# Patient Record
Sex: FEMALE | Race: OTHER | HISPANIC OR LATINO
[De-identification: names, ages, dates, MRNs, and addresses within clinical notes are randomized per-mention and may not be internally consistent; named-entity substitution may affect disease eponyms.]

---

## 2017-01-03 ENCOUNTER — APPOINTMENT (OUTPATIENT)
Dept: SURGERY | Facility: CLINIC | Age: 42
End: 2017-01-03

## 2017-01-03 VITALS
WEIGHT: 159.13 LBS | TEMPERATURE: 98.2 F | HEIGHT: 61 IN | DIASTOLIC BLOOD PRESSURE: 80 MMHG | HEART RATE: 73 BPM | OXYGEN SATURATION: 98 % | BODY MASS INDEX: 30.04 KG/M2 | SYSTOLIC BLOOD PRESSURE: 118 MMHG

## 2017-01-03 RX ORDER — ERGOCALCIFEROL 1.25 MG/1
1.25 MG CAPSULE, LIQUID FILLED ORAL
Qty: 4 | Refills: 0 | Status: ACTIVE | COMMUNITY
Start: 2016-12-22

## 2017-01-05 ENCOUNTER — OUTPATIENT (OUTPATIENT)
Dept: OUTPATIENT SERVICES | Facility: HOSPITAL | Age: 42
LOS: 1 days | End: 2017-01-05
Payer: COMMERCIAL

## 2017-01-05 DIAGNOSIS — Z90.49 ACQUIRED ABSENCE OF OTHER SPECIFIED PARTS OF DIGESTIVE TRACT: Chronic | ICD-10-CM

## 2017-01-05 DIAGNOSIS — Z90.710 ACQUIRED ABSENCE OF BOTH CERVIX AND UTERUS: Chronic | ICD-10-CM

## 2017-01-05 PROCEDURE — 74245: CPT

## 2017-01-05 PROCEDURE — 74245: CPT | Mod: 26

## 2017-01-17 ENCOUNTER — OUTPATIENT (OUTPATIENT)
Dept: OUTPATIENT SERVICES | Facility: HOSPITAL | Age: 42
LOS: 1 days | End: 2017-01-17
Payer: COMMERCIAL

## 2017-01-17 ENCOUNTER — APPOINTMENT (OUTPATIENT)
Dept: SURGERY | Facility: CLINIC | Age: 42
End: 2017-01-17

## 2017-01-17 ENCOUNTER — LABORATORY RESULT (OUTPATIENT)
Age: 42
End: 2017-01-17

## 2017-01-17 VITALS
TEMPERATURE: 97.8 F | BODY MASS INDEX: 30.8 KG/M2 | OXYGEN SATURATION: 98 % | HEART RATE: 58 BPM | HEIGHT: 61 IN | WEIGHT: 163.13 LBS | SYSTOLIC BLOOD PRESSURE: 132 MMHG | DIASTOLIC BLOOD PRESSURE: 82 MMHG

## 2017-01-17 DIAGNOSIS — Z90.710 ACQUIRED ABSENCE OF BOTH CERVIX AND UTERUS: Chronic | ICD-10-CM

## 2017-01-17 DIAGNOSIS — Z90.49 ACQUIRED ABSENCE OF OTHER SPECIFIED PARTS OF DIGESTIVE TRACT: Chronic | ICD-10-CM

## 2017-01-17 DIAGNOSIS — Z01.818 ENCOUNTER FOR OTHER PREPROCEDURAL EXAMINATION: ICD-10-CM

## 2017-01-17 DIAGNOSIS — E56.9 VITAMIN DEFICIENCY, UNSPECIFIED: ICD-10-CM

## 2017-01-17 PROCEDURE — 93005 ELECTROCARDIOGRAM TRACING: CPT

## 2017-01-17 PROCEDURE — 71020: CPT | Mod: 26

## 2017-01-17 PROCEDURE — 71046 X-RAY EXAM CHEST 2 VIEWS: CPT

## 2017-01-17 PROCEDURE — 93010 ELECTROCARDIOGRAM REPORT: CPT

## 2017-01-26 ENCOUNTER — OTHER (OUTPATIENT)
Age: 42
End: 2017-01-26

## 2017-01-26 LAB
25(OH)D3 SERPL-MCNC: 17.9 NG/ML
A-TOCOPHEROL VIT E SERPL-MCNC: 10.8 MG/L
ALBUMIN SERPL ELPH-MCNC: 4.3 G/DL
ALP BLD-CCNC: 95 U/L
ALT SERPL-CCNC: 19 U/L
ANION GAP SERPL CALC-SCNC: 15 MMOL/L
APPEARANCE: ABNORMAL
APTT BLD: 34.1 SEC
AST SERPL-CCNC: 17 U/L
BASOPHILS # BLD AUTO: 0.02 K/UL
BASOPHILS NFR BLD AUTO: 0.5 %
BETA+GAMMA TOCOPHEROL SERPL-MCNC: <1 MG/L
BILIRUB SERPL-MCNC: 0.4 MG/DL
BILIRUBIN URINE: NEGATIVE
BLOOD URINE: NEGATIVE
BUN SERPL-MCNC: 10 MG/DL
CA-I SERPL-SCNC: 1.21 MMOL/L
CALCIUM SERPL-MCNC: 9.6 MG/DL
CALCIUM SERPL-MCNC: 9.6 MG/DL
CHLORIDE SERPL-SCNC: 103 MMOL/L
CHOLEST SERPL-MCNC: 218 MG/DL
CHOLEST/HDLC SERPL: 3 RATIO
CO2 SERPL-SCNC: 23 MMOL/L
COLOR: YELLOW
CREAT SERPL-MCNC: 0.58 MG/DL
EOSINOPHIL # BLD AUTO: 0.07 K/UL
EOSINOPHIL NFR BLD AUTO: 1.6 %
FOLATE SERPL-MCNC: 8.6 NG/ML
GLUCOSE QUALITATIVE U: NORMAL
GLUCOSE SERPL-MCNC: 96 MG/DL
HBA1C MFR BLD HPLC: 5.5 %
HCT VFR BLD CALC: 39.6 %
HDLC SERPL-MCNC: 72 MG/DL
HGB BLD-MCNC: 13.1 G/DL
IMM GRANULOCYTES NFR BLD AUTO: 0 %
INR PPP: 1.03 RATIO
IRON SATN MFR SERPL: 36 %
IRON SERPL-MCNC: 126 UG/DL
KETONES URINE: NEGATIVE
LDLC SERPL CALC-MCNC: 121 MG/DL
LEUKOCYTE ESTERASE URINE: NEGATIVE
LYMPHOCYTES # BLD AUTO: 2.09 K/UL
LYMPHOCYTES NFR BLD AUTO: 47.2 %
MAN DIFF?: NORMAL
MCHC RBC-ENTMCNC: 28.1 PG
MCHC RBC-ENTMCNC: 33.1 GM/DL
MCV RBC AUTO: 84.8 FL
MONOCYTES # BLD AUTO: 0.31 K/UL
MONOCYTES NFR BLD AUTO: 7 %
NEUTROPHILS # BLD AUTO: 1.94 K/UL
NEUTROPHILS NFR BLD AUTO: 43.7 %
NITRITE URINE: NEGATIVE
PARATHYROID HORMONE INTACT: 57 PG/ML
PH URINE: 6
PLATELET # BLD AUTO: 268 K/UL
POTASSIUM SERPL-SCNC: 4.2 MMOL/L
PREALB SERPL NEPH-MCNC: 25 MG/DL
PROT SERPL-MCNC: 6.9 G/DL
PROTEIN URINE: NEGATIVE
PT BLD: 11.6 SEC
RBC # BLD: 4.67 M/UL
RBC # FLD: 19.8 %
SODIUM SERPL-SCNC: 141 MMOL/L
SPECIFIC GRAVITY URINE: 1.02
TIBC SERPL-MCNC: 352 UG/DL
TRIGL SERPL-MCNC: 123 MG/DL
TSH SERPL-ACNC: 0.71 UIU/ML
UIBC SERPL-MCNC: 226 UG/DL
UROBILINOGEN URINE: NORMAL
VIT A SERPL-MCNC: 45 UG/DL
VIT B1 SERPL-MCNC: 144.6 NMOL/L
VIT B12 SERPL-MCNC: 783 PG/ML
WBC # FLD AUTO: 4.43 K/UL
ZINC SERPL-MCNC: 94 UG/DL

## 2017-01-26 RX ORDER — ERGOCALCIFEROL 1.25 MG/1
1.25 MG CAPSULE, LIQUID FILLED ORAL
Qty: 4 | Refills: 2 | Status: ACTIVE | OUTPATIENT
Start: 2017-01-26

## 2017-01-27 ENCOUNTER — OTHER (OUTPATIENT)
Age: 42
End: 2017-01-27

## 2017-02-01 ENCOUNTER — RESULT REVIEW (OUTPATIENT)
Age: 42
End: 2017-02-01

## 2017-02-02 ENCOUNTER — OUTPATIENT (OUTPATIENT)
Dept: OUTPATIENT SERVICES | Facility: HOSPITAL | Age: 42
LOS: 1 days | Discharge: HOME | End: 2017-02-02
Payer: COMMERCIAL

## 2017-02-02 VITALS
HEART RATE: 65 BPM | TEMPERATURE: 98 F | WEIGHT: 156.53 LBS | DIASTOLIC BLOOD PRESSURE: 63 MMHG | RESPIRATION RATE: 15 BRPM | SYSTOLIC BLOOD PRESSURE: 118 MMHG | HEIGHT: 61 IN

## 2017-02-02 DIAGNOSIS — Z90.710 ACQUIRED ABSENCE OF BOTH CERVIX AND UTERUS: Chronic | ICD-10-CM

## 2017-02-02 DIAGNOSIS — R10.9 UNSPECIFIED ABDOMINAL PAIN: ICD-10-CM

## 2017-02-02 DIAGNOSIS — Z90.49 ACQUIRED ABSENCE OF OTHER SPECIFIED PARTS OF DIGESTIVE TRACT: Chronic | ICD-10-CM

## 2017-02-02 PROCEDURE — 49320 DIAG LAPARO SEPARATE PROC: CPT | Mod: 59,GC

## 2017-02-02 PROCEDURE — 43848 REVJ OPEN GSTR RSTCV PX: CPT | Mod: GC

## 2017-02-02 RX ORDER — OXYBUTYNIN CHLORIDE 5 MG
5 TABLET ORAL DAILY
Qty: 0 | Refills: 0 | Status: DISCONTINUED | OUTPATIENT
Start: 2017-02-02 | End: 2017-02-04

## 2017-02-02 RX ORDER — HYDROMORPHONE HYDROCHLORIDE 2 MG/ML
0.5 INJECTION INTRAMUSCULAR; INTRAVENOUS; SUBCUTANEOUS EVERY 4 HOURS
Qty: 0 | Refills: 0 | Status: DISCONTINUED | OUTPATIENT
Start: 2017-02-02 | End: 2017-02-04

## 2017-02-02 RX ORDER — SODIUM CHLORIDE 9 MG/ML
1000 INJECTION, SOLUTION INTRAVENOUS
Qty: 0 | Refills: 0 | Status: DISCONTINUED | OUTPATIENT
Start: 2017-02-02 | End: 2017-02-04

## 2017-02-02 RX ORDER — HEPARIN SODIUM 5000 [USP'U]/ML
5000 INJECTION INTRAVENOUS; SUBCUTANEOUS EVERY 8 HOURS
Qty: 0 | Refills: 0 | Status: DISCONTINUED | OUTPATIENT
Start: 2017-02-02 | End: 2017-02-04

## 2017-02-02 RX ORDER — HYDROMORPHONE HYDROCHLORIDE 2 MG/ML
0.5 INJECTION INTRAMUSCULAR; INTRAVENOUS; SUBCUTANEOUS
Qty: 0 | Refills: 0 | Status: DISCONTINUED | OUTPATIENT
Start: 2017-02-02 | End: 2017-02-02

## 2017-02-02 RX ORDER — ONDANSETRON 8 MG/1
4 TABLET, FILM COATED ORAL EVERY 6 HOURS
Qty: 0 | Refills: 0 | Status: DISCONTINUED | OUTPATIENT
Start: 2017-02-02 | End: 2017-02-04

## 2017-02-02 RX ADMIN — HYDROMORPHONE HYDROCHLORIDE 0.5 MILLIGRAM(S): 2 INJECTION INTRAMUSCULAR; INTRAVENOUS; SUBCUTANEOUS at 13:46

## 2017-02-02 RX ADMIN — HYDROMORPHONE HYDROCHLORIDE 0.5 MILLIGRAM(S): 2 INJECTION INTRAMUSCULAR; INTRAVENOUS; SUBCUTANEOUS at 13:19

## 2017-02-02 RX ADMIN — HYDROMORPHONE HYDROCHLORIDE 0.5 MILLIGRAM(S): 2 INJECTION INTRAMUSCULAR; INTRAVENOUS; SUBCUTANEOUS at 12:40

## 2017-02-02 RX ADMIN — SODIUM CHLORIDE 100 MILLILITER(S): 9 INJECTION, SOLUTION INTRAVENOUS at 22:33

## 2017-02-02 RX ADMIN — HEPARIN SODIUM 5000 UNIT(S): 5000 INJECTION INTRAVENOUS; SUBCUTANEOUS at 22:31

## 2017-02-02 RX ADMIN — HYDROMORPHONE HYDROCHLORIDE 0.5 MILLIGRAM(S): 2 INJECTION INTRAMUSCULAR; INTRAVENOUS; SUBCUTANEOUS at 13:49

## 2017-02-02 RX ADMIN — HYDROMORPHONE HYDROCHLORIDE 0.5 MILLIGRAM(S): 2 INJECTION INTRAMUSCULAR; INTRAVENOUS; SUBCUTANEOUS at 17:30

## 2017-02-02 RX ADMIN — HYDROMORPHONE HYDROCHLORIDE 0.5 MILLIGRAM(S): 2 INJECTION INTRAMUSCULAR; INTRAVENOUS; SUBCUTANEOUS at 17:15

## 2017-02-02 NOTE — H&P ADULT. - ASSESSMENT
41F with history of gastric bypass complicated by intussusception and abdominal pain, presents for possible gastric bypass revision.

## 2017-02-02 NOTE — H&P ADULT. - PROBLEM SELECTOR PLAN 1
- Admit to surgery, regional bed  - OR for diagnostic laparoscopy and possible gastric bypass revision

## 2017-02-02 NOTE — ASU PREOP CHECKLIST - SELECT TESTS ORDERED
PT/PTT/CXR/CBC/UGI series, CT of Abd/pelvic/CMP/Urinalysis/EKG CBC/CXR/EKG/Urinalysis/UGI series, CT of Abd/pelvic; ICON-NEGATIVE/PT/PTT/CMP

## 2017-02-02 NOTE — ASU PATIENT PROFILE, ADULT - PSH
H/O abdominal hysterectomy    History of cholecystectomy    Status post bariatric surgery  Gastric bypass status for obesity

## 2017-02-02 NOTE — H&P ADULT. - HISTORY OF PRESENT ILLNESS
Patient is a 41 year old female with remote history of gastric bypass, who presented several months ago to the ER with abdominal pain, found to be caused by intussusception involving the jejunojejunostomy. She now presents for elective diagnostic laparoscopy and possible revision of her gastric bypass.

## 2017-02-02 NOTE — BRIEF OPERATIVE NOTE - OPERATION/FINDINGS
Diagnostic laparoscopy, Resection JJ anastomosis. Small bowel anastomosis x 2.    dilated JJ anastomosis.

## 2017-02-03 LAB
ANION GAP SERPL CALC-SCNC: 8 MMOL/L — LOW (ref 9–16)
BUN SERPL-MCNC: 11 MG/DL — SIGNIFICANT CHANGE UP (ref 7–23)
CALCIUM SERPL-MCNC: 8.1 MG/DL — LOW (ref 8.5–10.5)
CHLORIDE SERPL-SCNC: 104 MMOL/L — SIGNIFICANT CHANGE UP (ref 96–108)
CO2 SERPL-SCNC: 25 MMOL/L — SIGNIFICANT CHANGE UP (ref 22–31)
CREAT SERPL-MCNC: 0.38 MG/DL — LOW (ref 0.5–1.3)
GLUCOSE SERPL-MCNC: 121 MG/DL — HIGH (ref 70–99)
HCT VFR BLD CALC: 30.7 % — LOW (ref 34.5–45)
HGB BLD-MCNC: 10.2 G/DL — LOW (ref 11.5–15.5)
MAGNESIUM SERPL-MCNC: 1.9 MG/DL — SIGNIFICANT CHANGE UP (ref 1.6–2.4)
MCHC RBC-ENTMCNC: 28.3 PG — SIGNIFICANT CHANGE UP (ref 27–34)
MCHC RBC-ENTMCNC: 33.2 G/DL — SIGNIFICANT CHANGE UP (ref 32–36)
MCV RBC AUTO: 85.3 FL — SIGNIFICANT CHANGE UP (ref 80–100)
PHOSPHATE SERPL-MCNC: 3.2 MG/DL — SIGNIFICANT CHANGE UP (ref 2.5–4.5)
PLATELET # BLD AUTO: 249 K/UL — SIGNIFICANT CHANGE UP (ref 150–400)
POTASSIUM SERPL-MCNC: 3.4 MMOL/L — LOW (ref 3.5–5.3)
POTASSIUM SERPL-SCNC: 3.4 MMOL/L — LOW (ref 3.5–5.3)
RBC # BLD: 3.6 M/UL — LOW (ref 3.8–5.2)
RBC # FLD: 16.3 % — SIGNIFICANT CHANGE UP (ref 10.3–16.9)
SODIUM SERPL-SCNC: 137 MMOL/L — SIGNIFICANT CHANGE UP (ref 135–145)
WBC # BLD: 10.3 K/UL — SIGNIFICANT CHANGE UP (ref 3.8–10.5)
WBC # FLD AUTO: 10.3 K/UL — SIGNIFICANT CHANGE UP (ref 3.8–10.5)

## 2017-02-03 RX ORDER — POTASSIUM CHLORIDE 20 MEQ
10 PACKET (EA) ORAL
Qty: 0 | Refills: 0 | Status: COMPLETED | OUTPATIENT
Start: 2017-02-03 | End: 2017-02-03

## 2017-02-03 RX ORDER — HYDROMORPHONE HYDROCHLORIDE 2 MG/ML
0.5 INJECTION INTRAMUSCULAR; INTRAVENOUS; SUBCUTANEOUS ONCE
Qty: 0 | Refills: 0 | Status: DISCONTINUED | OUTPATIENT
Start: 2017-02-03 | End: 2017-02-03

## 2017-02-03 RX ORDER — SIMETHICONE 80 MG/1
80 TABLET, CHEWABLE ORAL ONCE
Qty: 0 | Refills: 0 | Status: COMPLETED | OUTPATIENT
Start: 2017-02-03 | End: 2017-02-03

## 2017-02-03 RX ADMIN — HYDROMORPHONE HYDROCHLORIDE 0.5 MILLIGRAM(S): 2 INJECTION INTRAMUSCULAR; INTRAVENOUS; SUBCUTANEOUS at 09:41

## 2017-02-03 RX ADMIN — Medication 100 MILLIEQUIVALENT(S): at 16:02

## 2017-02-03 RX ADMIN — SIMETHICONE 80 MILLIGRAM(S): 80 TABLET, CHEWABLE ORAL at 09:26

## 2017-02-03 RX ADMIN — SODIUM CHLORIDE 100 MILLILITER(S): 9 INJECTION, SOLUTION INTRAVENOUS at 22:05

## 2017-02-03 RX ADMIN — ONDANSETRON 4 MILLIGRAM(S): 8 TABLET, FILM COATED ORAL at 17:40

## 2017-02-03 RX ADMIN — HYDROMORPHONE HYDROCHLORIDE 0.5 MILLIGRAM(S): 2 INJECTION INTRAMUSCULAR; INTRAVENOUS; SUBCUTANEOUS at 03:40

## 2017-02-03 RX ADMIN — HEPARIN SODIUM 5000 UNIT(S): 5000 INJECTION INTRAVENOUS; SUBCUTANEOUS at 22:03

## 2017-02-03 RX ADMIN — HYDROMORPHONE HYDROCHLORIDE 0.5 MILLIGRAM(S): 2 INJECTION INTRAMUSCULAR; INTRAVENOUS; SUBCUTANEOUS at 06:21

## 2017-02-03 RX ADMIN — HEPARIN SODIUM 5000 UNIT(S): 5000 INJECTION INTRAVENOUS; SUBCUTANEOUS at 13:14

## 2017-02-03 RX ADMIN — HYDROMORPHONE HYDROCHLORIDE 0.5 MILLIGRAM(S): 2 INJECTION INTRAMUSCULAR; INTRAVENOUS; SUBCUTANEOUS at 04:00

## 2017-02-03 RX ADMIN — ONDANSETRON 4 MILLIGRAM(S): 8 TABLET, FILM COATED ORAL at 09:38

## 2017-02-03 RX ADMIN — HEPARIN SODIUM 5000 UNIT(S): 5000 INJECTION INTRAVENOUS; SUBCUTANEOUS at 06:21

## 2017-02-03 RX ADMIN — Medication 100 MILLIEQUIVALENT(S): at 17:40

## 2017-02-03 RX ADMIN — HYDROMORPHONE HYDROCHLORIDE 0.5 MILLIGRAM(S): 2 INJECTION INTRAMUSCULAR; INTRAVENOUS; SUBCUTANEOUS at 16:15

## 2017-02-03 RX ADMIN — HYDROMORPHONE HYDROCHLORIDE 0.5 MILLIGRAM(S): 2 INJECTION INTRAMUSCULAR; INTRAVENOUS; SUBCUTANEOUS at 06:56

## 2017-02-03 RX ADMIN — HYDROMORPHONE HYDROCHLORIDE 0.5 MILLIGRAM(S): 2 INJECTION INTRAMUSCULAR; INTRAVENOUS; SUBCUTANEOUS at 09:26

## 2017-02-03 RX ADMIN — Medication 5 MILLIGRAM(S): at 11:08

## 2017-02-03 RX ADMIN — Medication 100 MILLIEQUIVALENT(S): at 13:14

## 2017-02-03 RX ADMIN — HYDROMORPHONE HYDROCHLORIDE 0.5 MILLIGRAM(S): 2 INJECTION INTRAMUSCULAR; INTRAVENOUS; SUBCUTANEOUS at 16:00

## 2017-02-03 NOTE — PROGRESS NOTE ADULT - SUBJECTIVE AND OBJECTIVE BOX
O/N: Afebrile, VSS. LANA  2/2: S/P Diagnostic laparoscopy, Resection JJ anastomosis. Small bowel anastomosis x 2  POC WNL, VSS     STATUS POST: Diagnostic laparoscopy, Resection JJ anastomosis. Small bowel anastomosis x 2    POST OP DAY #: 1 O/N: Afebrile, VSS. LANA  2/2: S/P Diagnostic laparoscopy, Resection JJ anastomosis. Small bowel anastomosis x 2  POC WNL, VSS     STATUS POST: Diagnostic laparoscopy, Resection JJ anastomosis. Small bowel anastomosis x 2    POST OP DAY #: 1    SUBJECTIVE: Pt seen and examined at bedside. No overnight events.  Having pain. Some nausea    Vital Signs Last 24 Hrs  T(C): 36.9, Max: 37.4 (02-02 @ 17:15)  T(F): 98.5, Max: 99.4 (02-02 @ 17:15)  HR: 68 (62 - 83)  BP: 105/67 (93/57 - 138/69)  BP(mean): --  RR: 16 (15 - 17)  SpO2: 98% (95% - 98%)    PHYSICAL EXAM    Gen: NAD  Abd: Soft, nondistended, tender, incisions CDI      I&O's Detail    I & Os for current day (as of 03 Feb 2017 07:22)  =============================================  IN:    lactated ringers.: 1900 ml    Oral Fluid: 400 ml    Total IN: 2300 ml  ---------------------------------------------  OUT:    Voided: 950 ml    Total OUT: 950 ml  ---------------------------------------------  Total NET: 1350 ml      LABS:                MEDICATIONS  (STANDING):  heparin  Injectable 5000Unit(s) SubCutaneous every 8 hours  lactated ringers. 1000milliLiter(s) IV Continuous <Continuous>  oxybutynin 5milliGRAM(s) Oral daily    MEDICATIONS  (PRN):  oxyCODONE  5 mG/acetaminophen 325 mG 1Tablet(s) Oral every 4 hours PRN Moderate Pain  oxyCODONE  5 mG/acetaminophen 325 mG 2Tablet(s) Oral every 4 hours PRN Severe Pain  HYDROmorphone  Injectable 0.5milliGRAM(s) IV Push every 4 hours PRN Breakthrough pain only  ondansetron Injectable 4milliGRAM(s) IV Push every 6 hours PRN Nausea      RADIOLOGY & ADDITIONAL STUDIES:    ASSESSMENT AND PLAN

## 2017-02-04 ENCOUNTER — TRANSCRIPTION ENCOUNTER (OUTPATIENT)
Age: 42
End: 2017-02-04

## 2017-02-04 VITALS
OXYGEN SATURATION: 99 % | DIASTOLIC BLOOD PRESSURE: 63 MMHG | RESPIRATION RATE: 16 BRPM | HEART RATE: 61 BPM | SYSTOLIC BLOOD PRESSURE: 102 MMHG | TEMPERATURE: 99 F

## 2017-02-04 LAB
ANION GAP SERPL CALC-SCNC: 8 MMOL/L — LOW (ref 9–16)
BUN SERPL-MCNC: 6 MG/DL — LOW (ref 7–23)
CALCIUM SERPL-MCNC: 8.5 MG/DL — SIGNIFICANT CHANGE UP (ref 8.5–10.5)
CHLORIDE SERPL-SCNC: 104 MMOL/L — SIGNIFICANT CHANGE UP (ref 96–108)
CO2 SERPL-SCNC: 28 MMOL/L — SIGNIFICANT CHANGE UP (ref 22–31)
CREAT SERPL-MCNC: 0.5 MG/DL — SIGNIFICANT CHANGE UP (ref 0.5–1.3)
GLUCOSE SERPL-MCNC: 88 MG/DL — SIGNIFICANT CHANGE UP (ref 70–99)
HCT VFR BLD CALC: 29.7 % — LOW (ref 34.5–45)
HGB BLD-MCNC: 9.7 G/DL — LOW (ref 11.5–15.5)
MAGNESIUM SERPL-MCNC: 1.7 MG/DL — SIGNIFICANT CHANGE UP (ref 1.6–2.4)
MCHC RBC-ENTMCNC: 28.1 PG — SIGNIFICANT CHANGE UP (ref 27–34)
MCHC RBC-ENTMCNC: 32.7 G/DL — SIGNIFICANT CHANGE UP (ref 32–36)
MCV RBC AUTO: 86.1 FL — SIGNIFICANT CHANGE UP (ref 80–100)
PHOSPHATE SERPL-MCNC: 2.7 MG/DL — SIGNIFICANT CHANGE UP (ref 2.5–4.5)
PLATELET # BLD AUTO: 247 K/UL — SIGNIFICANT CHANGE UP (ref 150–400)
POTASSIUM SERPL-MCNC: 3.6 MMOL/L — SIGNIFICANT CHANGE UP (ref 3.5–5.3)
POTASSIUM SERPL-SCNC: 3.6 MMOL/L — SIGNIFICANT CHANGE UP (ref 3.5–5.3)
RBC # BLD: 3.45 M/UL — LOW (ref 3.8–5.2)
RBC # FLD: 16.2 % — SIGNIFICANT CHANGE UP (ref 10.3–16.9)
SODIUM SERPL-SCNC: 140 MMOL/L — SIGNIFICANT CHANGE UP (ref 135–145)
WBC # BLD: 5.8 K/UL — SIGNIFICANT CHANGE UP (ref 3.8–10.5)
WBC # FLD AUTO: 5.8 K/UL — SIGNIFICANT CHANGE UP (ref 3.8–10.5)

## 2017-02-04 PROCEDURE — 86900 BLOOD TYPING SEROLOGIC ABO: CPT

## 2017-02-04 PROCEDURE — 36415 COLL VENOUS BLD VENIPUNCTURE: CPT

## 2017-02-04 PROCEDURE — 84100 ASSAY OF PHOSPHORUS: CPT

## 2017-02-04 PROCEDURE — 43659 UNLISTED LAPS PX STOMACH: CPT

## 2017-02-04 PROCEDURE — 86901 BLOOD TYPING SEROLOGIC RH(D): CPT

## 2017-02-04 PROCEDURE — 83735 ASSAY OF MAGNESIUM: CPT

## 2017-02-04 PROCEDURE — 86850 RBC ANTIBODY SCREEN: CPT

## 2017-02-04 PROCEDURE — 85027 COMPLETE CBC AUTOMATED: CPT

## 2017-02-04 PROCEDURE — 88307 TISSUE EXAM BY PATHOLOGIST: CPT

## 2017-02-04 PROCEDURE — 80048 BASIC METABOLIC PNL TOTAL CA: CPT

## 2017-02-04 RX ORDER — ERGOCALCIFEROL 1.25 MG/1
1 CAPSULE ORAL
Qty: 0 | Refills: 0 | COMMUNITY

## 2017-02-04 RX ORDER — OXYBUTYNIN CHLORIDE 5 MG
1 TABLET ORAL
Qty: 0 | Refills: 0 | COMMUNITY

## 2017-02-04 RX ORDER — MAGNESIUM SULFATE 500 MG/ML
1 VIAL (ML) INJECTION ONCE
Qty: 0 | Refills: 0 | Status: COMPLETED | OUTPATIENT
Start: 2017-02-04 | End: 2017-02-04

## 2017-02-04 RX ORDER — PANTOPRAZOLE SODIUM 20 MG/1
1 TABLET, DELAYED RELEASE ORAL
Qty: 30 | Refills: 0
Start: 2017-02-04 | End: 2017-03-06

## 2017-02-04 RX ORDER — POTASSIUM CHLORIDE 20 MEQ
40 PACKET (EA) ORAL ONCE
Qty: 0 | Refills: 0 | Status: COMPLETED | OUTPATIENT
Start: 2017-02-04 | End: 2017-02-04

## 2017-02-04 RX ADMIN — Medication 100 GRAM(S): at 13:49

## 2017-02-04 RX ADMIN — Medication 5 MILLIGRAM(S): at 13:50

## 2017-02-04 RX ADMIN — HEPARIN SODIUM 5000 UNIT(S): 5000 INJECTION INTRAVENOUS; SUBCUTANEOUS at 05:45

## 2017-02-04 RX ADMIN — ONDANSETRON 4 MILLIGRAM(S): 8 TABLET, FILM COATED ORAL at 00:56

## 2017-02-04 RX ADMIN — Medication 40 MILLIEQUIVALENT(S): at 13:50

## 2017-02-04 RX ADMIN — HEPARIN SODIUM 5000 UNIT(S): 5000 INJECTION INTRAVENOUS; SUBCUTANEOUS at 13:50

## 2017-02-04 NOTE — DISCHARGE NOTE ADULT - CARE PROVIDER_API CALL
Lane Jack (MD), Cardiovascular Disease; Internal Medicine  [address] [place] [address]  Phone: [phone]  Fax: [phone]

## 2017-02-04 NOTE — DISCHARGE NOTE ADULT - MEDICATION SUMMARY - MEDICATIONS TO TAKE
I will START or STAY ON the medications listed below when I get home from the hospital:    Percocet 5/325 325 mg-5 mg oral tablet  -- 1 tab(s) by mouth every 4 to 6 hours, As Needed -for severe pain MDD:6  -- Caution federal law prohibits the transfer of this drug to any person other  than the person for whom it was prescribed.  May cause drowsiness.  Alcohol may intensify this effect.  Use care when operating dangerous machinery.  This prescription cannot be refilled.  This product contains acetaminophen.  Do not use  with any other product containing acetaminophen to prevent possible liver damage.  Using more of this medication than prescribed may cause serious breathing problems.    -- Indication: For Post operative pain control    ferrous sulfate 325 mg (65 mg elemental iron) oral tablet  -- 1 tab(s) by mouth 3 times a day  -- Indication: For Home medication     tiZANidine 2 mg oral capsule  -- 1 cap(s) by mouth once (at bedtime)  -- Indication: For Home medication     Protonix 40 mg oral delayed release tablet  -- 1 tab(s) by mouth once a day MDD:1  -- It is very important that you take or use this exactly as directed.  Do not skip doses or discontinue unless directed by your doctor.  Obtain medical advice before taking any non-prescription drugs as some may affect the action of this medication.  Swallow whole.  Do not crush.    -- Indication: For Acid reflux     cyanocobalamin 2500 mcg sublingual tablet  -- 1 tab(s) under tongue once a day  -- Indication: For Home medication

## 2017-02-04 NOTE — DISCHARGE NOTE ADULT - PATIENT PORTAL LINK FT
“You can access the [employer] Patient Portal, offered by [facility], by registering with the following website: [email or web address]”

## 2017-02-04 NOTE — DISCHARGE NOTE ADULT - PLAN OF CARE
recovery Follow up with  in 1 week. Call the office at  to schedule your appointment. You may shower; soap and water over incision sites. Do not scrub. Pat dry when done. No tub bathing or swimming until cleared. Keep incision sites out of the sun as scars will darken. No heavy lifting (>10lbs) or strenuous exercise. Diet: please continue clear liquid diet and advance as tolerated Call the office if you experience increasing abdominal pain, nausea, vomiting, or temperature >100.4F.  NO ASPIRIN OR NSAIDs until approved by Dr. Campos. Avoid alcoholic beverages until cleared by Dr. Campos.

## 2017-02-04 NOTE — PROGRESS NOTE ADULT - SUBJECTIVE AND OBJECTIVE BOX
O/N: Tolerating CLD. VSS. No flatus  2/3: tolerating clears, OOB, c/o pain    STATUS POST: Diagnostic laparoscopy, Resection JJ anastomosis. Small bowel anastomosis x 2    POST OP DAY #: 2 O/N: Tolerating CLD. VSS. No flatus  2/3: tolerating clears, OOB, c/o pain    STATUS POST: Diagnostic laparoscopy, Resection JJ anastomosis. Small bowel anastomosis x 2    POST OP DAY #: 2      SUBJECTIVE:  Patient complains of incisional pain  Flatus: [] YES [ X} NO             Bowel Movement: [ ] YES [ X] NO  Pain (0-10):            Pain Control Adequate: [X ] YES [ ] NO  Nausea: [ ] YES [X ] NO            Vomiting: [ ] YES [X ] NO  Diarrhea: [ ] YES [X ] NO         Constipation: [ ] YES [X ] NO     Chest Pain: [ ] YES [X ] NO    SOB:  [ ] YES [X ] NO    heparin  Injectable 5000Unit(s) SubCutaneous every 8 hours      Vital Signs Last 24 Hrs  T(C): 37.1, Max: 37.1 (02-04 @ 05:38)  T(F): 98.7, Max: 98.7 (02-04 @ 05:38)  HR: 73 (64 - 85)  BP: 104/67 (104/67 - 111/72)  BP(mean): --  RR: 17 (14 - 17)  SpO2: 91% (91% - 98%)  I&O's Detail    I & Os for current day (as of 04 Feb 2017 11:19)  =============================================  IN:    lactated ringers.: 2400 ml    Oral Fluid: 820 ml    Total IN: 3220 ml  ---------------------------------------------  OUT:    Voided: 3780 ml    Total OUT: 3780 ml  ---------------------------------------------  Total NET: -560 ml      General: NAD, resting comfortably in bed  C/V: NSR  Pulm: Nonlabored breathing, no respiratory distress  Abd: soft, non distended , TTP  around incision site , incision clean dry and intact   Extrem: WWP, no edema, SCDs in place        LABS:                        9.7    5.8   )-----------( 247      ( 04 Feb 2017 07:05 )             29.7     04 Feb 2017 07:03    140    |  104    |  6      ----------------------------<  88     3.6     |  28     |  0.50     Ca    8.5        04 Feb 2017 07:03  Phos  2.7       04 Feb 2017 07:03  Mg     1.7       04 Feb 2017 07:03            RADIOLOGY & ADDITIONAL STUDIES:

## 2017-02-04 NOTE — DISCHARGE NOTE ADULT - CARE PLAN
Principal Discharge DX:	Abdominal pain  Goal:	recovery  Instructions for follow-up, activity and diet:	Follow up with  in 1 week. Call the office at  to schedule your appointment. You may shower; soap and water over incision sites. Do not scrub. Pat dry when done. No tub bathing or swimming until cleared. Keep incision sites out of the sun as scars will darken. No heavy lifting (>10lbs) or strenuous exercise. Diet: please continue clear liquid diet and advance as tolerated Call the office if you experience increasing abdominal pain, nausea, vomiting, or temperature >100.4F.  NO ASPIRIN OR NSAIDs until approved by Dr. Campos. Avoid alcoholic beverages until cleared by Dr. Campos.

## 2017-02-04 NOTE — DISCHARGE NOTE ADULT - HOSPITAL COURSE
41 year old female with remote history of gastric bypass, who presented several months ago to the ER with abdominal pain, found to be caused by intussusception involving the jejunojejunostomy. She now presents for elective diagnostic laparoscopy and possible revision of her gastric bypass.     2/2:Diagnostic laparoscopy, Resection JJ anastomosis. Small bowel anastomosis x 2  Pt tolerated the procedure well. At time of discharge, pt was tolerating a clear liquid diet, and pt's pain was controlled. Patient discharged on clear liquid diet and  advance as tolerated. Plan is to follow up with Dr. Campos in the office.

## 2017-02-08 DIAGNOSIS — D64.9 ANEMIA, UNSPECIFIED: ICD-10-CM

## 2017-02-08 DIAGNOSIS — K56.1 INTUSSUSCEPTION: ICD-10-CM

## 2017-02-08 DIAGNOSIS — Z98.84 BARIATRIC SURGERY STATUS: ICD-10-CM

## 2017-02-08 LAB — SURGICAL PATHOLOGY STUDY: SIGNIFICANT CHANGE UP

## 2017-02-10 ENCOUNTER — APPOINTMENT (OUTPATIENT)
Dept: SURGERY | Facility: CLINIC | Age: 42
End: 2017-02-10

## 2017-02-10 VITALS
HEIGHT: 61 IN | BODY MASS INDEX: 28.7 KG/M2 | TEMPERATURE: 98.3 F | OXYGEN SATURATION: 99 % | WEIGHT: 152 LBS | SYSTOLIC BLOOD PRESSURE: 115 MMHG | DIASTOLIC BLOOD PRESSURE: 74 MMHG | HEART RATE: 69 BPM

## 2017-02-10 RX ORDER — PANTOPRAZOLE 40 MG/1
40 TABLET, DELAYED RELEASE ORAL DAILY
Qty: 30 | Refills: 2 | Status: ACTIVE | OUTPATIENT
Start: 2017-02-10

## 2017-02-13 ENCOUNTER — APPOINTMENT (OUTPATIENT)
Dept: BREAST CENTER | Facility: CLINIC | Age: 42
End: 2017-02-13

## 2017-02-13 DIAGNOSIS — N64.4 MASTODYNIA: ICD-10-CM

## 2017-02-13 DIAGNOSIS — N60.19 DIFFUSE CYSTIC MASTOPATHY OF UNSPECIFIED BREAST: ICD-10-CM

## 2017-03-03 ENCOUNTER — APPOINTMENT (OUTPATIENT)
Dept: SURGERY | Facility: CLINIC | Age: 42
End: 2017-03-03

## 2017-03-03 VITALS
SYSTOLIC BLOOD PRESSURE: 123 MMHG | OXYGEN SATURATION: 98 % | HEART RATE: 64 BPM | HEIGHT: 61 IN | BODY MASS INDEX: 28.89 KG/M2 | WEIGHT: 153 LBS | DIASTOLIC BLOOD PRESSURE: 81 MMHG

## 2017-03-06 ENCOUNTER — APPOINTMENT (OUTPATIENT)
Dept: UROLOGY | Facility: CLINIC | Age: 42
End: 2017-03-06

## 2017-03-06 VITALS
BODY MASS INDEX: 28.89 KG/M2 | TEMPERATURE: 97.9 F | DIASTOLIC BLOOD PRESSURE: 75 MMHG | SYSTOLIC BLOOD PRESSURE: 117 MMHG | WEIGHT: 153 LBS | HEIGHT: 61 IN | HEART RATE: 69 BPM

## 2017-04-22 ENCOUNTER — EMERGENCY (EMERGENCY)
Facility: HOSPITAL | Age: 42
LOS: 1 days | Discharge: HOME | End: 2017-04-22
Attending: EMERGENCY MEDICINE | Admitting: EMERGENCY MEDICINE
Payer: COMMERCIAL

## 2017-04-22 VITALS
HEART RATE: 71 BPM | TEMPERATURE: 99 F | RESPIRATION RATE: 18 BRPM | WEIGHT: 151.9 LBS | DIASTOLIC BLOOD PRESSURE: 71 MMHG | SYSTOLIC BLOOD PRESSURE: 120 MMHG | HEIGHT: 61 IN | OXYGEN SATURATION: 98 %

## 2017-04-22 DIAGNOSIS — Z90.49 ACQUIRED ABSENCE OF OTHER SPECIFIED PARTS OF DIGESTIVE TRACT: Chronic | ICD-10-CM

## 2017-04-22 DIAGNOSIS — Z90.710 ACQUIRED ABSENCE OF BOTH CERVIX AND UTERUS: Chronic | ICD-10-CM

## 2017-04-22 DIAGNOSIS — K29.70 GASTRITIS, UNSPECIFIED, WITHOUT BLEEDING: ICD-10-CM

## 2017-04-22 DIAGNOSIS — Z79.899 OTHER LONG TERM (CURRENT) DRUG THERAPY: ICD-10-CM

## 2017-04-22 DIAGNOSIS — R11.2 NAUSEA WITH VOMITING, UNSPECIFIED: ICD-10-CM

## 2017-04-22 LAB
ALBUMIN SERPL ELPH-MCNC: 3.8 G/DL — SIGNIFICANT CHANGE UP (ref 3.4–5)
ALP SERPL-CCNC: 101 U/L — SIGNIFICANT CHANGE UP (ref 40–120)
ALT FLD-CCNC: 53 U/L — HIGH (ref 12–42)
ANION GAP SERPL CALC-SCNC: 9 MMOL/L — SIGNIFICANT CHANGE UP (ref 9–16)
AST SERPL-CCNC: 41 U/L — HIGH (ref 15–37)
BASOPHILS NFR BLD AUTO: 0.2 % — SIGNIFICANT CHANGE UP (ref 0–2)
BILIRUB SERPL-MCNC: 0.7 MG/DL — SIGNIFICANT CHANGE UP (ref 0.2–1.2)
BUN SERPL-MCNC: 12 MG/DL — SIGNIFICANT CHANGE UP (ref 7–23)
CALCIUM SERPL-MCNC: 8.7 MG/DL — SIGNIFICANT CHANGE UP (ref 8.5–10.5)
CHLORIDE SERPL-SCNC: 101 MMOL/L — SIGNIFICANT CHANGE UP (ref 96–108)
CO2 SERPL-SCNC: 26 MMOL/L — SIGNIFICANT CHANGE UP (ref 22–31)
CREAT SERPL-MCNC: 0.43 MG/DL — LOW (ref 0.5–1.3)
EOSINOPHIL NFR BLD AUTO: 0.5 % — SIGNIFICANT CHANGE UP (ref 0–6)
GLUCOSE SERPL-MCNC: 109 MG/DL — HIGH (ref 70–99)
HCT VFR BLD CALC: 40.4 % — SIGNIFICANT CHANGE UP (ref 34.5–45)
HGB BLD-MCNC: 14 G/DL — SIGNIFICANT CHANGE UP (ref 11.5–15.5)
LACTATE SERPL-SCNC: 1.1 MMOL/L — SIGNIFICANT CHANGE UP (ref 0.5–2)
LIDOCAIN IGE QN: 307 U/L — SIGNIFICANT CHANGE UP (ref 73–393)
LYMPHOCYTES # BLD AUTO: 10.9 % — LOW (ref 13–44)
MCHC RBC-ENTMCNC: 31 PG — SIGNIFICANT CHANGE UP (ref 27–34)
MCHC RBC-ENTMCNC: 34.7 G/DL — SIGNIFICANT CHANGE UP (ref 32–36)
MCV RBC AUTO: 89.6 FL — SIGNIFICANT CHANGE UP (ref 80–100)
MONOCYTES NFR BLD AUTO: 5.9 % — SIGNIFICANT CHANGE UP (ref 2–14)
NEUTROPHILS NFR BLD AUTO: 82.5 % — HIGH (ref 43–77)
PLATELET # BLD AUTO: 316 K/UL — SIGNIFICANT CHANGE UP (ref 150–400)
POTASSIUM SERPL-MCNC: 4.1 MMOL/L — SIGNIFICANT CHANGE UP (ref 3.5–5.3)
POTASSIUM SERPL-SCNC: 4.1 MMOL/L — SIGNIFICANT CHANGE UP (ref 3.5–5.3)
PROT SERPL-MCNC: 7.2 G/DL — SIGNIFICANT CHANGE UP (ref 6.4–8.2)
RBC # BLD: 4.51 M/UL — SIGNIFICANT CHANGE UP (ref 3.8–5.2)
RBC # FLD: 12.5 % — SIGNIFICANT CHANGE UP (ref 10.3–16.9)
SODIUM SERPL-SCNC: 136 MMOL/L — SIGNIFICANT CHANGE UP (ref 135–145)
WBC # BLD: 9.4 K/UL — SIGNIFICANT CHANGE UP (ref 3.8–10.5)
WBC # FLD AUTO: 9.4 K/UL — SIGNIFICANT CHANGE UP (ref 3.8–10.5)

## 2017-04-22 PROCEDURE — 74022 RADEX COMPL AQT ABD SERIES: CPT | Mod: 26

## 2017-04-22 PROCEDURE — 99285 EMERGENCY DEPT VISIT HI MDM: CPT | Mod: 25

## 2017-04-22 PROCEDURE — 93010 ELECTROCARDIOGRAM REPORT: CPT

## 2017-04-22 RX ORDER — MORPHINE SULFATE 50 MG/1
4 CAPSULE, EXTENDED RELEASE ORAL ONCE
Qty: 0 | Refills: 0 | Status: DISCONTINUED | OUTPATIENT
Start: 2017-04-22 | End: 2017-04-22

## 2017-04-22 RX ORDER — ONDANSETRON 8 MG/1
4 TABLET, FILM COATED ORAL ONCE
Qty: 0 | Refills: 0 | Status: COMPLETED | OUTPATIENT
Start: 2017-04-22 | End: 2017-04-22

## 2017-04-22 RX ORDER — IOHEXOL 300 MG/ML
50 INJECTION, SOLUTION INTRAVENOUS ONCE
Qty: 0 | Refills: 0 | Status: COMPLETED | OUTPATIENT
Start: 2017-04-22 | End: 2017-04-22

## 2017-04-22 RX ORDER — MORPHINE SULFATE 50 MG/1
2 CAPSULE, EXTENDED RELEASE ORAL ONCE
Qty: 0 | Refills: 0 | Status: DISCONTINUED | OUTPATIENT
Start: 2017-04-22 | End: 2017-04-22

## 2017-04-22 RX ADMIN — MORPHINE SULFATE 4 MILLIGRAM(S): 50 CAPSULE, EXTENDED RELEASE ORAL at 23:09

## 2017-04-22 RX ADMIN — ONDANSETRON 4 MILLIGRAM(S): 8 TABLET, FILM COATED ORAL at 22:18

## 2017-04-22 RX ADMIN — MORPHINE SULFATE 2 MILLIGRAM(S): 50 CAPSULE, EXTENDED RELEASE ORAL at 23:45

## 2017-04-22 RX ADMIN — IOHEXOL 50 MILLILITER(S): 300 INJECTION, SOLUTION INTRAVENOUS at 23:07

## 2017-04-22 RX ADMIN — MORPHINE SULFATE 4 MILLIGRAM(S): 50 CAPSULE, EXTENDED RELEASE ORAL at 22:18

## 2017-04-22 NOTE — ED ADULT NURSE NOTE - OBJECTIVE STATEMENT
Patient to the ED for worsening upper epigastric pain radiating to back beginning around 5pm today associated with nausea, vomiting diaphoresis and chills.  Patient took 1gm tylenol around 5 pm with no relief.  Patient denies any chest pain, fever, diarrhea, burning or pain on urination.  Reported having abdominal surgery last year for intestinal obstruction.

## 2017-04-22 NOTE — ED ADULT TRIAGE NOTE - ARRIVAL INFO ADDITIONAL COMMENTS
Pt to the ER c/o cramping abd pain today with SOB, N/V, and diaphoresis. Pt states she had intestinal surgery in this hospital 2 months ago.  Pt denies CP, fevers, sick contacts, or travel out of the country in the last 3 weeks.  EKG done at triage per protocol.

## 2017-04-23 VITALS
SYSTOLIC BLOOD PRESSURE: 116 MMHG | HEART RATE: 68 BPM | RESPIRATION RATE: 18 BRPM | DIASTOLIC BLOOD PRESSURE: 72 MMHG | OXYGEN SATURATION: 98 % | TEMPERATURE: 98 F

## 2017-04-23 LAB
APPEARANCE UR: CLEAR — SIGNIFICANT CHANGE UP
BILIRUB UR-MCNC: NEGATIVE — SIGNIFICANT CHANGE UP
COLOR SPEC: YELLOW — SIGNIFICANT CHANGE UP
DIFF PNL FLD: NEGATIVE — SIGNIFICANT CHANGE UP
GLUCOSE UR QL: NEGATIVE — SIGNIFICANT CHANGE UP
KETONES UR-MCNC: NEGATIVE — SIGNIFICANT CHANGE UP
LEUKOCYTE ESTERASE UR-ACNC: NEGATIVE — SIGNIFICANT CHANGE UP
NITRITE UR-MCNC: NEGATIVE — SIGNIFICANT CHANGE UP
PH UR: 6 — SIGNIFICANT CHANGE UP (ref 5–8)
PROT UR-MCNC: NEGATIVE MG/DL — SIGNIFICANT CHANGE UP
SP GR SPEC: 1.01 — SIGNIFICANT CHANGE UP (ref 1–1.03)
UROBILINOGEN FLD QL: 0.2 E.U./DL — SIGNIFICANT CHANGE UP

## 2017-04-23 PROCEDURE — 85025 COMPLETE CBC W/AUTO DIFF WBC: CPT

## 2017-04-23 PROCEDURE — 96375 TX/PRO/DX INJ NEW DRUG ADDON: CPT | Mod: XU

## 2017-04-23 PROCEDURE — 83690 ASSAY OF LIPASE: CPT

## 2017-04-23 PROCEDURE — 99284 EMERGENCY DEPT VISIT MOD MDM: CPT | Mod: 25

## 2017-04-23 PROCEDURE — 81003 URINALYSIS AUTO W/O SCOPE: CPT

## 2017-04-23 PROCEDURE — 87086 URINE CULTURE/COLONY COUNT: CPT

## 2017-04-23 PROCEDURE — 80053 COMPREHEN METABOLIC PANEL: CPT

## 2017-04-23 PROCEDURE — 74177 CT ABD & PELVIS W/CONTRAST: CPT | Mod: 26

## 2017-04-23 PROCEDURE — 74022 RADEX COMPL AQT ABD SERIES: CPT

## 2017-04-23 PROCEDURE — 74177 CT ABD & PELVIS W/CONTRAST: CPT

## 2017-04-23 PROCEDURE — 93005 ELECTROCARDIOGRAM TRACING: CPT

## 2017-04-23 PROCEDURE — 96374 THER/PROPH/DIAG INJ IV PUSH: CPT | Mod: XU

## 2017-04-23 PROCEDURE — 83605 ASSAY OF LACTIC ACID: CPT

## 2017-04-23 PROCEDURE — 96376 TX/PRO/DX INJ SAME DRUG ADON: CPT

## 2017-04-23 RX ORDER — LIDOCAINE 4 G/100G
5 CREAM TOPICAL ONCE
Qty: 0 | Refills: 0 | Status: COMPLETED | OUTPATIENT
Start: 2017-04-23 | End: 2017-04-23

## 2017-04-23 RX ORDER — MORPHINE SULFATE 50 MG/1
2 CAPSULE, EXTENDED RELEASE ORAL ONCE
Qty: 0 | Refills: 0 | Status: DISCONTINUED | OUTPATIENT
Start: 2017-04-23 | End: 2017-04-23

## 2017-04-23 RX ORDER — ONDANSETRON 8 MG/1
1 TABLET, FILM COATED ORAL
Qty: 21 | Refills: 0 | OUTPATIENT
Start: 2017-04-23 | End: 2017-04-30

## 2017-04-23 RX ORDER — OMEPRAZOLE 10 MG/1
1 CAPSULE, DELAYED RELEASE ORAL
Qty: 30 | Refills: 0
Start: 2017-04-23 | End: 2017-05-23

## 2017-04-23 RX ORDER — FAMOTIDINE 10 MG/ML
20 INJECTION INTRAVENOUS ONCE
Qty: 0 | Refills: 0 | Status: COMPLETED | OUTPATIENT
Start: 2017-04-23 | End: 2017-04-23

## 2017-04-23 RX ADMIN — LIDOCAINE 5 MILLILITER(S): 4 CREAM TOPICAL at 03:00

## 2017-04-23 RX ADMIN — MORPHINE SULFATE 2 MILLIGRAM(S): 50 CAPSULE, EXTENDED RELEASE ORAL at 03:56

## 2017-04-23 RX ADMIN — MORPHINE SULFATE 2 MILLIGRAM(S): 50 CAPSULE, EXTENDED RELEASE ORAL at 00:12

## 2017-04-23 RX ADMIN — MORPHINE SULFATE 2 MILLIGRAM(S): 50 CAPSULE, EXTENDED RELEASE ORAL at 03:42

## 2017-04-23 RX ADMIN — Medication 30 MILLILITER(S): at 03:00

## 2017-04-23 RX ADMIN — FAMOTIDINE 20 MILLIGRAM(S): 10 INJECTION INTRAVENOUS at 02:08

## 2017-04-23 NOTE — ED PROVIDER NOTE - OBJECTIVE STATEMENT
40 y/o f with multiple abdominal surgery h/o gastric bypass 9 yrs ago, multiple SBO, intussusception 12/16 and most recent sbo two months ago. Presents to ED c/o nausea and vomiting about 8 episodes since 5 pm today. 42 y/o f with multiple abdominal surgery h/o gastric bypass 9 yrs ago, multiple SBO, intussusception 12/16 and most recent sbo two months ago. Presents to ED c/o nausea and vomiting about 8 episodes since 5 pm today. Denies fever, URI, sob, chest pain, diarrhea, bloody or black stools. No urinary symptoms.

## 2017-04-23 NOTE — ED PROVIDER NOTE - MEDICAL DECISION MAKING DETAILS
Patient with nausea /vomiting feeling better, tolerating PO. Neg labs and ctscan.  Recommend GI f/u with PPI

## 2017-04-23 NOTE — ED PROVIDER NOTE - CHPI ED SYMPTOMS NEG
no diarrhea/no chills/no abdominal distension/no blood in stool/no hematuria/no dysuria/no burning urination/no fever no hematuria/no blood in stool/no diarrhea/no fever/no chills/no dysuria/no abdominal distension no blood in stool/no abdominal distension/no hematuria/no dysuria/no fever/no chills

## 2017-04-23 NOTE — ED PROVIDER NOTE - ATTENDING CONTRIBUTION TO CARE
abd pain, n/v for past day.  Multiple SBO in past. Appears uncomfortable with diffuse tenderness on exam.  CT obtained without acute pathology.  Surgery consulted given history and cleared for discharge after improvement with meds/GI cocktail

## 2017-04-24 LAB
CULTURE RESULTS: NO GROWTH — SIGNIFICANT CHANGE UP
SPECIMEN SOURCE: SIGNIFICANT CHANGE UP

## 2017-05-02 ENCOUNTER — APPOINTMENT (OUTPATIENT)
Dept: SURGERY | Facility: CLINIC | Age: 42
End: 2017-05-02

## 2017-05-02 VITALS
DIASTOLIC BLOOD PRESSURE: 65 MMHG | WEIGHT: 152 LBS | BODY MASS INDEX: 28.7 KG/M2 | HEIGHT: 61 IN | SYSTOLIC BLOOD PRESSURE: 103 MMHG | HEART RATE: 64 BPM

## 2017-07-10 ENCOUNTER — EMERGENCY (EMERGENCY)
Facility: HOSPITAL | Age: 42
LOS: 1 days | Discharge: HOME | End: 2017-07-10
Admitting: EMERGENCY MEDICINE
Payer: COMMERCIAL

## 2017-07-10 VITALS
WEIGHT: 158.29 LBS | TEMPERATURE: 98 F | SYSTOLIC BLOOD PRESSURE: 108 MMHG | HEART RATE: 64 BPM | RESPIRATION RATE: 18 BRPM | OXYGEN SATURATION: 98 % | DIASTOLIC BLOOD PRESSURE: 58 MMHG

## 2017-07-10 DIAGNOSIS — M25.532 PAIN IN LEFT WRIST: ICD-10-CM

## 2017-07-10 DIAGNOSIS — Z90.710 ACQUIRED ABSENCE OF BOTH CERVIX AND UTERUS: Chronic | ICD-10-CM

## 2017-07-10 DIAGNOSIS — Z90.49 ACQUIRED ABSENCE OF OTHER SPECIFIED PARTS OF DIGESTIVE TRACT: Chronic | ICD-10-CM

## 2017-07-10 DIAGNOSIS — Z79.1 LONG TERM (CURRENT) USE OF NON-STEROIDAL ANTI-INFLAMMATORIES (NSAID): ICD-10-CM

## 2017-07-10 DIAGNOSIS — Z79.899 OTHER LONG TERM (CURRENT) DRUG THERAPY: ICD-10-CM

## 2017-07-10 PROCEDURE — 99283 EMERGENCY DEPT VISIT LOW MDM: CPT | Mod: 25

## 2017-07-10 PROCEDURE — 99283 EMERGENCY DEPT VISIT LOW MDM: CPT

## 2017-07-10 NOTE — ED PROVIDER NOTE - OBJECTIVE STATEMENT
42 y/o f presents c/o pain to left wrist, gradually worsening over the past 2 weeks. Pt stating pain at base of thumb, worse with movement 42 y/o f presents c/o pain to left wrist, gradually worsening over the past 2 weeks. Pt stating pain at base of thumb, worse with movement.  Denies trauma, numbness/tingling, weakness, all other ROS negative.

## 2017-07-10 NOTE — ED PROVIDER NOTE - MEDICAL DECISION MAKING DETAILS
40 y/o f left wrist pain; exam consistent with tendonitis, placed in pre-ember splint, will rest, elevate, nsaids, f/u ortho

## 2017-07-10 NOTE — ED PROVIDER NOTE - MUSCULOSKELETAL, MLM
tenderness over radial aspect of left wrist, exacerbated with abduction of 1st digit, sensation intact distally, cap refill < 2 sec

## 2017-07-13 ENCOUNTER — APPOINTMENT (OUTPATIENT)
Dept: ORTHOPEDIC SURGERY | Facility: CLINIC | Age: 42
End: 2017-07-13

## 2017-08-08 ENCOUNTER — APPOINTMENT (OUTPATIENT)
Dept: SURGERY | Facility: CLINIC | Age: 42
End: 2017-08-08
Payer: MEDICAID

## 2017-08-08 VITALS
OXYGEN SATURATION: 98 % | SYSTOLIC BLOOD PRESSURE: 127 MMHG | HEIGHT: 61 IN | BODY MASS INDEX: 29.86 KG/M2 | WEIGHT: 158.13 LBS | DIASTOLIC BLOOD PRESSURE: 83 MMHG

## 2017-08-08 DIAGNOSIS — Z90.49 ACQUIRED ABSENCE OF OTHER SPECIFIED PARTS OF DIGESTIVE TRACT: ICD-10-CM

## 2017-08-08 PROCEDURE — 99213 OFFICE O/P EST LOW 20 MIN: CPT

## 2017-09-13 ENCOUNTER — APPOINTMENT (OUTPATIENT)
Dept: UROLOGY | Facility: CLINIC | Age: 42
End: 2017-09-13
Payer: MEDICAID

## 2017-09-13 VITALS
TEMPERATURE: 98.5 F | WEIGHT: 158 LBS | HEIGHT: 61 IN | DIASTOLIC BLOOD PRESSURE: 76 MMHG | BODY MASS INDEX: 29.83 KG/M2 | SYSTOLIC BLOOD PRESSURE: 116 MMHG | HEART RATE: 67 BPM

## 2017-09-13 PROCEDURE — 99213 OFFICE O/P EST LOW 20 MIN: CPT

## 2017-11-03 ENCOUNTER — APPOINTMENT (OUTPATIENT)
Dept: SURGERY | Facility: CLINIC | Age: 42
End: 2017-11-03

## 2018-03-14 ENCOUNTER — APPOINTMENT (OUTPATIENT)
Dept: UROLOGY | Facility: CLINIC | Age: 43
End: 2018-03-14

## 2018-04-06 ENCOUNTER — APPOINTMENT (OUTPATIENT)
Dept: UROLOGY | Facility: CLINIC | Age: 43
End: 2018-04-06

## 2018-06-11 VITALS
HEART RATE: 78 BPM | SYSTOLIC BLOOD PRESSURE: 120 MMHG | OXYGEN SATURATION: 97 % | WEIGHT: 166.45 LBS | DIASTOLIC BLOOD PRESSURE: 75 MMHG | RESPIRATION RATE: 18 BRPM | TEMPERATURE: 98 F

## 2018-06-11 LAB
ALBUMIN SERPL ELPH-MCNC: 3.9 G/DL — SIGNIFICANT CHANGE UP (ref 3.3–5)
ALP SERPL-CCNC: 133 U/L — HIGH (ref 40–120)
ALT FLD-CCNC: 211 U/L — HIGH (ref 10–45)
ANION GAP SERPL CALC-SCNC: 10 MMOL/L — SIGNIFICANT CHANGE UP (ref 5–17)
APPEARANCE UR: CLEAR — SIGNIFICANT CHANGE UP
AST SERPL-CCNC: 170 U/L — HIGH (ref 10–40)
BASOPHILS NFR BLD AUTO: 0.1 % — SIGNIFICANT CHANGE UP (ref 0–2)
BILIRUB SERPL-MCNC: 0.3 MG/DL — SIGNIFICANT CHANGE UP (ref 0.2–1.2)
BILIRUB UR-MCNC: NEGATIVE — SIGNIFICANT CHANGE UP
BUN SERPL-MCNC: 10 MG/DL — SIGNIFICANT CHANGE UP (ref 7–23)
CALCIUM SERPL-MCNC: 8.8 MG/DL — SIGNIFICANT CHANGE UP (ref 8.4–10.5)
CHLORIDE SERPL-SCNC: 101 MMOL/L — SIGNIFICANT CHANGE UP (ref 96–108)
CO2 SERPL-SCNC: 27 MMOL/L — SIGNIFICANT CHANGE UP (ref 22–31)
COLOR SPEC: YELLOW — SIGNIFICANT CHANGE UP
CREAT SERPL-MCNC: 0.51 MG/DL — SIGNIFICANT CHANGE UP (ref 0.5–1.3)
DIFF PNL FLD: NEGATIVE — SIGNIFICANT CHANGE UP
EOSINOPHIL NFR BLD AUTO: 1.2 % — SIGNIFICANT CHANGE UP (ref 0–6)
EXTRA BLUE TOP TUBE: SIGNIFICANT CHANGE UP
EXTRA SST TUBE: SIGNIFICANT CHANGE UP
GLUCOSE SERPL-MCNC: 76 MG/DL — SIGNIFICANT CHANGE UP (ref 70–99)
GLUCOSE UR QL: NEGATIVE — SIGNIFICANT CHANGE UP
HCT VFR BLD CALC: 36.2 % — SIGNIFICANT CHANGE UP (ref 34.5–45)
HGB BLD-MCNC: 12.3 G/DL — SIGNIFICANT CHANGE UP (ref 11.5–15.5)
KETONES UR-MCNC: NEGATIVE — SIGNIFICANT CHANGE UP
LEUKOCYTE ESTERASE UR-ACNC: NEGATIVE — SIGNIFICANT CHANGE UP
LYMPHOCYTES # BLD AUTO: 19 % — SIGNIFICANT CHANGE UP (ref 13–44)
MCHC RBC-ENTMCNC: 29.1 PG — SIGNIFICANT CHANGE UP (ref 27–34)
MCHC RBC-ENTMCNC: 34 G/DL — SIGNIFICANT CHANGE UP (ref 32–36)
MCV RBC AUTO: 85.6 FL — SIGNIFICANT CHANGE UP (ref 80–100)
MONOCYTES NFR BLD AUTO: 8.1 % — SIGNIFICANT CHANGE UP (ref 2–14)
NEUTROPHILS NFR BLD AUTO: 71.6 % — SIGNIFICANT CHANGE UP (ref 43–77)
NITRITE UR-MCNC: NEGATIVE — SIGNIFICANT CHANGE UP
PH UR: 7 — SIGNIFICANT CHANGE UP (ref 5–8)
PLATELET # BLD AUTO: 320 K/UL — SIGNIFICANT CHANGE UP (ref 150–400)
POTASSIUM SERPL-MCNC: 4.3 MMOL/L — SIGNIFICANT CHANGE UP (ref 3.5–5.3)
POTASSIUM SERPL-SCNC: 4.3 MMOL/L — SIGNIFICANT CHANGE UP (ref 3.5–5.3)
PROT SERPL-MCNC: 6.6 G/DL — SIGNIFICANT CHANGE UP (ref 6–8.3)
PROT UR-MCNC: NEGATIVE MG/DL — SIGNIFICANT CHANGE UP
RBC # BLD: 4.23 M/UL — SIGNIFICANT CHANGE UP (ref 3.8–5.2)
RBC # FLD: 12.9 % — SIGNIFICANT CHANGE UP (ref 10.3–16.9)
SODIUM SERPL-SCNC: 138 MMOL/L — SIGNIFICANT CHANGE UP (ref 135–145)
SP GR SPEC: 1.01 — SIGNIFICANT CHANGE UP (ref 1–1.03)
UROBILINOGEN FLD QL: 0.2 E.U./DL — SIGNIFICANT CHANGE UP
WBC # BLD: 7 K/UL — SIGNIFICANT CHANGE UP (ref 3.8–10.5)
WBC # FLD AUTO: 7 K/UL — SIGNIFICANT CHANGE UP (ref 3.8–10.5)

## 2018-06-11 PROCEDURE — 74177 CT ABD & PELVIS W/CONTRAST: CPT | Mod: 26

## 2018-06-11 PROCEDURE — 99285 EMERGENCY DEPT VISIT HI MDM: CPT | Mod: 25

## 2018-06-11 PROCEDURE — 93010 ELECTROCARDIOGRAM REPORT: CPT

## 2018-06-11 RX ORDER — ONDANSETRON 8 MG/1
4 TABLET, FILM COATED ORAL ONCE
Qty: 0 | Refills: 0 | Status: COMPLETED | OUTPATIENT
Start: 2018-06-11 | End: 2018-06-11

## 2018-06-11 RX ORDER — MORPHINE SULFATE 50 MG/1
4 CAPSULE, EXTENDED RELEASE ORAL ONCE
Qty: 0 | Refills: 0 | Status: DISCONTINUED | OUTPATIENT
Start: 2018-06-11 | End: 2018-06-11

## 2018-06-11 RX ORDER — IOHEXOL 300 MG/ML
50 INJECTION, SOLUTION INTRAVENOUS ONCE
Qty: 0 | Refills: 0 | Status: COMPLETED | OUTPATIENT
Start: 2018-06-11 | End: 2018-06-11

## 2018-06-11 RX ORDER — SODIUM CHLORIDE 9 MG/ML
1000 INJECTION, SOLUTION INTRAVENOUS
Qty: 0 | Refills: 0 | Status: DISCONTINUED | OUTPATIENT
Start: 2018-06-11 | End: 2018-06-14

## 2018-06-11 RX ORDER — SODIUM CHLORIDE 9 MG/ML
1000 INJECTION INTRAMUSCULAR; INTRAVENOUS; SUBCUTANEOUS ONCE
Qty: 0 | Refills: 0 | Status: COMPLETED | OUTPATIENT
Start: 2018-06-11 | End: 2018-06-11

## 2018-06-11 RX ORDER — MORPHINE SULFATE 50 MG/1
6 CAPSULE, EXTENDED RELEASE ORAL ONCE
Qty: 0 | Refills: 0 | Status: DISCONTINUED | OUTPATIENT
Start: 2018-06-11 | End: 2018-06-11

## 2018-06-11 RX ADMIN — SODIUM CHLORIDE 250 MILLILITER(S): 9 INJECTION, SOLUTION INTRAVENOUS at 20:59

## 2018-06-11 RX ADMIN — IOHEXOL 50 MILLILITER(S): 300 INJECTION, SOLUTION INTRAVENOUS at 20:16

## 2018-06-11 RX ADMIN — MORPHINE SULFATE 6 MILLIGRAM(S): 50 CAPSULE, EXTENDED RELEASE ORAL at 22:32

## 2018-06-11 RX ADMIN — SODIUM CHLORIDE 1000 MILLILITER(S): 9 INJECTION INTRAMUSCULAR; INTRAVENOUS; SUBCUTANEOUS at 19:41

## 2018-06-11 RX ADMIN — MORPHINE SULFATE 4 MILLIGRAM(S): 50 CAPSULE, EXTENDED RELEASE ORAL at 20:58

## 2018-06-11 RX ADMIN — MORPHINE SULFATE 4 MILLIGRAM(S): 50 CAPSULE, EXTENDED RELEASE ORAL at 20:16

## 2018-06-11 RX ADMIN — ONDANSETRON 4 MILLIGRAM(S): 8 TABLET, FILM COATED ORAL at 20:16

## 2018-06-11 RX ADMIN — SODIUM CHLORIDE 1000 MILLILITER(S): 9 INJECTION INTRAMUSCULAR; INTRAVENOUS; SUBCUTANEOUS at 20:59

## 2018-06-11 NOTE — ED PROVIDER NOTE - OBJECTIVE STATEMENT
Pt w/ PMHx obesity s/p gastric bypass 10-11 years ago s/p intussusception and SBO, hysterectomy, cholecystectomy, p/w abd pain x 4 days. The pain is located in the diffuse abdomen, more so in the upper abdomen toward the left. The pain is described as hardness, and sometimes sharp, associated swelling. The pain is similar to prior intussusception and sbo. The pain is constant and non radiating. + nausea. no vomiting. Pt reports 4 episodes of watery, non bloody diarrhea today. No f/c. Pt took Tylenol for pain yesterday w/o relief. Pain on exam is 8/10.

## 2018-06-11 NOTE — ED ADULT NURSE NOTE - OBJECTIVE STATEMENT
41 y/o female pmhx hysterectomy and bowel obstruction c/o lower abdominal pain since Friday with loose stools/diarrhea and nausea for one week. pt also reports decreased eating/drinking and chest pain that "feels like something is sitting on my chest." EKG in progress. NAD, VSS, pt speaking in full sentences, denies any fever/chills, vomiting, urinary symptoms, vaginal bleeding or discharge. pt had surgery in february for bowel obstruction and also states "it has happened twice, I forget when the first one was though."

## 2018-06-11 NOTE — ED PROVIDER NOTE - PROGRESS NOTE DETAILS
Surgery made aware pt in the ED and w/u in progress CT neg for acute findings, surgery will admit for observation, serial exams

## 2018-06-11 NOTE — ED PROVIDER NOTE - GASTROINTESTINAL, MLM
Abd soft, ND, hypoactive BS. + ttp in epigastric region and LUQ. No guarding, rebound, or rigidity. No pulsatile abdominal masses. No organomegaly appreciated. No CVAT

## 2018-06-11 NOTE — ED PROVIDER NOTE - MEDICAL DECISION MAKING DETAILS
Pt p/w abd pain, nausea, diarrhea s/p gastric bypass, prior hx intussusception and SBO, now reporting similar pain to prior episodes. DDx  include pancreatitis, gastritis, SBO, partial SBO, complication of gastric bypass, other intra-abdominal pathology. Check labs, EKG, UA, CT a/p, IVF, NPO, analgesia. Dispo pending w/u and clinical status

## 2018-06-12 ENCOUNTER — INPATIENT (INPATIENT)
Facility: HOSPITAL | Age: 43
LOS: 5 days | Discharge: HOME | DRG: 745 | End: 2018-06-18
Attending: SURGERY | Admitting: SURGERY
Payer: COMMERCIAL

## 2018-06-12 DIAGNOSIS — Z90.710 ACQUIRED ABSENCE OF BOTH CERVIX AND UTERUS: Chronic | ICD-10-CM

## 2018-06-12 DIAGNOSIS — Z90.49 ACQUIRED ABSENCE OF OTHER SPECIFIED PARTS OF DIGESTIVE TRACT: Chronic | ICD-10-CM

## 2018-06-12 LAB
ALBUMIN SERPL ELPH-MCNC: 3.6 G/DL — SIGNIFICANT CHANGE UP (ref 3.3–5)
ALP SERPL-CCNC: 108 U/L — SIGNIFICANT CHANGE UP (ref 40–120)
ALT FLD-CCNC: 137 U/L — HIGH (ref 10–45)
ANION GAP SERPL CALC-SCNC: 9 MMOL/L — SIGNIFICANT CHANGE UP (ref 5–17)
AST SERPL-CCNC: 90 U/L — HIGH (ref 10–40)
BILIRUB SERPL-MCNC: 0.3 MG/DL — SIGNIFICANT CHANGE UP (ref 0.2–1.2)
BLD GP AB SCN SERPL QL: NEGATIVE — SIGNIFICANT CHANGE UP
BUN SERPL-MCNC: 6 MG/DL — LOW (ref 7–23)
CALCIUM SERPL-MCNC: 9 MG/DL — SIGNIFICANT CHANGE UP (ref 8.4–10.5)
CHLORIDE SERPL-SCNC: 101 MMOL/L — SIGNIFICANT CHANGE UP (ref 96–108)
CO2 SERPL-SCNC: 25 MMOL/L — SIGNIFICANT CHANGE UP (ref 22–31)
CREAT SERPL-MCNC: 0.46 MG/DL — LOW (ref 0.5–1.3)
GLUCOSE SERPL-MCNC: 97 MG/DL — SIGNIFICANT CHANGE UP (ref 70–99)
MAGNESIUM SERPL-MCNC: 2.2 MG/DL — SIGNIFICANT CHANGE UP (ref 1.6–2.6)
PHOSPHATE SERPL-MCNC: 3.5 MG/DL — SIGNIFICANT CHANGE UP (ref 2.5–4.5)
POTASSIUM SERPL-MCNC: 4 MMOL/L — SIGNIFICANT CHANGE UP (ref 3.5–5.3)
POTASSIUM SERPL-SCNC: 4 MMOL/L — SIGNIFICANT CHANGE UP (ref 3.5–5.3)
PROT SERPL-MCNC: 5.7 G/DL — LOW (ref 6–8.3)
RH IG SCN BLD-IMP: POSITIVE — SIGNIFICANT CHANGE UP
SODIUM SERPL-SCNC: 135 MMOL/L — SIGNIFICANT CHANGE UP (ref 135–145)

## 2018-06-12 PROCEDURE — 74245: CPT | Mod: 26

## 2018-06-12 RX ORDER — MORPHINE SULFATE 50 MG/1
4 CAPSULE, EXTENDED RELEASE ORAL EVERY 4 HOURS
Qty: 0 | Refills: 0 | Status: DISCONTINUED | OUTPATIENT
Start: 2018-06-12 | End: 2018-06-17

## 2018-06-12 RX ORDER — HYDROMORPHONE HYDROCHLORIDE 2 MG/ML
0.5 INJECTION INTRAMUSCULAR; INTRAVENOUS; SUBCUTANEOUS ONCE
Qty: 0 | Refills: 0 | Status: DISCONTINUED | OUTPATIENT
Start: 2018-06-12 | End: 2018-06-12

## 2018-06-12 RX ORDER — ACETAMINOPHEN 500 MG
1000 TABLET ORAL ONCE
Qty: 0 | Refills: 0 | Status: COMPLETED | OUTPATIENT
Start: 2018-06-12 | End: 2018-06-12

## 2018-06-12 RX ORDER — HEPARIN SODIUM 5000 [USP'U]/ML
5000 INJECTION INTRAVENOUS; SUBCUTANEOUS EVERY 8 HOURS
Qty: 0 | Refills: 0 | Status: DISCONTINUED | OUTPATIENT
Start: 2018-06-12 | End: 2018-06-18

## 2018-06-12 RX ORDER — DOCUSATE SODIUM 100 MG
100 CAPSULE ORAL
Qty: 0 | Refills: 0 | Status: DISCONTINUED | OUTPATIENT
Start: 2018-06-12 | End: 2018-06-18

## 2018-06-12 RX ORDER — ONDANSETRON 8 MG/1
8 TABLET, FILM COATED ORAL EVERY 6 HOURS
Qty: 0 | Refills: 0 | Status: DISCONTINUED | OUTPATIENT
Start: 2018-06-12 | End: 2018-06-18

## 2018-06-12 RX ORDER — HYDROMORPHONE HYDROCHLORIDE 2 MG/ML
0.5 INJECTION INTRAMUSCULAR; INTRAVENOUS; SUBCUTANEOUS EVERY 4 HOURS
Qty: 0 | Refills: 0 | Status: DISCONTINUED | OUTPATIENT
Start: 2018-06-12 | End: 2018-06-12

## 2018-06-12 RX ORDER — SENNA PLUS 8.6 MG/1
1 TABLET ORAL ONCE
Qty: 0 | Refills: 0 | Status: COMPLETED | OUTPATIENT
Start: 2018-06-12 | End: 2018-06-12

## 2018-06-12 RX ORDER — MINERAL OIL
30 OIL (ML) MISCELLANEOUS DAILY
Qty: 0 | Refills: 0 | Status: DISCONTINUED | OUTPATIENT
Start: 2018-06-12 | End: 2018-06-18

## 2018-06-12 RX ORDER — SENNA PLUS 8.6 MG/1
2 TABLET ORAL AT BEDTIME
Qty: 0 | Refills: 0 | Status: DISCONTINUED | OUTPATIENT
Start: 2018-06-12 | End: 2018-06-18

## 2018-06-12 RX ORDER — SCOPALAMINE 1 MG/3D
1 PATCH, EXTENDED RELEASE TRANSDERMAL
Qty: 0 | Refills: 0 | Status: DISCONTINUED | OUTPATIENT
Start: 2018-06-12 | End: 2018-06-18

## 2018-06-12 RX ORDER — PANTOPRAZOLE SODIUM 20 MG/1
40 TABLET, DELAYED RELEASE ORAL DAILY
Qty: 0 | Refills: 0 | Status: DISCONTINUED | OUTPATIENT
Start: 2018-06-12 | End: 2018-06-18

## 2018-06-12 RX ORDER — SODIUM CHLORIDE 9 MG/ML
1000 INJECTION INTRAMUSCULAR; INTRAVENOUS; SUBCUTANEOUS
Qty: 0 | Refills: 0 | Status: DISCONTINUED | OUTPATIENT
Start: 2018-06-12 | End: 2018-06-14

## 2018-06-12 RX ORDER — MORPHINE SULFATE 50 MG/1
2 CAPSULE, EXTENDED RELEASE ORAL EVERY 4 HOURS
Qty: 0 | Refills: 0 | Status: DISCONTINUED | OUTPATIENT
Start: 2018-06-12 | End: 2018-06-17

## 2018-06-12 RX ADMIN — HEPARIN SODIUM 5000 UNIT(S): 5000 INJECTION INTRAVENOUS; SUBCUTANEOUS at 22:00

## 2018-06-12 RX ADMIN — HYDROMORPHONE HYDROCHLORIDE 0.5 MILLIGRAM(S): 2 INJECTION INTRAMUSCULAR; INTRAVENOUS; SUBCUTANEOUS at 19:30

## 2018-06-12 RX ADMIN — Medication 100 MILLIGRAM(S): at 18:17

## 2018-06-12 RX ADMIN — Medication 1000 MILLIGRAM(S): at 06:12

## 2018-06-12 RX ADMIN — ONDANSETRON 8 MILLIGRAM(S): 8 TABLET, FILM COATED ORAL at 19:15

## 2018-06-12 RX ADMIN — Medication 30 MILLILITER(S): at 22:07

## 2018-06-12 RX ADMIN — Medication 400 MILLIGRAM(S): at 05:42

## 2018-06-12 RX ADMIN — HEPARIN SODIUM 5000 UNIT(S): 5000 INJECTION INTRAVENOUS; SUBCUTANEOUS at 14:27

## 2018-06-12 RX ADMIN — Medication 100 MILLIGRAM(S): at 05:42

## 2018-06-12 RX ADMIN — ONDANSETRON 8 MILLIGRAM(S): 8 TABLET, FILM COATED ORAL at 02:25

## 2018-06-12 RX ADMIN — HYDROMORPHONE HYDROCHLORIDE 0.5 MILLIGRAM(S): 2 INJECTION INTRAMUSCULAR; INTRAVENOUS; SUBCUTANEOUS at 19:15

## 2018-06-12 RX ADMIN — SENNA PLUS 2 TABLET(S): 8.6 TABLET ORAL at 21:28

## 2018-06-12 RX ADMIN — Medication 400 MILLIGRAM(S): at 22:07

## 2018-06-12 RX ADMIN — SODIUM CHLORIDE 75 MILLILITER(S): 9 INJECTION INTRAMUSCULAR; INTRAVENOUS; SUBCUTANEOUS at 21:28

## 2018-06-12 RX ADMIN — HYDROMORPHONE HYDROCHLORIDE 0.5 MILLIGRAM(S): 2 INJECTION INTRAMUSCULAR; INTRAVENOUS; SUBCUTANEOUS at 01:27

## 2018-06-12 RX ADMIN — SCOPALAMINE 1 PATCH: 1 PATCH, EXTENDED RELEASE TRANSDERMAL at 14:27

## 2018-06-12 RX ADMIN — MORPHINE SULFATE 6 MILLIGRAM(S): 50 CAPSULE, EXTENDED RELEASE ORAL at 00:59

## 2018-06-12 RX ADMIN — Medication 1000 MILLIGRAM(S): at 22:30

## 2018-06-12 RX ADMIN — HYDROMORPHONE HYDROCHLORIDE 0.5 MILLIGRAM(S): 2 INJECTION INTRAMUSCULAR; INTRAVENOUS; SUBCUTANEOUS at 14:48

## 2018-06-12 RX ADMIN — SENNA PLUS 1 TABLET(S): 8.6 TABLET ORAL at 05:42

## 2018-06-12 RX ADMIN — PANTOPRAZOLE SODIUM 40 MILLIGRAM(S): 20 TABLET, DELAYED RELEASE ORAL at 14:27

## 2018-06-12 RX ADMIN — ONDANSETRON 8 MILLIGRAM(S): 8 TABLET, FILM COATED ORAL at 14:27

## 2018-06-12 RX ADMIN — HEPARIN SODIUM 5000 UNIT(S): 5000 INJECTION INTRAVENOUS; SUBCUTANEOUS at 05:42

## 2018-06-12 RX ADMIN — HYDROMORPHONE HYDROCHLORIDE 0.5 MILLIGRAM(S): 2 INJECTION INTRAMUSCULAR; INTRAVENOUS; SUBCUTANEOUS at 14:33

## 2018-06-12 NOTE — H&P ADULT - ASSESSMENT
42yoF with PMH RYGB (2009), jejunojejunal anastomotic intussusception (2015), and subsequent resection of jejunojejunal anastomosis (2017), presents with 3-day history of nausea and 1-day history of abdominal pain and PO intolerance    Admit to General Surgery, regional, Dr Campos  NPO  IVF, continue banana bag  SQH/SCD/OOBA/IS  Serial abdominal exams  AM labs

## 2018-06-12 NOTE — H&P ADULT - HISTORY OF PRESENT ILLNESS
42yoF with PMH anemia, herniated cervical disc, cholecystectomy, RYGB (2009), intussusception of jejunojejunal anastomosis (2015), and subsequent resection of dilated jejunojejunal anastomosis (Feb 2017) with small bowel anastomosis, presents to the ED with 3-day history of nausea, and 1-day history of abdominal pain. She states that she began feeling mild nausea on Saturday but became concerned when she experienced abdominal pain last night, prompting her visit to the ED. She has been unable to tolerate PO as it worsens the abdominal pain. Denies fevers, chills. Emesis x1 this AM--clear liquid, NBNB. Last BM this morning--small volume, loose BM. Prior to this AM, had been constipated. No recent weight changes. Denies PUD, gastritis, GERD.

## 2018-06-12 NOTE — PROGRESS NOTE ADULT - ASSESSMENT
42yoF with PMH RYGB (2009), jejunojejunal anastomotic intussusception (2015), and subsequent resection of jejunojejunal anastomosis (2017), presents with 3-day history of nausea and 1-day history of abdominal pain and PO intolerance    NPO  IVF, continue banana bag  SQH/SCD/OOBA/IS  Serial abdominal exams  AM labs

## 2018-06-12 NOTE — H&P ADULT - NSHPPHYSICALEXAM_GEN_ALL_CORE
GEN: NAD, resting comfortably in bed. Appears stated age  CV: RRR, no MRG  PULM: CTAB, nonlabored breathing on RA  ABD: soft, mildly distended, tender to palpation in epigastric region with voluntary guarding. No rebound.  EXTREM: WWP, no edema. Palpable radial and PT bilaterally

## 2018-06-12 NOTE — H&P ADULT - NSHPLABSRESULTS_GEN_ALL_CORE
12.3   7.0   )-----------( 320      ( 11 Jun 2018 19:39 )             36.2   06-11    138  |  101  |  10  ----------------------------<  76  4.3   |  27  |  0.51    Ca    8.8      11 Jun 2018 19:39  Mg     2.6     06-11    TPro  6.6  /  Alb  3.9  /  TBili  0.3  /  DBili  x   /  AST  170<H>  /  ALT  211<H>  /  AlkPhos  133<H>  06-11  < from: CT Abdomen and Pelvis w/ Oral Cont and w/ IV Cont (06.11.18 @ 22:42) >    Lower chest: Linear atelectasis is noted within the left lower lobe. No   pleural effusions. No pericardial effusion.    Liver: Smooth in contour. No focal mass. Portal and hepatic veins are   patent.    Biliary system: The patient is again noted to be status post   cholecystectomy. No biliary ductal dilatation.    Pancreas: Unremarkable.    Spleen: Unremarkable.    Adrenal glands: Unremarkable.    Kidneys: Symmetric parenchymal enhancement. No renal mass. No   hydronephrosis. No renal or ureteral stone.     Urinary Bladder: Unremarkable.    Reproductive organs: The right ovary is enlarged as compared to the left   with a 3.2 cm cyst. The left ovary is normal in appearance. The patient   is again noted to be status post hysterectomy.     Bowel/Peritoneum: The patient is again noted to be status post Renetta-en-Y   gastric bypass. Ingested contrast is seen reaching the cecum. There is   reflux of contrast into the secretory limb and opacifying the duodenum   and distal gastric remnant. There is a dilated loop of proximal jejunum   measuring up to4.1 cm in diameter, similar but slightly improved when   compared to 4/23/2017. There are a couple of short segments of perceived   small bowel wall thickening, the first is proximal to this area of small   bowel dilatation, and the second is distal.These may be secondary to   peristalsis. No evidence of small bowel obstruction. No evidence of   intussusception on this examination. Normal appendix. Redemonstration of   trace pelvic ascites. No extraluminal gas.     Lymph nodes: No lymphadenopathy.    Aorta/IVC: Normal caliber.    Abdominal wall: Tiny fat-containing umbilical hernia.    Bones/Soft tissues: No acute abnormality.  Mild degenerative changes. The   patient is again noted to be status post gluteal injections.    IMPRESSION:   1.  The patient is again noted to be status post Renetta-en-Y gastric bypass   with redemonstration of distended loops of proximal jejunum measuring up   to 4.1 cm, slightly improved as compared to 4/23/2017. No evidence of   small bowel obstruction. No evidence of intussusception.  2.  Ancillary findings as above.        < end of copied text >

## 2018-06-12 NOTE — PROGRESS NOTE ADULT - SUBJECTIVE AND OBJECTIVE BOX
OVERNIGHT EVENTS: admitted for 3 day history of nausea and 1 day history of abdominal pain and PO intolerance, pt states she has not had a bm in several days, john brown    SUBJECTIVE: Patient examined bedside with chief resident complains of  diffuse abdominal pain   Flatus: [] YES [X] NO             Bowel Movement: [ ] YES [X ] NO  Pain (0-10):            Pain Control Adequate: [X ] YES [ ] NO  Nausea: [ ] YES [X ] NO            Vomiting: [ ] YES [X ] NO  Diarrhea: [ ] YES [X ] NO         Constipation: [ ] YES [X ] NO     Chest Pain: [ ] YES [X ] NO    SOB:  [ ] YES [X ] NO    heparin  Injectable 5000 Unit(s) SubCutaneous every 8 hours      Vital Signs Last 24 Hrs  T(C): 36.6 (2018 05:34), Max: 37.1 (2018 01:52)  T(F): 97.9 (2018 05:34), Max: 98.7 (2018 01:52)  HR: 60 (2018 05:34) (56 - 78)  BP: 95/59 (2018 05:34) (93/52 - 147/90)  BP(mean): --  RR: 17 (2018 05:34) (16 - 18)  SpO2: 94% (2018 05:34) (94% - 99%)  I&O's Detail    2018 07:01  -  2018 07:00  --------------------------------------------------------  IN:    multivitamin/thiamine/folic acid in sodium chloride 0.9%: 250 mL    sodium chloride 0.9%.: 150 mL  Total IN: 400 mL    OUT:  Total OUT: 0 mL    Total NET: 400 mL          General: NAD, resting comfortably in bed  C/V: NSR  Pulm: Nonlabored breathing, no respiratory distress  Abd: soft, ND, diffuse abdominal tenderness to  palpation.  Extrem: WWP, no edema, SCDs in place      LABS:                        12.3   7.0   )-----------( 320      ( 2018 19:39 )             36.2     06-11    138  |  101  |  10  ----------------------------<  76  4.3   |  27  |  0.51    Ca    8.8      2018 19:39  Mg     2.6         TPro  6.6  /  Alb  3.9  /  TBili  0.3  /  DBili  x   /  AST  170<H>  /  ALT  211<H>  /  AlkPhos  133<H>        Urinalysis Basic - ( 2018 19:39 )    Color: Yellow / Appearance: Clear / S.010 / pH: x  Gluc: x / Ketone: NEGATIVE  / Bili: Negative / Urobili: 0.2 E.U./dL   Blood: x / Protein: NEGATIVE mg/dL / Nitrite: NEGATIVE   Leuk Esterase: NEGATIVE / RBC: x / WBC x   Sq Epi: x / Non Sq Epi: x / Bacteria: x

## 2018-06-13 LAB
ANION GAP SERPL CALC-SCNC: 13 MMOL/L — SIGNIFICANT CHANGE UP (ref 5–17)
APTT BLD: 29.7 SEC — SIGNIFICANT CHANGE UP (ref 27.5–37.4)
BUN SERPL-MCNC: 7 MG/DL — SIGNIFICANT CHANGE UP (ref 7–23)
CALCIUM SERPL-MCNC: 8.9 MG/DL — SIGNIFICANT CHANGE UP (ref 8.4–10.5)
CHLORIDE SERPL-SCNC: 100 MMOL/L — SIGNIFICANT CHANGE UP (ref 96–108)
CO2 SERPL-SCNC: 23 MMOL/L — SIGNIFICANT CHANGE UP (ref 22–31)
CREAT SERPL-MCNC: 0.54 MG/DL — SIGNIFICANT CHANGE UP (ref 0.5–1.3)
GLUCOSE SERPL-MCNC: 84 MG/DL — SIGNIFICANT CHANGE UP (ref 70–99)
HCG UR QL: NEGATIVE — SIGNIFICANT CHANGE UP
HCT VFR BLD CALC: 33.8 % — LOW (ref 34.5–45)
HGB BLD-MCNC: 11.1 G/DL — LOW (ref 11.5–15.5)
INR BLD: 0.99 — SIGNIFICANT CHANGE UP (ref 0.88–1.16)
MAGNESIUM SERPL-MCNC: 2 MG/DL — SIGNIFICANT CHANGE UP (ref 1.6–2.6)
MCHC RBC-ENTMCNC: 28.2 PG — SIGNIFICANT CHANGE UP (ref 27–34)
MCHC RBC-ENTMCNC: 32.8 G/DL — SIGNIFICANT CHANGE UP (ref 32–36)
MCV RBC AUTO: 86 FL — SIGNIFICANT CHANGE UP (ref 80–100)
PHOSPHATE SERPL-MCNC: 3.6 MG/DL — SIGNIFICANT CHANGE UP (ref 2.5–4.5)
PLATELET # BLD AUTO: 299 K/UL — SIGNIFICANT CHANGE UP (ref 150–400)
POTASSIUM SERPL-MCNC: 3.6 MMOL/L — SIGNIFICANT CHANGE UP (ref 3.5–5.3)
POTASSIUM SERPL-SCNC: 3.6 MMOL/L — SIGNIFICANT CHANGE UP (ref 3.5–5.3)
PROTHROM AB SERPL-ACNC: 11 SEC — SIGNIFICANT CHANGE UP (ref 9.8–12.7)
RBC # BLD: 3.93 M/UL — SIGNIFICANT CHANGE UP (ref 3.8–5.2)
RBC # FLD: 13 % — SIGNIFICANT CHANGE UP (ref 10.3–16.9)
SODIUM SERPL-SCNC: 136 MMOL/L — SIGNIFICANT CHANGE UP (ref 135–145)
WBC # BLD: 5.1 K/UL — SIGNIFICANT CHANGE UP (ref 3.8–10.5)
WBC # FLD AUTO: 5.1 K/UL — SIGNIFICANT CHANGE UP (ref 3.8–10.5)

## 2018-06-13 RX ORDER — POTASSIUM CHLORIDE 20 MEQ
10 PACKET (EA) ORAL
Qty: 0 | Refills: 0 | Status: COMPLETED | OUTPATIENT
Start: 2018-06-13 | End: 2018-06-13

## 2018-06-13 RX ADMIN — SENNA PLUS 2 TABLET(S): 8.6 TABLET ORAL at 21:10

## 2018-06-13 RX ADMIN — ONDANSETRON 8 MILLIGRAM(S): 8 TABLET, FILM COATED ORAL at 13:59

## 2018-06-13 RX ADMIN — ONDANSETRON 8 MILLIGRAM(S): 8 TABLET, FILM COATED ORAL at 19:12

## 2018-06-13 RX ADMIN — MORPHINE SULFATE 4 MILLIGRAM(S): 50 CAPSULE, EXTENDED RELEASE ORAL at 21:00

## 2018-06-13 RX ADMIN — Medication 100 MILLIEQUIVALENT(S): at 15:10

## 2018-06-13 RX ADMIN — MORPHINE SULFATE 4 MILLIGRAM(S): 50 CAPSULE, EXTENDED RELEASE ORAL at 11:44

## 2018-06-13 RX ADMIN — PANTOPRAZOLE SODIUM 40 MILLIGRAM(S): 20 TABLET, DELAYED RELEASE ORAL at 13:59

## 2018-06-13 RX ADMIN — MORPHINE SULFATE 4 MILLIGRAM(S): 50 CAPSULE, EXTENDED RELEASE ORAL at 16:26

## 2018-06-13 RX ADMIN — Medication 100 MILLIGRAM(S): at 05:15

## 2018-06-13 RX ADMIN — MORPHINE SULFATE 4 MILLIGRAM(S): 50 CAPSULE, EXTENDED RELEASE ORAL at 20:51

## 2018-06-13 RX ADMIN — Medication 100 MILLIEQUIVALENT(S): at 16:40

## 2018-06-13 RX ADMIN — MORPHINE SULFATE 4 MILLIGRAM(S): 50 CAPSULE, EXTENDED RELEASE ORAL at 15:56

## 2018-06-13 RX ADMIN — HEPARIN SODIUM 5000 UNIT(S): 5000 INJECTION INTRAVENOUS; SUBCUTANEOUS at 06:00

## 2018-06-13 RX ADMIN — ONDANSETRON 8 MILLIGRAM(S): 8 TABLET, FILM COATED ORAL at 01:10

## 2018-06-13 RX ADMIN — Medication 100 MILLIEQUIVALENT(S): at 14:00

## 2018-06-13 RX ADMIN — MORPHINE SULFATE 4 MILLIGRAM(S): 50 CAPSULE, EXTENDED RELEASE ORAL at 11:29

## 2018-06-13 RX ADMIN — Medication 30 MILLILITER(S): at 21:10

## 2018-06-13 RX ADMIN — Medication 100 MILLIGRAM(S): at 18:01

## 2018-06-13 RX ADMIN — ONDANSETRON 8 MILLIGRAM(S): 8 TABLET, FILM COATED ORAL at 07:10

## 2018-06-13 RX ADMIN — HEPARIN SODIUM 5000 UNIT(S): 5000 INJECTION INTRAVENOUS; SUBCUTANEOUS at 21:23

## 2018-06-13 NOTE — PROGRESS NOTE ADULT - ASSESSMENT
42yoF with PMH RYGB (2009), jejunojejunal anastomotic intussusception (2015), and subsequent resection of jejunojejunal anastomosis (2017), presents with 3-day history of nausea and 1-day history of abdominal pain and PO intolerance    Admit to General Surgery, regional, Dr Campos  NPO  IVF, continue banana bag  SQH/SCD/OOBA/IS  Serial abdominal exams  AM labs 42yoF with PMH RYGB (2009), jejunojejunal anastomotic intussusception (2015), and subsequent resection of jejunojejunal anastomosis (2017), presents with 3-day history of nausea and 1-day history of abdominal pain and PO intolerance    NPO  IVF  Banana bag  Pain/nausea control  OOB/AMB SCD'S  F/U am labs   possible OR   HSQ DVT PPX

## 2018-06-13 NOTE — PROGRESS NOTE ADULT - SUBJECTIVE AND OBJECTIVE BOX
O/N: pt states she is in discomfort all the time, - BM, - Flatus, encouraged OOBA, mineral oil and senna for BM, preopped for OR tomorrow (except for chest x-ray)  6/12: Pt c/o diffuse abdominal pain, UGI:Mildly dilated loop of small bowel in the left lower quadrant with delayed passage of contrast through this region. Contrast reaches the cecum in approximately 3.5 hours. Findings are suggestive of incomplete obstruction. OVERNIGHT EVENTS: pt states she is in discomfort all the time, - BM, - Flatus, encouraged OOBA, mineral oil and senna for BM, preopped for OR tomorrow (except for chest x-ray)  : Pt c/o diffuse abdominal pain, UGI:Mildly dilated loop of small bowel in the left lower quadrant with delayed passage of contrast through this region. Contrast reaches the cecum in approximately 3.5 hours. Findings are suggestive of incomplete obstruction.    SUBJECTIVE: Patient examined bedside complains of diffuse abdominal pain.  Flatus: [] YES [X] NO             Bowel Movement: [ ] YES [ X] NO  Pain (0-10):            Pain Control Adequate: [X ] YES [ ] NO  Nausea: [ ] YES [X ] NO            Vomiting: [ ] YES [X ] NO  Diarrhea: [ ] YES [ X] NO         Constipation: [ ] YES [X ] NO     Chest Pain: [ ] YES [ X] NO    SOB:  [ ] YES [ X] NO    heparin  Injectable 5000 Unit(s) SubCutaneous every 8 hours      Vital Signs Last 24 Hrs  T(C): 36.2 (2018 05:32), Max: 37.2 (2018 16:59)  T(F): 97.2 (2018 05:32), Max: 99 (2018 16:59)  HR: 51 (2018 05:32) (51 - 61)  BP: 93/56 (2018 05:32) (92/57 - 112/74)  BP(mean): --  RR: 16 (2018 05:32) (16 - 17)  SpO2: 96% (2018 05:32) (95% - 98%)  I&O's Detail    2018 07:01  -  2018 07:00  --------------------------------------------------------  IN:    sodium chloride 0.9%.: 1425 mL  Total IN: 1425 mL    OUT:    Voided: 120 mL  Total OUT: 120 mL    Total NET: 1305 mL      2018 07:01  -  2018 08:01  --------------------------------------------------------  IN:    sodium chloride 0.9%.: 75 mL  Total IN: 75 mL    OUT:  Total OUT: 0 mL    Total NET: 75 mL          General: NAD, resting comfortably in bed  C/V: NSR  Pulm: Nonlabored breathing, no respiratory distress  Abd: Soft, non- distended, TTP around incision site, incision clean dry and intact   Extrem: WWP, no edema, SCDs in place      LABS:                        11.1   5.1   )-----------( 299      ( 2018 06:50 )             33.8     06-13    136  |  100  |  7   ----------------------------<  84  3.6   |  23  |  0.54    Ca    8.9      2018 06:50  Phos  3.6     06-13  Mg     2.0     06-13    TPro  5.7<L>  /  Alb  3.6  /  TBili  0.3  /  DBili  x   /  AST  90<H>  /  ALT  137<H>  /  AlkPhos  108  06-12    PT/INR - ( 2018 06:50 )   PT: 11.0 sec;   INR: 0.99          PTT - ( 2018 06:50 )  PTT:29.7 sec  Urinalysis Basic - ( 2018 19:39 )    Color: Yellow / Appearance: Clear / S.010 / pH: x  Gluc: x / Ketone: NEGATIVE  / Bili: Negative / Urobili: 0.2 E.U./dL   Blood: x / Protein: NEGATIVE mg/dL / Nitrite: NEGATIVE   Leuk Esterase: NEGATIVE / RBC: x / WBC x   Sq Epi: x / Non Sq Epi: x / Bacteria: x

## 2018-06-14 LAB
ANION GAP SERPL CALC-SCNC: 11 MMOL/L — SIGNIFICANT CHANGE UP (ref 5–17)
BUN SERPL-MCNC: 6 MG/DL — LOW (ref 7–23)
CALCIUM SERPL-MCNC: 8.5 MG/DL — SIGNIFICANT CHANGE UP (ref 8.4–10.5)
CHLORIDE SERPL-SCNC: 100 MMOL/L — SIGNIFICANT CHANGE UP (ref 96–108)
CO2 SERPL-SCNC: 24 MMOL/L — SIGNIFICANT CHANGE UP (ref 22–31)
CREAT SERPL-MCNC: 0.63 MG/DL — SIGNIFICANT CHANGE UP (ref 0.5–1.3)
GLUCOSE SERPL-MCNC: 85 MG/DL — SIGNIFICANT CHANGE UP (ref 70–99)
HCT VFR BLD CALC: 33.5 % — LOW (ref 34.5–45)
HGB BLD-MCNC: 10.7 G/DL — LOW (ref 11.5–15.5)
MAGNESIUM SERPL-MCNC: 1.8 MG/DL — SIGNIFICANT CHANGE UP (ref 1.6–2.6)
MCHC RBC-ENTMCNC: 27.9 PG — SIGNIFICANT CHANGE UP (ref 27–34)
MCHC RBC-ENTMCNC: 31.9 G/DL — LOW (ref 32–36)
MCV RBC AUTO: 87.5 FL — SIGNIFICANT CHANGE UP (ref 80–100)
PHOSPHATE SERPL-MCNC: 3.7 MG/DL — SIGNIFICANT CHANGE UP (ref 2.5–4.5)
PLATELET # BLD AUTO: 300 K/UL — SIGNIFICANT CHANGE UP (ref 150–400)
POTASSIUM SERPL-MCNC: 3.9 MMOL/L — SIGNIFICANT CHANGE UP (ref 3.5–5.3)
POTASSIUM SERPL-SCNC: 3.9 MMOL/L — SIGNIFICANT CHANGE UP (ref 3.5–5.3)
RBC # BLD: 3.83 M/UL — SIGNIFICANT CHANGE UP (ref 3.8–5.2)
RBC # FLD: 12.6 % — SIGNIFICANT CHANGE UP (ref 10.3–16.9)
SODIUM SERPL-SCNC: 135 MMOL/L — SIGNIFICANT CHANGE UP (ref 135–145)
WBC # BLD: 4.9 K/UL — SIGNIFICANT CHANGE UP (ref 3.8–10.5)
WBC # FLD AUTO: 4.9 K/UL — SIGNIFICANT CHANGE UP (ref 3.8–10.5)

## 2018-06-14 RX ORDER — MAGNESIUM SULFATE 500 MG/ML
1 VIAL (ML) INJECTION ONCE
Qty: 0 | Refills: 0 | Status: COMPLETED | OUTPATIENT
Start: 2018-06-14 | End: 2018-06-14

## 2018-06-14 RX ORDER — SODIUM CHLORIDE 9 MG/ML
1000 INJECTION, SOLUTION INTRAVENOUS
Qty: 0 | Refills: 0 | Status: DISCONTINUED | OUTPATIENT
Start: 2018-06-14 | End: 2018-06-18

## 2018-06-14 RX ADMIN — ONDANSETRON 8 MILLIGRAM(S): 8 TABLET, FILM COATED ORAL at 14:09

## 2018-06-14 RX ADMIN — MORPHINE SULFATE 2 MILLIGRAM(S): 50 CAPSULE, EXTENDED RELEASE ORAL at 21:04

## 2018-06-14 RX ADMIN — MORPHINE SULFATE 4 MILLIGRAM(S): 50 CAPSULE, EXTENDED RELEASE ORAL at 05:59

## 2018-06-14 RX ADMIN — Medication 100 GRAM(S): at 10:57

## 2018-06-14 RX ADMIN — HEPARIN SODIUM 5000 UNIT(S): 5000 INJECTION INTRAVENOUS; SUBCUTANEOUS at 21:04

## 2018-06-14 RX ADMIN — MORPHINE SULFATE 2 MILLIGRAM(S): 50 CAPSULE, EXTENDED RELEASE ORAL at 22:00

## 2018-06-14 RX ADMIN — MORPHINE SULFATE 2 MILLIGRAM(S): 50 CAPSULE, EXTENDED RELEASE ORAL at 15:38

## 2018-06-14 RX ADMIN — MORPHINE SULFATE 2 MILLIGRAM(S): 50 CAPSULE, EXTENDED RELEASE ORAL at 16:30

## 2018-06-14 RX ADMIN — MORPHINE SULFATE 4 MILLIGRAM(S): 50 CAPSULE, EXTENDED RELEASE ORAL at 00:50

## 2018-06-14 RX ADMIN — SENNA PLUS 2 TABLET(S): 8.6 TABLET ORAL at 21:04

## 2018-06-14 RX ADMIN — MORPHINE SULFATE 2 MILLIGRAM(S): 50 CAPSULE, EXTENDED RELEASE ORAL at 10:59

## 2018-06-14 RX ADMIN — MORPHINE SULFATE 2 MILLIGRAM(S): 50 CAPSULE, EXTENDED RELEASE ORAL at 10:48

## 2018-06-14 RX ADMIN — Medication 100 MILLIGRAM(S): at 17:57

## 2018-06-14 RX ADMIN — ONDANSETRON 8 MILLIGRAM(S): 8 TABLET, FILM COATED ORAL at 19:01

## 2018-06-14 RX ADMIN — PANTOPRAZOLE SODIUM 40 MILLIGRAM(S): 20 TABLET, DELAYED RELEASE ORAL at 14:09

## 2018-06-14 RX ADMIN — ONDANSETRON 8 MILLIGRAM(S): 8 TABLET, FILM COATED ORAL at 08:41

## 2018-06-14 RX ADMIN — HEPARIN SODIUM 5000 UNIT(S): 5000 INJECTION INTRAVENOUS; SUBCUTANEOUS at 05:59

## 2018-06-14 RX ADMIN — Medication 30 MILLILITER(S): at 21:04

## 2018-06-14 RX ADMIN — MORPHINE SULFATE 4 MILLIGRAM(S): 50 CAPSULE, EXTENDED RELEASE ORAL at 00:36

## 2018-06-14 RX ADMIN — ONDANSETRON 8 MILLIGRAM(S): 8 TABLET, FILM COATED ORAL at 01:10

## 2018-06-14 RX ADMIN — MORPHINE SULFATE 4 MILLIGRAM(S): 50 CAPSULE, EXTENDED RELEASE ORAL at 06:10

## 2018-06-14 RX ADMIN — HEPARIN SODIUM 5000 UNIT(S): 5000 INJECTION INTRAVENOUS; SUBCUTANEOUS at 14:09

## 2018-06-14 NOTE — PROGRESS NOTE ADULT - SUBJECTIVE AND OBJECTIVE BOX
PREOP NOTE    preop diagnosis: abdominal pain  procedure: exploratory laparoscopy  labs: CBC, BMP, coags, type and screen, ua  Blood: type and screen x 2 done  Orders: NPO  consent to be obtained, patient able to consent PREOP NOTE    preop diagnosis: abdominal pain  procedure: exploratory laparoscopy  labs: CBC, BMP, coags, type and screen, ua - refer to results in computer chart  Blood: type and screen x 2 done - refer to results in computer chart  Orders: NPO  consent to be obtained, patient able to consent

## 2018-06-14 NOTE — PROGRESS NOTE ADULT - ASSESSMENT
42yoF with PMH RYGB (2009), jejunojejunal anastomotic intussusception (2015), and subsequent resection of jejunojejunal anastomosis (2017), presents with 3-day history of nausea and 1-day history of abdominal pain and PO intolerance    Admit to General Surgery, regional, Dr Campos  NPO  IVF, continue banana bag  SQH/SCD/OOBA/IS  Serial abdominal exams  AM labs 42yoF with PMH RYGB (2009), jejunojejunal anastomotic intussusception (2015), and subsequent resection of jejunojejunal anastomosis (2017), presents with 3-day history of nausea and 1-day history of abdominal pain and PO intolerance      NPO  IVF, continue banana bag  SQH/SCD/OOBA/IS  Serial abdominal exams  AM labs

## 2018-06-14 NOTE — PROGRESS NOTE ADULT - SUBJECTIVE AND OBJECTIVE BOX
O/N: preop note done, c/o abdominal pain/nausea  6/13: +flatus/bm x1, reports abdominal pain improving , OR tomorrow OVERNIGHT EVENTS: preop note done, c/o abdominal pain/nausea  6/13: +flatus/bm x1, reports abdominal pain improving , OR tomorrow       SUBJECTIVE: Patient examined bedside complains of diffuse abdominal  pain.  Flatus: [] YES [X] NO             Bowel Movement: [ ] YES [ X] NO  Pain (0-10):            Pain Control Adequate: [ X] YES [ ] NO  Nausea: [ ] YES [X ] NO            Vomiting: [ ] YES [X ] NO  Diarrhea: [ ] YES [X ] NO         Constipation: [ ] YES [ X] NO     Chest Pain: [ ] YES [X ] NO    SOB:  [ ] YES [ X] NO    heparin  Injectable 5000 Unit(s) SubCutaneous every 8 hours      Vital Signs Last 24 Hrs  T(C): 36.5 (14 Jun 2018 05:30), Max: 37.7 (13 Jun 2018 20:55)  T(F): 97.7 (14 Jun 2018 05:30), Max: 99.8 (13 Jun 2018 20:55)  HR: 52 (14 Jun 2018 05:30) (52 - 91)  BP: 93/56 (14 Jun 2018 05:30) (90/54 - 100/59)  BP(mean): --  RR: 16 (14 Jun 2018 05:30) (16 - 17)  SpO2: 96% (14 Jun 2018 05:30) (95% - 98%)  I&O's Detail    13 Jun 2018 07:01  -  14 Jun 2018 07:00  --------------------------------------------------------  IN:    IV PiggyBack: 100 mL    Oral Fluid: 370 mL    sodium chloride 0.9%.: 1725 mL  Total IN: 2195 mL    OUT:    Voided: 150 mL  Total OUT: 150 mL    Total NET: 2045 mL          General: NAD, resting comfortably in bed  C/V: NSR  Pulm: Nonlabored breathing, no respiratory distress  Abd: soft, non distended , diffuse abdominal tenderness to palpation .  Extrem: WWP, no edema, SCDs in place        LABS:                        11.1   5.1   )-----------( 299      ( 13 Jun 2018 06:50 )             33.8     06-13    136  |  100  |  7   ----------------------------<  84  3.6   |  23  |  0.54    Ca    8.9      13 Jun 2018 06:50  Phos  3.6     06-13  Mg     2.0     06-13      PT/INR - ( 13 Jun 2018 06:50 )   PT: 11.0 sec;   INR: 0.99          PTT - ( 13 Jun 2018 06:50 )  PTT:29.7 sec

## 2018-06-14 NOTE — DIETITIAN INITIAL EVALUATION ADULT. - OTHER INFO
43yo F w/ PMH of RYGB (2009), jejunojejunal anastomotic intussusception (2015), and subsequent resection of jejunojejunal anastomosis (2017). P/w 3 adys of nausea and 1 day of abdominal pain and PO intoleranc.e Pt seen resting in bed. Currently NPO w/ plan for ex lap. Reports minimal intake since Friday 6/8- indicating pt has been meeting <25% estimated needs x6 days. +Flatus/+BM. Nausea controlled. States pain is currently a 7/10 (better than yesterday). Takes calcium, vitamin D, iron at home. Discussed advancement process once deemed medically feasible. No known wt changes, UBW is 166lbs. NKFA or dietary restrictions. Skin WDL; GI: rounded and tender per flowsheet.

## 2018-06-14 NOTE — DIETITIAN INITIAL EVALUATION ADULT. - ENERGY NEEDS
Height: 5'2" Weight: 166lbs, IBW 110lbs+/-10%, %%, BMI 31.5  IBW used for calculations as pt >120% of IBW   Nutrient needs based on [facility] standards of care for maintenance in adults.   Needs adjusted for poor PO intake x6 days and pre-op

## 2018-06-15 LAB
ANION GAP SERPL CALC-SCNC: 12 MMOL/L — SIGNIFICANT CHANGE UP (ref 5–17)
BUN SERPL-MCNC: 4 MG/DL — LOW (ref 7–23)
CALCIUM SERPL-MCNC: 8.8 MG/DL — SIGNIFICANT CHANGE UP (ref 8.4–10.5)
CHLORIDE SERPL-SCNC: 100 MMOL/L — SIGNIFICANT CHANGE UP (ref 96–108)
CO2 SERPL-SCNC: 25 MMOL/L — SIGNIFICANT CHANGE UP (ref 22–31)
CREAT SERPL-MCNC: 0.68 MG/DL — SIGNIFICANT CHANGE UP (ref 0.5–1.3)
GLUCOSE SERPL-MCNC: 105 MG/DL — HIGH (ref 70–99)
HCT VFR BLD CALC: 33.5 % — LOW (ref 34.5–45)
HGB BLD-MCNC: 11.1 G/DL — LOW (ref 11.5–15.5)
MAGNESIUM SERPL-MCNC: 2 MG/DL — SIGNIFICANT CHANGE UP (ref 1.6–2.6)
MCHC RBC-ENTMCNC: 28.8 PG — SIGNIFICANT CHANGE UP (ref 27–34)
MCHC RBC-ENTMCNC: 33.1 G/DL — SIGNIFICANT CHANGE UP (ref 32–36)
MCV RBC AUTO: 87 FL — SIGNIFICANT CHANGE UP (ref 80–100)
PHOSPHATE SERPL-MCNC: 3.9 MG/DL — SIGNIFICANT CHANGE UP (ref 2.5–4.5)
PLATELET # BLD AUTO: 300 K/UL — SIGNIFICANT CHANGE UP (ref 150–400)
POTASSIUM SERPL-MCNC: 3.6 MMOL/L — SIGNIFICANT CHANGE UP (ref 3.5–5.3)
POTASSIUM SERPL-SCNC: 3.6 MMOL/L — SIGNIFICANT CHANGE UP (ref 3.5–5.3)
RBC # BLD: 3.85 M/UL — SIGNIFICANT CHANGE UP (ref 3.8–5.2)
RBC # FLD: 12.7 % — SIGNIFICANT CHANGE UP (ref 10.3–16.9)
SODIUM SERPL-SCNC: 137 MMOL/L — SIGNIFICANT CHANGE UP (ref 135–145)
WBC # BLD: 4.6 K/UL — SIGNIFICANT CHANGE UP (ref 3.8–10.5)
WBC # FLD AUTO: 4.6 K/UL — SIGNIFICANT CHANGE UP (ref 3.8–10.5)

## 2018-06-15 RX ORDER — SUCRALFATE 1 G
1 TABLET ORAL
Qty: 0 | Refills: 0 | Status: DISCONTINUED | OUTPATIENT
Start: 2018-06-15 | End: 2018-06-18

## 2018-06-15 RX ADMIN — Medication 100 MILLIGRAM(S): at 17:17

## 2018-06-15 RX ADMIN — HEPARIN SODIUM 5000 UNIT(S): 5000 INJECTION INTRAVENOUS; SUBCUTANEOUS at 13:16

## 2018-06-15 RX ADMIN — SCOPALAMINE 1 PATCH: 1 PATCH, EXTENDED RELEASE TRANSDERMAL at 13:16

## 2018-06-15 RX ADMIN — HEPARIN SODIUM 5000 UNIT(S): 5000 INJECTION INTRAVENOUS; SUBCUTANEOUS at 21:16

## 2018-06-15 RX ADMIN — PANTOPRAZOLE SODIUM 40 MILLIGRAM(S): 20 TABLET, DELAYED RELEASE ORAL at 13:16

## 2018-06-15 RX ADMIN — SENNA PLUS 2 TABLET(S): 8.6 TABLET ORAL at 21:16

## 2018-06-15 RX ADMIN — MORPHINE SULFATE 2 MILLIGRAM(S): 50 CAPSULE, EXTENDED RELEASE ORAL at 22:54

## 2018-06-15 RX ADMIN — HEPARIN SODIUM 5000 UNIT(S): 5000 INJECTION INTRAVENOUS; SUBCUTANEOUS at 05:28

## 2018-06-15 RX ADMIN — MORPHINE SULFATE 2 MILLIGRAM(S): 50 CAPSULE, EXTENDED RELEASE ORAL at 06:30

## 2018-06-15 RX ADMIN — MORPHINE SULFATE 2 MILLIGRAM(S): 50 CAPSULE, EXTENDED RELEASE ORAL at 06:00

## 2018-06-15 RX ADMIN — MORPHINE SULFATE 2 MILLIGRAM(S): 50 CAPSULE, EXTENDED RELEASE ORAL at 14:00

## 2018-06-15 RX ADMIN — MORPHINE SULFATE 4 MILLIGRAM(S): 50 CAPSULE, EXTENDED RELEASE ORAL at 19:00

## 2018-06-15 RX ADMIN — Medication 100 MILLIGRAM(S): at 05:28

## 2018-06-15 RX ADMIN — ONDANSETRON 8 MILLIGRAM(S): 8 TABLET, FILM COATED ORAL at 06:00

## 2018-06-15 RX ADMIN — Medication 30 MILLILITER(S): at 21:16

## 2018-06-15 RX ADMIN — MORPHINE SULFATE 2 MILLIGRAM(S): 50 CAPSULE, EXTENDED RELEASE ORAL at 13:37

## 2018-06-15 RX ADMIN — MORPHINE SULFATE 4 MILLIGRAM(S): 50 CAPSULE, EXTENDED RELEASE ORAL at 18:35

## 2018-06-15 RX ADMIN — Medication 1 GRAM(S): at 17:17

## 2018-06-15 RX ADMIN — ONDANSETRON 8 MILLIGRAM(S): 8 TABLET, FILM COATED ORAL at 13:16

## 2018-06-15 RX ADMIN — ONDANSETRON 8 MILLIGRAM(S): 8 TABLET, FILM COATED ORAL at 21:16

## 2018-06-15 RX ADMIN — SODIUM CHLORIDE 100 MILLILITER(S): 9 INJECTION, SOLUTION INTRAVENOUS at 22:54

## 2018-06-15 NOTE — PROGRESS NOTE ADULT - ASSESSMENT
42yoF with PMH RYGB (2009), jejunojejunal anastomotic intussusception (2015), and subsequent resection of jejunojejunal anastomosis (2017), presents with 3-day history of nausea and 1-day history of abdominal pain and PO intolerance    Admit to General Surgery, regional, Dr Campos  NPO  IVF, continue banana bag  SQH/SCD/OOBA/IS  Serial abdominal exams  AM labs 42yoF with PMH RYGB (2009), jejunojejunal anastomotic intussusception (2015), and subsequent resection of jejunojejunal anastomosis (2017), presents with 3-day history of nausea and 1-day history of abdominal pain and PO intolerance    NPO  IVF  SQH/SCD/OOBA/IS  Serial abdominal exams  AM labs  Possible OR procedure today

## 2018-06-15 NOTE — PROGRESS NOTE ADULT - SUBJECTIVE AND OBJECTIVE BOX
O/N: LANA, NPO after midnight  6/14: Patient started on full liquid diet, O/N: LANA, NPO after midnight  6/14: Patient started on full liquid diet,     SUBJECTIVE: LANA. Mild nausea after PO intake yesterday.      Vital Signs Last 24 Hrs  T(C): 36.8 (15 Vicente 2018 05:46), Max: 37.1 (14 Jun 2018 08:20)  T(F): 98.3 (15 Vicente 2018 05:46), Max: 98.8 (14 Jun 2018 17:01)  HR: 63 (15 Vicente 2018 05:46) (51 - 63)  BP: 94/59 (15 Vicente 2018 05:46) (90/50 - 106/65)  BP(mean): --  RR: 18 (15 Vicente 2018 05:46) (16 - 18)  SpO2: 95% (15 Vicente 2018 05:46) (95% - 100%)    General: NAD, resting comfortably in bed  Pulm: Nonlabored breathing, no respiratory distress  Abd: soft, ND, mildly TTP left side    LABS:                        10.7   4.9   )-----------( 300      ( 14 Jun 2018 07:09 )             33.5     06-14    135  |  100  |  6<L>  ----------------------------<  85  3.9   |  24  |  0.63    Ca    8.5      14 Jun 2018 07:09  Phos  3.7     06-14  Mg     1.8     06-14

## 2018-06-16 LAB
ANION GAP SERPL CALC-SCNC: 9 MMOL/L — SIGNIFICANT CHANGE UP (ref 5–17)
APTT BLD: 30.2 SEC — SIGNIFICANT CHANGE UP (ref 27.5–37.4)
BUN SERPL-MCNC: 5 MG/DL — LOW (ref 7–23)
CALCIUM SERPL-MCNC: 8.5 MG/DL — SIGNIFICANT CHANGE UP (ref 8.4–10.5)
CHLORIDE SERPL-SCNC: 104 MMOL/L — SIGNIFICANT CHANGE UP (ref 96–108)
CO2 SERPL-SCNC: 26 MMOL/L — SIGNIFICANT CHANGE UP (ref 22–31)
CREAT SERPL-MCNC: 0.72 MG/DL — SIGNIFICANT CHANGE UP (ref 0.5–1.3)
GLUCOSE SERPL-MCNC: 110 MG/DL — HIGH (ref 70–99)
GRAM STN FLD: SIGNIFICANT CHANGE UP
HCT VFR BLD CALC: 33.6 % — LOW (ref 34.5–45)
HGB BLD-MCNC: 11 G/DL — LOW (ref 11.5–15.5)
INR BLD: 1.04 — SIGNIFICANT CHANGE UP (ref 0.88–1.16)
MAGNESIUM SERPL-MCNC: 1.8 MG/DL — SIGNIFICANT CHANGE UP (ref 1.6–2.6)
MCHC RBC-ENTMCNC: 28.5 PG — SIGNIFICANT CHANGE UP (ref 27–34)
MCHC RBC-ENTMCNC: 32.7 G/DL — SIGNIFICANT CHANGE UP (ref 32–36)
MCV RBC AUTO: 87 FL — SIGNIFICANT CHANGE UP (ref 80–100)
PHOSPHATE SERPL-MCNC: 4 MG/DL — SIGNIFICANT CHANGE UP (ref 2.5–4.5)
PLATELET # BLD AUTO: 304 K/UL — SIGNIFICANT CHANGE UP (ref 150–400)
POTASSIUM SERPL-MCNC: 3.7 MMOL/L — SIGNIFICANT CHANGE UP (ref 3.5–5.3)
POTASSIUM SERPL-SCNC: 3.7 MMOL/L — SIGNIFICANT CHANGE UP (ref 3.5–5.3)
PROTHROM AB SERPL-ACNC: 11.6 SEC — SIGNIFICANT CHANGE UP (ref 9.8–12.7)
RBC # BLD: 3.86 M/UL — SIGNIFICANT CHANGE UP (ref 3.8–5.2)
RBC # FLD: 12.7 % — SIGNIFICANT CHANGE UP (ref 10.3–16.9)
SODIUM SERPL-SCNC: 139 MMOL/L — SIGNIFICANT CHANGE UP (ref 135–145)
SPECIMEN SOURCE: SIGNIFICANT CHANGE UP
WBC # BLD: 4.6 K/UL — SIGNIFICANT CHANGE UP (ref 3.8–10.5)
WBC # FLD AUTO: 4.6 K/UL — SIGNIFICANT CHANGE UP (ref 3.8–10.5)

## 2018-06-16 PROCEDURE — 76830 TRANSVAGINAL US NON-OB: CPT | Mod: 26

## 2018-06-16 PROCEDURE — 76856 US EXAM PELVIC COMPLETE: CPT | Mod: 26

## 2018-06-16 PROCEDURE — 49084 PERITONEAL LAVAGE: CPT | Mod: 59,GC

## 2018-06-16 PROCEDURE — 49320 DIAG LAPARO SEPARATE PROC: CPT | Mod: GC

## 2018-06-16 RX ORDER — POTASSIUM CHLORIDE 20 MEQ
10 PACKET (EA) ORAL
Qty: 0 | Refills: 0 | Status: COMPLETED | OUTPATIENT
Start: 2018-06-16 | End: 2018-06-16

## 2018-06-16 RX ORDER — CEFOTETAN DISODIUM 1 G
2 VIAL (EA) INJECTION EVERY 12 HOURS
Qty: 0 | Refills: 0 | Status: DISCONTINUED | OUTPATIENT
Start: 2018-06-16 | End: 2018-06-18

## 2018-06-16 RX ORDER — CEFOTETAN DISODIUM 1 G
2 VIAL (EA) INJECTION ONCE
Qty: 0 | Refills: 0 | Status: COMPLETED | OUTPATIENT
Start: 2018-06-16 | End: 2018-06-16

## 2018-06-16 RX ORDER — MORPHINE SULFATE 50 MG/1
1 CAPSULE, EXTENDED RELEASE ORAL ONCE
Qty: 0 | Refills: 0 | Status: DISCONTINUED | OUTPATIENT
Start: 2018-06-16 | End: 2018-06-17

## 2018-06-16 RX ORDER — CEFOTETAN DISODIUM 1 G
VIAL (EA) INJECTION
Qty: 0 | Refills: 0 | Status: DISCONTINUED | OUTPATIENT
Start: 2018-06-16 | End: 2018-06-18

## 2018-06-16 RX ORDER — ACETAMINOPHEN 500 MG
1000 TABLET ORAL ONCE
Qty: 0 | Refills: 0 | Status: COMPLETED | OUTPATIENT
Start: 2018-06-16 | End: 2018-06-16

## 2018-06-16 RX ADMIN — Medication 100 MILLIEQUIVALENT(S): at 15:29

## 2018-06-16 RX ADMIN — Medication 100 GRAM(S): at 13:23

## 2018-06-16 RX ADMIN — MORPHINE SULFATE 2 MILLIGRAM(S): 50 CAPSULE, EXTENDED RELEASE ORAL at 15:29

## 2018-06-16 RX ADMIN — MORPHINE SULFATE 4 MILLIGRAM(S): 50 CAPSULE, EXTENDED RELEASE ORAL at 13:09

## 2018-06-16 RX ADMIN — ONDANSETRON 8 MILLIGRAM(S): 8 TABLET, FILM COATED ORAL at 03:24

## 2018-06-16 RX ADMIN — MORPHINE SULFATE 4 MILLIGRAM(S): 50 CAPSULE, EXTENDED RELEASE ORAL at 13:24

## 2018-06-16 RX ADMIN — Medication 100 MILLIGRAM(S): at 17:41

## 2018-06-16 RX ADMIN — Medication 30 MILLILITER(S): at 21:06

## 2018-06-16 RX ADMIN — PANTOPRAZOLE SODIUM 40 MILLIGRAM(S): 20 TABLET, DELAYED RELEASE ORAL at 13:09

## 2018-06-16 RX ADMIN — HEPARIN SODIUM 5000 UNIT(S): 5000 INJECTION INTRAVENOUS; SUBCUTANEOUS at 21:06

## 2018-06-16 RX ADMIN — SENNA PLUS 2 TABLET(S): 8.6 TABLET ORAL at 21:06

## 2018-06-16 RX ADMIN — ONDANSETRON 8 MILLIGRAM(S): 8 TABLET, FILM COATED ORAL at 08:14

## 2018-06-16 RX ADMIN — ONDANSETRON 8 MILLIGRAM(S): 8 TABLET, FILM COATED ORAL at 21:06

## 2018-06-16 RX ADMIN — Medication 1000 MILLIGRAM(S): at 22:51

## 2018-06-16 RX ADMIN — MORPHINE SULFATE 2 MILLIGRAM(S): 50 CAPSULE, EXTENDED RELEASE ORAL at 20:03

## 2018-06-16 RX ADMIN — Medication 1 GRAM(S): at 17:41

## 2018-06-16 RX ADMIN — HEPARIN SODIUM 5000 UNIT(S): 5000 INJECTION INTRAVENOUS; SUBCUTANEOUS at 07:14

## 2018-06-16 RX ADMIN — Medication 100 MILLIEQUIVALENT(S): at 08:11

## 2018-06-16 RX ADMIN — MORPHINE SULFATE 2 MILLIGRAM(S): 50 CAPSULE, EXTENDED RELEASE ORAL at 19:53

## 2018-06-16 RX ADMIN — Medication 400 MILLIGRAM(S): at 22:41

## 2018-06-16 RX ADMIN — MORPHINE SULFATE 2 MILLIGRAM(S): 50 CAPSULE, EXTENDED RELEASE ORAL at 15:55

## 2018-06-16 RX ADMIN — ONDANSETRON 8 MILLIGRAM(S): 8 TABLET, FILM COATED ORAL at 15:35

## 2018-06-16 NOTE — PROGRESS NOTE ADULT - SUBJECTIVE AND OBJECTIVE BOX
Post op check       s/p diagnostic lap and CRICKET   patient seen and examined   no complaints post op   evidence of PID intra op   started on abx / gyn consulted / TVUS ordered       Vital Signs Last 24 Hrs  T(C): 36.4 (16 Jun 2018 10:50), Max: 37.1 (15 Vicente 2018 20:30)  T(F): 97.6 (16 Jun 2018 10:50), Max: 98.7 (15 Vicente 2018 20:30)  HR: 53 (16 Jun 2018 11:10) (53 - 67)  BP: 100/56 (16 Jun 2018 11:10) (87/52 - 131/63)  BP(mean): 80 (16 Jun 2018 11:10) (80 - 93)  RR: 16 (16 Jun 2018 11:10) (16 - 18)  SpO2: 96% (16 Jun 2018 11:10) (95% - 98%)      Gen: NAD  Abd: soft , nt / nd   incisions c/d/i       42yoF with PMH RYGB (2009), jejunojejunal anastomotic intussusception (2015), and subsequent resection of jejunojejunal anastomosis (2017), presents with 3-day history of nausea and 1-day history of abdominal pain and PO intolerance  now s/p diagnostic lap / cricket / cultures sent from fluid in pelvis suspicion for PID   pain control  dvt ppx  cld  abx for pid   gyn c/s  tvus ordered   likely d/c tmw

## 2018-06-16 NOTE — BRIEF OPERATIVE NOTE - OPERATION/FINDINGS
Diagnostic lap, bowel ran. Small amount of adhesions by J-J lysed, no major kink or any issue in this area. Pelvis with adhesions, especially to left ovary. Some fluid in pelvis, sent for culture. Suspicious for pelvic inflamatory disease.

## 2018-06-16 NOTE — PROGRESS NOTE ADULT - ASSESSMENT
42yoF with PMH RYGB (2009), jejunojejunal anastomotic intussusception (2015), and subsequent resection of jejunojejunal anastomosis (2017), presents with 3-day history of nausea and 1-day history of abdominal pain and PO intolerance    Admit to General Surgery, regional, Dr Campos  NPO  IVF, continue banana bag  SQH/SCD/OOBA/IS  Serial abdominal exams  AM labs 42yoF with PMH RYGB (2009), jejunojejunal anastomotic intussusception (2015), and subsequent resection of jejunojejunal anastomosis (2017), presents with 3-day history of nausea and 1-day history of abdominal pain and PO intolerance    NPO  IVF, continue banana bag  SQH/SCD/OOBA/IS  Serial abdominal exams  AM labs  possible OR today for diagnostic laparoscopy

## 2018-06-16 NOTE — PROGRESS NOTE ADULT - SUBJECTIVE AND OBJECTIVE BOX
O/N: c/o abdominal pain, LANA  6/15: FLD, NPO MN, carafate added O/N: c/o abdominal pain, LANA  6/15: FLD, NPO MN, carafate added        Vital Signs Last 24 Hrs  T(C): 36.6 (16 Jun 2018 06:02), Max: 37.1 (15 Vicente 2018 20:30)  T(F): 97.9 (16 Jun 2018 06:02), Max: 98.7 (15 Vicente 2018 20:30)  HR: 59 (16 Jun 2018 06:02) (51 - 63)  BP: 87/54 (16 Jun 2018 06:02) (87/54 - 103/67)  BP(mean): --  RR: 16 (16 Jun 2018 06:02) (16 - 18)  SpO2: 97% (16 Jun 2018 06:02) (95% - 98%)      Gen:NAd, poor po intake   abd: soft, ttp, nd

## 2018-06-17 ENCOUNTER — TRANSCRIPTION ENCOUNTER (OUTPATIENT)
Age: 43
End: 2018-06-17

## 2018-06-17 LAB
ANION GAP SERPL CALC-SCNC: 11 MMOL/L — SIGNIFICANT CHANGE UP (ref 5–17)
BUN SERPL-MCNC: 4 MG/DL — LOW (ref 7–23)
CALCIUM SERPL-MCNC: 9 MG/DL — SIGNIFICANT CHANGE UP (ref 8.4–10.5)
CHLORIDE SERPL-SCNC: 102 MMOL/L — SIGNIFICANT CHANGE UP (ref 96–108)
CO2 SERPL-SCNC: 24 MMOL/L — SIGNIFICANT CHANGE UP (ref 22–31)
CREAT SERPL-MCNC: 0.6 MG/DL — SIGNIFICANT CHANGE UP (ref 0.5–1.3)
GLUCOSE SERPL-MCNC: 115 MG/DL — HIGH (ref 70–99)
HCT VFR BLD CALC: 32 % — LOW (ref 34.5–45)
HGB BLD-MCNC: 10.5 G/DL — LOW (ref 11.5–15.5)
MAGNESIUM SERPL-MCNC: 1.7 MG/DL — SIGNIFICANT CHANGE UP (ref 1.6–2.6)
MCHC RBC-ENTMCNC: 28.6 PG — SIGNIFICANT CHANGE UP (ref 27–34)
MCHC RBC-ENTMCNC: 32.8 G/DL — SIGNIFICANT CHANGE UP (ref 32–36)
MCV RBC AUTO: 87.2 FL — SIGNIFICANT CHANGE UP (ref 80–100)
PHOSPHATE SERPL-MCNC: 3.9 MG/DL — SIGNIFICANT CHANGE UP (ref 2.5–4.5)
PLATELET # BLD AUTO: 301 K/UL — SIGNIFICANT CHANGE UP (ref 150–400)
POTASSIUM SERPL-MCNC: 3.2 MMOL/L — LOW (ref 3.5–5.3)
POTASSIUM SERPL-SCNC: 3.2 MMOL/L — LOW (ref 3.5–5.3)
RBC # BLD: 3.67 M/UL — LOW (ref 3.8–5.2)
RBC # FLD: 12.9 % — SIGNIFICANT CHANGE UP (ref 10.3–16.9)
SODIUM SERPL-SCNC: 137 MMOL/L — SIGNIFICANT CHANGE UP (ref 135–145)
WBC # BLD: 8.8 K/UL — SIGNIFICANT CHANGE UP (ref 3.8–10.5)
WBC # FLD AUTO: 8.8 K/UL — SIGNIFICANT CHANGE UP (ref 3.8–10.5)

## 2018-06-17 PROCEDURE — 49320 DIAG LAPARO SEPARATE PROC: CPT | Mod: GC

## 2018-06-17 PROCEDURE — 49084 PERITONEAL LAVAGE: CPT | Mod: 59,GC

## 2018-06-17 RX ORDER — POTASSIUM CHLORIDE 20 MEQ
40 PACKET (EA) ORAL
Qty: 0 | Refills: 0 | Status: COMPLETED | OUTPATIENT
Start: 2018-06-17 | End: 2018-06-17

## 2018-06-17 RX ORDER — SENNA PLUS 8.6 MG/1
2 TABLET ORAL
Qty: 0 | Refills: 0 | COMMUNITY
Start: 2018-06-17

## 2018-06-17 RX ORDER — OXYCODONE AND ACETAMINOPHEN 5; 325 MG/1; MG/1
2 TABLET ORAL EVERY 4 HOURS
Qty: 0 | Refills: 0 | Status: DISCONTINUED | OUTPATIENT
Start: 2018-06-17 | End: 2018-06-18

## 2018-06-17 RX ORDER — DOCUSATE SODIUM 100 MG
1 CAPSULE ORAL
Qty: 0 | Refills: 0 | COMMUNITY
Start: 2018-06-17

## 2018-06-17 RX ORDER — MAGNESIUM SULFATE 500 MG/ML
2 VIAL (ML) INJECTION ONCE
Qty: 0 | Refills: 0 | Status: COMPLETED | OUTPATIENT
Start: 2018-06-17 | End: 2018-06-17

## 2018-06-17 RX ORDER — OXYCODONE AND ACETAMINOPHEN 5; 325 MG/1; MG/1
1 TABLET ORAL EVERY 4 HOURS
Qty: 0 | Refills: 0 | Status: DISCONTINUED | OUTPATIENT
Start: 2018-06-17 | End: 2018-06-18

## 2018-06-17 RX ADMIN — OXYCODONE AND ACETAMINOPHEN 2 TABLET(S): 5; 325 TABLET ORAL at 16:18

## 2018-06-17 RX ADMIN — Medication 40 MILLIEQUIVALENT(S): at 07:42

## 2018-06-17 RX ADMIN — Medication 100 MILLIGRAM(S): at 17:06

## 2018-06-17 RX ADMIN — Medication 40 MILLIEQUIVALENT(S): at 11:33

## 2018-06-17 RX ADMIN — HEPARIN SODIUM 5000 UNIT(S): 5000 INJECTION INTRAVENOUS; SUBCUTANEOUS at 21:21

## 2018-06-17 RX ADMIN — OXYCODONE AND ACETAMINOPHEN 2 TABLET(S): 5; 325 TABLET ORAL at 22:21

## 2018-06-17 RX ADMIN — ONDANSETRON 8 MILLIGRAM(S): 8 TABLET, FILM COATED ORAL at 19:30

## 2018-06-17 RX ADMIN — HEPARIN SODIUM 5000 UNIT(S): 5000 INJECTION INTRAVENOUS; SUBCUTANEOUS at 06:24

## 2018-06-17 RX ADMIN — HEPARIN SODIUM 5000 UNIT(S): 5000 INJECTION INTRAVENOUS; SUBCUTANEOUS at 14:47

## 2018-06-17 RX ADMIN — Medication 100 GRAM(S): at 01:17

## 2018-06-17 RX ADMIN — Medication 50 GRAM(S): at 07:41

## 2018-06-17 RX ADMIN — PANTOPRAZOLE SODIUM 40 MILLIGRAM(S): 20 TABLET, DELAYED RELEASE ORAL at 14:47

## 2018-06-17 RX ADMIN — Medication 100 GRAM(S): at 14:46

## 2018-06-17 RX ADMIN — Medication 1 GRAM(S): at 17:06

## 2018-06-17 RX ADMIN — MORPHINE SULFATE 2 MILLIGRAM(S): 50 CAPSULE, EXTENDED RELEASE ORAL at 05:06

## 2018-06-17 RX ADMIN — OXYCODONE AND ACETAMINOPHEN 2 TABLET(S): 5; 325 TABLET ORAL at 21:21

## 2018-06-17 RX ADMIN — ONDANSETRON 8 MILLIGRAM(S): 8 TABLET, FILM COATED ORAL at 06:24

## 2018-06-17 RX ADMIN — OXYCODONE AND ACETAMINOPHEN 2 TABLET(S): 5; 325 TABLET ORAL at 11:36

## 2018-06-17 RX ADMIN — ONDANSETRON 8 MILLIGRAM(S): 8 TABLET, FILM COATED ORAL at 11:32

## 2018-06-17 RX ADMIN — Medication 100 MILLIGRAM(S): at 06:24

## 2018-06-17 RX ADMIN — Medication 1 GRAM(S): at 06:24

## 2018-06-17 RX ADMIN — Medication 30 MILLILITER(S): at 21:21

## 2018-06-17 RX ADMIN — SENNA PLUS 2 TABLET(S): 8.6 TABLET ORAL at 21:21

## 2018-06-17 RX ADMIN — MORPHINE SULFATE 2 MILLIGRAM(S): 50 CAPSULE, EXTENDED RELEASE ORAL at 04:51

## 2018-06-17 RX ADMIN — OXYCODONE AND ACETAMINOPHEN 2 TABLET(S): 5; 325 TABLET ORAL at 16:48

## 2018-06-17 RX ADMIN — OXYCODONE AND ACETAMINOPHEN 2 TABLET(S): 5; 325 TABLET ORAL at 12:15

## 2018-06-17 NOTE — DISCHARGE NOTE ADULT - CARE PROVIDER_API CALL
Gavin Campos), Surgery  [address] [place] [address]  Phone: [phone]  Fax: [phone] Gavin Campos), Surgery  [address] [address]  Phone: [phone]  Fax: [phone]    Esvin Valles), Gynecologic Oncology  [address] [place] [address]  Phone: [phone]  Fax: [phone]

## 2018-06-17 NOTE — CONSULT NOTE ADULT - SUBJECTIVE AND OBJECTIVE BOX
42y  s/p total hysterectomy 2y ago with PMH anemia, herniated cervical disc, cholecystectomy, RYGB (), intussusception of jejunojejunal anastomosis (), and subsequent resection of dilated jejunojejunal anastomosis (2017) with small bowel anastomosis, admitted to surgery for diagnostic l/s for 1-day history of abdominal pain. In OR pt was found to have pelvic fluid concerning for PID. Pt denies any abnormal discharge or hx of STDs. She does endorse one small ovarian cyst on right ovary but other wise GYNHx only significant for total hysterectomy for fibroids. Pelvic pain was significant only for one day. Denies fevers. chills.      OBHx:  x2, 17y and 21y ago  GYNHx: total abdominal hysterectomy for fibroid uterus 2y ago. Denies STDs or abnormal pap smears     PAST MEDICAL & SURGICAL HISTORY:  UTI (urinary tract infection)  Herniated cervical disc  Anemia  History of cholecystectomy  H/O total abdominal hysterectomy  Status post bariatric surgery: Gastric bypass status for obesity    FAMILY HISTORY:  No pertinent family history in first degree relatives    No Known Allergies          PHYSICAL EXAM:   Vital Signs Last 24 Hrs  T(C): 36.7 (2018 09:20), Max: 37.2 (2018 15:28)  T(F): 98 (2018 09:20), Max: 99 (2018 15:28)  HR: 52 (2018 09:20) (46 - 72)  BP: 91/59 (2018 09:20) (91/57 - 119/75)  BP(mean): 85 (2018 14:00) (73 - 87)  RR: 17 (2018 09:20) (16 - 18)  SpO2: 97% (2018 09:20) (95% - 98%)    **************************  Constitutional: Alert & Oriented x3, No acute distress, cooperative   Respiratory: Clear to ausculation bilaterally; no wheezing, rhonchi, or crackles  Cardiovascular: S1 &S2 physiologic, no murmurs, or gallops  Gastrointestinal: soft, non tender, positive bowel sounds, no rebound or guarding   Speculum exam:  normal vaginal mucosa, some vaginal discharge, NL  Pelvic exam: no CMT, some pelvic pain at incision sites  Extremities: no calf tenderness or swelling      LABS:                        10.5   8.8   )-----------( 301      ( 2018 05:41 )             32.0     -    137  |  102  |  4<L>  ----------------------------<  115<H>  3.2<L>   |  24  |  0.60    Ca    9.0      2018 05:41  Phos  3.9       Mg     1.7           PT/INR - ( 2018 06:34 )   PT: 11.6 sec;   INR: 1.04          PTT - ( 2018 06:34 )  PTT:30.2 sec      RADIOLOGY & ADDITIONAL STUDIES:  < from: US Transvaginal (18 @ 14:55) >  ******PRELIMINARY REPORT******    ******PRELIMINARY REPORT******            EXAM:  US PELVIC COMPLETE                          EXAM:  US TRANSVAGINAL                          PROCEDURE DATE:  2018    ******PRELIMINARY REPORT******    ******PRELIMINARY REPORT******              INTERPRETATION:  RESIDENT PRELIMINARY REPORT    PELVIC ULTRASOUND dated 2018 2:55 PM    INDICATION: Pelvic inflammatory disease. Pelvic pain. LMP: Not reported    TECHNIQUE: Transabdominal views of the pelvis were obtained followed by   transvaginal views for better visualization of the ovaries.      PRIOR STUDIES: CT abdomen and pelvis 2018    FINDINGS:   Patient is noted to be status post hysterectomy. The cervix is normal in   appearance.    The right ovary is enlarged, measuring 4.0 x 3.5 x 3.1 cm. There is a 3.3   x 2.0 x 3.0 cm simple cyst within the right ovary. The left ovary is   normal in size, measuring 2.5 x 1.9 x 1.4 cm. No left ovarian masses are   seen. Doppler evaluation demonstrates flow to both ovaries with no   evidence of torsion.    A physiologic amount of free fluid is seen in the cul-de-sac.    IMPRESSION:   Patient is noted to be status post hysterectomy. Right ovarian cyst   without sonographic evidence of ovarian torsion.            "Thank you for the opportunity to participate in the care of this   patient."  ******PRELIMINARY REPORT******    ******PRELIMINARY REPORT******          NIURKA ANGEL M.D., RADIOLOGY RESIDENT    < end of copied text >  < from: US Transvaginal (18 @ 14:55) >  ******PRELIMINARY REPORT******    ******PRELIMINARY REPORT******            EXAM:  US PELVIC COMPLETE                          EXAM:  US TRANSVAGINAL                          PROCEDURE DATE:  2018    ******PRELIMINARY REPORT******    ******PRELIMINARY REPORT******              INTERPRETATION:  RESIDENT PRELIMINARY REPORT    PELVIC ULTRASOUND dated 2018 2:55 PM    INDICATION: Pelvic inflammatory disease. Pelvic pain. LMP: Not reported    TECHNIQUE: Transabdominal views of the pelvis were obtained followed by   transvaginal views for better visualization of the ovaries.      PRIOR STUDIES: CT abdomen and pelvis 2018    FINDINGS:   Patient is noted to be status post hysterectomy. The cervix is normal in   appearance.    The right ovary is enlarged, measuring 4.0 x 3.5 x 3.1 cm. There is a 3.3   x 2.0 x 3.0 cm simple cyst within the right ovary. The left ovary is   normal in size, measuring 2.5 x 1.9 x 1.4 cm. No left ovarian masses are   seen. Doppler evaluation demonstrates flow to both ovaries with no   evidence of torsion.    A physiologic amount of free fluid is seen in the cul-de-sac.    IMPRESSION:   Patient is noted to be status post hysterectomy. Right ovarian cyst   without sonographic evidence of ovarian torsion.            "Thank you for the opportunity to participate in the care of this   patient."  ******PRELIMINARY REPORT******    ******PRELIMINARY REPORT******          NIURKA ANGEL M.D., RADIOLOGY RESIDENT    < end of copied text >

## 2018-06-17 NOTE — CONSULT NOTE ADULT - ASSESSMENT
42y  s/p total hysterectomy presenting to ED with pelvic pain and found to have pelvic fluid concerning for PID in OR  - Pt w/o cervix and uterus therefore PID unlikely.  - Possible infxn of tube/ovary. agree with ABx in house  - No need for outpt abx  - Pt to follow up with own GYN or [facility] on [address]    dtori Valles

## 2018-06-17 NOTE — PROGRESS NOTE ADULT - SUBJECTIVE AND OBJECTIVE BOX
6/17: went to OR for diagnostic laparoscopy, CRICKET performed, fluid in pelvis noted and cultures suspicion for PID, TVUS performed, GYN called, started on abx, POC WNL         Gen: NAD  Abd: soft , nt / nd   incisions c/d/i       42yoF with PMH RYGB (2009), jejunojejunal anastomotic intussusception (2015), and subsequent resection of jejunojejunal anastomosis (2017), presents with 3-day history of nausea and 1-day history of abdominal pain and PO intolerance  now s/p diagnostic lap / cricket / cultures sent from fluid in pelvis suspicion for PID   pain control  dvt ppx  cld  abx for pid   gyn c/s  tvus ordered   likely d/c tmw 6/17: went to OR for diagnostic laparoscopy, CRICKET performed, fluid in pelvis noted and cultures suspicion for PID, TVUS performed, GYN called, started on abx, POC WNL         INTERVAL HPI/OVERNIGHT EVENTS:    STATUS POST:  Diagnostic Lap CRICKET    POST OPERATIVE DAY #: 1    SUBJECTIVE: Pt seen and examined at bedside. No acute complaints. Pain improved.  Flatus: [ x] YES [ ] NO             Bowel Movement: [ ] YES [x ] NO  Pain (0-10):            Pain Control Adequate: [x ] YES [ ] NO  Nausea: [ ] YES [x ] NO            Vomiting: [ ] YES [x ] NO  Diarrhea: [ ] YES [x ] NO         Constipation: [ ] YES [x ] NO     Chest Pain: [ ] YES [x ] NO    SOB:  [ ] YES [x ] NO    MEDICATIONS  (STANDING):  cefoTEtan  IVPB 2 Gram(s) IV Intermittent every 12 hours  cefoTEtan  IVPB      dextrose 5% + sodium chloride 0.45%. 1000 milliLiter(s) (100 mL/Hr) IV Continuous <Continuous>  docusate sodium 100 milliGRAM(s) Oral two times a day  doxycycline hyclate Capsule 100 milliGRAM(s) Oral every 12 hours  heparin  Injectable 5000 Unit(s) SubCutaneous every 8 hours  mineral oil 30 milliLiter(s) Oral daily  morphine  - Injectable 1 milliGRAM(s) IV Push once  ondansetron Injectable 8 milliGRAM(s) IV Push every 6 hours  pantoprazole  Injectable 40 milliGRAM(s) IV Push daily  potassium chloride   Powder 40 milliEquivalent(s) Oral every 2 hours  scopolamine   Patch 1 Patch Transdermal every 3 days  senna 2 Tablet(s) Oral at bedtime  sucralfate 1 Gram(s) Oral two times a day    MEDICATIONS  (PRN):  morphine  - Injectable 2 milliGRAM(s) IV Push every 4 hours PRN Moderate Pain (4 - 6)  morphine  - Injectable 4 milliGRAM(s) IV Push every 4 hours PRN Severe Pain (7 - 10)      Vital Signs Last 24 Hrs  T(C): 36.3 (17 Jun 2018 05:48), Max: 37.2 (16 Jun 2018 15:28)  T(F): 97.4 (17 Jun 2018 05:48), Max: 99 (16 Jun 2018 15:28)  HR: 46 (17 Jun 2018 05:48) (46 - 72)  BP: 91/57 (17 Jun 2018 05:48) (91/53 - 131/63)  BP(mean): 85 (16 Jun 2018 14:00) (73 - 93)  RR: 16 (17 Jun 2018 05:48) (16 - 18)  SpO2: 96% (17 Jun 2018 05:48) (95% - 98%)    PHYSICAL EXAM:      Constitutional: A&Ox3    Respiratory: non labored breathing, no respiratory distress    Cardiovascular:  RRR    Gastrointestinal: soft nt nd                 Incision: cdi    Genitourinary: voiding    Extremities: (-) edema                  I&O's Detail    16 Jun 2018 07:01  -  17 Jun 2018 07:00  --------------------------------------------------------  IN:    dextrose 5% + sodium chloride 0.45%.: 1400 mL    Oral Fluid: 850 mL    Other: 900 mL  Total IN: 3150 mL    OUT:    Voided: 2200 mL  Total OUT: 2200 mL    Total NET: 950 mL          LABS:                        10.5   8.8   )-----------( 301      ( 17 Jun 2018 05:41 )             32.0     06-17    137  |  102  |  4<L>  ----------------------------<  115<H>  3.2<L>   |  24  |  0.60    Ca    9.0      17 Jun 2018 05:41  Phos  3.9     06-17  Mg     1.7     06-17      PT/INR - ( 16 Jun 2018 06:34 )   PT: 11.6 sec;   INR: 1.04          PTT - ( 16 Jun 2018 06:34 )  PTT:30.2 sec      RADIOLOGY & ADDITIONAL STUDIES:      42yoF with PMH RYGB (2009), jejunojejunal anastomotic intussusception (2015), and subsequent resection of jejunojejunal anastomosis (2017), presents with 3-day history of nausea and 1-day history of abdominal pain and PO intolerance  now s/p diagnostic lap / cricket / cultures sent from fluid in pelvis suspicion for PID   pain control  dvt ppx  Regular diet  abx for pid   gyn c/s  tvus ordered   likely d/c in pm

## 2018-06-17 NOTE — DISCHARGE NOTE ADULT - PLAN OF CARE
recovery Follow up with  in 1 week. Call the office at  to schedule your appointment. You should follow up with own GYN or [facility] on [address]. You may shower; soap and water over incision sites. Do not scrub. Pat dry when done. No tub bathing or swimming until cleared. Keep incision sites out of the sun as scars will darken. No heavy lifting (>10lbs) or strenuous exercise.  Continue diet as prior if tolerated. You should be urinating at least 3-4x per day.   For pain control, you may take Percocet for severe pain not helped by Tylenol or Advil. Take Percocet 1-2 tabs, every 4-6 hours, as needed. Do not take Tylenol and Percocet together. While taking Percocet, you may want to take Colace (a stool softener), as the narcotics may cause constipation. Call the office if you experience increasing abdominal pain, nausea, vomiting, or temperature >100.4F. Gyn Please follow up with your primary OBGYN doctor. If you do not have one or would prefer someone else, please follow up with Dr. Valles in 2-4 weeks. Please call the number below to schedule an appointment.

## 2018-06-17 NOTE — DISCHARGE NOTE ADULT - FINDINGS/TREATMENT
Diagnostic lap, bowel ran. Small amount of adhesions by J-J lysed, no major kink or any issue in this area. Pelvis with adhesions, especially to left ovary. Some fluid in pelvis, sent for culture. Suspicious for pelvic inflammatory disease.

## 2018-06-17 NOTE — DISCHARGE NOTE ADULT - NS AS ACTIVITY OBS
Walking-Indoors allowed/Stairs allowed/Walking-Outdoors allowed/Showering allowed/No Heavy lifting/straining

## 2018-06-17 NOTE — DISCHARGE NOTE ADULT - MEDICATION SUMMARY - MEDICATIONS TO TAKE
I will START or STAY ON the medications listed below when I get home from the hospital:    Anaprox-DS sodium 550 mg oral tablet  -- 1 tab(s) by mouth 2 times a day  -- Check with your doctor before becoming pregnant.  It is very important that you take or use this exactly as directed.  Do not skip doses or discontinue unless directed by your doctor.  May cause drowsiness or dizziness.  Obtain medical advice before taking any non-prescription drugs as some may affect the action of this medication.  Take with food or milk.    -- Indication: For Home Medication    oxyCODONE-acetaminophen 5 mg-325 mg oral tablet  -- 1 tab(s) by mouth every 4-6 hours, As Needed -Severe Pain (7 - 10) MDD:6  -- Indication: For Severe Pain    Zofran ODT 4 mg oral tablet, disintegrating  -- 1 tab(s) by mouth 3 times a day  -- Indication: For nausea    ferrous sulfate 325 mg (65 mg elemental iron) oral tablet  -- 1 tab(s) by mouth 3 times a day  -- Indication: For iron    docusate sodium 100 mg oral capsule  -- 1 cap(s) by mouth 2 times a day  -- Indication: For PRN constipation    senna oral tablet  -- 2 tab(s) by mouth once a day (at bedtime)  -- Indication: For PRN constipation    tiZANidine 2 mg oral capsule  -- 1 cap(s) by mouth once (at bedtime)  -- Indication: For muscle relaxant    Protonix 40 mg oral delayed release tablet  -- 1 tab(s) by mouth once a day MDD:1  -- It is very important that you take or use this exactly as directed.  Do not skip doses or discontinue unless directed by your doctor.  Obtain medical advice before taking any non-prescription drugs as some may affect the action of this medication.  Swallow whole.  Do not crush.    -- Indication: For home medication    omeprazole 20 mg oral delayed release capsule  -- 1 cap(s) by mouth once a day  -- It is very important that you take or use this exactly as directed.  Do not skip doses or discontinue unless directed by your doctor.  Obtain medical advice before taking any non-prescription drugs as some may affect the action of this medication.  Swallow whole.  Do not crush.    -- Indication: For home medication    cyanocobalamin 2500 mcg sublingual tablet  -- 1 tab(s) under tongue once a day  -- Indication: For Hx gastric surgery

## 2018-06-17 NOTE — DISCHARGE NOTE ADULT - CARE PLAN
Principal Discharge DX:	Abdominal pain  Goal:	recovery  Assessment and plan of treatment:	Follow up with  in 1 week. Call the office at  to schedule your appointment. You should follow up with own GYN or [facility] on [address]. You may shower; soap and water over incision sites. Do not scrub. Pat dry when done. No tub bathing or swimming until cleared. Keep incision sites out of the sun as scars will darken. No heavy lifting (>10lbs) or strenuous exercise.  Continue diet as prior if tolerated. You should be urinating at least 3-4x per day.   For pain control, you may take Percocet for severe pain not helped by Tylenol or Advil. Take Percocet 1-2 tabs, every 4-6 hours, as needed. Do not take Tylenol and Percocet together. While taking Percocet, you may want to take Colace (a stool softener), as the narcotics may cause constipation. Call the office if you experience increasing abdominal pain, nausea, vomiting, or temperature >100.4F. Principal Discharge DX:	Abdominal pain  Goal:	recovery  Assessment and plan of treatment:	Follow up with  in 1 week. Call the office at  to schedule your appointment. You should follow up with own GYN or [facility] on [address]. You may shower; soap and water over incision sites. Do not scrub. Pat dry when done. No tub bathing or swimming until cleared. Keep incision sites out of the sun as scars will darken. No heavy lifting (>10lbs) or strenuous exercise.  Continue diet as prior if tolerated. You should be urinating at least 3-4x per day.   For pain control, you may take Percocet for severe pain not helped by Tylenol or Advil. Take Percocet 1-2 tabs, every 4-6 hours, as needed. Do not take Tylenol and Percocet together. While taking Percocet, you may want to take Colace (a stool softener), as the narcotics may cause constipation. Call the office if you experience increasing abdominal pain, nausea, vomiting, or temperature >100.4F.  Goal:	Gyn  Assessment and plan of treatment:	Please follow up with your primary OBGYN doctor. If you do not have one or would prefer someone else, please follow up with Dr. Valles in 2-4 weeks. Please call the number below to schedule an appointment.

## 2018-06-17 NOTE — DISCHARGE NOTE ADULT - CONDITIONS AT DISCHARGE
VSS. Patient denies pain. VSS. Patient denies pain and nausea. OOB independent ambulatory. VSS. Patient denies pain and nausea. OOB independent ambulatory. Surgical incisions clean, dry and intact.

## 2018-06-17 NOTE — DISCHARGE NOTE ADULT - PATIENT PORTAL LINK FT
You can access the [facility] Patient Portal, offered by [facility], by registering with the following website: [email or web address]

## 2018-06-17 NOTE — DISCHARGE NOTE ADULT - CARE PROVIDERS DIRECT ADDRESSES
[email or web address]

## 2018-06-17 NOTE — DISCHARGE NOTE ADULT - HOSPITAL COURSE
HPI:  42yoF with PMH anemia, herniated cervical disc, cholecystectomy, RYGB (2009), intussusception of jejunojejunal anastomosis (2015), and subsequent resection of dilated jejunojejunal anastomosis (Feb 2017) with small bowel anastomosis, presents to the ED with 3-day history of nausea, and 1-day history of abdominal pain. She states that she began feeling mild nausea on Saturday but became concerned when she experienced abdominal pain last night, prompting her visit to the ED. She has been unable to tolerate PO as it worsens the abdominal pain. Denies fevers, chills. Emesis x1 this AM--clear liquid, NBNB. Last BM this morning--small volume, loose BM. Prior to this AM, had been constipated. No recent weight changes. Denies PUD, gastritis, GERD.   On 6/12 she underwent Upper GI that showed contrast reaching cecum in 3.5 hrs and dilated loop in LLQ consistent with partial obstruction. On 6/17 she underwent laparoscopic GENI and pelvic fluid was found and sent for culture. GYN was consulted who suggested no OP abx. At this time she is afebrile, hemodynamically  stable. Tolerating a regular diet. having bowel function and with pain well controlled on oral medication. She is currently ready for discharge.

## 2018-06-18 VITALS
HEART RATE: 73 BPM | OXYGEN SATURATION: 95 % | RESPIRATION RATE: 16 BRPM | TEMPERATURE: 98 F | SYSTOLIC BLOOD PRESSURE: 106 MMHG | DIASTOLIC BLOOD PRESSURE: 69 MMHG

## 2018-06-18 LAB
ANION GAP SERPL CALC-SCNC: 9 MMOL/L — SIGNIFICANT CHANGE UP (ref 5–17)
BUN SERPL-MCNC: 5 MG/DL — LOW (ref 7–23)
C TRACH RRNA SPEC QL NAA+PROBE: SIGNIFICANT CHANGE UP
CALCIUM SERPL-MCNC: 8.7 MG/DL — SIGNIFICANT CHANGE UP (ref 8.4–10.5)
CHLORIDE SERPL-SCNC: 103 MMOL/L — SIGNIFICANT CHANGE UP (ref 96–108)
CO2 SERPL-SCNC: 24 MMOL/L — SIGNIFICANT CHANGE UP (ref 22–31)
CREAT SERPL-MCNC: 0.65 MG/DL — SIGNIFICANT CHANGE UP (ref 0.5–1.3)
CULTURE RESULTS: NO GROWTH — SIGNIFICANT CHANGE UP
GLUCOSE SERPL-MCNC: 98 MG/DL — SIGNIFICANT CHANGE UP (ref 70–99)
HCT VFR BLD CALC: 30.4 % — LOW (ref 34.5–45)
HGB BLD-MCNC: 10.3 G/DL — LOW (ref 11.5–15.5)
MAGNESIUM SERPL-MCNC: 1.8 MG/DL — SIGNIFICANT CHANGE UP (ref 1.6–2.6)
MCHC RBC-ENTMCNC: 29.3 PG — SIGNIFICANT CHANGE UP (ref 27–34)
MCHC RBC-ENTMCNC: 33.9 G/DL — SIGNIFICANT CHANGE UP (ref 32–36)
MCV RBC AUTO: 86.6 FL — SIGNIFICANT CHANGE UP (ref 80–100)
N GONORRHOEA RRNA SPEC QL NAA+PROBE: SIGNIFICANT CHANGE UP
PHOSPHATE SERPL-MCNC: 4.3 MG/DL — SIGNIFICANT CHANGE UP (ref 2.5–4.5)
PLATELET # BLD AUTO: 267 K/UL — SIGNIFICANT CHANGE UP (ref 150–400)
POTASSIUM SERPL-MCNC: 3.8 MMOL/L — SIGNIFICANT CHANGE UP (ref 3.5–5.3)
POTASSIUM SERPL-SCNC: 3.8 MMOL/L — SIGNIFICANT CHANGE UP (ref 3.5–5.3)
RBC # BLD: 3.51 M/UL — LOW (ref 3.8–5.2)
RBC # FLD: 13.7 % — SIGNIFICANT CHANGE UP (ref 10.3–16.9)
SODIUM SERPL-SCNC: 136 MMOL/L — SIGNIFICANT CHANGE UP (ref 135–145)
SPECIMEN SOURCE: SIGNIFICANT CHANGE UP
SPECIMEN SOURCE: SIGNIFICANT CHANGE UP
WBC # BLD: 5.4 K/UL — SIGNIFICANT CHANGE UP (ref 3.8–10.5)
WBC # FLD AUTO: 5.4 K/UL — SIGNIFICANT CHANGE UP (ref 3.8–10.5)

## 2018-06-18 RX ORDER — DOCUSATE SODIUM 100 MG
1 CAPSULE ORAL
Qty: 28 | Refills: 0 | OUTPATIENT
Start: 2018-06-18 | End: 2018-07-01

## 2018-06-18 RX ORDER — SENNA PLUS 8.6 MG/1
2 TABLET ORAL
Qty: 28 | Refills: 0 | OUTPATIENT
Start: 2018-06-18 | End: 2018-07-01

## 2018-06-18 RX ORDER — MAGNESIUM SULFATE 500 MG/ML
2 VIAL (ML) INJECTION ONCE
Qty: 0 | Refills: 0 | Status: COMPLETED | OUTPATIENT
Start: 2018-06-18 | End: 2018-06-18

## 2018-06-18 RX ORDER — POTASSIUM CHLORIDE 20 MEQ
20 PACKET (EA) ORAL ONCE
Qty: 0 | Refills: 0 | Status: COMPLETED | OUTPATIENT
Start: 2018-06-18 | End: 2018-06-18

## 2018-06-18 RX ORDER — ONDANSETRON 8 MG/1
4 TABLET, FILM COATED ORAL EVERY 4 HOURS
Qty: 0 | Refills: 0 | Status: DISCONTINUED | OUTPATIENT
Start: 2018-06-18 | End: 2018-06-18

## 2018-06-18 RX ADMIN — OXYCODONE AND ACETAMINOPHEN 2 TABLET(S): 5; 325 TABLET ORAL at 12:00

## 2018-06-18 RX ADMIN — ONDANSETRON 8 MILLIGRAM(S): 8 TABLET, FILM COATED ORAL at 06:21

## 2018-06-18 RX ADMIN — OXYCODONE AND ACETAMINOPHEN 2 TABLET(S): 5; 325 TABLET ORAL at 05:52

## 2018-06-18 RX ADMIN — OXYCODONE AND ACETAMINOPHEN 2 TABLET(S): 5; 325 TABLET ORAL at 02:49

## 2018-06-18 RX ADMIN — Medication 100 GRAM(S): at 01:25

## 2018-06-18 RX ADMIN — Medication 50 GRAM(S): at 10:08

## 2018-06-18 RX ADMIN — OXYCODONE AND ACETAMINOPHEN 2 TABLET(S): 5; 325 TABLET ORAL at 06:47

## 2018-06-18 RX ADMIN — OXYCODONE AND ACETAMINOPHEN 2 TABLET(S): 5; 325 TABLET ORAL at 12:55

## 2018-06-18 RX ADMIN — Medication 1 GRAM(S): at 05:48

## 2018-06-18 RX ADMIN — Medication 100 MILLIGRAM(S): at 05:48

## 2018-06-18 RX ADMIN — OXYCODONE AND ACETAMINOPHEN 2 TABLET(S): 5; 325 TABLET ORAL at 01:49

## 2018-06-18 RX ADMIN — HEPARIN SODIUM 5000 UNIT(S): 5000 INJECTION INTRAVENOUS; SUBCUTANEOUS at 05:48

## 2018-06-18 RX ADMIN — ONDANSETRON 8 MILLIGRAM(S): 8 TABLET, FILM COATED ORAL at 00:38

## 2018-06-18 RX ADMIN — ONDANSETRON 4 MILLIGRAM(S): 8 TABLET, FILM COATED ORAL at 10:09

## 2018-06-18 RX ADMIN — Medication 20 MILLIEQUIVALENT(S): at 10:07

## 2018-06-18 RX ADMIN — OXYCODONE AND ACETAMINOPHEN 2 TABLET(S): 5; 325 TABLET ORAL at 16:06

## 2018-06-18 NOTE — PROGRESS NOTE ADULT - ASSESSMENT
42yoF with PMH RYGB (2009), jejunojejunal anastomotic intussusception (2015), and subsequent resection of jejunojejunal anastomosis (2017), presents with 3-day history of nausea and 1-day history of abdominal pain and PO intolerance s/p GENI     Admit to General Surgery, [facility], Dr Campos  Regular diet  SQH/SCD/OOBA/IS  Serial abdominal exams  AM labs 42yoF with PMH RYGB (2009), jejunojejunal anastomotic intussusception (2015), and subsequent resection of jejunojejunal anastomosis (2017), presents with 3-day history of nausea and 1-day history of abdominal pain and PO intolerance s/p GENI. s/p GENI near JJ anastomosis 6/16    Discharge home today  Regular diet  SQH/SCD/OOBA/IS  Serial abdominal exams  AM labs

## 2018-06-18 NOTE — PROGRESS NOTE ADULT - SUBJECTIVE AND OBJECTIVE BOX
O/N: pt wants to stay tonight because she has nausea  6/17: went to OR for diagnostic laparoscopy, GENI performed, fluid in pelvis noted and cultures suspicion for PID, TVUS performed, GYN called, started on abx, POC WNL Advanced to regular diet. Tolerating O/N: pt wants to stay tonight because she has nausea  6/17: went to OR for diagnostic laparoscopy, GENI performed, fluid in pelvis noted and cultures suspicion for PID, TVUS performed, GYN called, started on abx, POC WNL Advanced to regular diet. Tolerating      SUBJECTIVE: Patient seen and examined bedside by surgery team. Nausea improved. Throat discomfort, consistent with ET tube from OR. Reassured. Pain well-controlled. Denies nausea, vomiting, chest pain, shortness of breath, fevers, or chills.      cefoTEtan  IVPB 2 Gram(s) IV Intermittent every 12 hours  cefoTEtan  IVPB      doxycycline hyclate Capsule 100 milliGRAM(s) Oral every 12 hours  heparin  Injectable 5000 Unit(s) SubCutaneous every 8 hours      Vital Signs Last 24 Hrs  T(C): 36.7 (18 Jun 2018 05:54), Max: 37.1 (17 Jun 2018 17:25)  T(F): 98 (18 Jun 2018 05:54), Max: 98.7 (17 Jun 2018 17:25)  HR: 55 (18 Jun 2018 05:54) (52 - 70)  BP: 92/61 (18 Jun 2018 05:54) (91/59 - 132/74)  BP(mean): --  RR: 17 (18 Jun 2018 05:54) (16 - 17)  SpO2: 96% (18 Jun 2018 05:54) (95% - 97%)  I&O's Detail    17 Jun 2018 07:01  -  18 Jun 2018 07:00  --------------------------------------------------------  IN:    dextrose 5% + sodium chloride 0.45%.: 1200 mL    Oral Fluid: 240 mL  Total IN: 1440 mL    OUT:  Total OUT: 0 mL    Total NET: 1440 mL            Physical Exam  Gen:  NAD, resting comfortably  Pulm:  no respiratory distress, nonlabored breathing  C/V: normal sinus rhythm  Abd: soft, NT/ND. Lap incision sites CDI  Extrem: WWP, non-edematous, SCDs in place        LABS:                        10.3   5.4   )-----------( 267      ( 18 Jun 2018 07:20 )             30.4     06-18    136  |  103  |  5<L>  ----------------------------<  98  3.8   |  24  |  0.65    Ca    8.7      18 Jun 2018 07:20  Phos  4.3     06-18  Mg     1.8     06-18            RADIOLOGY & ADDITIONAL STUDIES:

## 2018-06-19 LAB
CULTURE RESULTS: SIGNIFICANT CHANGE UP
SPECIMEN SOURCE: SIGNIFICANT CHANGE UP

## 2018-06-19 PROCEDURE — 83690 ASSAY OF LIPASE: CPT

## 2018-06-19 PROCEDURE — 85025 COMPLETE CBC W/AUTO DIFF WBC: CPT

## 2018-06-19 PROCEDURE — 87591 N.GONORRHOEAE DNA AMP PROB: CPT

## 2018-06-19 PROCEDURE — 96376 TX/PRO/DX INJ SAME DRUG ADON: CPT

## 2018-06-19 PROCEDURE — 87075 CULTR BACTERIA EXCEPT BLOOD: CPT

## 2018-06-19 PROCEDURE — 80048 BASIC METABOLIC PNL TOTAL CA: CPT

## 2018-06-19 PROCEDURE — 86900 BLOOD TYPING SEROLOGIC ABO: CPT

## 2018-06-19 PROCEDURE — 93005 ELECTROCARDIOGRAM TRACING: CPT

## 2018-06-19 PROCEDURE — 85730 THROMBOPLASTIN TIME PARTIAL: CPT

## 2018-06-19 PROCEDURE — 76830 TRANSVAGINAL US NON-OB: CPT

## 2018-06-19 PROCEDURE — 87491 CHLMYD TRACH DNA AMP PROBE: CPT

## 2018-06-19 PROCEDURE — 86901 BLOOD TYPING SEROLOGIC RH(D): CPT

## 2018-06-19 PROCEDURE — 85610 PROTHROMBIN TIME: CPT

## 2018-06-19 PROCEDURE — 81003 URINALYSIS AUTO W/O SCOPE: CPT

## 2018-06-19 PROCEDURE — 96374 THER/PROPH/DIAG INJ IV PUSH: CPT | Mod: XU

## 2018-06-19 PROCEDURE — 87205 SMEAR GRAM STAIN: CPT

## 2018-06-19 PROCEDURE — 36415 COLL VENOUS BLD VENIPUNCTURE: CPT

## 2018-06-19 PROCEDURE — 80053 COMPREHEN METABOLIC PANEL: CPT

## 2018-06-19 PROCEDURE — 83735 ASSAY OF MAGNESIUM: CPT

## 2018-06-19 PROCEDURE — 85027 COMPLETE CBC AUTOMATED: CPT

## 2018-06-19 PROCEDURE — 74245: CPT

## 2018-06-19 PROCEDURE — 81025 URINE PREGNANCY TEST: CPT

## 2018-06-19 PROCEDURE — 99285 EMERGENCY DEPT VISIT HI MDM: CPT | Mod: 25

## 2018-06-19 PROCEDURE — 74177 CT ABD & PELVIS W/CONTRAST: CPT

## 2018-06-19 PROCEDURE — 96375 TX/PRO/DX INJ NEW DRUG ADDON: CPT

## 2018-06-19 PROCEDURE — 87070 CULTURE OTHR SPECIMN AEROBIC: CPT

## 2018-06-19 PROCEDURE — 86850 RBC ANTIBODY SCREEN: CPT

## 2018-06-19 PROCEDURE — 84100 ASSAY OF PHOSPHORUS: CPT

## 2018-06-19 PROCEDURE — 76856 US EXAM PELVIC COMPLETE: CPT

## 2018-06-23 NOTE — ED PROVIDER NOTE - BOWEL SOUNDS
"Encounter Date: 6/23/2018    SCRIBE #1 NOTE: I, Mohamud Borrero, am scribing for, and in the presence of, Dr. Ghotra.       History     Chief Complaint   Patient presents with    Nausea     "I feel hung over." "I may have essential oil poisoning"     06/23/2018 1:07 PM     Chief complaint: Nausea     Mary Myles is a 23 y.o. female who has a past medical history of Anemia; GERD; interstitial cystitis; and PCOS. The patient presents to the ED with an acute onset of nausea with associated abdominal pain and a burning sensation after urination. She reports that she was performing a ritual for her [employer] yesterday and she spilled some essential oils on her hands. She expounds that the oils entered cat scratch wounds on her hands. She adds that she woke up feeling her current symptoms. She states that she feels "hung over" despite not having any alcohol for a significant amount of time and that she "may have essential oil poisoning." She adds that she has had darker urine and a burning sensation after urinating since before her current issues and that she has an unspecified gallbladder issue for which she has been unable to get a referral to [facility]. She presents with no other acute complaints.         The history is provided by the patient.     Review of patient's allergies indicates:  No Known Allergies  Past Medical History:   Diagnosis Date    ADD (attention deficit disorder)     Anemia     Borderline personality disorder     Chronic headache     Depression     Fibromyalgia     GERD (gastroesophageal reflux disease)     Migraine headache     MALCOLM (obstructive sleep apnea)     PCOS (polycystic ovarian syndrome)     Pseudotumor cerebri syndrome     Schizoaffective disorder     Tourette's syndrome      Past Surgical History:   Procedure Laterality Date    TONSILLECTOMY      UPPER GASTROINTESTINAL ENDOSCOPY  in her childhood    "born with underdeveloped esophagus" & GERD per " patient report    WISDOM TOOTH EXTRACTION       Family History   Problem Relation Age of Onset    Seizures Mother     Migraines Mother     Migraines Brother     Colon cancer Neg Hx     Colon polyps Neg Hx     Crohn's disease Neg Hx     Ulcerative colitis Neg Hx     Stomach cancer Neg Hx     Esophageal cancer Neg Hx      Social History   Substance Use Topics    Smoking status: Current Every Day Smoker     Packs/day: 1.00     Types: Cigarettes    Smokeless tobacco: Never Used    Alcohol use No     Review of Systems   Constitutional: Negative for fever.   HENT: Negative for congestion.    Eyes: Negative for visual disturbance.   Respiratory: Negative for wheezing.    Cardiovascular: Negative for chest pain.   Gastrointestinal: Positive for abdominal pain and nausea.   Genitourinary: Positive for dysuria.        She has a burning sensation after urinating and she notes darker urine as of late.   Musculoskeletal: Negative for joint swelling.   Skin: Negative for rash.   Neurological: Negative for syncope.   Hematological: Does not bruise/bleed easily.   Psychiatric/Behavioral: Negative for confusion.       Physical Exam     Vitals:    06/23/18 1110   BP: 128/76   Pulse: 86   Resp: 16   Temp: 98.4 °F (36.9 °C)      Physical Exam    Nursing note and vitals reviewed.  Constitutional: She appears well-developed and well-nourished.  Non-toxic appearance. No distress.   HENT:   Head: Normocephalic and atraumatic.   Eyes: EOM are normal. Pupils are equal, round, and reactive to light.   Neck: Normal range of motion. Neck supple. No neck rigidity. No JVD present.   Cardiovascular: Normal rate, regular rhythm, normal heart sounds and intact distal pulses. Exam reveals no gallop and no friction rub.    No murmur heard.  Pulmonary/Chest: Breath sounds normal. She has no wheezes. She has no rhonchi. She has no rales.   Abdominal: Soft. Bowel sounds are normal. She exhibits no distension. There is tenderness. There is no  rigidity, no rebound and no guarding.   She has tenderness to palpation of the right upper quadrant and the suprapubic region.   She has a positive Miller's sign.   Musculoskeletal: Normal range of motion.   Neurological: She is alert and oriented to person, place, and time. She has normal strength and normal reflexes. No cranial nerve deficit or sensory deficit. She exhibits normal muscle tone. Coordination normal. GCS eye subscore is 4. GCS verbal subscore is 5. GCS motor subscore is 6.   Skin: Skin is warm and dry.   Psychiatric: She has a normal mood and affect. Her speech is normal and behavior is normal. She is not actively hallucinating.         ED Course   Procedures  Labs Reviewed   CBC W/ AUTO DIFFERENTIAL - Abnormal; Notable for the following:        Result Value    RDW 14.9 (*)     Platelets 351 (*)     MPV 7.8 (*)     All other components within normal limits   COMPREHENSIVE METABOLIC PANEL - Abnormal; Notable for the following:     Total Bilirubin 1.7 (*)     All other components within normal limits   URINALYSIS - Abnormal; Notable for the following:     Protein, UA Trace (*)     All other components within normal limits   LIPASE   POCT URINE PREGNANCY          Imaging Results          US Abdomen Limited (Final result)  Result time 06/23/18 14:52:54    Final result by Rhett Littlejohn Jr., MD (06/23/18 14:52:54)                 Impression:      Negative right upper quadrant ultrasound.      Electronically signed by: Rhett Littlejohn MD  Date:    06/23/2018  Time:    14:52             Narrative:    EXAMINATION:  US ABDOMEN LIMITED    CLINICAL HISTORY:  ruq pain, elevated bilirubin, hx gallstones;    TECHNIQUE:  Limited ultrasound of the right upper quadrant of the abdomen (including pancreas, liver, gallbladder, common bile duct, and right kidney) was performed.    COMPARISON:  Abdomen ultrasound of May 23, 2018.    FINDINGS:  The liver is of normal size contour and echogenicity without a focal liver  mass seen.  The gallbladder is of normal size without internal echogenic shifting shadowing structures consistent with gallstones.  A small amount of debris is noted.  The common bile duct is not dilated at 2.3 mm.    The right kidney measures 12.1 cm without mass cyst or hydronephrosis.  The abdominal aorta and pancreas are obscured by bowel gas.                                 Medical Decision Making:   History:   Old Medical Records: I decided to obtain old medical records.  Initial Assessment:    Patient 23-year-old girl who presents for evaluation of nausea, dysuria and concern she may have overdosed on essential oils while performing a [employer] ritual.  States she woke up today feeling as though she had a hangover but did not drink any alcohol.  Patient had tenderness in the right upper quadrant of her abdomen.  States she has a history of biliary sludge in his awaiting for evaluation for cholecystectomy at [facility].  Laboratory evaluation today reveals chronic mild elevation of her total bilirubin with a negative right upper quadrant ultrasound.  No fever.  No evidence of UTI.  No significant laboratory derangements.  Patient is advised to wear gloves while handling the essential oils, make sure she has well ventilated space and get fresh air.  I do not detect any acutely life-threatening or emergent etiologies to her symptoms.  She is discharged in no acute distress.  Clinical Tests:   Lab Tests: Ordered and Reviewed            Scribe Attestation:   Scribe #1: I performed the above scribed service and the documentation accurately describes the services I performed. I attest to the accuracy of the note.    I, Brandin Hardy, personally performed the services described in this documentation. All medical record entries made by the scribe were at my direction and in my presence.  I have reviewed the chart and agree that the record reflects my personal performance and is accurate and complete.  Dereck Ghotra MD.  11:19 PM 06/23/2018             Clinical Impression:     1. Nausea            Disposition:   Disposition: Discharged  Condition: Stable                        Dereck Ghotra MD  06/23/18 1230     HYPOACTIVE

## 2018-06-26 ENCOUNTER — APPOINTMENT (OUTPATIENT)
Dept: SURGERY | Facility: CLINIC | Age: 43
End: 2018-06-26
Payer: MEDICAID

## 2018-06-26 VITALS
OXYGEN SATURATION: 100 % | DIASTOLIC BLOOD PRESSURE: 78 MMHG | HEIGHT: 61 IN | WEIGHT: 161.38 LBS | BODY MASS INDEX: 30.47 KG/M2 | SYSTOLIC BLOOD PRESSURE: 116 MMHG | TEMPERATURE: 97.9 F | HEART RATE: 67 BPM

## 2018-06-26 DIAGNOSIS — Z87.19 PERSONAL HISTORY OF OTHER DISEASES OF THE DIGESTIVE SYSTEM: ICD-10-CM

## 2018-06-26 DIAGNOSIS — K56.1 INTUSSUSCEPTION: ICD-10-CM

## 2018-06-26 PROCEDURE — 99024 POSTOP FOLLOW-UP VISIT: CPT

## 2018-06-29 DIAGNOSIS — R10.9 UNSPECIFIED ABDOMINAL PAIN: ICD-10-CM

## 2018-06-29 DIAGNOSIS — E66.9 OBESITY, UNSPECIFIED: ICD-10-CM

## 2018-06-29 DIAGNOSIS — Z90.49 ACQUIRED ABSENCE OF OTHER SPECIFIED PARTS OF DIGESTIVE TRACT: ICD-10-CM

## 2018-06-29 DIAGNOSIS — N73.9 FEMALE PELVIC INFLAMMATORY DISEASE, UNSPECIFIED: ICD-10-CM

## 2018-06-29 DIAGNOSIS — M50.20 OTHER CERVICAL DISC DISPLACEMENT, UNSPECIFIED CERVICAL REGION: ICD-10-CM

## 2018-06-29 DIAGNOSIS — N73.6 FEMALE PELVIC PERITONEAL ADHESIONS (POSTINFECTIVE): ICD-10-CM

## 2018-06-29 DIAGNOSIS — N83.202 UNSPECIFIED OVARIAN CYST, LEFT SIDE: ICD-10-CM

## 2018-06-29 DIAGNOSIS — Z90.710 ACQUIRED ABSENCE OF BOTH CERVIX AND UTERUS: ICD-10-CM

## 2018-06-29 DIAGNOSIS — Z98.84 BARIATRIC SURGERY STATUS: ICD-10-CM

## 2018-06-29 DIAGNOSIS — N83.201 UNSPECIFIED OVARIAN CYST, RIGHT SIDE: ICD-10-CM

## 2018-07-08 VITALS
RESPIRATION RATE: 16 BRPM | HEART RATE: 68 BPM | DIASTOLIC BLOOD PRESSURE: 70 MMHG | WEIGHT: 160.06 LBS | TEMPERATURE: 98 F | SYSTOLIC BLOOD PRESSURE: 111 MMHG | OXYGEN SATURATION: 98 %

## 2018-07-08 NOTE — ED ADULT TRIAGE NOTE - ARRIVAL INFO ADDITIONAL COMMENTS
Pt c/o mid low back pain onset today while walking. Pt states the pain radiates down both legs when ambulating.

## 2018-07-08 NOTE — ED ADULT NURSE NOTE - CHPI ED SYMPTOMS NEG
no bladder dysfunction/no constipation/no neck tenderness/no motor function loss/no anorexia/no bowel dysfunction/no fatigue

## 2018-07-08 NOTE — ED ADULT NURSE NOTE - OBJECTIVE STATEMENT
Patient states was walking uphill at 6pm tonight when had sudden lower mid-back pain 6/10, w/ numbness /tingling of bilateral lower legs, no urinary difficulties or weakness.  Denies fall or recent injury, w/ difficulty ambulating and bearing weight.

## 2018-07-09 ENCOUNTER — INPATIENT (INPATIENT)
Facility: HOSPITAL | Age: 43
LOS: 5 days | Discharge: HOME | DRG: 552 | End: 2018-07-15
Attending: INTERNAL MEDICINE | Admitting: INTERNAL MEDICINE
Payer: COMMERCIAL

## 2018-07-09 DIAGNOSIS — Z29.9 ENCOUNTER FOR PROPHYLACTIC MEASURES, UNSPECIFIED: ICD-10-CM

## 2018-07-09 DIAGNOSIS — Z90.710 ACQUIRED ABSENCE OF BOTH CERVIX AND UTERUS: Chronic | ICD-10-CM

## 2018-07-09 DIAGNOSIS — R63.8 OTHER SYMPTOMS AND SIGNS CONCERNING FOOD AND FLUID INTAKE: ICD-10-CM

## 2018-07-09 DIAGNOSIS — M50.20 OTHER CERVICAL DISC DISPLACEMENT, UNSPECIFIED CERVICAL REGION: ICD-10-CM

## 2018-07-09 DIAGNOSIS — M54.9 DORSALGIA, UNSPECIFIED: ICD-10-CM

## 2018-07-09 DIAGNOSIS — Z90.49 ACQUIRED ABSENCE OF OTHER SPECIFIED PARTS OF DIGESTIVE TRACT: Chronic | ICD-10-CM

## 2018-07-09 DIAGNOSIS — K59.00 CONSTIPATION, UNSPECIFIED: ICD-10-CM

## 2018-07-09 DIAGNOSIS — Z91.89 OTHER SPECIFIED PERSONAL RISK FACTORS, NOT ELSEWHERE CLASSIFIED: ICD-10-CM

## 2018-07-09 LAB
ALBUMIN SERPL ELPH-MCNC: 4.1 G/DL — SIGNIFICANT CHANGE UP (ref 3.3–5)
ALP SERPL-CCNC: 95 U/L — SIGNIFICANT CHANGE UP (ref 40–120)
ALT FLD-CCNC: 18 U/L — SIGNIFICANT CHANGE UP (ref 10–45)
ANION GAP SERPL CALC-SCNC: 12 MMOL/L — SIGNIFICANT CHANGE UP (ref 5–17)
APPEARANCE UR: CLEAR — SIGNIFICANT CHANGE UP
APTT BLD: 28.9 SEC — SIGNIFICANT CHANGE UP (ref 27.5–37.4)
AST SERPL-CCNC: 20 U/L — SIGNIFICANT CHANGE UP (ref 10–40)
BILIRUB SERPL-MCNC: 0.2 MG/DL — SIGNIFICANT CHANGE UP (ref 0.2–1.2)
BILIRUB UR-MCNC: NEGATIVE — SIGNIFICANT CHANGE UP
BUN SERPL-MCNC: 14 MG/DL — SIGNIFICANT CHANGE UP (ref 7–23)
CALCIUM SERPL-MCNC: 9.4 MG/DL — SIGNIFICANT CHANGE UP (ref 8.4–10.5)
CHLORIDE SERPL-SCNC: 102 MMOL/L — SIGNIFICANT CHANGE UP (ref 96–108)
CO2 SERPL-SCNC: 24 MMOL/L — SIGNIFICANT CHANGE UP (ref 22–31)
COLOR SPEC: YELLOW — SIGNIFICANT CHANGE UP
CREAT SERPL-MCNC: 0.61 MG/DL — SIGNIFICANT CHANGE UP (ref 0.5–1.3)
DIFF PNL FLD: NEGATIVE — SIGNIFICANT CHANGE UP
GLUCOSE SERPL-MCNC: 101 MG/DL — HIGH (ref 70–99)
GLUCOSE UR QL: NEGATIVE — SIGNIFICANT CHANGE UP
HCG SERPL-ACNC: <.1 MIU/ML — SIGNIFICANT CHANGE UP
HCT VFR BLD CALC: 34.8 % — SIGNIFICANT CHANGE UP (ref 34.5–45)
HGB BLD-MCNC: 11.5 G/DL — SIGNIFICANT CHANGE UP (ref 11.5–15.5)
INR BLD: 1 — SIGNIFICANT CHANGE UP (ref 0.88–1.16)
KETONES UR-MCNC: NEGATIVE — SIGNIFICANT CHANGE UP
LEUKOCYTE ESTERASE UR-ACNC: NEGATIVE — SIGNIFICANT CHANGE UP
MCHC RBC-ENTMCNC: 28.8 PG — SIGNIFICANT CHANGE UP (ref 27–34)
MCHC RBC-ENTMCNC: 33 G/DL — SIGNIFICANT CHANGE UP (ref 32–36)
MCV RBC AUTO: 87.2 FL — SIGNIFICANT CHANGE UP (ref 80–100)
NITRITE UR-MCNC: NEGATIVE — SIGNIFICANT CHANGE UP
PH UR: 6.5 — SIGNIFICANT CHANGE UP (ref 5–8)
PLATELET # BLD AUTO: 307 K/UL — SIGNIFICANT CHANGE UP (ref 150–400)
POTASSIUM SERPL-MCNC: 4.3 MMOL/L — SIGNIFICANT CHANGE UP (ref 3.5–5.3)
POTASSIUM SERPL-SCNC: 4.3 MMOL/L — SIGNIFICANT CHANGE UP (ref 3.5–5.3)
PROT SERPL-MCNC: 6.3 G/DL — SIGNIFICANT CHANGE UP (ref 6–8.3)
PROT UR-MCNC: NEGATIVE MG/DL — SIGNIFICANT CHANGE UP
PROTHROM AB SERPL-ACNC: 11.1 SEC — SIGNIFICANT CHANGE UP (ref 9.8–12.7)
RBC # BLD: 3.99 M/UL — SIGNIFICANT CHANGE UP (ref 3.8–5.2)
RBC # FLD: 13.3 % — SIGNIFICANT CHANGE UP (ref 10.3–16.9)
SODIUM SERPL-SCNC: 138 MMOL/L — SIGNIFICANT CHANGE UP (ref 135–145)
SP GR SPEC: 1.01 — SIGNIFICANT CHANGE UP (ref 1–1.03)
UROBILINOGEN FLD QL: 0.2 E.U./DL — SIGNIFICANT CHANGE UP
WBC # BLD: 7.1 K/UL — SIGNIFICANT CHANGE UP (ref 3.8–10.5)
WBC # FLD AUTO: 7.1 K/UL — SIGNIFICANT CHANGE UP (ref 3.8–10.5)

## 2018-07-09 PROCEDURE — 99285 EMERGENCY DEPT VISIT HI MDM: CPT

## 2018-07-09 PROCEDURE — 72148 MRI LUMBAR SPINE W/O DYE: CPT | Mod: 26

## 2018-07-09 PROCEDURE — 99223 1ST HOSP IP/OBS HIGH 75: CPT | Mod: GC

## 2018-07-09 PROCEDURE — 74176 CT ABD & PELVIS W/O CONTRAST: CPT | Mod: 26

## 2018-07-09 RX ORDER — ONDANSETRON 8 MG/1
4 TABLET, FILM COATED ORAL ONCE
Qty: 0 | Refills: 0 | Status: COMPLETED | OUTPATIENT
Start: 2018-07-09 | End: 2018-07-09

## 2018-07-09 RX ORDER — HYDROMORPHONE HYDROCHLORIDE 2 MG/ML
1 INJECTION INTRAMUSCULAR; INTRAVENOUS; SUBCUTANEOUS ONCE
Qty: 0 | Refills: 0 | Status: DISCONTINUED | OUTPATIENT
Start: 2018-07-09 | End: 2018-07-09

## 2018-07-09 RX ORDER — OXYCODONE AND ACETAMINOPHEN 5; 325 MG/1; MG/1
1 TABLET ORAL EVERY 4 HOURS
Qty: 0 | Refills: 0 | Status: DISCONTINUED | OUTPATIENT
Start: 2018-07-09 | End: 2018-07-09

## 2018-07-09 RX ORDER — CYCLOBENZAPRINE HYDROCHLORIDE 10 MG/1
5 TABLET, FILM COATED ORAL THREE TIMES A DAY
Qty: 0 | Refills: 0 | Status: DISCONTINUED | OUTPATIENT
Start: 2018-07-09 | End: 2018-07-09

## 2018-07-09 RX ORDER — HYDROMORPHONE HYDROCHLORIDE 2 MG/ML
0.5 INJECTION INTRAMUSCULAR; INTRAVENOUS; SUBCUTANEOUS
Qty: 0 | Refills: 0 | Status: DISCONTINUED | OUTPATIENT
Start: 2018-07-09 | End: 2018-07-11

## 2018-07-09 RX ORDER — ONDANSETRON 8 MG/1
4 TABLET, FILM COATED ORAL EVERY 6 HOURS
Qty: 0 | Refills: 0 | Status: DISCONTINUED | OUTPATIENT
Start: 2018-07-09 | End: 2018-07-15

## 2018-07-09 RX ORDER — LIDOCAINE 4 G/100G
1 CREAM TOPICAL ONCE
Qty: 0 | Refills: 0 | Status: COMPLETED | OUTPATIENT
Start: 2018-07-09 | End: 2018-07-09

## 2018-07-09 RX ORDER — FERROUS SULFATE 325(65) MG
325 TABLET ORAL DAILY
Qty: 0 | Refills: 0 | Status: DISCONTINUED | OUTPATIENT
Start: 2018-07-09 | End: 2018-07-15

## 2018-07-09 RX ORDER — HYDROMORPHONE HYDROCHLORIDE 2 MG/ML
0.5 INJECTION INTRAMUSCULAR; INTRAVENOUS; SUBCUTANEOUS
Qty: 0 | Refills: 0 | Status: DISCONTINUED | OUTPATIENT
Start: 2018-07-09 | End: 2018-07-09

## 2018-07-09 RX ORDER — FERROUS SULFATE 325(65) MG
325 TABLET ORAL DAILY
Qty: 0 | Refills: 0 | Status: DISCONTINUED | OUTPATIENT
Start: 2018-07-09 | End: 2018-07-09

## 2018-07-09 RX ORDER — MORPHINE SULFATE 50 MG/1
4 CAPSULE, EXTENDED RELEASE ORAL ONCE
Qty: 0 | Refills: 0 | Status: DISCONTINUED | OUTPATIENT
Start: 2018-07-09 | End: 2018-07-09

## 2018-07-09 RX ORDER — POLYETHYLENE GLYCOL 3350 17 G/17G
17 POWDER, FOR SOLUTION ORAL DAILY
Qty: 0 | Refills: 0 | Status: DISCONTINUED | OUTPATIENT
Start: 2018-07-09 | End: 2018-07-12

## 2018-07-09 RX ORDER — GABAPENTIN 400 MG/1
100 CAPSULE ORAL THREE TIMES A DAY
Qty: 0 | Refills: 0 | Status: DISCONTINUED | OUTPATIENT
Start: 2018-07-09 | End: 2018-07-10

## 2018-07-09 RX ORDER — KETOROLAC TROMETHAMINE 30 MG/ML
15 SYRINGE (ML) INJECTION ONCE
Qty: 0 | Refills: 0 | Status: DISCONTINUED | OUTPATIENT
Start: 2018-07-09 | End: 2018-07-09

## 2018-07-09 RX ORDER — OXYCODONE AND ACETAMINOPHEN 5; 325 MG/1; MG/1
1 TABLET ORAL EVERY 6 HOURS
Qty: 0 | Refills: 0 | Status: DISCONTINUED | OUTPATIENT
Start: 2018-07-09 | End: 2018-07-10

## 2018-07-09 RX ORDER — LIDOCAINE 4 G/100G
1 CREAM TOPICAL DAILY
Qty: 0 | Refills: 0 | Status: DISCONTINUED | OUTPATIENT
Start: 2018-07-09 | End: 2018-07-15

## 2018-07-09 RX ORDER — ONDANSETRON 8 MG/1
8 TABLET, FILM COATED ORAL ONCE
Qty: 0 | Refills: 0 | Status: COMPLETED | OUTPATIENT
Start: 2018-07-09 | End: 2018-07-09

## 2018-07-09 RX ORDER — PREGABALIN 225 MG/1
1000 CAPSULE ORAL DAILY
Qty: 0 | Refills: 0 | Status: DISCONTINUED | OUTPATIENT
Start: 2018-07-09 | End: 2018-07-09

## 2018-07-09 RX ORDER — SENNA PLUS 8.6 MG/1
1 TABLET ORAL DAILY
Qty: 0 | Refills: 0 | Status: DISCONTINUED | OUTPATIENT
Start: 2018-07-09 | End: 2018-07-12

## 2018-07-09 RX ADMIN — HYDROMORPHONE HYDROCHLORIDE 1 MILLIGRAM(S): 2 INJECTION INTRAMUSCULAR; INTRAVENOUS; SUBCUTANEOUS at 02:12

## 2018-07-09 RX ADMIN — MORPHINE SULFATE 4 MILLIGRAM(S): 50 CAPSULE, EXTENDED RELEASE ORAL at 00:27

## 2018-07-09 RX ADMIN — GABAPENTIN 100 MILLIGRAM(S): 400 CAPSULE ORAL at 08:43

## 2018-07-09 RX ADMIN — MORPHINE SULFATE 4 MILLIGRAM(S): 50 CAPSULE, EXTENDED RELEASE ORAL at 01:00

## 2018-07-09 RX ADMIN — Medication 0.5 MILLIGRAM(S): at 03:48

## 2018-07-09 RX ADMIN — HYDROMORPHONE HYDROCHLORIDE 0.5 MILLIGRAM(S): 2 INJECTION INTRAMUSCULAR; INTRAVENOUS; SUBCUTANEOUS at 19:10

## 2018-07-09 RX ADMIN — OXYCODONE AND ACETAMINOPHEN 1 TABLET(S): 5; 325 TABLET ORAL at 13:30

## 2018-07-09 RX ADMIN — ONDANSETRON 4 MILLIGRAM(S): 8 TABLET, FILM COATED ORAL at 18:55

## 2018-07-09 RX ADMIN — OXYCODONE AND ACETAMINOPHEN 1 TABLET(S): 5; 325 TABLET ORAL at 22:34

## 2018-07-09 RX ADMIN — GABAPENTIN 100 MILLIGRAM(S): 400 CAPSULE ORAL at 14:06

## 2018-07-09 RX ADMIN — CYCLOBENZAPRINE HYDROCHLORIDE 5 MILLIGRAM(S): 10 TABLET, FILM COATED ORAL at 14:06

## 2018-07-09 RX ADMIN — ONDANSETRON 4 MILLIGRAM(S): 8 TABLET, FILM COATED ORAL at 02:29

## 2018-07-09 RX ADMIN — SENNA PLUS 1 TABLET(S): 8.6 TABLET ORAL at 11:42

## 2018-07-09 RX ADMIN — POLYETHYLENE GLYCOL 3350 17 GRAM(S): 17 POWDER, FOR SOLUTION ORAL at 11:42

## 2018-07-09 RX ADMIN — OXYCODONE AND ACETAMINOPHEN 1 TABLET(S): 5; 325 TABLET ORAL at 14:30

## 2018-07-09 RX ADMIN — ONDANSETRON 8 MILLIGRAM(S): 8 TABLET, FILM COATED ORAL at 05:31

## 2018-07-09 RX ADMIN — ONDANSETRON 4 MILLIGRAM(S): 8 TABLET, FILM COATED ORAL at 12:48

## 2018-07-09 RX ADMIN — HYDROMORPHONE HYDROCHLORIDE 1 MILLIGRAM(S): 2 INJECTION INTRAMUSCULAR; INTRAVENOUS; SUBCUTANEOUS at 03:11

## 2018-07-09 RX ADMIN — HYDROMORPHONE HYDROCHLORIDE 0.5 MILLIGRAM(S): 2 INJECTION INTRAMUSCULAR; INTRAVENOUS; SUBCUTANEOUS at 18:09

## 2018-07-09 RX ADMIN — HYDROMORPHONE HYDROCHLORIDE 1 MILLIGRAM(S): 2 INJECTION INTRAMUSCULAR; INTRAVENOUS; SUBCUTANEOUS at 03:30

## 2018-07-09 RX ADMIN — HYDROMORPHONE HYDROCHLORIDE 1 MILLIGRAM(S): 2 INJECTION INTRAMUSCULAR; INTRAVENOUS; SUBCUTANEOUS at 01:03

## 2018-07-09 RX ADMIN — LIDOCAINE 1 PATCH: 4 CREAM TOPICAL at 08:43

## 2018-07-09 RX ADMIN — GABAPENTIN 100 MILLIGRAM(S): 400 CAPSULE ORAL at 21:29

## 2018-07-09 RX ADMIN — LIDOCAINE 1 PATCH: 4 CREAM TOPICAL at 21:31

## 2018-07-09 RX ADMIN — OXYCODONE AND ACETAMINOPHEN 1 TABLET(S): 5; 325 TABLET ORAL at 21:34

## 2018-07-09 RX ADMIN — Medication 325 MILLIGRAM(S): at 14:06

## 2018-07-09 NOTE — ED PROVIDER NOTE - PROGRESS NOTE DETAILS
pt given morphine, dilaudid, still unable to walk, asking for bed pan sleeping comfortably sleeping comfortably, discussed case with JATIN ROGER, MRI tech to come to perform MRI to r/o cauda equina somewhat decreased rectal tone, will give zofran for nausea. pt has had multiple doses of opioid pain meds and still unable to ambulate more than one step.  will admit to hospitalist service for pain mgmt (her PCP at [facility]).

## 2018-07-09 NOTE — ED PROVIDER NOTE - MEDICAL DECISION MAKING DETAILS
Pt with acute onset low back pain R sided weakness numbness, unclear etiology, hx of herniated disc in cervical spine, no trauma, no lifting, no sea risk factors, no fever, no ivdu, no diabetes. Pt with hx of abd surgeries, multiple including gastric bypass however no abdominal pain, no n/v at this time. UA shows no e/o infection or heme, s/p hysterectomy, CT abdomen/pelvis shows no etiology of kidney pathology. Plan for MRI to eval for cauda equina.

## 2018-07-09 NOTE — H&P ADULT - NSHPLABSRESULTS_GEN_ALL_CORE
.  LABS:                         11.5   7.1   )-----------( 307      ( 2018 00:36 )             34.8         138  |  102  |  14  ----------------------------<  101<H>  4.3   |  24  |  0.61    Ca    9.4      2018 00:36    TPro  6.3  /  Alb  4.1  /  TBili  0.2  /  DBili  x   /  AST  20  /  ALT  18  /  AlkPhos  95  07-09    PT/INR - ( 2018 00:36 )   PT: 11.1 sec;   INR: 1.00          PTT - ( 2018 00:36 )  PTT:28.9 sec  Urinalysis Basic - ( 2018 02:04 )    Color: Yellow / Appearance: Clear / S.010 / pH: x  Gluc: x / Ketone: NEGATIVE  / Bili: Negative / Urobili: 0.2 E.U./dL   Blood: x / Protein: NEGATIVE mg/dL / Nitrite: NEGATIVE   Leuk Esterase: NEGATIVE / RBC: x / WBC x   Sq Epi: x / Non Sq Epi: x / Bacteria: x                RADIOLOGY, EKG & ADDITIONAL TESTS: Reviewed.   < from: MR Lumbar Spine No Cont (18 @ 04:38) >      IMPRESSION: Small central herniated disc at L4/L5 causing slight   indentation on the thecal sac.      < end of copied text > .  LABS:                         11.5   7.1   )-----------( 307      ( 2018 00:36 )             34.8         138  |  102  |  14  ----------------------------<  101<H>  4.3   |  24  |  0.61    Ca    9.4      2018 00:36    TPro  6.3  /  Alb  4.1  /  TBili  0.2  /  DBili  x   /  AST  20  /  ALT  18  /  AlkPhos  95      PT/INR - ( 2018 00:36 )   PT: 11.1 sec;   INR: 1.00          PTT - ( 2018 00:36 )  PTT:28.9 sec  Urinalysis Basic - ( 2018 02:04 )    Color: Yellow / Appearance: Clear / S.010 / pH: x  Gluc: x / Ketone: NEGATIVE  / Bili: Negative / Urobili: 0.2 E.U./dL   Blood: x / Protein: NEGATIVE mg/dL / Nitrite: NEGATIVE   Leuk Esterase: NEGATIVE / RBC: x / WBC x   Sq Epi: x / Non Sq Epi: x / Bacteria: x                RADIOLOGY, EKG & ADDITIONAL TESTS: Reviewed.   CT Abd and Pelvis  IMPRESSION:  1.  No renal or ureteral calculus.  2.  Again status post Renetta-en-Y gastric bypass; no evidence of   obstruction. No acute intra-abdominal pathology.    < from: MR Lumbar Spine No Cont (18 @ 04:38) >      IMPRESSION: Small central herniated disc at L4/L5 causing slight   indentation on the thecal sac.      < end of copied text >

## 2018-07-09 NOTE — ED PROVIDER NOTE - OBJECTIVE STATEMENT
42F Bur[place]ian speaking prefers to use son for translation, hx of gastric bypass with complications of intussception, most recent surgery in June pt with sudden onset back pain, gradual worsening to the point where she couldn't walk. Occurred after [employer], no lifting, no trauma, pt reports leg weakness R > L, reports leg numbness r > L, no bowel/bladder incontinence, has hx of overactive bladder at baseline, no saddle anesthesia. Pain is worse than movement, pt unable to walk.

## 2018-07-09 NOTE — PHYSICAL THERAPY INITIAL EVALUATION ADULT - ADDITIONAL COMMENTS
pt lives at home with family in private home 5 ANA ad few STEs inside, no elevator. PTA indep with functional mobility, no Ad.

## 2018-07-09 NOTE — H&P ADULT - ATTENDING COMMENTS
Patient seen and examined at 1 PM.     Subjective: Spoke with son Lane over phone to go over HPI. Patient feels minimal relief with pain meds. Last used percocet 6/20 after her surgery. No bowel or urinary incontinence. Has sensation in both legs.     Exam: afebrile, VSS, appears comfortable with minimal movement  HEENT: MMM  CV: S1+S2,   Lungs: CTA b/l  Back: exquisite paraspinal muscle tenderness on R lumbar area, no spinal tenderness  Neuro: +R SLR, unable to lift legs due to pain but does lift minimally when asked to, able to move toes, sensation intact    A/P: 42 year old F w/ PMH of R cervical herniated disc, recent lysis of adhesions p/w back pain.     1. Acute back pain: likely lumbosacral sprain vs herniated disc at L4/L5, no neurosurgical intervention, r/o cauda equina w/ MRI overnight, c/w pain control with percocet, muscle relaxants, f/u pain management recs, PT    I was physically present for the key portions of the evaluation and management (E/M) service provided.  I agree with the above history, physical, and plan which I have reviewed and edited where appropriate.     35 minutes spent on total encounter; more than 50% of the visit was spent counseling and/or coordinating care by the attending physician.     Plan discussed with Primary Team.

## 2018-07-09 NOTE — H&P ADULT - NSHPPHYSICALEXAM_GEN_ALL_CORE
.  VITAL SIGNS:  T(C): 36.8 (07-09-18 @ 05:29), Max: 36.9 (07-09-18 @ 03:00)  T(F): 98.3 (07-09-18 @ 05:29), Max: 98.4 (07-09-18 @ 03:00)  HR: 82 (07-09-18 @ 06:45) (68 - 91)  BP: 121/72 (07-09-18 @ 06:45) (111/70 - 121/72)  BP(mean): --  RR: 16 (07-09-18 @ 06:45) (16 - 18)  SpO2: 99% (07-09-18 @ 06:45) (97% - 100%)  Wt(kg): --    PHYSICAL EXAM:    Constitutional: laying in gurney NAD when not moving.,   Head: NC/AT  Eyes: PERRL, EOMI, clear conjunctiva  ENT: no nasal discharge; uvula midline, no oropharyngeal erythema or exudates; MMM  Neck: supple; no JVD or thyromegaly  Respiratory: CTA B/L; no W/R/R, no retractions  Cardiac: +S1/S2; RRR; no M/R/G; PMI non-displaced  Gastrointestinal: soft, NT/ND; no rebound or guarding; +BSx4  **Back: spine midline, no bony tenderness or step-offs; no CVAT B/L  Extremities: WWP, no clubbing or cyanosis; no peripheral edema  Musculoskeletal: NROM x4; no joint swelling, tenderness or erythema; trace edema LE, dar  Vascular: 2+ radial, femoral, DP/PT pulses B/L  Dermatologic: skin warm, dry and intact; no rashes, wounds, or scars  Lymphatic: no submandibular or cervical LAD  Neurologic: AAOx3; CNII-XII grossly intact; no focal deficits  LE sensation in tact to LT, dar.   Reflexes: 2+ patella; Babinski toes down going  Straight leg test positive, dar  Pt did not stand to walk.   Psychiatric: affect and characteristics of appearance, verbalizations, behaviors are appropriate .  VITAL SIGNS:  T(C): 36.8 (07-09-18 @ 05:29), Max: 36.9 (07-09-18 @ 03:00)  T(F): 98.3 (07-09-18 @ 05:29), Max: 98.4 (07-09-18 @ 03:00)  HR: 82 (07-09-18 @ 06:45) (68 - 91)  BP: 121/72 (07-09-18 @ 06:45) (111/70 - 121/72)  BP(mean): --  RR: 16 (07-09-18 @ 06:45) (16 - 18)  SpO2: 99% (07-09-18 @ 06:45) (97% - 100%)  Wt(kg): --    PHYSICAL EXAM:    Constitutional: laying in gurney NAD when not moving.,   Head: NC/AT  Eyes: PERRL, EOMI, clear conjunctiva  ENT: no nasal discharge; uvula midline, no oropharyngeal erythema or exudates; MMM  Neck: supple; no JVD or thyromegaly  Respiratory: CTA B/L; no W/R/R, no retractions  Cardiac: +S1/S2; RRR; no M/R/G; PMI non-displaced  Gastrointestinal: soft, NT/ND; no rebound or guarding; +BSx4  Back: Pt with limited ROM in all directions sec pain. Examined back in left lateral decubitus. Lordosis of lower spine but midline, no bony tenderness or step-offs; no CVAT B/L  Extremities: WWP, no clubbing or cyanosis; no peripheral edema  Musculoskeletal: NROM x4; no joint swelling, tenderness or erythema; trace edema LE, dar  Vascular: 2+ radial, femoral, DP/PT pulses B/L  Dermatologic: skin warm, dry and intact; no rashes, wounds, or scars  Lymphatic: no submandibular or cervical LAD  Neurologic: AAOx3; CNII-XII grossly intact; no focal deficits  LE sensation in tact to LT, dar.   Reflexes: 2+ patella; Babinski toes down going  Straight leg test positive, dar  Did not test gait as pt refused sec to pain limitations.  Psychiatric: affect and characteristics of appearance, verbalizations, behaviors are appropriate .  VITAL SIGNS:  T(C): 36.8 (07-09-18 @ 05:29), Max: 36.9 (07-09-18 @ 03:00)  T(F): 98.3 (07-09-18 @ 05:29), Max: 98.4 (07-09-18 @ 03:00)  HR: 82 (07-09-18 @ 06:45) (68 - 91)  BP: 121/72 (07-09-18 @ 06:45) (111/70 - 121/72)  BP(mean): --  RR: 16 (07-09-18 @ 06:45) (16 - 18)  SpO2: 99% (07-09-18 @ 06:45) (97% - 100%)  Wt(kg): --    PHYSICAL EXAM:    Constitutional: Laying in gurney, sleeping and easily aroused to name. NAD when not moving.,   Head: NC/AT  Eyes: PERRL, EOMI, clear conjunctiva  ENT: no nasal discharge; uvula midline, no oropharyngeal erythema or exudates; MMM  Neck: supple; no JVD or thyromegaly  Respiratory: CTA B/L; no W/R/R, no retractions  Cardiac: +S1/S2; RRR; no M/R/G; PMI non-displaced  Gastrointestinal: soft, NT/ND; no rebound or guarding; +BSx4  Back: Pt with limited ROM in all directions sec pain. Examined back in left lateral decubitus. Lordosis of lower spine but midline, no bony tenderness or step-offs; no CVAT B/L  Extremities: WWP, no clubbing or cyanosis; no peripheral edema  Musculoskeletal: NROM x4; no joint swelling, tenderness or erythema; trace edema LE, dar  Vascular: 2+ radial, femoral, DP/PT pulses B/L  Dermatologic: skin warm, dry and intact; no rashes, wounds, or scars  Lymphatic: no submandibular or cervical LAD  Neurologic: AAOx3; CNII-XII grossly intact; no focal deficits  LE sensation in tact to LT, dar.   Reflexes: 2+ patella; Babinski toes down going  Straight leg test positive, dar  Did not test gait as pt refused sec to pain limitations.  Psychiatric: affect and characteristics of appearance, verbalizations, behaviors are appropriate

## 2018-07-09 NOTE — PHYSICAL THERAPY INITIAL EVALUATION ADULT - PERTINENT HX OF CURRENT PROBLEM, REHAB EVAL
41 yo F presents with sudden onset of lower back pain. Pt and her son reported yesterday after [facility] the pt had sudden onset of mid, lower back pain, sharp in quality, that radiated down legs with "pulsating" sensation R>L. Pt went home and took 2 Tylenol but this did not relieve pain. Pain escalated to 10/10 and she was not able to walk due to the pain, which prompted her son to bring her to the ED.

## 2018-07-09 NOTE — H&P ADULT - PROBLEM/PLAN-1
Josep Zuniga  Cardiology fellow  [id number] DISPLAY PLAN FREE TEXT

## 2018-07-09 NOTE — ED PROVIDER NOTE - SHIFT CHANGE DETAILS
Pt with acute low back pain radiating RLE with assoc RLE weakness and decreased sensation. Neuro exam significant for slight decreased rectal tone.  Going for MRI to r/o cauda equina syndrome.  Neurosurgery aware.

## 2018-07-09 NOTE — ED PROVIDER NOTE - PHYSICAL EXAMINATION
General: NAD, alert, conversant, comfortable-appearing  Head: ncat  Eyes: clear, pupils round  Thoat: MMM, oropharynx clear  Neck: supple, no large masses, no meningismus  CV: RRR no murmurs  Resp: CTAB no w/c/r  Abd: nontender, no rebound/guarding  Back: lumbar midline ttp, no flank ttp, strength exam limited due to pain, r worse than L ehl strength, reports decreased sensation on R side of lower ext affecting tib/fib region 2+ dp/pt pulse

## 2018-07-09 NOTE — H&P ADULT - PROBLEM SELECTOR PLAN 1
Acute back pain ddx broad inclussive of spinal cord impingement/abscess, or fracture, vs. gyn/pelvic inflammation/infxn, vs. kidney stone vs. bowel inflammation/constipation. VS, labs and urine reassuring that there is no infectious process. Imaging identifies a herniated disk at L4/5 which is likely resulting in radiculopathy to LE's, which is consistent with exam. Per neurosx, no surgical intervention warranted at this time. Pain management and mobility likely to improve symptoms. Pain relief not achieved with opiates. She would benefit from trial of local intervention in conjunction with systemic neuro. meds. Of note, pt constipated, which can be exacerbating symptoms.   -lidocaine patch to lower back  -gabapentin 100 mg TID   -PT eval Acute back pain ddx broad inclusive of MSK strain vs spinal cord impingement/abscess or fracture, vs. gyn/pelvic inflammation/infxn, vs. kidney stone vs. bowel inflammation/constipation. Vital signs, labs and urine reassuring that there is no infectious process. Imaging identifies a herniated disk at L4/5 which is likely resulting in radiculopathy to LE's, consistent with exam. Per neurosx, no surgical intervention warranted at this time. Pain management and mobility likely to improve symptoms. Pain relief not achieved with opiates, thus far. However, pt may have higher tolerance given recent use of same. Will try PO percocet, as less nauseous with same. Will consult pain service to help manage pain long term. Of note, pt constipated but no abd distention or impaction noted on imaging, which can be exacerbating symptoms.   -lidocaine patch to lower back  -gabapentin 100 mg TID   -Percocet 5mg/325mg q4hr PRN for sev pain  -Pain consult  -PT eval

## 2018-07-09 NOTE — ED ADULT NURSE REASSESSMENT NOTE - NS ED NURSE REASSESS COMMENT FT1
Patient c/o of severe lower back pain, radiates to bilateral lower legs, no urinary or neuro deficits, w/ pain on bearing weight and ambulating.  Vital signs stable.  Repositioned for comfort.  MD evaluation pending for pain meds.
pt complaining of nausea with one episode of vomiting Md Briseno made aware pt medicated with 4mg Zofran IVP
pt to MRI
pt. medicated as noted, mayi. well, assessment on-going
pt. medicated as noted, t/p here to take pt. to MRI, pt. and spouse utd, with understanding verbalized, states nausea better since zofran
pt. reminded that we need urine sample prior to CT, has cup, understanding verbalized
pt. was placed on bedpan for void per HARLAN Goodman
initial admit order received, awaiting sign out, family remains at bedside
interventions as noted, medicated for pain, mayi. well, family at bedside, utd on poc with understanding verbalized

## 2018-07-09 NOTE — PHYSICAL THERAPY INITIAL EVALUATION ADULT - GENERAL OBSERVATIONS, REHAB EVAL
Rec'd pt supine in bed, non-acute distress, +heplock, cleared for PT and OOB actvitiy by HARLAN Dove and Dr. Sandhu intern Rec'd pt supine in bed, non-acute distress, +heplock, cleared for PT and OOB activity by HARLAN Dove and Dr. Sandhu intern

## 2018-07-09 NOTE — H&P ADULT - PROBLEM SELECTOR PLAN 2
Pt with h/o RYGB with subsequent complications, now constipating in setting of acute pain and opiates. Will provide bowel regimen.   -senna  -miralax

## 2018-07-09 NOTE — CONSULT NOTE ADULT - SUBJECTIVE AND OBJECTIVE BOX
Pain Management Consult Note - [facility] [phone]    Chief Complaint: Lower Back Pain    HPI: Patient seen at 1:30pm. Patient laying down in bed, in no apparent distress. Patient complains of lower back pain that radiates down to her Right and Left Leg that is unrelieved with Tylenol PO .      Pain is _x__ sharp _x___dull ___burning _x__achy ___ Intensity: ____ mild _x__mod _x__severe     Location ____surgical site ____cervical __x___lumbar ____abd ____upper ext____lower ext    Worse with __x__activity __x__movement _____physical therapy___ Rest    Improved with _x___medication _x___rest ____physical therapy    ROS: Const:  _-__febrile   Eyes:___ENT:___CV: __-_chest pain  Resp: _-___sob  GI:_-__nausea _x__vomiting _-__abd pain ___npo ___clears _x_full diet __bm  :___ Musk: _x__pain _x__spasm  Skin:___ Neuro:  __-_nsmeocbn__-_aaeasbxuv__-_ numbness _x__weakness _x__paresth  Psych:__anxiety  Endo:___ Heme:___Allergy:_________, _x__all others reviewed and negative    PAST MEDICAL & SURGICAL HISTORY:  UTI (urinary tract infection)  Herniated cervical disc  Anemia  History of cholecystectomy  H/O abdominal hysterectomy  Status post bariatric surgery: Gastric bypass status for obesity  ACUTE MIDLINE BACK PAIN  No pertinent family history in first degree relatives  Handoff  MEWS Score  UTI (urinary tract infection)  Herniated cervical disc  Anemia  UTI (urinary tract infection)  Acute midline back pain, unspecified back location  Transition of care performed with sharing of clinical summary  Need for prophylactic measure  Nutrition, metabolism, and development symptoms  Herniated cervical disc  Constipation, unspecified constipation type  Acute midline back pain, unspecified back location  History of cholecystectomy  H/O abdominal hysterectomy  Status post bariatric surgery  LOWER BACK PAIN    SH: _-__Tobacco   _-__Alcohol                          FH:FAMILY HISTORY:  No pertinent family history in first degree relatives      cyclobenzaprine 5 milliGRAM(s) Oral three times a day PRN  ferrous    sulfate 325 milliGRAM(s) Oral daily  gabapentin 100 milliGRAM(s) Oral three times a day  oxyCODONE    5 mG/acetaminophen 325 mG 1 Tablet(s) Oral every 4 hours PRN  polyethylene glycol 3350 17 Gram(s) Oral daily  senna 1 Tablet(s) Oral daily      T(C): 37 (07-09-18 @ 13:30), Max: 37 (07-09-18 @ 13:30)  HR: 80 (07-09-18 @ 13:30) (68 - 91)  BP: 120/70 (07-09-18 @ 13:30) (111/70 - 122/72)  RR: 18 (07-09-18 @ 13:30) (16 - 18)  SpO2: 98% (07-09-18 @ 13:30) (97% - 100%)  Wt(kg): --    T(C): 37 (07-09-18 @ 13:30), Max: 37 (07-09-18 @ 13:30)  HR: 80 (07-09-18 @ 13:30) (68 - 91)  BP: 120/70 (07-09-18 @ 13:30) (111/70 - 122/72)  RR: 18 (07-09-18 @ 13:30) (16 - 18)  SpO2: 98% (07-09-18 @ 13:30) (97% - 100%)  Wt(kg): --    T(C): 37 (07-09-18 @ 13:30), Max: 37 (07-09-18 @ 13:30)  HR: 80 (07-09-18 @ 13:30) (68 - 91)  BP: 120/70 (07-09-18 @ 13:30) (111/70 - 122/72)  RR: 18 (07-09-18 @ 13:30) (16 - 18)  SpO2: 98% (07-09-18 @ 13:30) (97% - 100%)  Wt(kg): --    PHYSICAL EXAM:  Gen Appearance: __x_no acute distress _x__appropriate        Neuro: ___SILT feet____ EOM Intact Psych: AAOX_3_, _x__mood/affect appropriate        Eyes: _x__conjunctiva WNL  __x___ Pupils equal and round        ENT: _x__ears and nose atraumatic__x_ Hearing grossly intact        Neck: _x__trachea midline, no visible masses ___thyroid without palpable mass    Resp: _x__Nml WOB____No tactile fremitus ___clear to auscultation    Cardio: ___extremities free from edema __x__pedal pulses palpable    GI/Abdomen: _x__soft ___x__ Nontender___x___Nondistended_____HSM    Lymphatic: ___no palpable nodes in neck  _x__no palpable nodes calves and feet    Skin/Wound: ___Incision, ___Dressing c/d/i,   ____surrounding tissues soft,  ___drain/chest tube present____    Muscular: EHL _5__/5  Gastrocnemius_5__/5    ___absent clubbing/cyanosis      ASSESSMENT: This is a 42y old Female with a history of herniated cervical disc presenting with acute midline back pain that radiates to his R and L leg. Pain not well controlled under current pain medication regimen,.         Recommended Treatment PLAN:  1. Oxycodone 5-10mg Po Q4h prn for moderate to severe pain  2. Dilaudid 0.5mg Q2H IVP prn breakthrough pain  3. Lidocaine patch to affected area 12 hours on, 12 hours off   4. Gabapentin 100mg Po TID  Plan discussed with José Miguel Garg Pain Management Consult Note - [facility] [phone]    Chief Complaint: Lower Back Pain    HPI: Patient seen at 1:30pm. Patient laying down in bed, in no apparent distress. Patient complains of lower back pain that radiates down to her Right and Left Leg that is unrelieved with Tylenol PO .      Pain is _x__ sharp _x___dull ___burning _x__achy ___ Intensity: ____ mild _x__mod _x__severe     Location ____surgical site ____cervical __x___lumbar ____abd ____upper ext____lower ext    Worse with __x__activity __x__movement _____physical therapy___ Rest    Improved with _x___medication _x___rest ____physical therapy    ROS: Const:  _-__febrile   Eyes:___ENT:___CV: __-_chest pain  Resp: _-___sob  GI:_-__nausea _x__vomiting _-__abd pain ___npo ___clears _x_full diet __bm  :___ Musk: _x__pain _x__spasm  Skin:___ Neuro:  __-_xlmigvky__-_xaehorvxg__-_ numbness _x__weakness _x__paresth  Psych:__anxiety  Endo:___ Heme:___Allergy:_________, _x__all others reviewed and negative    PAST MEDICAL & SURGICAL HISTORY:  UTI (urinary tract infection)  Herniated cervical disc  Anemia  History of cholecystectomy  H/O abdominal hysterectomy  Status post bariatric surgery: Gastric bypass status for obesity  ACUTE MIDLINE BACK PAIN  No pertinent family history in first degree relatives  Handoff  MEWS Score  UTI (urinary tract infection)  Herniated cervical disc  Anemia  UTI (urinary tract infection)  Acute midline back pain, unspecified back location  Transition of care performed with sharing of clinical summary  Need for prophylactic measure  Nutrition, metabolism, and development symptoms  Herniated cervical disc  Constipation, unspecified constipation type  Acute midline back pain, unspecified back location  History of cholecystectomy  H/O abdominal hysterectomy  Status post bariatric surgery  LOWER BACK PAIN    SH: _-__Tobacco   _-__Alcohol                          FH:FAMILY HISTORY:  No pertinent family history in first degree relatives      cyclobenzaprine 5 milliGRAM(s) Oral three times a day PRN  ferrous    sulfate 325 milliGRAM(s) Oral daily  gabapentin 100 milliGRAM(s) Oral three times a day  oxyCODONE    5 mG/acetaminophen 325 mG 1 Tablet(s) Oral every 4 hours PRN  polyethylene glycol 3350 17 Gram(s) Oral daily  senna 1 Tablet(s) Oral daily      T(C): 37 (07-09-18 @ 13:30), Max: 37 (07-09-18 @ 13:30)  HR: 80 (07-09-18 @ 13:30) (68 - 91)  BP: 120/70 (07-09-18 @ 13:30) (111/70 - 122/72)  RR: 18 (07-09-18 @ 13:30) (16 - 18)  SpO2: 98% (07-09-18 @ 13:30) (97% - 100%)  Wt(kg): --    T(C): 37 (07-09-18 @ 13:30), Max: 37 (07-09-18 @ 13:30)  HR: 80 (07-09-18 @ 13:30) (68 - 91)  BP: 120/70 (07-09-18 @ 13:30) (111/70 - 122/72)  RR: 18 (07-09-18 @ 13:30) (16 - 18)  SpO2: 98% (07-09-18 @ 13:30) (97% - 100%)  Wt(kg): --    T(C): 37 (07-09-18 @ 13:30), Max: 37 (07-09-18 @ 13:30)  HR: 80 (07-09-18 @ 13:30) (68 - 91)  BP: 120/70 (07-09-18 @ 13:30) (111/70 - 122/72)  RR: 18 (07-09-18 @ 13:30) (16 - 18)  SpO2: 98% (07-09-18 @ 13:30) (97% - 100%)  Wt(kg): --    PHYSICAL EXAM:  Gen Appearance: __x_no acute distress _x__appropriate        Neuro: ___SILT feet____ EOM Intact Psych: AAOX_3_, _x__mood/affect appropriate        Eyes: _x__conjunctiva WNL  __x___ Pupils equal and round        ENT: _x__ears and nose atraumatic__x_ Hearing grossly intact        Neck: _x__trachea midline, no visible masses ___thyroid without palpable mass    Resp: _x__Nml WOB____No tactile fremitus ___clear to auscultation    Cardio: ___extremities free from edema __x__pedal pulses palpable    GI/Abdomen: _x__soft ___x__ Nontender___x___Nondistended_____HSM    Lymphatic: ___no palpable nodes in neck  _x__no palpable nodes calves and feet    Skin/Wound: ___Incision, ___Dressing c/d/i,   ____surrounding tissues soft,  ___drain/chest tube present____    Muscular: EHL _5__/5  Gastrocnemius_5__/5    ___absent clubbing/cyanosis      ASSESSMENT: This is a 42y old Female with a history of herniated cervical disc presenting with acute midline back pain that radiates to his R and L leg. Pain not well controlled under current pain medication regimen.    Recommended Treatment PLAN:  1. Oxycodone 5-10mg Po Q4h prn for moderate to severe pain  2. Dilaudid 0.5mg Q2H IVP prn breakthrough pain  3. Lidocaine patch to affected area 12 hours on, 12 hours off   4. Gabapentin 100mg Po TID  Plan discussed with José Miguel Garg

## 2018-07-09 NOTE — H&P ADULT - NSHPSOCIALHISTORY_GEN_ALL_CORE
pt lives at home with  and her 2 grown children. No tob/EtOH/rec drug. Works in office, computer job.

## 2018-07-09 NOTE — H&P ADULT - HISTORY OF PRESENT ILLNESS
42yoF with PMH anemia, herniated cervical disc, cholecystectomy, RYGB (2009), intussusception of jejunojejunal anastomosis (2015), and subsequent resection of dilated jejunojejunal anastomosis (Feb 2017) with small bowel anastomosis, recently admitted in June for pelvis laproscopy with lysis of adhesions, who presents with sudden onset of lower back pain. Pt and her son reported yesterday after Taoism the pt had sudden onset of mid, lower back pain, sharp in quality, that radiated down legs with "pulsating" sensation R>L. Pt went home and took 2 Tylenol but this did not relieve pain. Pain escalated to 10/10 and she was not able to walk, which prompted her son to bring her to the ED.     Per pt, she never had back pain before. The pain is 10/10 at rest in supine and with any movement. She reported urinary incontinence over the past 2 weeks associated with filled bladder and movement; no bowel incontinence. Denied saddle anesthesia. Denied back trauma or any new exercise; she works at a desk job. Received local steroid injection to cervical spine in May 2018 without relief; feels this pain is different. BM's occur 2x/day, last BM Friday which was formed, brown, non-bloody.  Denied fever/chills, headache, dizziness, nausea/vomitting, diarrhea. Other ROS neg.     In ED, she walked in with her son; afebrile VSS. She was given Dilaudid 1mg (2x), Morphine 4mg, lorazepam 0.5mg, and Zofran (4mg, 8mg). 42yoF with PMH anemia, herniated cervical disc, cholecystectomy, RYGB (2009), intussusception of jejunojejunal anastomosis (2015), and subsequent resection of dilated jejunojejunal anastomosis (Feb 2017) with small bowel anastomosis, recently admitted in June for pelvis laproscopy with lysis of adhesions, who presents with sudden onset of lower back pain. Pt and her son reported yesterday after [employer] the pt had sudden onset of mid, lower back pain, sharp in quality, that radiated down legs with "pulsating" sensation R>L. Pt went home and took 2 Tylenol but this did not relieve pain. Pain escalated to 10/10 and she was not able to walk due to the pain, which prompted her son to bring her to the ED.     Per pt, she never had back pain before. The pain is 10/10 at rest in supine and with any movement. She reported urinary incontinence over the past 2 weeks associated with filled bladder and movement; no bowel incontinence. Denied saddle anesthesia. Denied back trauma or any new exercise; she works at a desk job. Received local steroid injection to cervical spine in May 2018 without relief but no new symptoms currently; feels this back pain is different. BM's occur 2x/day, last BM Friday which was formed, brown, non-bloody.  Denied fever/chills, headache, dizziness, nausea/vomitting, diarrhea. Other ROS neg.     In ED, she walked in with her son; afebrile VSS. She was given Dilaudid 1mg (2x), Morphine 4mg, lorazepam 0.5mg, and Zofran (4mg, 8mg).

## 2018-07-10 PROCEDURE — 99221 1ST HOSP IP/OBS SF/LOW 40: CPT

## 2018-07-10 PROCEDURE — 93010 ELECTROCARDIOGRAM REPORT: CPT

## 2018-07-10 PROCEDURE — 99233 SBSQ HOSP IP/OBS HIGH 50: CPT | Mod: GC

## 2018-07-10 RX ORDER — GABAPENTIN 400 MG/1
200 CAPSULE ORAL THREE TIMES A DAY
Qty: 0 | Refills: 0 | Status: DISCONTINUED | OUTPATIENT
Start: 2018-07-10 | End: 2018-07-12

## 2018-07-10 RX ORDER — DOCUSATE SODIUM 100 MG
100 CAPSULE ORAL THREE TIMES A DAY
Qty: 0 | Refills: 0 | Status: DISCONTINUED | OUTPATIENT
Start: 2018-07-10 | End: 2018-07-15

## 2018-07-10 RX ORDER — CYCLOBENZAPRINE HYDROCHLORIDE 10 MG/1
5 TABLET, FILM COATED ORAL
Qty: 0 | Refills: 0 | Status: DISCONTINUED | OUTPATIENT
Start: 2018-07-10 | End: 2018-07-11

## 2018-07-10 RX ORDER — OXYCODONE AND ACETAMINOPHEN 5; 325 MG/1; MG/1
2 TABLET ORAL EVERY 6 HOURS
Qty: 0 | Refills: 0 | Status: DISCONTINUED | OUTPATIENT
Start: 2018-07-10 | End: 2018-07-12

## 2018-07-10 RX ORDER — CYCLOBENZAPRINE HYDROCHLORIDE 10 MG/1
10 TABLET, FILM COATED ORAL THREE TIMES A DAY
Qty: 0 | Refills: 0 | Status: DISCONTINUED | OUTPATIENT
Start: 2018-07-10 | End: 2018-07-10

## 2018-07-10 RX ORDER — OXYCODONE AND ACETAMINOPHEN 5; 325 MG/1; MG/1
1 TABLET ORAL EVERY 6 HOURS
Qty: 0 | Refills: 0 | Status: DISCONTINUED | OUTPATIENT
Start: 2018-07-10 | End: 2018-07-10

## 2018-07-10 RX ADMIN — POLYETHYLENE GLYCOL 3350 17 GRAM(S): 17 POWDER, FOR SOLUTION ORAL at 11:25

## 2018-07-10 RX ADMIN — GABAPENTIN 100 MILLIGRAM(S): 400 CAPSULE ORAL at 13:20

## 2018-07-10 RX ADMIN — Medication 100 MILLIGRAM(S): at 14:16

## 2018-07-10 RX ADMIN — LIDOCAINE 1 PATCH: 4 CREAM TOPICAL at 11:25

## 2018-07-10 RX ADMIN — OXYCODONE AND ACETAMINOPHEN 1 TABLET(S): 5; 325 TABLET ORAL at 13:49

## 2018-07-10 RX ADMIN — HYDROMORPHONE HYDROCHLORIDE 0.5 MILLIGRAM(S): 2 INJECTION INTRAMUSCULAR; INTRAVENOUS; SUBCUTANEOUS at 05:26

## 2018-07-10 RX ADMIN — Medication 100 MILLIGRAM(S): at 21:18

## 2018-07-10 RX ADMIN — GABAPENTIN 100 MILLIGRAM(S): 400 CAPSULE ORAL at 05:07

## 2018-07-10 RX ADMIN — Medication 50 MILLIGRAM(S): at 15:54

## 2018-07-10 RX ADMIN — Medication 325 MILLIGRAM(S): at 11:25

## 2018-07-10 RX ADMIN — OXYCODONE AND ACETAMINOPHEN 1 TABLET(S): 5; 325 TABLET ORAL at 06:46

## 2018-07-10 RX ADMIN — OXYCODONE AND ACETAMINOPHEN 1 TABLET(S): 5; 325 TABLET ORAL at 07:46

## 2018-07-10 RX ADMIN — HYDROMORPHONE HYDROCHLORIDE 0.5 MILLIGRAM(S): 2 INJECTION INTRAMUSCULAR; INTRAVENOUS; SUBCUTANEOUS at 22:21

## 2018-07-10 RX ADMIN — HYDROMORPHONE HYDROCHLORIDE 0.5 MILLIGRAM(S): 2 INJECTION INTRAMUSCULAR; INTRAVENOUS; SUBCUTANEOUS at 21:51

## 2018-07-10 RX ADMIN — HYDROMORPHONE HYDROCHLORIDE 0.5 MILLIGRAM(S): 2 INJECTION INTRAMUSCULAR; INTRAVENOUS; SUBCUTANEOUS at 05:11

## 2018-07-10 RX ADMIN — HYDROMORPHONE HYDROCHLORIDE 0.5 MILLIGRAM(S): 2 INJECTION INTRAMUSCULAR; INTRAVENOUS; SUBCUTANEOUS at 09:20

## 2018-07-10 RX ADMIN — OXYCODONE AND ACETAMINOPHEN 1 TABLET(S): 5; 325 TABLET ORAL at 12:49

## 2018-07-10 RX ADMIN — OXYCODONE AND ACETAMINOPHEN 1 TABLET(S): 5; 325 TABLET ORAL at 19:01

## 2018-07-10 RX ADMIN — CYCLOBENZAPRINE HYDROCHLORIDE 5 MILLIGRAM(S): 10 TABLET, FILM COATED ORAL at 21:43

## 2018-07-10 RX ADMIN — HYDROMORPHONE HYDROCHLORIDE 0.5 MILLIGRAM(S): 2 INJECTION INTRAMUSCULAR; INTRAVENOUS; SUBCUTANEOUS at 10:25

## 2018-07-10 RX ADMIN — GABAPENTIN 200 MILLIGRAM(S): 400 CAPSULE ORAL at 21:18

## 2018-07-10 RX ADMIN — HYDROMORPHONE HYDROCHLORIDE 0.5 MILLIGRAM(S): 2 INJECTION INTRAMUSCULAR; INTRAVENOUS; SUBCUTANEOUS at 17:03

## 2018-07-10 RX ADMIN — HYDROMORPHONE HYDROCHLORIDE 0.5 MILLIGRAM(S): 2 INJECTION INTRAMUSCULAR; INTRAVENOUS; SUBCUTANEOUS at 16:01

## 2018-07-10 RX ADMIN — SENNA PLUS 1 TABLET(S): 8.6 TABLET ORAL at 11:25

## 2018-07-10 NOTE — CONSULT NOTE ADULT - SUBJECTIVE AND OBJECTIVE BOX
HPI:   42yoF with PMH anemia, herniated cervical disc, cholecystectomy, RYGB (), intussusception of jejunojejunal anastomosis (), and subsequent resection of dilated jejunojejunal anastomosis (2017) with small bowel anastomosis, recently admitted in  for pelvis laproscopy with lysis of adhesions, who presents with sudden onset of lower back pain. Pt and her son reported yesterday after Alev[employer] the pt had sudden onset of mid, lower back pain, sharp in quality, that radiated down legs with "pulsating" sensation R>L. Pt went home and took 2 Tylenol but this did not relieve pain. Pain escalated to 10/10 and she was not able to walk due to the pain, which prompted her son to bring her to the ED.     Per pt, she never had back pain before. The pain is 10/10 at rest in supine and with any movement. She reported urinary incontinence over the past 2 weeks associated with filled bladder and movement; no bowel incontinence. Denied saddle anesthesia. Denied back trauma or any new exercise; she works at a desk job. Received local steroid injection to cervical spine in May 2018 without relief but no new symptoms currently; feels this back pain is different. BM's occur 2x/day, last BM Friday which was formed, brown, non-bloody.  Denied fever/chills, headache, dizziness, nausea/vomitting, diarrhea. Other ROS neg.     In ED, she walked in with her son; afebrile VSS. She was given Dilaudid 1mg (2x), Morphine 4mg, lorazepam 0.5mg, and Zofran (4mg, 8mg).     INTERVAL HPI/OVERNIGHT EVENTS:    SUBJECTIVE: Patient seen and examined at bedside.    OBJECTIVE:    VITAL SIGNS:  ICU Vital Signs Last 24 Hrs  T(C): 37 (10 Jul 2018 08:27), Max: 37 (2018 13:30)  T(F): 98.6 (10 Jul 2018 08:27), Max: 98.6 (2018 13:30)  HR: 61 (10 Jul 2018 08:27) (61 - 80)  BP: 109/71 (10 Jul 2018 08:27) (105/66 - 120/70)  BP(mean): --  ABP: --  ABP(mean): --  RR: 16 (10 Jul 2018 08:27) (16 - 18)  SpO2: 95% (10 Jul 2018 08:27) (94% - 98%)        CAPILLARY BLOOD GLUCOSE          PHYSICAL EXAM:    General: NAD  HEENT: NC/AT; PERRL, clear conjunctiva  Neck: supple  Respiratory: CTA b/l  Cardiovascular: +S1/S2; RRR  Abdomen: soft, NT/ND; +BS x4  Extremities: WWP, 2+ peripheral pulses b/l; no LE edema  Skin: normal color and turgor; no rash  Neurological:     MEDICATIONS:  MEDICATIONS  (STANDING):  ferrous    sulfate 325 milliGRAM(s) Oral daily  gabapentin 100 milliGRAM(s) Oral three times a day  lidocaine   Patch 1 Patch Transdermal daily  polyethylene glycol 3350 17 Gram(s) Oral daily  senna 1 Tablet(s) Oral daily    MEDICATIONS  (PRN):  HYDROmorphone  Injectable 0.5 milliGRAM(s) IV Push every 3 hours PRN Severe Pain (7 - 10)  ondansetron Injectable 4 milliGRAM(s) IV Push every 6 hours PRN Nausea and/or Vomiting  oxyCODONE    5 mG/acetaminophen 325 mG 1 Tablet(s) Oral every 6 hours PRN Severe Pain (7 - 10)      ALLERGIES:  Allergies    No Known Allergies    Intolerances        LABS:                        11.5   7.1   )-----------( 307      ( 2018 00:36 )             34.8     07-09    138  |  102  |  14  ----------------------------<  101<H>  4.3   |  24  |  0.61    Ca    9.4      2018 00:36    TPro  6.3  /  Alb  4.1  /  TBili  0.2  /  DBili  x   /  AST  20  /  ALT  18  /  AlkPhos  95  07-09    PT/INR - ( 2018 00:36 )   PT: 11.1 sec;   INR: 1.00          PTT - ( 2018 00:36 )  PTT:28.9 sec  Urinalysis Basic - ( 2018 02:04 )    Color: Yellow / Appearance: Clear / S.010 / pH: x  Gluc: x / Ketone: NEGATIVE  / Bili: Negative / Urobili: 0.2 E.U./dL   Blood: x / Protein: NEGATIVE mg/dL / Nitrite: NEGATIVE   Leuk Esterase: NEGATIVE / RBC: x / WBC x   Sq Epi: x / Non Sq Epi: x / Bacteria: x        RADIOLOGY & ADDITIONAL TESTS: Reviewed. HPI:   42yoF with PMH anemia, herniated cervical disc, cholecystectomy, RYGB (), intussusception of jejunojejunal anastomosis (), and subsequent resection of dilated jejunojejunal anastomosis (2017) with small bowel anastomosis, recently admitted in  for pelvis laproscopy with lysis of adhesions, who presents with sudden onset of lower back pain. Pt and her son reported yesterday after [employer] orth[employer] the pt had sudden onset of mid, lower back pain, sharp in quality, that radiated down legs with "pulsating" sensation R>L. Pt went home and took 2 Tylenol but this did not relieve pain. Pain escalated to 10/10 and she was not able to walk due to the pain, which prompted her son to bring her to the ED.     Per pt, she never had back pain before. The pain is 10/10 at rest in supine and with any movement. She reported urinary incontinence over the past 2 weeks associated with filled bladder and movement; no bowel incontinence. Denied saddle anesthesia. Denied back trauma or any new exercise; she works at a desk job. Received local steroid injection to cervical spine in May 2018 without relief but no new symptoms currently; feels this back pain is different. BM's occur 2x/day, last BM Friday which was formed, brown, non-bloody.  Denied fever/chills, headache, dizziness, nausea/vomitting, diarrhea. Other ROS neg.     In ED, she walked in with her son; afebrile VSS. She was given Dilaudid 1mg (2x), Morphine 4mg, lorazepam 0.5mg, and Zofran (4mg, 8mg).     INTERVAL HPI/OVERNIGHT EVENTS:    SUBJECTIVE: Patient seen and examined at bedside.    OBJECTIVE:    VITAL SIGNS:  ICU Vital Signs Last 24 Hrs  T(C): 37 (10 Jul 2018 08:27), Max: 37 (2018 13:30)  T(F): 98.6 (10 Jul 2018 08:27), Max: 98.6 (2018 13:30)  HR: 61 (10 Jul 2018 08:27) (61 - 80)  BP: 109/71 (10 Jul 2018 08:27) (105/66 - 120/70)  BP(mean): --  ABP: --  ABP(mean): --  RR: 16 (10 Jul 2018 08:27) (16 - 18)  SpO2: 95% (10 Jul 2018 08:27) (94% - 98%)        CAPILLARY BLOOD GLUCOSE          PHYSICAL EXAM:    General: NAD  HEENT: NC/AT; PERRL, clear conjunctiva  Neck: supple  Respiratory: CTA b/l  Cardiovascular: +S1/S2; RRR  Abdomen: soft, NT/ND; +BS x4  Extremities: WWP, 2+ peripheral pulses b/l; no LE edema  Skin: normal color and turgor; no rash    NEURO  Mental status:  The patient is alert, attentive, and oriented. Speech is clear and fluent with good repetition, comprehension, and naming.   Cranial nerves:  CN II: Visual fields are full to confrontation. PERRLA.   CN III, IV, VI: Intact  CN V: Facial sensation is intact to pinprick in all 3 divisions bilaterally. Corneal responses are intact.  CN VII: Face is symmetric with normal eye closure and smile.  CN VII: Hearing is normal to rubbing fingers  CN IX, X: Palate elevates symmetrically. Phonation is normal.  CN XI: Head turning and shoulder shrug are intact  CN XII: Tongue is midline with normal movements and no atrophy.  Motor: 5/5 UE b/l; unable to text LE strength 2/2 pain  Sensation: sensation to light touch and vibration intact everywhere; no numbness  Reflex: 3+ patellar, triceps and biceps reflex      MEDICATIONS:  MEDICATIONS  (STANDING):  ferrous    sulfate 325 milliGRAM(s) Oral daily  gabapentin 100 milliGRAM(s) Oral three times a day  lidocaine   Patch 1 Patch Transdermal daily  polyethylene glycol 3350 17 Gram(s) Oral daily  senna 1 Tablet(s) Oral daily    MEDICATIONS  (PRN):  HYDROmorphone  Injectable 0.5 milliGRAM(s) IV Push every 3 hours PRN Severe Pain (7 - 10)  ondansetron Injectable 4 milliGRAM(s) IV Push every 6 hours PRN Nausea and/or Vomiting  oxyCODONE    5 mG/acetaminophen 325 mG 1 Tablet(s) Oral every 6 hours PRN Severe Pain (7 - 10)      ALLERGIES:  Allergies    No Known Allergies    Intolerances        LABS:                        11.5   7.1   )-----------( 307      ( 2018 00:36 )             34.8     07-09    138  |  102  |  14  ----------------------------<  101<H>  4.3   |  24  |  0.61    Ca    9.4      2018 00:36    TPro  6.3  /  Alb  4.1  /  TBili  0.2  /  DBili  x   /  AST  20  /  ALT  18  /  AlkPhos  95  07-09    PT/INR - ( 2018 00:36 )   PT: 11.1 sec;   INR: 1.00          PTT - ( 2018 00:36 )  PTT:28.9 sec  Urinalysis Basic - ( 2018 02:04 )    Color: Yellow / Appearance: Clear / S.010 / pH: x  Gluc: x / Ketone: NEGATIVE  / Bili: Negative / Urobili: 0.2 E.U./dL   Blood: x / Protein: NEGATIVE mg/dL / Nitrite: NEGATIVE   Leuk Esterase: NEGATIVE / RBC: x / WBC x   Sq Epi: x / Non Sq Epi: x / Bacteria: x        RADIOLOGY & ADDITIONAL TESTS: Reviewed. HPI:   42yoF with PMH anemia, herniated cervical disc, cholecystectomy, RYGB (), intussusception of jejunojejunal anastomosis (), and subsequent resection of dilated jejunojejunal anastomosis (2017) with small bowel anastomosis, recently admitted in  for pelvis laproscopy with lysis of adhesions, who presents with sudden onset of lower back pain. Pt and her son reported yesterday after [employer] the pt had sudden onset of mid, lower back pain, sharp in quality, that radiated down legs with "pulsating" sensation R>L. Pt went home and took 2 Tylenol but this did not relieve pain. Pain escalated to 10/10 and she was not able to walk due to the pain, which prompted her son to bring her to the ED.     Per pt, she never had back pain before. The pain is 10/10 at rest in supine and with any movement. She reported urinary incontinence over the past 2 weeks associated with filled bladder and movement; no bowel incontinence. Denied saddle anesthesia. Denied back trauma or any new exercise; she works at a desk job. Received local steroid injection to cervical spine in May 2018 without relief but no new symptoms currently; feels this back pain is different. BM's occur 2x/day, last BM Friday which was formed, brown, non-bloody.  Denied fever/chills, headache, dizziness, nausea/vomitting, diarrhea. Other ROS neg.     In ED, she walked in with her son; afebrile VSS. She was given Dilaudid 1mg (2x), Morphine 4mg, lorazepam 0.5mg, and Zofran (4mg, 8mg).     INTERVAL HPI/OVERNIGHT EVENTS:    SUBJECTIVE: Patient seen and examined at bedside.    OBJECTIVE:    VITAL SIGNS:  ICU Vital Signs Last 24 Hrs  T(C): 37 (10 Jul 2018 08:27), Max: 37 (2018 13:30)  T(F): 98.6 (10 Jul 2018 08:27), Max: 98.6 (2018 13:30)  HR: 61 (10 Jul 2018 08:27) (61 - 80)  BP: 109/71 (10 Jul 2018 08:27) (105/66 - 120/70)  BP(mean): --  ABP: --  ABP(mean): --  RR: 16 (10 Jul 2018 08:27) (16 - 18)  SpO2: 95% (10 Jul 2018 08:27) (94% - 98%)        CAPILLARY BLOOD GLUCOSE          PHYSICAL EXAM:    General: NAD  HEENT: NC/AT; PERRL, clear conjunctiva  Neck: supple  Respiratory: CTA b/l  Cardiovascular: +S1/S2; RRR  Abdomen: soft, NT/ND; +BS x4  Extremities: WWP, 2+ peripheral pulses b/l; no LE edema  Skin: normal color and turgor; no rash    NEURO  Mental status:  The patient is alert, attentive, and oriented. Speech is clear and fluent with good repetition, comprehension, and naming.   Cranial nerves:  CN II: Visual fields are full to confrontation. PERRLA.   CN III, IV, VI: Intact  CN V: Facial sensation is intact to pinprick in all 3 divisions bilaterally. Corneal responses are intact.  CN VII: Face is symmetric with normal eye closure and smile.  CN VII: Hearing is normal to rubbing fingers  CN IX, X: Palate elevates symmetrically. Phonation is normal.  CN XI: Head turning and shoulder shrug are intact  CN XII: Tongue is midline with normal movements and no atrophy.  Motor: 5/5 UE b/l; unable to text LE strength 2/2 pain  Sensation: sensation to light touch and vibration intact everywhere; no numbness  Cerebellar: FTS intact b/l  Reflex: 3+ patellar, triceps and biceps reflex  Gait: deferred      MEDICATIONS:  MEDICATIONS  (STANDING):  ferrous    sulfate 325 milliGRAM(s) Oral daily  gabapentin 100 milliGRAM(s) Oral three times a day  lidocaine   Patch 1 Patch Transdermal daily  polyethylene glycol 3350 17 Gram(s) Oral daily  senna 1 Tablet(s) Oral daily    MEDICATIONS  (PRN):  HYDROmorphone  Injectable 0.5 milliGRAM(s) IV Push every 3 hours PRN Severe Pain (7 - 10)  ondansetron Injectable 4 milliGRAM(s) IV Push every 6 hours PRN Nausea and/or Vomiting  oxyCODONE    5 mG/acetaminophen 325 mG 1 Tablet(s) Oral every 6 hours PRN Severe Pain (7 - 10)      ALLERGIES:  Allergies    No Known Allergies    Intolerances        LABS:                        11.5   7.1   )-----------( 307      ( 2018 00:36 )             34.8     07-09    138  |  102  |  14  ----------------------------<  101<H>  4.3   |  24  |  0.61    Ca    9.4      2018 00:36    TPro  6.3  /  Alb  4.1  /  TBili  0.2  /  DBili  x   /  AST  20  /  ALT  18  /  AlkPhos  95  07-09    PT/INR - ( 2018 00:36 )   PT: 11.1 sec;   INR: 1.00          PTT - ( 2018 00:36 )  PTT:28.9 sec  Urinalysis Basic - ( 2018 02:04 )    Color: Yellow / Appearance: Clear / S.010 / pH: x  Gluc: x / Ketone: NEGATIVE  / Bili: Negative / Urobili: 0.2 E.U./dL   Blood: x / Protein: NEGATIVE mg/dL / Nitrite: NEGATIVE   Leuk Esterase: NEGATIVE / RBC: x / WBC x   Sq Epi: x / Non Sq Epi: x / Bacteria: x        RADIOLOGY & ADDITIONAL TESTS: Reviewed.    MR Lumbar Spine No Cont (18 @ 04:38)   IMPRESSION: Small central herniated disc at L4/L5 causing slight   indentation on the thecal sac. HPI:   42yoF with PMH anemia, herniated cervical disc, cholecystectomy, RYGB (), intussusception of jejunojejunal anastomosis (), and subsequent resection of dilated jejunojejunal anastomosis (2017) with small bowel anastomosis, recently admitted in  for pelvis laproscopy with lysis of adhesions, who presents with sudden onset of lower back pain. Pt and her son reported yesterday after Spirit[employer] the pt had sudden onset of mid, lower back pain, sharp in quality, that radiated down legs with "pulsating" sensation R>L. Pt went home and took 2 Tylenol but this did not relieve pain. Pain escalated to 10/10 and she was not able to walk due to the pain, which prompted her son to bring her to the ED.     Per pt, she never had back pain before. The pain is 10/10 at rest in supine and with any movement. She reported urinary incontinence over the past 2 weeks associated with filled bladder and movement; no bowel incontinence. Denied saddle anesthesia. Denied back trauma or any new exercise; she works at a desk job. Received local steroid injection to cervical spine in May 2018 without relief but no new symptoms currently; feels this back pain is different. BM's occur 2x/day, last BM Friday which was formed, brown, non-bloody.  Denied fever/chills, headache, dizziness, nausea/vomitting, diarrhea. Other ROS neg.     In ED, she walked in with her son; afebrile VSS. She was given Dilaudid 1mg (2x), Morphine 4mg, lorazepam 0.5mg, and Zofran (4mg, 8mg).     INTERVAL HPI/OVERNIGHT EVENTS:    SUBJECTIVE: Patient seen and examined at bedside.    OBJECTIVE:    VITAL SIGNS:  ICU Vital Signs Last 24 Hrs  T(C): 37 (10 Jul 2018 08:27), Max: 37 (2018 13:30)  T(F): 98.6 (10 Jul 2018 08:27), Max: 98.6 (2018 13:30)  HR: 61 (10 Jul 2018 08:27) (61 - 80)  BP: 109/71 (10 Jul 2018 08:27) (105/66 - 120/70)  BP(mean): --  ABP: --  ABP(mean): --  RR: 16 (10 Jul 2018 08:27) (16 - 18)  SpO2: 95% (10 Jul 2018 08:27) (94% - 98%)        CAPILLARY BLOOD GLUCOSE          PHYSICAL EXAM:    General: NAD  HEENT: NC/AT; PERRL, clear conjunctiva  Neck: supple  Respiratory: CTA b/l  Cardiovascular: +S1/S2; RRR  Abdomen: soft, NT/ND; +BS x4  Extremities: WWP, 2+ peripheral pulses b/l; no LE edema  Skin: normal color and turgor; no rash    NEURO  Mental status:  The patient is alert, attentive, and oriented. Speech is clear and fluent with good repetition, comprehension, and naming.   Cranial nerves:  CN II: Visual fields are full to confrontation. PERRLA.   CN III, IV, VI: Intact  CN V: Facial sensation is intact to pinprick in all 3 divisions bilaterally. Corneal responses are intact.  CN VII: Face is symmetric with normal eye closure and smile.  CN VII: Hearing is normal to rubbing fingers  CN IX, X: Palate elevates symmetrically. Phonation is normal.  CN XI: Head turning and shoulder shrug are intact  CN XII: Tongue is midline with normal movements and no atrophy.  Motor: 5/5 UE b/l; unable to text LE strength 2/2 pain  Sensation: diminished LT/ PP from ankle to knee circumferentially on right; feet symmetric  Cerebellar: FTS intact b/l  Reflex: 3+ patellar, triceps, biceps b/l, achilles 2+, toes down b/l  Gait: deferred due to pain      MEDICATIONS:  MEDICATIONS  (STANDING):  ferrous    sulfate 325 milliGRAM(s) Oral daily  gabapentin 100 milliGRAM(s) Oral three times a day  lidocaine   Patch 1 Patch Transdermal daily  polyethylene glycol 3350 17 Gram(s) Oral daily  senna 1 Tablet(s) Oral daily    MEDICATIONS  (PRN):  HYDROmorphone  Injectable 0.5 milliGRAM(s) IV Push every 3 hours PRN Severe Pain (7 - 10)  ondansetron Injectable 4 milliGRAM(s) IV Push every 6 hours PRN Nausea and/or Vomiting  oxyCODONE    5 mG/acetaminophen 325 mG 1 Tablet(s) Oral every 6 hours PRN Severe Pain (7 - 10)      ALLERGIES:  Allergies    No Known Allergies    Intolerances        LABS:                        11.5   7.1   )-----------( 307      ( 2018 00:36 )             34.8         138  |  102  |  14  ----------------------------<  101<H>  4.3   |  24  |  0.61    Ca    9.4      2018 00:36    TPro  6.3  /  Alb  4.1  /  TBili  0.2  /  DBili  x   /  AST  20  /  ALT  18  /  AlkPhos  95      PT/INR - ( 2018 00:36 )   PT: 11.1 sec;   INR: 1.00          PTT - ( 2018 00:36 )  PTT:28.9 sec  Urinalysis Basic - ( 2018 02:04 )    Color: Yellow / Appearance: Clear / S.010 / pH: x  Gluc: x / Ketone: NEGATIVE  / Bili: Negative / Urobili: 0.2 E.U./dL   Blood: x / Protein: NEGATIVE mg/dL / Nitrite: NEGATIVE   Leuk Esterase: NEGATIVE / RBC: x / WBC x   Sq Epi: x / Non Sq Epi: x / Bacteria: x        RADIOLOGY & ADDITIONAL TESTS: Reviewed.    MR Lumbar Spine No Cont (18 @ 04:38)   IMPRESSION: Small central herniated disc at L4/L5 causing slight   indentation on the thecal sac.

## 2018-07-10 NOTE — PROGRESS NOTE ADULT - PROBLEM SELECTOR PLAN 1
Acute back pain ddx broad inclusive of MSK strain vs spinal cord impingement/abscess or fracture, vs. gyn/pelvic inflammation/infxn, vs. bowel inflammation/constipation. Vital signs, labs and urine reassuring that there is no infectious process. Imaging identifies a herniated disk at L4/5 which is likely resulting in radiculopathy to LE's, consistent with exam. Per neurosx, no surgical intervention warranted at this time. However, more improvement in symptoms with pain management regimen would be expected for purely musculoskeletal physiology - pain relief not achieved with opiates. Pain service consulted to help manage pain long term. Of note, pt constipated but no abd distention or impaction noted on imaging, may be exacerbating symptoms. The patient also weak bilaterally in lower extremities, possibly due to pain vs. true neurologic deficit.   -lidocaine patch to lower back  -gabapentin 100 mg TID   -dilaudid 0.5 mg q3h IV PRN  -Percocet 5mg/325mg q6hr PRN for sev pain  - Pain consulted, appreciate recs  - PT   - neurology consult  - NSGY consult  - will consider thoracic and cervical spine imaging -- has mild weakness in upper and lower extremities, again perhaps due to low back pain

## 2018-07-10 NOTE — CONSULT NOTE ADULT - SUBJECTIVE AND OBJECTIVE BOX
HISTORY OF PRESENT ILLNESS: This is a 43yo F PMH herniated cervical disc, anemia, cholecystectomy, abdominal hysterectomy, s/p gastric bypass with laparoscopic surgery for lysis of adhesions 2 weeks ago p/w lower back pain x3 days. She states she was walking home from [employer] down an incline when suddenly her developed lower back pain which she can pinpoint. She denies any alleviating factors, she took 2 Tylenol extra strength tabs at home to no relief. Pain is exacerbated with all movement. She describes the pain as constant 10/10. This am, started radiating to her tailbone with numbness extending down both legs (R >L).  She denies similar sx in the past, recent falls, trauma to back, dizziness, LOC, strenuous activity, chest pain, SOB, abdominal pain, saddle anesthesia, urinary incontinence, bowel incontinence.     PAST MEDICAL & SURGICAL HISTORY:  UTI (urinary tract infection)  Herniated cervical disc  Anemia  History of cholecystectomy  H/O abdominal hysterectomy  Status post bariatric surgery: Gastric bypass status for obesity    FAMILY HISTORY:  No pertinent family history in first degree relatives      SOCIAL HISTORY:  Tobacco Use: denies  EtOH use: denies  Substance: denies     Allergies    No Known Allergies    Intolerances        REVIEW OF SYSTEMS  General: denies fever, fatigue	  Skin/Breast: denies rashes, new nevi  Ophthalmologic: denies blurry vision, change in vision   ENMT:	denies nasal congestion, sore throat  Respiratory and Thorax: denies SOB, cough  Cardiovascular:	denies CP, palpitations  Gastrointestinal:	denies abdominal pain, N/V  Genitourinary: see HPI.   Musculoskeletal:	 see HPI.   Neurological: see HPI.   Psychiatric: denies anxiety, depressions  Hematology/Lymphatics:	 denies bleeding  Endocrine: denies intolerance to heat/cold      MEDICATIONS:  Antibiotics:    Neuro:  gabapentin 100 milliGRAM(s) Oral three times a day  HYDROmorphone  Injectable 0.5 milliGRAM(s) IV Push every 3 hours PRN  ondansetron Injectable 4 milliGRAM(s) IV Push every 6 hours PRN  oxyCODONE    5 mG/acetaminophen 325 mG 1 Tablet(s) Oral every 6 hours PRN    Anticoagulation:    OTHER:  docusate sodium 100 milliGRAM(s) Oral three times a day  lidocaine   Patch 1 Patch Transdermal daily  polyethylene glycol 3350 17 Gram(s) Oral daily  predniSONE   Tablet 50 milliGRAM(s) Oral every 24 hours  senna 1 Tablet(s) Oral daily    IVF:  ferrous    sulfate 325 milliGRAM(s) Oral daily      Vital Signs Last 24 Hrs  T(C): 36.2 (10 Jul 2018 16:07), Max: 37 (10 Jul 2018 08:27)  T(F): 97.2 (10 Jul 2018 16:07), Max: 98.6 (10 Jul 2018 08:27)  HR: 69 (10 Jul 2018 16:07) (61 - 72)  BP: 114/77 (10 Jul 2018 16:07) (105/66 - 117/64)  BP(mean): --  RR: 17 (10 Jul 2018 16:07) (16 - 18)  SpO2: 95% (10 Jul 2018 16:07) (94% - 98%)    PHYSICAL EXAM:  Constitutional: Pt lying comfortably when not moving, but crying out in pain when attempting to move   Eyes: PERRL, EOMI, moist conjucntival mucosa  ENMT: nares patent, no nasal d/c, uvula midline, moist mucosal membranes  Neck: supple, no thyroidmegaly   Back: unable to fully examine d/t pain, examined in R lat decubitus position. Nontender along cervical, thoracic spine. Tender to palpation along L4-5, sacral iliac joint, coccyx. Some paraspinal muscle tenderness along L4-5, SI. Pain to ROM in all directions.   Respiratory: Lungs CTA b/l, beath sounds vesicular, no wheezes/rails/rhonchi  Cardiovascular: RRR, S1/S2, no murmurs/rubs/gallops appreciated   Gastrointestinal: soft, nontender, nondistended  Vascular: 2+ femoral, DP, PT b/l, unable to palpate popliteal   Neurological: A&Ox3, recent/remote memory intact, CNII-XII grossly intact. Nontender LE. Strength decreased in both LE, worst on R. Sensation decreased to sharp/dull sensation b/l, worst on R. Unable to ascertain patellar reflexes, babinski toes downward. Vibration sensed b/l, worst on R. Proprioception intact. Unable to evaluate gait, pronator drift.  Pain illicited on passive ROM, unable to evaluate ROM of LE d/t pain.   Skin: dry, warm     LABS:                        11.5   7.1   )-----------( 307      ( 2018 00:36 )             34.8     07-09    138  |  102  |  14  ----------------------------<  101<H>  4.3   |  24  |  0.61    Ca    9.4      2018 00:36    TPro  6.3  /  Alb  4.1  /  TBili  0.2  /  DBili  x   /  AST  20  /  ALT  18  /  AlkPhos  95      PT/INR - ( 2018 00:36 )   PT: 11.1 sec;   INR: 1.00          PTT - ( 2018 00:36 )  PTT:28.9 sec  Urinalysis Basic - ( 2018 02:04 )    Color: Yellow / Appearance: Clear / S.010 / pH: x  Gluc: x / Ketone: NEGATIVE  / Bili: Negative / Urobili: 0.2 E.U./dL   Blood: x / Protein: NEGATIVE mg/dL / Nitrite: NEGATIVE   Leuk Esterase: NEGATIVE / RBC: x / WBC x   Sq Epi: x / Non Sq Epi: x / Bacteria: x      CULTURES:      RADIOLOGY & ADDITIONAL STUDIES:   MRI [facility] spine 2018: IMPRESSION: Small central herniated disc at L4/L5 causing slight indentation on the thecal sac.      Assessment:  This is a 43yo F PMH herniated cervical disc, anemia, cholecystectomy, abdominal hysterectomy, s/p gastric bypass with laparoscopic surgery for lysis of adhesions 2 weeks ago admitted for lower back pain x 3 days. Lumbar MRI shows small herniated disc at L4/L5.       Plan: HISTORY OF PRESENT ILLNESS: This is a 43yo F PMH herniated cervical disc, anemia, cholecystectomy, abdominal hysterectomy, s/p gastric bypass with laparoscopic surgery for lysis of adhesions 2 weeks ago p/w lower back pain x3 days. She states she was walking home from [employer] down an incline when suddenly her developed lower back pain which she can pinpoint. She denies any alleviating factors, she took 2 Tylenol extra strength tabs at home to no relief. Pain is exacerbated with all movement. She describes the pain as constant 10/10. This am, started radiating to her tailbone with numbness extending down both legs (R >L).  She denies similar sx in the past, recent falls, trauma to back, dizziness, LOC, strenuous activity, chest pain, SOB, abdominal pain, saddle anesthesia, urinary incontinence, bowel incontinence.     PAST MEDICAL & SURGICAL HISTORY:  UTI (urinary tract infection)  Herniated cervical disc  Anemia  History of cholecystectomy  H/O abdominal hysterectomy  Status post bariatric surgery: Gastric bypass status for obesity    FAMILY HISTORY:  No pertinent family history in first degree relatives      SOCIAL HISTORY:  Tobacco Use: denies  EtOH use: denies  Substance: denies     Allergies    No Known Allergies    Intolerances        REVIEW OF SYSTEMS  General: denies fever, fatigue	  Skin/Breast: denies rashes, new nevi  Ophthalmologic: denies blurry vision, change in vision   ENMT:	denies nasal congestion, sore throat  Respiratory and Thorax: denies SOB, cough  Cardiovascular:	denies CP, palpitations  Gastrointestinal:	denies abdominal pain, N/V  Genitourinary: see HPI.   Musculoskeletal:	 see HPI.   Neurological: see HPI.   Psychiatric: denies anxiety, depressions  Hematology/Lymphatics:	 denies bleeding  Endocrine: denies intolerance to heat/cold      MEDICATIONS:  Antibiotics:    Neuro:  gabapentin 100 milliGRAM(s) Oral three times a day  HYDROmorphone  Injectable 0.5 milliGRAM(s) IV Push every 3 hours PRN  ondansetron Injectable 4 milliGRAM(s) IV Push every 6 hours PRN  oxyCODONE    5 mG/acetaminophen 325 mG 1 Tablet(s) Oral every 6 hours PRN    Anticoagulation:    OTHER:  docusate sodium 100 milliGRAM(s) Oral three times a day  lidocaine   Patch 1 Patch Transdermal daily  polyethylene glycol 3350 17 Gram(s) Oral daily  predniSONE   Tablet 50 milliGRAM(s) Oral every 24 hours  senna 1 Tablet(s) Oral daily    IVF:  ferrous    sulfate 325 milliGRAM(s) Oral daily      Vital Signs Last 24 Hrs  T(C): 36.2 (10 Jul 2018 16:07), Max: 37 (10 Jul 2018 08:27)  T(F): 97.2 (10 Jul 2018 16:07), Max: 98.6 (10 Jul 2018 08:27)  HR: 69 (10 Jul 2018 16:07) (61 - 72)  BP: 114/77 (10 Jul 2018 16:07) (105/66 - 117/64)  BP(mean): --  RR: 17 (10 Jul 2018 16:07) (16 - 18)  SpO2: 95% (10 Jul 2018 16:07) (94% - 98%)    PHYSICAL EXAM:  Constitutional: Pt lying comfortably when not moving, but crying out in pain when attempting to move   Eyes: PERRL, EOMI, moist conjucntival mucosa  ENMT: nares patent, no nasal d/c, uvula midline, moist mucosal membranes  Neck: supple, no thyroidmegaly   Back: unable to fully examine d/t pain, examined in R lat decubitus position. Nontender along cervical, thoracic spine. Tender to palpation along L4-5, sacral iliac joint, coccyx. Some paraspinal muscle tenderness along L4-5, SI. Pain to ROM in all directions.   Respiratory: Lungs CTA b/l, beath sounds vesicular, no wheezes/rails/rhonchi  Cardiovascular: RRR, S1/S2, no murmurs/rubs/gallops appreciated   Gastrointestinal: soft, nontender, nondistended  Vascular: 2+ femoral, DP, PT b/l, unable to palpate popliteal   Neurological: A&Ox3, recent/remote memory intact, CNII-XII grossly intact. Nontender LE. Strength decreased in both LE, worst on R. Sensation decreased to sharp/dull sensation b/l, worst on R. Unable to ascertain patellar reflexes, babinski toes downward. Vibration sensed b/l, worst on R. Proprioception intact. Unable to evaluate gait, pronator drift.  Pain illicited on passive ROM, unable to evaluate ROM of LE d/t pain.   Skin: dry, warm     LABS:                        11.5   7.1   )-----------( 307      ( 2018 00:36 )             34.8     07-09    138  |  102  |  14  ----------------------------<  101<H>  4.3   |  24  |  0.61    Ca    9.4      2018 00:36    TPro  6.3  /  Alb  4.1  /  TBili  0.2  /  DBili  x   /  AST  20  /  ALT  18  /  AlkPhos  95  07-    PT/INR - ( 2018 00:36 )   PT: 11.1 sec;   INR: 1.00          PTT - ( 2018 00:36 )  PTT:28.9 sec  Urinalysis Basic - ( 2018 02:04 )    Color: Yellow / Appearance: Clear / S.010 / pH: x  Gluc: x / Ketone: NEGATIVE  / Bili: Negative / Urobili: 0.2 E.U./dL   Blood: x / Protein: NEGATIVE mg/dL / Nitrite: NEGATIVE   Leuk Esterase: NEGATIVE / RBC: x / WBC x   Sq Epi: x / Non Sq Epi: x / Bacteria: x      CULTURES:      RADIOLOGY & ADDITIONAL STUDIES:   MRI [facility] spine 2018: IMPRESSION: Small central herniated disc at L4/L5 causing slight indentation on the thecal sac.      Assessment:  This is a 43yo F PMH herniated cervical disc, anemia, cholecystectomy, abdominal hysterectomy, s/p gastric bypass with laparoscopic surgery for lysis of adhesions 2 weeks ago admitted for lower back pain x 3 days. Lumbar MRI shows small herniated disc at L4/L5.       Plan:  -neuro/spinal checks  -pain control  -agree with prednisone   -continue care per primary medicine team  -MRI to be reviewed and discussed with Dr. Barbosa HISTORY OF PRESENT ILLNESS: This is a 43yo F PMH herniated cervical disc, anemia, cholecystectomy, abdominal hysterectomy, s/p gastric bypass with laparoscopic surgery for lysis of adhesions 2 weeks ago p/w lower back pain x3 days. She states she was walking home from [employer] down an incline when suddenly her developed lower back pain which she can pinpoint. She denies any alleviating factors, she took 2 Tylenol extra strength tabs at home to no relief. Pain is exacerbated with all movement. She describes the pain as constant 10/10. This am, started radiating to her tailbone with numbness extending down both legs (R >L).  She denies similar sx in the past, recent falls, trauma to back, dizziness, LOC, strenuous activity, chest pain, SOB, abdominal pain, saddle anesthesia, urinary incontinence, bowel incontinence.     PAST MEDICAL & SURGICAL HISTORY:  UTI (urinary tract infection)  Herniated cervical disc  Anemia  History of cholecystectomy  H/O abdominal hysterectomy  Status post bariatric surgery: Gastric bypass status for obesity    FAMILY HISTORY:  No pertinent family history in first degree relatives      SOCIAL HISTORY:  Tobacco Use: denies  EtOH use: denies  Substance: denies     Allergies    No Known Allergies    Intolerances        REVIEW OF SYSTEMS  General: denies fever, fatigue	  Skin/Breast: denies rashes, new nevi  Ophthalmologic: denies blurry vision, change in vision   ENMT:	denies nasal congestion, sore throat  Respiratory and Thorax: denies SOB, cough  Cardiovascular:	denies CP, palpitations  Gastrointestinal:	denies abdominal pain, N/V  Genitourinary: see HPI.   Musculoskeletal:	 see HPI.   Neurological: see HPI.   Psychiatric: denies anxiety, depressions  Hematology/Lymphatics:	 denies bleeding  Endocrine: denies intolerance to heat/cold      MEDICATIONS:  Antibiotics:    Neuro:  gabapentin 100 milliGRAM(s) Oral three times a day  HYDROmorphone  Injectable 0.5 milliGRAM(s) IV Push every 3 hours PRN  ondansetron Injectable 4 milliGRAM(s) IV Push every 6 hours PRN  oxyCODONE    5 mG/acetaminophen 325 mG 1 Tablet(s) Oral every 6 hours PRN    Anticoagulation:    OTHER:  docusate sodium 100 milliGRAM(s) Oral three times a day  lidocaine   Patch 1 Patch Transdermal daily  polyethylene glycol 3350 17 Gram(s) Oral daily  predniSONE   Tablet 50 milliGRAM(s) Oral every 24 hours  senna 1 Tablet(s) Oral daily    IVF:  ferrous    sulfate 325 milliGRAM(s) Oral daily      Vital Signs Last 24 Hrs  T(C): 36.2 (10 Jul 2018 16:07), Max: 37 (10 Jul 2018 08:27)  T(F): 97.2 (10 Jul 2018 16:07), Max: 98.6 (10 Jul 2018 08:27)  HR: 69 (10 Jul 2018 16:07) (61 - 72)  BP: 114/77 (10 Jul 2018 16:07) (105/66 - 117/64)  BP(mean): --  RR: 17 (10 Jul 2018 16:07) (16 - 18)  SpO2: 95% (10 Jul 2018 16:07) (94% - 98%)    PHYSICAL EXAM:  Constitutional: Pt lying comfortably when not moving, but crying out in pain when attempting to move   Eyes: PERRL, EOMI, moist conjucntival mucosa  ENMT: nares patent, no nasal d/c, uvula midline, moist mucosal membranes  Neck: supple, no thyroidmegaly   Back: unable to fully examine d/t pain, examined in R lat decubitus position. Nontender along cervical, thoracic spine. Tender to palpation along L4-5, sacral iliac joint, coccyx. Some paraspinal muscle tenderness along L4-5, SI. Pain to ROM in all directions.   Respiratory: Lungs CTA b/l, beath sounds vesicular, no wheezes/rails/rhonchi  Cardiovascular: RRR, S1/S2, no murmurs/rubs/gallops appreciated   Gastrointestinal: soft, nontender, nondistended  Vascular: 2+ femoral, DP, PT b/l, unable to palpate popliteal   Neurological: A&Ox3, recent/remote memory intact, CNII-XII grossly intact. Nontender LE. Strength decreased in both LE, worst on R. Sensation decreased to sharp/dull sensation b/l, worst on R. Unable to ascertain patellar reflexes, babinski toes downward. Vibration sensed b/l, worst on R. Proprioception intact. Unable to evaluate gait, pronator drift.  Pain illicited on passive ROM, unable to evaluate ROM of LE d/t pain.   Skin: dry, warm     LABS:                        11.5   7.1   )-----------( 307      ( 2018 00:36 )             34.8     07-09    138  |  102  |  14  ----------------------------<  101<H>  4.3   |  24  |  0.61    Ca    9.4      2018 00:36    TPro  6.3  /  Alb  4.1  /  TBili  0.2  /  DBili  x   /  AST  20  /  ALT  18  /  AlkPhos  95  07-    PT/INR - ( 2018 00:36 )   PT: 11.1 sec;   INR: 1.00          PTT - ( 2018 00:36 )  PTT:28.9 sec  Urinalysis Basic - ( 2018 02:04 )    Color: Yellow / Appearance: Clear / S.010 / pH: x  Gluc: x / Ketone: NEGATIVE  / Bili: Negative / Urobili: 0.2 E.U./dL   Blood: x / Protein: NEGATIVE mg/dL / Nitrite: NEGATIVE   Leuk Esterase: NEGATIVE / RBC: x / WBC x   Sq Epi: x / Non Sq Epi: x / Bacteria: x      CULTURES:      RADIOLOGY & ADDITIONAL STUDIES:   MRI [facility] spine 2018: IMPRESSION: Small central herniated disc at L4/L5 causing slight indentation on the thecal sac.      Assessment:  This is a 43yo F PMH herniated cervical disc, anemia, cholecystectomy, abdominal hysterectomy, s/p gastric bypass with laparoscopic surgery for lysis of adhesions 2 weeks ago admitted for lower back pain x 3 days. Lumbar MRI shows small herniated disc at L4/L5.       Plan:  -neuro/spinal checks  -pain control  -agree with prednisone   -continue care per primary medicine team  -MRI to be reviewed and discussed with Dr. Barbosa.

## 2018-07-10 NOTE — PROGRESS NOTE ADULT - ASSESSMENT
43yo woman with PMH anemia, herniated cervical disc, cholecystectomy, RYGB (2009), intussusception of jejunojejunal anastomosis (2015), and subsequent resection of dilated jejunojejunal anastomosis (Feb 2017) with small bowel anastomosis, recently admitted in June for pelvis laproscopy with lysis of adhesions, who presented with acute onset low back pain leading to inability to ambulate. Currently stable but with little improvement in pain management, awaiting further workup.

## 2018-07-10 NOTE — PROGRESS NOTE ADULT - PROBLEM SELECTOR PLAN 2
Pt with h/o RYGB with subsequent complications, now constipated in setting of acute pain and opiates.    -senna  -miralax  -added colace tid

## 2018-07-10 NOTE — PROGRESS NOTE ADULT - SUBJECTIVE AND OBJECTIVE BOX
OVERNIGHT EVENTS: no acute events overnight    SUBJECTIVE / INTERVAL HPI: Patient seen and examined at bedside. The patient continues to have back pain with little relief from pain medication regimen. She notes continued pins and needles sensation in her R calf anteriorly but no saddle anesthesia. She denies chest pain, SOB, headache, f/c, n/v. She still has not had a bowel movement since coming to the hospital and starting on bowel regimen of senna and miralax.     VITAL SIGNS:  Vital Signs Last 24 Hrs  T(C): 37 (10 Jul 2018 08:27), Max: 37 (10 Jul 2018 08:27)  T(F): 98.6 (10 Jul 2018 08:27), Max: 98.6 (10 Jul 2018 08:27)  HR: 61 (10 Jul 2018 08:27) (61 - 72)  BP: 109/71 (10 Jul 2018 08:27) (105/66 - 117/64)  RR: 16 (10 Jul 2018 08:27) (16 - 18)  SpO2: 95% (10 Jul 2018 08:27) (94% - 98%)    PHYSICAL EXAM:    General: WDWN, sitting in bed comfortably when not moving but crying in pain when asked to move on exam  HEENT: NC/AT; PERRL, anicteric sclera; MMM  Cardiovascular: +S1/S2, RRR  Respiratory: CTA B/L; no W/R/R  Gastrointestinal: soft, NT/ND  Back: +spinal tenderness over L4/L5 and surrounding paraspinal tenderness. No tenderness in thoracic and cervical spine or paraspinal muscles.   Extremities: WWP; no edema, clubbing or cyanosis  Vascular: 2+ radial, DP/PT pulses B/L  Neurological: AAOx3. Strength 3/5 in lower extremities with patient in severe pain on movement. 5/5 grasp strength b/l, 4/5 in b/l deltoids. No deficits in sensation to light touch.     MEDICATIONS:  MEDICATIONS  (STANDING):  ferrous    sulfate 325 milliGRAM(s) Oral daily  gabapentin 100 milliGRAM(s) Oral three times a day  lidocaine   Patch 1 Patch Transdermal daily  polyethylene glycol 3350 17 Gram(s) Oral daily  senna 1 Tablet(s) Oral daily    MEDICATIONS  (PRN):  HYDROmorphone  Injectable 0.5 milliGRAM(s) IV Push every 3 hours PRN Severe Pain (7 - 10)  ondansetron Injectable 4 milliGRAM(s) IV Push every 6 hours PRN Nausea and/or Vomiting  oxyCODONE    5 mG/acetaminophen 325 mG 1 Tablet(s) Oral every 6 hours PRN Severe Pain (7 - 10)    LABS:                        11.5   7.1   )-----------( 307      ( 09 Jul 2018 00:36 )             34.8     07-09    138  |  102  |  14  ----------------------------<  101<H>  4.3   |  24  |  0.61    Ca    9.4      09 Jul 2018 00:36  TPro  6.3  /  Alb  4.1  /  TBili  0.2  /  DBili  x   /  AST  20  /  ALT  18  /  AlkPhos  95  07-09  PT/INR - ( 09 Jul 2018 00:36 )   PT: 11.1 sec;   INR: 1.00     PTT - ( 09 Jul 2018 00:36 )  PTT:28.9 sec    MR Lumbar Spine No Cont (07.09.18 @ 04:38)  IMPRESSION: Small central herniated disc at L4/L5 causing slight   indentation on the thecal sac.    CT Abdomen and Pelvis No Cont (07.09.18 @ 01:33)  IMPRESSION:  1.  No renal or ureteral calculus.  2.  Again status post Renetta-en-Y gastric bypass; no evidence of   obstruction. No acute intra-abdominal pathology. PGY1 OFF SERVICE NOTE    HPI/HOSPITAL COURSE: 42yoF with PMH anemia, herniated cervical disc, cholecystectomy, RYGB (2009), intussusception of jejunojejunal anastomosis (2015), and subsequent resection of dilated jejunojejunal anastomosis (Feb 2017) with small bowel anastomosis, recently admitted in June for pelvis laparoscopy with lysis of adhesions, who presents with sudden onset of lower back pain. Pt was walking to her car when she had sudden onset of midline lower back pain, sharp in quality, that radiated down legs with "pulsating" sensation R>L. Pt went home and took 2 Tylenol but this did not relieve pain. Pain escalated to 10/10 and she was not able to walk due to the pain, which prompted her son to bring her to the ED. The pain is 10/10 at rest in supine and with any movement. She reported urinary incontinence over the past 2 weeks associated with filled bladder and movement; no bowel incontinence. Denied saddle anesthesia, back trauma. Received local steroid injection to cervical spine in May 2018 without relief but no new symptoms currently; feels this back pain is different.     In the ED, VSS and the patient was given Dilaudid 1mg (2x), Morphine 4mg, lorazepam 0.5mg, and Zofran (4mg, 8mg). MRI       OVERNIGHT EVENTS: no acute events overnight    SUBJECTIVE / INTERVAL HPI: Patient seen and examined at bedside. The patient continues to have back pain with little relief from pain medication regimen. She notes continued pins and needles sensation in her R calf anteriorly but no saddle anesthesia. She denies chest pain, SOB, headache, f/c, n/v. She still has not had a bowel movement since coming to the hospital and starting on bowel regimen of senna and miralax.     VITAL SIGNS:  Vital Signs Last 24 Hrs  T(C): 37 (10 Jul 2018 08:27), Max: 37 (10 Jul 2018 08:27)  T(F): 98.6 (10 Jul 2018 08:27), Max: 98.6 (10 Jul 2018 08:27)  HR: 61 (10 Jul 2018 08:27) (61 - 72)  BP: 109/71 (10 Jul 2018 08:27) (105/66 - 117/64)  RR: 16 (10 Jul 2018 08:27) (16 - 18)  SpO2: 95% (10 Jul 2018 08:27) (94% - 98%)    PHYSICAL EXAM:    General: WDWN, sitting in bed comfortably when not moving but crying in pain when asked to move on exam  HEENT: NC/AT; PERRL, anicteric sclera; MMM  Cardiovascular: +S1/S2, RRR  Respiratory: CTA B/L; no W/R/R  Gastrointestinal: soft, NT/ND  Back: +spinal tenderness over L4/L5 and surrounding paraspinal tenderness. No tenderness in thoracic and cervical spine or paraspinal muscles.   Extremities: WWP; no edema, clubbing or cyanosis  Vascular: 2+ radial, DP/PT pulses B/L  Neurological: AAOx3. Strength 3/5 in lower extremities with patient in severe pain on movement. 5/5 grasp strength b/l, 4/5 in b/l deltoids. No deficits in sensation to light touch.     MEDICATIONS:  MEDICATIONS  (STANDING):  ferrous    sulfate 325 milliGRAM(s) Oral daily  gabapentin 100 milliGRAM(s) Oral three times a day  lidocaine   Patch 1 Patch Transdermal daily  polyethylene glycol 3350 17 Gram(s) Oral daily  senna 1 Tablet(s) Oral daily    MEDICATIONS  (PRN):  HYDROmorphone  Injectable 0.5 milliGRAM(s) IV Push every 3 hours PRN Severe Pain (7 - 10)  ondansetron Injectable 4 milliGRAM(s) IV Push every 6 hours PRN Nausea and/or Vomiting  oxyCODONE    5 mG/acetaminophen 325 mG 1 Tablet(s) Oral every 6 hours PRN Severe Pain (7 - 10)    LABS:                        11.5   7.1   )-----------( 307      ( 09 Jul 2018 00:36 )             34.8     07-09    138  |  102  |  14  ----------------------------<  101<H>  4.3   |  24  |  0.61    Ca    9.4      09 Jul 2018 00:36  TPro  6.3  /  Alb  4.1  /  TBili  0.2  /  DBili  x   /  AST  20  /  ALT  18  /  AlkPhos  95  07-09  PT/INR - ( 09 Jul 2018 00:36 )   PT: 11.1 sec;   INR: 1.00     PTT - ( 09 Jul 2018 00:36 )  PTT:28.9 sec    MR Lumbar Spine No Cont (07.09.18 @ 04:38)  IMPRESSION: Small central herniated disc at L4/L5 causing slight   indentation on the thecal sac.    CT Abdomen and Pelvis No Cont (07.09.18 @ 01:33)  IMPRESSION:  1.  No renal or ureteral calculus.  2.  Again status post Renetta-en-Y gastric bypass; no evidence of   obstruction. No acute intra-abdominal pathology. PGY1 OFF SERVICE NOTE    HPI/HOSPITAL COURSE: 42yoF with PMH anemia, herniated cervical disc, cholecystectomy, RYGB (2009), intussusception of jejunojejunal anastomosis (2015), and subsequent resection of dilated jejunojejunal anastomosis (Feb 2017) with small bowel anastomosis, recently admitted in June for pelvis laparoscopy with lysis of adhesions, who presents with sudden onset of lower back pain. Pt was walking to her car when she had sudden onset of midline lower back pain, sharp in quality, that radiated down legs with "pulsating" sensation R>L. Pt went home and took 2 Tylenol but this did not relieve pain. Pain escalated to 10/10 and she was not able to walk due to the pain, which prompted her son to bring her to the ED. The pain is 10/10 at rest in supine and with any movement. She reported urinary incontinence over the past 2 weeks associated with filled bladder and movement; no bowel incontinence. Denied saddle anesthesia, back trauma. Received local steroid injection to cervical spine in May 2018 without relief but no new symptoms currently; feels this back pain is different.     In the ED, VSS and the patient was given Dilaudid 1mg (2x), Morphine 4mg, lorazepam 0.5mg, and Zofran (4mg, 8mg). CT A/P showed no acute intra-abdominal pathology and no renal or uretal calculus. MRI showed small central herniated disc at L4/L5 causing slight indentation of the thecal sac. NSGY evaluated patient and recommended no surgery needed. The patient was given a pain regimen of a lidocaine patch, gabapentin 100 tid, dilaudid 0.5 mg q3h IV PRN, and percocet 5/325 q6h PO PRN with little improvement overnight on HD #1. Neurology was consulted and felt pain was most likely due to acute radiculopathy, and she was started on a 1 week course of prednisone 50 mg PO daily. She currently continues to have pain with movement, with the plan for pain management and exercise as tolerated to lead to improvement in back pain and functional status.     OVERNIGHT EVENTS: no acute events overnight    SUBJECTIVE / INTERVAL HPI: Patient seen and examined at bedside. The patient continues to have back pain with little relief from pain medication regimen. She notes continued pins and needles sensation in her R calf anteriorly but no saddle anesthesia. She denies chest pain, SOB, headache, f/c, n/v. She still has not had a bowel movement since coming to the hospital and starting on bowel regimen of senna and miralax.     VITAL SIGNS:  Vital Signs Last 24 Hrs  T(C): 37 (10 Jul 2018 08:27), Max: 37 (10 Jul 2018 08:27)  T(F): 98.6 (10 Jul 2018 08:27), Max: 98.6 (10 Jul 2018 08:27)  HR: 61 (10 Jul 2018 08:27) (61 - 72)  BP: 109/71 (10 Jul 2018 08:27) (105/66 - 117/64)  RR: 16 (10 Jul 2018 08:27) (16 - 18)  SpO2: 95% (10 Jul 2018 08:27) (94% - 98%)    PHYSICAL EXAM:    General: WDWN, sitting in bed comfortably when not moving but crying in pain when asked to move on exam  HEENT: NC/AT; PERRL, anicteric sclera; MMM  Cardiovascular: +S1/S2, RRR  Respiratory: CTA B/L; no W/R/R  Gastrointestinal: soft, NT/ND  Back: +spinal tenderness over L4/L5 and surrounding paraspinal tenderness. No tenderness in thoracic and cervical spine or paraspinal muscles.   Extremities: WWP; no edema, clubbing or cyanosis  Vascular: 2+ radial, DP/PT pulses B/L  Neurological: AAOx3. Strength 3/5 in lower extremities with patient in severe pain on movement. 5/5 grasp strength b/l, 4/5 in b/l deltoids. No deficits in sensation to light touch.     MEDICATIONS:  MEDICATIONS  (STANDING):  ferrous    sulfate 325 milliGRAM(s) Oral daily  gabapentin 100 milliGRAM(s) Oral three times a day  lidocaine   Patch 1 Patch Transdermal daily  polyethylene glycol 3350 17 Gram(s) Oral daily  senna 1 Tablet(s) Oral daily    MEDICATIONS  (PRN):  HYDROmorphone  Injectable 0.5 milliGRAM(s) IV Push every 3 hours PRN Severe Pain (7 - 10)  ondansetron Injectable 4 milliGRAM(s) IV Push every 6 hours PRN Nausea and/or Vomiting  oxyCODONE    5 mG/acetaminophen 325 mG 1 Tablet(s) Oral every 6 hours PRN Severe Pain (7 - 10)    LABS:                        11.5   7.1   )-----------( 307      ( 09 Jul 2018 00:36 )             34.8     07-09    138  |  102  |  14  ----------------------------<  101<H>  4.3   |  24  |  0.61    Ca    9.4      09 Jul 2018 00:36  TPro  6.3  /  Alb  4.1  /  TBili  0.2  /  DBili  x   /  AST  20  /  ALT  18  /  AlkPhos  95  07-09  PT/INR - ( 09 Jul 2018 00:36 )   PT: 11.1 sec;   INR: 1.00     PTT - ( 09 Jul 2018 00:36 )  PTT:28.9 sec    MR Lumbar Spine No Cont (07.09.18 @ 04:38)  IMPRESSION: Small central herniated disc at L4/L5 causing slight   indentation on the thecal sac.    CT Abdomen and Pelvis No Cont (07.09.18 @ 01:33)  IMPRESSION:  1.  No renal or ureteral calculus.  2.  Again status post Renetta-en-Y gastric bypass; no evidence of   obstruction. No acute intra-abdominal pathology.

## 2018-07-10 NOTE — CONSULT NOTE ADULT - ASSESSMENT
41yo woman with PMH anemia, herniated cervical disc, cholecystectomy, RYGB (2009), intussusception of jejunojejunal anastomosis (2015), and subsequent resection of dilated jejunojejunal anastomosis (Feb 2017) with small bowel anastomosis, recently admitted in June for pelvis laproscopy with lysis of adhesions, who is admitted with lower back pain. 43yo woman with PMH anemia, herniated cervical disc, cholecystectomy, RYGB (2009), intussusception of jejunojejunal anastomosis (2015), and subsequent resection of dilated jejunojejunal anastomosis (Feb 2017) with small bowel anastomosis, recently admitted in June for pelvis laproscopy with lysis of adhesions, admitted with lower back pain.    #Acute lumbar radiculopathy   -L4, L5 herniated disc seen on MR lumbar spine  -MRI negative for cauda epuina/conus medullaris syndrome, neoplasm or abscess  -recommends prednisone 50mg PO x 7 days  -adequate pain control per primary team  -f/u neurosurgery recs 43yo woman with PMH anemia, herniated cervical disc, cholecystectomy, RYGB (2009), intussusception of jejunojejunal anastomosis (2015), and subsequent resection of dilated jejunojejunal anastomosis (Feb 2017) with small bowel anastomosis, recently admitted in June for pelvis laproscopy with lysis of adhesions, admitted with lower back pain.    #Acute lumbar radiculopathy   -L4, L5 herniated disc seen on MR lumbar spine  -MRI negative for cauda epuina/conus medullaris syndrome, neoplasm or abscess  -recommends prednisone 50mg PO x 7 days  -consider gabapentin 200TID and flexeril 5bid for pain control   -f/u neurosurgery recs 43yo woman with PMH anemia, herniated cervical disc, cholecystectomy, RYGB (2009), intussusception of jejunojejunal anastomosis (2015), and subsequent resection of dilated jejunojejunal anastomosis (Feb 2017) with small bowel anastomosis, recently admitted in June for pelvis laproscopy with lysis of adhesions, admitted with lower back pain.    #Acute herniated disc at L4/5 seen on MR lumbar spine with b/l lateral recess stenosis / mild L5 nerve root impingement  -MRI negative for cauda epuina/conus medullaris syndrome, neoplasm or abscess  -recommend prednisone 50mg PO x 7 days  -increase gabapentin to 200TID and add flexeril 5bid; increase both as needed / tolerated   -f/u neurosurgery recs

## 2018-07-11 LAB
ANION GAP SERPL CALC-SCNC: 14 MMOL/L — SIGNIFICANT CHANGE UP (ref 5–17)
BUN SERPL-MCNC: 11 MG/DL — SIGNIFICANT CHANGE UP (ref 7–23)
CALCIUM SERPL-MCNC: 9.4 MG/DL — SIGNIFICANT CHANGE UP (ref 8.4–10.5)
CHLORIDE SERPL-SCNC: 96 MMOL/L — SIGNIFICANT CHANGE UP (ref 96–108)
CO2 SERPL-SCNC: 25 MMOL/L — SIGNIFICANT CHANGE UP (ref 22–31)
CREAT SERPL-MCNC: 0.53 MG/DL — SIGNIFICANT CHANGE UP (ref 0.5–1.3)
GLUCOSE SERPL-MCNC: 109 MG/DL — HIGH (ref 70–99)
HCT VFR BLD CALC: 37.2 % — SIGNIFICANT CHANGE UP (ref 34.5–45)
HGB BLD-MCNC: 11.9 G/DL — SIGNIFICANT CHANGE UP (ref 11.5–15.5)
MAGNESIUM SERPL-MCNC: 1.9 MG/DL — SIGNIFICANT CHANGE UP (ref 1.6–2.6)
MCHC RBC-ENTMCNC: 27.9 PG — SIGNIFICANT CHANGE UP (ref 27–34)
MCHC RBC-ENTMCNC: 32 G/DL — SIGNIFICANT CHANGE UP (ref 32–36)
MCV RBC AUTO: 87.3 FL — SIGNIFICANT CHANGE UP (ref 80–100)
PHOSPHATE SERPL-MCNC: 4.4 MG/DL — SIGNIFICANT CHANGE UP (ref 2.5–4.5)
PLATELET # BLD AUTO: 305 K/UL — SIGNIFICANT CHANGE UP (ref 150–400)
POTASSIUM SERPL-MCNC: 4.2 MMOL/L — SIGNIFICANT CHANGE UP (ref 3.5–5.3)
POTASSIUM SERPL-SCNC: 4.2 MMOL/L — SIGNIFICANT CHANGE UP (ref 3.5–5.3)
RBC # BLD: 4.26 M/UL — SIGNIFICANT CHANGE UP (ref 3.8–5.2)
RBC # FLD: 12.9 % — SIGNIFICANT CHANGE UP (ref 10.3–16.9)
SODIUM SERPL-SCNC: 135 MMOL/L — SIGNIFICANT CHANGE UP (ref 135–145)
WBC # BLD: 9.6 K/UL — SIGNIFICANT CHANGE UP (ref 3.8–10.5)
WBC # FLD AUTO: 9.6 K/UL — SIGNIFICANT CHANGE UP (ref 3.8–10.5)

## 2018-07-11 PROCEDURE — 99233 SBSQ HOSP IP/OBS HIGH 50: CPT | Mod: GC

## 2018-07-11 PROCEDURE — 99232 SBSQ HOSP IP/OBS MODERATE 35: CPT

## 2018-07-11 RX ORDER — OXYCODONE HYDROCHLORIDE 5 MG/1
10 TABLET ORAL EVERY 12 HOURS
Qty: 0 | Refills: 0 | Status: DISCONTINUED | OUTPATIENT
Start: 2018-07-11 | End: 2018-07-15

## 2018-07-11 RX ORDER — CYCLOBENZAPRINE HYDROCHLORIDE 10 MG/1
5 TABLET, FILM COATED ORAL THREE TIMES A DAY
Qty: 0 | Refills: 0 | Status: DISCONTINUED | OUTPATIENT
Start: 2018-07-11 | End: 2018-07-12

## 2018-07-11 RX ORDER — OXYCODONE HYDROCHLORIDE 5 MG/1
10 TABLET ORAL EVERY 12 HOURS
Qty: 0 | Refills: 0 | Status: DISCONTINUED | OUTPATIENT
Start: 2018-07-11 | End: 2018-07-11

## 2018-07-11 RX ADMIN — HYDROMORPHONE HYDROCHLORIDE 0.5 MILLIGRAM(S): 2 INJECTION INTRAMUSCULAR; INTRAVENOUS; SUBCUTANEOUS at 15:50

## 2018-07-11 RX ADMIN — SENNA PLUS 1 TABLET(S): 8.6 TABLET ORAL at 11:52

## 2018-07-11 RX ADMIN — LIDOCAINE 1 PATCH: 4 CREAM TOPICAL at 11:51

## 2018-07-11 RX ADMIN — Medication 100 MILLIGRAM(S): at 13:22

## 2018-07-11 RX ADMIN — HYDROMORPHONE HYDROCHLORIDE 0.5 MILLIGRAM(S): 2 INJECTION INTRAMUSCULAR; INTRAVENOUS; SUBCUTANEOUS at 12:18

## 2018-07-11 RX ADMIN — GABAPENTIN 200 MILLIGRAM(S): 400 CAPSULE ORAL at 05:29

## 2018-07-11 RX ADMIN — OXYCODONE HYDROCHLORIDE 10 MILLIGRAM(S): 5 TABLET ORAL at 17:40

## 2018-07-11 RX ADMIN — LIDOCAINE 1 PATCH: 4 CREAM TOPICAL at 00:21

## 2018-07-11 RX ADMIN — CYCLOBENZAPRINE HYDROCHLORIDE 5 MILLIGRAM(S): 10 TABLET, FILM COATED ORAL at 21:08

## 2018-07-11 RX ADMIN — OXYCODONE HYDROCHLORIDE 10 MILLIGRAM(S): 5 TABLET ORAL at 18:40

## 2018-07-11 RX ADMIN — OXYCODONE AND ACETAMINOPHEN 2 TABLET(S): 5; 325 TABLET ORAL at 10:05

## 2018-07-11 RX ADMIN — Medication 325 MILLIGRAM(S): at 11:51

## 2018-07-11 RX ADMIN — HYDROMORPHONE HYDROCHLORIDE 0.5 MILLIGRAM(S): 2 INJECTION INTRAMUSCULAR; INTRAVENOUS; SUBCUTANEOUS at 06:56

## 2018-07-11 RX ADMIN — OXYCODONE AND ACETAMINOPHEN 2 TABLET(S): 5; 325 TABLET ORAL at 00:19

## 2018-07-11 RX ADMIN — CYCLOBENZAPRINE HYDROCHLORIDE 5 MILLIGRAM(S): 10 TABLET, FILM COATED ORAL at 05:29

## 2018-07-11 RX ADMIN — HYDROMORPHONE HYDROCHLORIDE 0.5 MILLIGRAM(S): 2 INJECTION INTRAMUSCULAR; INTRAVENOUS; SUBCUTANEOUS at 15:33

## 2018-07-11 RX ADMIN — POLYETHYLENE GLYCOL 3350 17 GRAM(S): 17 POWDER, FOR SOLUTION ORAL at 11:52

## 2018-07-11 RX ADMIN — OXYCODONE AND ACETAMINOPHEN 2 TABLET(S): 5; 325 TABLET ORAL at 01:19

## 2018-07-11 RX ADMIN — HYDROMORPHONE HYDROCHLORIDE 0.5 MILLIGRAM(S): 2 INJECTION INTRAMUSCULAR; INTRAVENOUS; SUBCUTANEOUS at 04:26

## 2018-07-11 RX ADMIN — Medication 100 MILLIGRAM(S): at 21:08

## 2018-07-11 RX ADMIN — OXYCODONE AND ACETAMINOPHEN 2 TABLET(S): 5; 325 TABLET ORAL at 09:50

## 2018-07-11 RX ADMIN — GABAPENTIN 200 MILLIGRAM(S): 400 CAPSULE ORAL at 21:09

## 2018-07-11 RX ADMIN — GABAPENTIN 200 MILLIGRAM(S): 400 CAPSULE ORAL at 13:22

## 2018-07-11 RX ADMIN — HYDROMORPHONE HYDROCHLORIDE 0.5 MILLIGRAM(S): 2 INJECTION INTRAMUSCULAR; INTRAVENOUS; SUBCUTANEOUS at 12:35

## 2018-07-11 RX ADMIN — LIDOCAINE 1 PATCH: 4 CREAM TOPICAL at 23:20

## 2018-07-11 RX ADMIN — OXYCODONE AND ACETAMINOPHEN 2 TABLET(S): 5; 325 TABLET ORAL at 21:40

## 2018-07-11 RX ADMIN — Medication 100 MILLIGRAM(S): at 05:29

## 2018-07-11 RX ADMIN — Medication 50 MILLIGRAM(S): at 15:34

## 2018-07-11 RX ADMIN — OXYCODONE AND ACETAMINOPHEN 2 TABLET(S): 5; 325 TABLET ORAL at 21:08

## 2018-07-11 NOTE — PROGRESS NOTE ADULT - ASSESSMENT
43yo woman with PMH anemia, herniated cervical disc, cholecystectomy, RYGB (2009), intussusception of jejunojejunal anastomosis (2015), and subsequent resection of dilated jejunojejunal anastomosis (Feb 2017) with small bowel anastomosis, recently admitted in June for pelvis laproscopy with lysis of adhesions, admitted with lower back pain.    #Acute herniated disc at L4/5 seen on MR lumbar spine with b/l lateral recess stenosis / mild L5 nerve root impingement  -MRI negative for cauda epuina/conus medullaris syndrome, neoplasm or abscess  -recommend prednisone 50mg PO x 7 days  -increase gabapentin to 200TID and add flexeril 5bid; increase both as needed / tolerated   -f/u neurosurgery recs 43yo woman with PMH anemia, herniated cervical disc, cholecystectomy, RYGB (2009), intussusception of jejunojejunal anastomosis (2015), and subsequent resection of dilated jejunojejunal anastomosis (Feb 2017) with small bowel anastomosis, recently admitted in June for pelvis laproscopy with lysis of adhesions, admitted with lower back pain.    #Acute herniated disc at L4/5 seen on MR lumbar spine with b/l lateral recess stenosis / mild L5 nerve root impingement  -MRI negative for cauda epuina/conus medullaris syndrome, neoplasm or abscess  -recommend prednisone 50mg PO x 7 days (7/10 - 7/16)  -c/w gabapentin to 200TID and add flexeril 5bid; increase both as needed/ tolerated   -f/u neurosurgery recs 43yo woman with PMH anemia, herniated cervical disc, cholecystectomy, RYGB (2009), intussusception of jejunojejunal anastomosis (2015), and subsequent resection of dilated jejunojejunal anastomosis (Feb 2017) with small bowel anastomosis, recently admitted in June for pelvis laproscopy with lysis of adhesions, admitted with lower back pain.    #Acute herniated disc at L4/5 seen on MR lumbar spine with b/l lateral recess stenosis / mild L5 nerve root impingement  -MRI negative for cauda epuina/conus medullaris syndrome, neoplasm or abscess  -recommend prednisone 50mg PO x 7 days (7/10 - 7/16)  -c/w gabapentin to 200TID   -increase flexeril 5 to tid; increase both as needed/ tolerated   -f/u neurosurgery recs 41yo woman with PMH anemia, herniated cervical disc, cholecystectomy, RYGB (2009), intussusception of jejunojejunal anastomosis (2015), and subsequent resection of dilated jejunojejunal anastomosis (Feb 2017) with small bowel anastomosis, recently admitted in June for pelvis laproscopy with lysis of adhesions, admitted with lower back pain.    #Acute herniated disc at L4/5 seen on MR lumbar spine with b/l lateral recess stenosis / mild L5 nerve root impingement  -MRI negative for cauda epuina/conus medullaris syndrome, neoplasm or abscess  -recommend prednisone 50mg PO x 7 days (7/10 - 7/16)  -c/w gabapentin to 200TID   -increase flexeril to 5 tid; increase both as needed/ tolerated   -f/u neurosurgery recs

## 2018-07-11 NOTE — PROGRESS NOTE ADULT - PROBLEM SELECTOR PLAN 1
Acute back pain ddx broad inclusive of MSK strain vs spinal cord impingement/abscess or fracture, vs. gyn/pelvic inflammation/infxn, vs. bowel inflammation/constipation. Vital signs, labs and urine reassuring that there is no infectious process. Imaging identifies a herniated disk at L4/5 which is likely resulting in radiculopathy to LE's, consistent with exam. Per neurosx, no surgical intervention warranted at this time. However, more improvement in symptoms with pain management regimen would be expected for purely musculoskeletal physiology - pain relief not achieved with opiates. Pain service consulted to help manage pain long term. Of note, pt constipated but no abd distention or impaction noted on imaging, may be exacerbating symptoms. The patient also weak bilaterally in lower extremities, possibly due to pain vs. true neurologic deficit.   -lidocaine patch to lower back  -gabapentin 100 mg TID   -dilaudid 0.5 mg q3h IV PRN  -Percocet 5mg/325mg q6hr PRN for sev pain  - Pain consulted, appreciate recs  - PT   - neurology consult  - NSGY consult  - will consider thoracic and cervical spine imaging -- has mild weakness in upper and lower extremities, again perhaps due to low back pain Acute back pain ddx broad inclusive of MSK strain vs spinal cord impingement/abscess or fracture, vs. gyn/pelvic inflammation/infxn, vs. bowel inflammation/constipation. Vital signs, labs and urine reassuring that there is no infectious process. Imaging identifies a herniated disk at L4/5 which is likely resulting in radiculopathy to LE's, consistent with exam. Per neurosx, no surgical intervention warranted at this time. However, more improvement in symptoms with pain management regimen would be expected for purely musculoskeletal physiology - pain relief not achieved with opiates. Pain service consulted to help manage pain long term. Of note, pt constipated but no abd distention or impaction noted on imaging, may be exacerbating symptoms. The patient also weak bilaterally in lower extremities, possibly due to pain vs. true neurologic deficit.   -lidocaine patch to lower back  -gabapentin 100 mg TID   -dilaudid 0.5 mg q3h IV PRN  -Percocet 5mg/325mg q6hr PRN for sev pain  - Pain consulted, appreciate recs  - f/u with pain management today regarding possibility of adding a long-term pain medication  - PT when pt able to participate  - neurology consult  - NSGY consult  - no cervical or thoracic imaging at this time

## 2018-07-11 NOTE — PROGRESS NOTE ADULT - ASSESSMENT
43yo woman with PMH anemia, herniated cervical disc, cholecystectomy, RYGB (2009), intussusception of jejunojejunal anastomosis (2015), and subsequent resection of dilated jejunojejunal anastomosis (Feb 2017) with small bowel anastomosis, recently admitted in June for pelvis laproscopy with lysis of adhesions, who presented with acute onset low back pain leading to inability to ambulate. Currently stable but with little improvement in pain management, awaiting further workup. 41yo woman with PMH anemia, herniated cervical disc, cholecystectomy, RYGB (2009), intussusception of jejunojejunal anastomosis (2015), and subsequent resection of dilated jejunojejunal anastomosis (Feb 2017) with small bowel anastomosis, recently admitted in June for pelvis laproscopy with lysis of adhesions, who presented with acute onset low back pain leading to inability to ambulate. Currently stable but still with severe pain (slightly improved), MRI r/o cauda equina, will not get cervical or thoracic MRI yet because exam does not this pathology. Will continue to monitor pain control and adjust medications appropriately to improve pain relief.

## 2018-07-11 NOTE — PROGRESS NOTE ADULT - SUBJECTIVE AND OBJECTIVE BOX
Pain Management Progress Note - [facility] [phone]    HPI: Patient seen at 03:00pm. Patient laying down in bed, in no apparent distress. Patient continues to complain of lower back and right lower extremity pain.        Pertinent PMH:   ACUTE MIDLINE BACK PAIN  No pertinent family history in first degree relatives  Handoff  MEWS Score  UTI (urinary tract infection)  Herniated cervical disc  Anemia  UTI (urinary tract infection)  Acute midline back pain, unspecified back location  Transition of care performed with sharing of clinical summary  Need for prophylactic measure  Nutrition, metabolism, and development symptoms  Herniated cervical disc  Constipation, unspecified constipation type  Acute midline back pain, unspecified back location  History of cholecystectomy  H/O abdominal hysterectomy  Status post bariatric surgery  LOWER BACK PAIN        Pain at: _x__Back ___Neck___Knee ___Hip ___Shoulder ___ Opioid tolerance    Pain is ___ sharp __x__dull ___burning _x__achy ___ Intensity: ____ mild __x__mod __x__severe     Location _____surgical site _____cervical _____lumbar ____abd _____upper ext_x___R lower ext    Worse with x____activity _x___movement _____physical therapy___ Rest    Improved with _x___medication __x__rest ____physical therapy    morphine  - Injectable  HYDROmorphone  Injectable  ondansetron Injectable  HYDROmorphone  Injectable  ketorolac   Injectable  LORazepam     Tablet  ondansetron Injectable  lidocaine   Patch  gabapentin  ferrous sulfate Oral Tab/Cap - Peds  cyanocobalamin  ferrous    sulfate  senna  polyethylene glycol 3350  oxyCODONE    5 mG/acetaminophen 325 mG  ondansetron    Tablet  cyclobenzaprine  HYDROmorphone  Injectable  lidocaine   Patch  HYDROmorphone  Injectable  oxyCODONE    5 mG/acetaminophen 325 mG  ondansetron Injectable  docusate sodium  predniSONE   Tablet  predniSONE   Tablet  gabapentin  cyclobenzaprine  cyclobenzaprine  oxyCODONE    5 mG/acetaminophen 325 mG  oxyCODONE    5 mG/acetaminophen 325 mG  cyclobenzaprine      ROS: Const:  _-__febrile   Eyes:___ENT:___CV: _-__chest pain  Resp: __-__sob  GI:_-__nausea -___vomiting ____abd pain ___npo ___clears _x__full diet __bm  :___ Musk: __x_pain _x__spasm  Skin:___ Neuro:  _-__pfjmlwfb__-_ipifdwvrx__-__ numbness _x__weakness _x__paresthesia  Psych:_-__anxiety  Endo:___ Heme:___Allergy:___   ___x_ All other systems reviewed and negative    07-11 @ 05:14772 mL/min/1.73M2    Hemoglobin: 11.9 g/dL (07-11 @ 05:55)        T(C): 36.9 (07-11-18 @ 09:04), Max: 36.9 (07-10-18 @ 20:27)  HR: 64 (07-11-18 @ 09:04) (62 - 69)  BP: 100/57 (07-11-18 @ 09:04) (100/57 - 120/75)  RR: 17 (07-11-18 @ 09:04) (16 - 18)  SpO2: 95% (07-11-18 @ 09:04) (95% - 95%)  Wt(kg): --     PHYSICAL EXAM:  Gen Appearance: _x__no acute distress _x__appropriate         Neuro: _x__SILT feet____ EOM Intact Psych: AAOX__3, _x__mood/affect appropriate        Eyes: _x__conjunctiva WNL  _____ Pupils equal and round        ENT: _x__ears and nose atraumatic___ Hearing grossly intact        Neck: _x__trachea midline, no visible masses ___thyroid without palpable mass    Resp: _x__Nml WOB____No tactile fremitus ___clear to auscultation    Cardio: x___extremities free from edema _x___pedal pulses palpable    GI/Abdomen: _x__soft __x___ Nontender___x___Nondistended_____HSM    Lymphatic: ___no palpable nodes in neck  __x_no palpable nodes calves and feet    Skin/Wound: ___Incision, ___Dressing c/d/i,   ____surrounding tissues soft,  ___drain/chest tube present____    Muscular: EHL _5__/5  Gastrocnemius__5_/5    ___absent clubbing/cyanosis         ASSESSMENT:  This is a 42y old Female with a history of lower back pain presented for acute midline back pain and R lower extremity pain, pain not well controlled with current pain medication regimen.        Recommended Treatment PLAN:  1. Percocet 2 tablets Q6h PO prn for moderate pain  2. Oxycontin 10mg PO BID  3. Lidocaine patch to affected area 12 hours on , 12 hours off  4. Dilaudid 0.5mg Q3H IVP prn severe pain  Plan discussed with Dr. Joao Hwang

## 2018-07-11 NOTE — PROGRESS NOTE ADULT - PROBLEM SELECTOR PLAN 6
d/c home vs. [facility]F, pending response to pain medication    FULL CODE d/c home, pending response to pain medication    FULL CODE

## 2018-07-11 NOTE — PROGRESS NOTE ADULT - PROBLEM SELECTOR PLAN 3
Pt with chronic stable pain; no new symptoms. Followed in o/p.   -O/P f/u Pt with chronic stable pain; some neck discomfort, but currently denying pain. Followed in o/p.   -O/P f/u

## 2018-07-11 NOTE — PROGRESS NOTE ADULT - SUBJECTIVE AND OBJECTIVE BOX
INTERVAL HPI/OVERNIGHT EVENTS:    SUBJECTIVE: Patient seen and examined at bedside.    OBJECTIVE:    VITAL SIGNS:  ICU Vital Signs Last 24 Hrs  T(C): 36.7 (11 Jul 2018 05:27), Max: 37 (10 Jul 2018 08:27)  T(F): 98.1 (11 Jul 2018 05:27), Max: 98.6 (10 Jul 2018 08:27)  HR: 62 (11 Jul 2018 05:27) (61 - 69)  BP: 107/57 (11 Jul 2018 05:27) (107/57 - 120/75)  BP(mean): --  ABP: --  ABP(mean): --  RR: 16 (11 Jul 2018 05:27) (16 - 18)  SpO2: 95% (11 Jul 2018 05:27) (95% - 95%)        CAPILLARY BLOOD GLUCOSE          PHYSICAL EXAM:    General: NAD  HEENT: NC/AT; PERRL, clear conjunctiva  Neck: supple  Respiratory: lungs CTA b/l, no wheezes or rhonchi  Cardiovascular: +S1/S2; RRR, no murmurs, rubs, or gallops  Abdomen: soft, non-tender, non-distended; +BS in all 4 quadrants  Extremities: WWP, 2+ peripheral pulses b/l; no LE edema  Skin: normal color and turgor; no rash  Neurological: AOx3, no focal deficits noted    MEDICATIONS:  MEDICATIONS  (STANDING):  cyclobenzaprine 5 milliGRAM(s) Oral two times a day  docusate sodium 100 milliGRAM(s) Oral three times a day  ferrous    sulfate 325 milliGRAM(s) Oral daily  gabapentin 200 milliGRAM(s) Oral three times a day  lidocaine   Patch 1 Patch Transdermal daily  polyethylene glycol 3350 17 Gram(s) Oral daily  predniSONE   Tablet 50 milliGRAM(s) Oral every 24 hours  senna 1 Tablet(s) Oral daily    MEDICATIONS  (PRN):  HYDROmorphone  Injectable 0.5 milliGRAM(s) IV Push every 3 hours PRN Severe Pain (7 - 10)  ondansetron Injectable 4 milliGRAM(s) IV Push every 6 hours PRN Nausea and/or Vomiting  oxyCODONE    5 mG/acetaminophen 325 mG 2 Tablet(s) Oral every 6 hours PRN Moderate Pain (4 - 6)      ALLERGIES:  Allergies    No Known Allergies    Intolerances        LABS:                        11.9   9.6   )-----------( 305      ( 11 Jul 2018 05:55 )             37.2     07-11    135  |  96  |  11  ----------------------------<  109<H>  4.2   |  25  |  0.53    Ca    9.4      11 Jul 2018 05:55  Phos  4.4     07-11  Mg     1.9     07-11            RADIOLOGY & ADDITIONAL TESTS: INTERVAL HPI/OVERNIGHT EVENTS: Overnight, there were no acute events for this patient.     SUBJECTIVE: Patient seen and examined at bedside. Pt continues to have immense pain in her back and legs. She says that none of the medications that she is receiving are helping her. She endorses having headache and shortness of breath when her pain is exacerbated but otherwise denies chest pain, abdominal pain, and N/V/D.     OBJECTIVE:    VITAL SIGNS:  ICU Vital Signs Last 24 Hrs  T(C): 36.7 (11 Jul 2018 05:27), Max: 37 (10 Jul 2018 08:27)  T(F): 98.1 (11 Jul 2018 05:27), Max: 98.6 (10 Jul 2018 08:27)  HR: 62 (11 Jul 2018 05:27) (61 - 69)  BP: 107/57 (11 Jul 2018 05:27) (107/57 - 120/75)  BP(mean): --  ABP: --  ABP(mean): --  RR: 16 (11 Jul 2018 05:27) (16 - 18)  SpO2: 95% (11 Jul 2018 05:27) (95% - 95%)    PHYSICAL EXAM:    General: NAD sitting still, on any sort of movement, patient winces in extreme pain.  HEENT: NC/AT, PERRL, clear conjunctiva, MMM  Neck: supple, no JVD  Respiratory: lungs CTA b/l, no wheezes, rales, or rhonchi  Cardiovascular: +S1/S2; RRR, no murmurs, rubs, or gallops  Abdomen: soft, non-tender, non-distended; +BS in all 4 quadrants  Back: spinal and paraspinal tenderness on lumbar palpation,   Extremities: WWP, 2+ peripheral pulses b/l; no LE edema  Neurological: AOx3, on strength exam, pt is not willing to attempt LE movement 2/2 pain, UE strength 5/5, no deficits in sensation.     MEDICATIONS:  MEDICATIONS  (STANDING):  cyclobenzaprine 5 milliGRAM(s) Oral two times a day  docusate sodium 100 milliGRAM(s) Oral three times a day  ferrous    sulfate 325 milliGRAM(s) Oral daily  gabapentin 200 milliGRAM(s) Oral three times a day  lidocaine   Patch 1 Patch Transdermal daily  polyethylene glycol 3350 17 Gram(s) Oral daily  predniSONE   Tablet 50 milliGRAM(s) Oral every 24 hours  senna 1 Tablet(s) Oral daily    MEDICATIONS  (PRN):  HYDROmorphone  Injectable 0.5 milliGRAM(s) IV Push every 3 hours PRN Severe Pain (7 - 10)  ondansetron Injectable 4 milliGRAM(s) IV Push every 6 hours PRN Nausea and/or Vomiting  oxyCODONE    5 mG/acetaminophen 325 mG 2 Tablet(s) Oral every 6 hours PRN Moderate Pain (4 - 6)      ALLERGIES:  Allergies    No Known Allergies    Intolerances        LABS:                        11.9   9.6   )-----------( 305      ( 11 Jul 2018 05:55 )             37.2     07-11    135  |  96  |  11  ----------------------------<  109<H>  4.2   |  25  |  0.53    Ca    9.4      11 Jul 2018 05:55  Phos  4.4     07-11  Mg     1.9     07-11            RADIOLOGY & ADDITIONAL TESTS: INTERVAL HPI/OVERNIGHT EVENTS: Overnight, there were no acute events for this patient.     SUBJECTIVE: Patient seen and examined at bedside. Pt continues to have immense pain in her back and legs. She says that none of the medications that she is receiving are helping her. She endorses having headache and shortness of breath when her pain is exacerbated but otherwise denies chest pain, abdominal pain, and N/V/D.     OBJECTIVE:    VITAL SIGNS:  ICU Vital Signs Last 24 Hrs  T(C): 36.7 (11 Jul 2018 05:27), Max: 37 (10 Jul 2018 08:27)  T(F): 98.1 (11 Jul 2018 05:27), Max: 98.6 (10 Jul 2018 08:27)  HR: 62 (11 Jul 2018 05:27) (61 - 69)  BP: 107/57 (11 Jul 2018 05:27) (107/57 - 120/75)  BP(mean): --  ABP: --  ABP(mean): --  RR: 16 (11 Jul 2018 05:27) (16 - 18)  SpO2: 95% (11 Jul 2018 05:27) (95% - 95%)    PHYSICAL EXAM:    General: NAD sitting still, on any sort of movement, patient winces in extreme pain.  HEENT: NC/AT, PERRL, clear conjunctiva, MMM  Neck: supple, no JVD  Respiratory: lungs CTA b/l, no wheezes, rales, or rhonchi  Cardiovascular: +S1/S2; RRR, no murmurs, rubs, or gallops  Abdomen: soft, non-tender, non-distended; +BS in all 4 quadrants  Back: paraspinal tenderness on lumbar palpation, no wounds noted  Extremities: WWP, 2+ peripheral pulses b/l; no LE edema  Neurological: AOx3, on strength exam, pt is not willing to attempt LE movement 2/2 pain, UE strength 5/5, no deficits in sensation.     MEDICATIONS:  MEDICATIONS  (STANDING):  cyclobenzaprine 5 milliGRAM(s) Oral two times a day  docusate sodium 100 milliGRAM(s) Oral three times a day  ferrous    sulfate 325 milliGRAM(s) Oral daily  gabapentin 200 milliGRAM(s) Oral three times a day  lidocaine   Patch 1 Patch Transdermal daily  polyethylene glycol 3350 17 Gram(s) Oral daily  predniSONE   Tablet 50 milliGRAM(s) Oral every 24 hours  senna 1 Tablet(s) Oral daily    MEDICATIONS  (PRN):  HYDROmorphone  Injectable 0.5 milliGRAM(s) IV Push every 3 hours PRN Severe Pain (7 - 10)  ondansetron Injectable 4 milliGRAM(s) IV Push every 6 hours PRN Nausea and/or Vomiting  oxyCODONE    5 mG/acetaminophen 325 mG 2 Tablet(s) Oral every 6 hours PRN Moderate Pain (4 - 6)      ALLERGIES:  Allergies    No Known Allergies    Intolerances        LABS:                        11.9   9.6   )-----------( 305      ( 11 Jul 2018 05:55 )             37.2     07-11    135  |  96  |  11  ----------------------------<  109<H>  4.2   |  25  |  0.53    Ca    9.4      11 Jul 2018 05:55  Phos  4.4     07-11  Mg     1.9     07-11      RADIOLOGY & ADDITIONAL TESTS:   CT A/P (7/9/18): "No renal or ureteral calculus.Again status post Renetta-en-Y gastric bypass; no evidence of   obstruction. No acute intra-abdominal pathology."  MRI (7/9/18): "Small central herniated disc at L4/L5 causing slight indentation on the thecal sac."

## 2018-07-11 NOTE — PROGRESS NOTE ADULT - SUBJECTIVE AND OBJECTIVE BOX
NEUROLOGY CONSULT PROGRESS NOTE    INTERVAL HISTORY:    Reports 10/10 back pain, but appears more comfortable compared to yesterday. Continues to have sharp back pain, but only radiating to RLE (yesterday had both RLE and LLE). Reports numbness of RLE (ankle to inguinal region, but not foot). No fever, chills, n/v or diarrhea. Reports mild neck pain.     REVIEW OF SYSTEMS:  As per HPI, otherwise negative for Constitutional, Eyes, Ears/Nose/Mouth/Throat, Neck, Cardiovascular, Respiratory, Gastrointestinal, Genitourinary, Skin, Endocrine, Musculoskeletal, Psychiatric, and Hematologic/Lymphatic.    MEDICATIONS:  cyclobenzaprine 5 milliGRAM(s) Oral two times a day  docusate sodium 100 milliGRAM(s) Oral three times a day  ferrous    sulfate 325 milliGRAM(s) Oral daily  gabapentin 200 milliGRAM(s) Oral three times a day  HYDROmorphone  Injectable 0.5 milliGRAM(s) IV Push every 3 hours PRN  lidocaine   Patch 1 Patch Transdermal daily  ondansetron Injectable 4 milliGRAM(s) IV Push every 6 hours PRN  oxyCODONE    5 mG/acetaminophen 325 mG 2 Tablet(s) Oral every 6 hours PRN  polyethylene glycol 3350 17 Gram(s) Oral daily  predniSONE   Tablet 50 milliGRAM(s) Oral every 24 hours  senna 1 Tablet(s) Oral daily    VITAL SIGNS:  Vital Signs Last 24 Hrs  T(C): 36.9 (11 Jul 2018 09:04), Max: 36.9 (10 Jul 2018 20:27)  T(F): 98.4 (11 Jul 2018 09:04), Max: 98.4 (10 Jul 2018 20:27)  HR: 64 (11 Jul 2018 09:04) (62 - 69)  BP: 100/57 (11 Jul 2018 09:04) (100/57 - 120/75)  BP(mean): --  RR: 17 (11 Jul 2018 09:04) (16 - 18)  SpO2: 95% (11 Jul 2018 09:04) (95% - 95%)    PHYSICAL EXAMINATION:  Constitutional: WDWN; NAD  Eyes: anicteric sclera  Cardiovascular: +S1/S2, RRR; no M/R/G  Neurologic:  - Mental Status:  AAOx3; speech is fluent with intact naming, repetition, and comprehension  - Cranial Nerves II-XII:    II:  PERRLA; visual fields are full to confrontation  III, IV, VI:  EOMI, no nystagmus  V:  facial sensation is intact in the V1-V3 distribution bilaterally.  VII:  face is symmetric with normal eye closure and smile  VIII:  hearing is intact to finger rub  IX, X:  uvula is midline and soft palate rises symmetrically  XI:  head turning and shoulder shrug are intact bilaterally  XII:  tongue protrudes in the midline  - Motor:  strength is 5/5 throughout in b/l UE; normal muscle bulk and tone throughout; no pronator drift. Strength LE deferred given acute pain.    - Reflexes:  2+ and symmetric at the biceps, triceps, knees;  plantar reflexes downgoing bilaterally  - Sensory:  decreased sensation (light touch, vibration) in RLE (ankle to inguinal region)  - Coordination:  finger-nose-finger intact  - Gait:  deferred    LABS:                          11.9   9.6   )-----------( 305      ( 11 Jul 2018 05:55 )             37.2     07-11    135  |  96  |  11  ----------------------------<  109<H>  4.2   |  25  |  0.53    Ca    9.4      11 Jul 2018 05:55  Phos  4.4     07-11  Mg     1.9     07-11            RADIOLOGY & ADDITIONAL STUDIES:

## 2018-07-12 ENCOUNTER — TRANSCRIPTION ENCOUNTER (OUTPATIENT)
Age: 43
End: 2018-07-12

## 2018-07-12 PROCEDURE — 99232 SBSQ HOSP IP/OBS MODERATE 35: CPT

## 2018-07-12 PROCEDURE — 99233 SBSQ HOSP IP/OBS HIGH 50: CPT | Mod: GC

## 2018-07-12 RX ORDER — OXYCODONE AND ACETAMINOPHEN 5; 325 MG/1; MG/1
2 TABLET ORAL EVERY 4 HOURS
Qty: 0 | Refills: 0 | Status: DISCONTINUED | OUTPATIENT
Start: 2018-07-12 | End: 2018-07-15

## 2018-07-12 RX ORDER — POLYETHYLENE GLYCOL 3350 17 G/17G
17 POWDER, FOR SOLUTION ORAL
Qty: 0 | Refills: 0 | Status: DISCONTINUED | OUTPATIENT
Start: 2018-07-12 | End: 2018-07-15

## 2018-07-12 RX ORDER — GABAPENTIN 400 MG/1
300 CAPSULE ORAL THREE TIMES A DAY
Qty: 0 | Refills: 0 | Status: DISCONTINUED | OUTPATIENT
Start: 2018-07-12 | End: 2018-07-15

## 2018-07-12 RX ORDER — SENNA PLUS 8.6 MG/1
2 TABLET ORAL AT BEDTIME
Qty: 0 | Refills: 0 | Status: DISCONTINUED | OUTPATIENT
Start: 2018-07-12 | End: 2018-07-15

## 2018-07-12 RX ORDER — GABAPENTIN 400 MG/1
300 CAPSULE ORAL
Qty: 0 | Refills: 0 | Status: DISCONTINUED | OUTPATIENT
Start: 2018-07-12 | End: 2018-07-12

## 2018-07-12 RX ORDER — CYCLOBENZAPRINE HYDROCHLORIDE 10 MG/1
10 TABLET, FILM COATED ORAL
Qty: 0 | Refills: 0 | Status: DISCONTINUED | OUTPATIENT
Start: 2018-07-12 | End: 2018-07-15

## 2018-07-12 RX ADMIN — OXYCODONE HYDROCHLORIDE 10 MILLIGRAM(S): 5 TABLET ORAL at 17:36

## 2018-07-12 RX ADMIN — CYCLOBENZAPRINE HYDROCHLORIDE 5 MILLIGRAM(S): 10 TABLET, FILM COATED ORAL at 13:22

## 2018-07-12 RX ADMIN — OXYCODONE HYDROCHLORIDE 10 MILLIGRAM(S): 5 TABLET ORAL at 05:20

## 2018-07-12 RX ADMIN — Medication 100 MILLIGRAM(S): at 05:23

## 2018-07-12 RX ADMIN — SENNA PLUS 1 TABLET(S): 8.6 TABLET ORAL at 13:22

## 2018-07-12 RX ADMIN — Medication 325 MILLIGRAM(S): at 13:22

## 2018-07-12 RX ADMIN — OXYCODONE AND ACETAMINOPHEN 2 TABLET(S): 5; 325 TABLET ORAL at 15:56

## 2018-07-12 RX ADMIN — LIDOCAINE 1 PATCH: 4 CREAM TOPICAL at 13:21

## 2018-07-12 RX ADMIN — POLYETHYLENE GLYCOL 3350 17 GRAM(S): 17 POWDER, FOR SOLUTION ORAL at 17:37

## 2018-07-12 RX ADMIN — POLYETHYLENE GLYCOL 3350 17 GRAM(S): 17 POWDER, FOR SOLUTION ORAL at 13:21

## 2018-07-12 RX ADMIN — Medication 50 MILLIGRAM(S): at 17:36

## 2018-07-12 RX ADMIN — OXYCODONE AND ACETAMINOPHEN 2 TABLET(S): 5; 325 TABLET ORAL at 16:20

## 2018-07-12 RX ADMIN — OXYCODONE HYDROCHLORIDE 10 MILLIGRAM(S): 5 TABLET ORAL at 18:45

## 2018-07-12 RX ADMIN — Medication 100 MILLIGRAM(S): at 13:22

## 2018-07-12 RX ADMIN — OXYCODONE AND ACETAMINOPHEN 2 TABLET(S): 5; 325 TABLET ORAL at 22:08

## 2018-07-12 RX ADMIN — CYCLOBENZAPRINE HYDROCHLORIDE 5 MILLIGRAM(S): 10 TABLET, FILM COATED ORAL at 05:20

## 2018-07-12 RX ADMIN — GABAPENTIN 200 MILLIGRAM(S): 400 CAPSULE ORAL at 13:22

## 2018-07-12 RX ADMIN — GABAPENTIN 300 MILLIGRAM(S): 400 CAPSULE ORAL at 21:07

## 2018-07-12 RX ADMIN — OXYCODONE AND ACETAMINOPHEN 2 TABLET(S): 5; 325 TABLET ORAL at 03:57

## 2018-07-12 RX ADMIN — CYCLOBENZAPRINE HYDROCHLORIDE 10 MILLIGRAM(S): 10 TABLET, FILM COATED ORAL at 17:36

## 2018-07-12 RX ADMIN — OXYCODONE AND ACETAMINOPHEN 2 TABLET(S): 5; 325 TABLET ORAL at 09:39

## 2018-07-12 RX ADMIN — SENNA PLUS 2 TABLET(S): 8.6 TABLET ORAL at 21:07

## 2018-07-12 RX ADMIN — OXYCODONE HYDROCHLORIDE 10 MILLIGRAM(S): 5 TABLET ORAL at 05:50

## 2018-07-12 RX ADMIN — OXYCODONE AND ACETAMINOPHEN 2 TABLET(S): 5; 325 TABLET ORAL at 21:06

## 2018-07-12 RX ADMIN — GABAPENTIN 200 MILLIGRAM(S): 400 CAPSULE ORAL at 05:23

## 2018-07-12 RX ADMIN — Medication 100 MILLIGRAM(S): at 21:07

## 2018-07-12 RX ADMIN — OXYCODONE AND ACETAMINOPHEN 2 TABLET(S): 5; 325 TABLET ORAL at 03:27

## 2018-07-12 RX ADMIN — OXYCODONE AND ACETAMINOPHEN 2 TABLET(S): 5; 325 TABLET ORAL at 10:20

## 2018-07-12 NOTE — PROGRESS NOTE ADULT - SUBJECTIVE AND OBJECTIVE BOX
NEUROLOGY CONSULT PROGRESS NOTE    INTERVAL HISTORY: Reports 9/10 pain. Overall feels better. No fever, chills, n/v or diarrhea. Continues to have back pain radiating to LE b/l. Reports RLE numbness (inguinal region to ankle).     REVIEW OF SYSTEMS:  As per HPI, otherwise negative for Constitutional, Eyes, Ears/Nose/Mouth/Throat, Neck, Cardiovascular, Respiratory, Gastrointestinal, Genitourinary, Skin, Endocrine, Musculoskeletal, Psychiatric, and Hematologic/Lymphatic.    MEDICATIONS:  cyclobenzaprine 10 milliGRAM(s) Oral two times a day  docusate sodium 100 milliGRAM(s) Oral three times a day  ferrous    sulfate 325 milliGRAM(s) Oral daily  gabapentin 300 milliGRAM(s) Oral three times a day  lidocaine   Patch 1 Patch Transdermal daily  ondansetron Injectable 4 milliGRAM(s) IV Push every 6 hours PRN  oxyCODONE    5 mG/acetaminophen 325 mG 2 Tablet(s) Oral every 4 hours PRN  oxyCODONE  ER Tablet 10 milliGRAM(s) Oral every 12 hours  polyethylene glycol 3350 17 Gram(s) Oral daily  predniSONE   Tablet 50 milliGRAM(s) Oral every 24 hours  senna 1 Tablet(s) Oral daily    VITAL SIGNS:  Vital Signs Last 24 Hrs  T(C): 36.6 (12 Jul 2018 05:34), Max: 36.8 (11 Jul 2018 20:44)  T(F): 97.9 (12 Jul 2018 05:34), Max: 98.2 (11 Jul 2018 20:44)  HR: 68 (12 Jul 2018 05:34) (68 - 77)  BP: 109/71 (12 Jul 2018 05:34) (109/71 - 128/75)  BP(mean): --  RR: 17 (12 Jul 2018 05:34) (17 - 18)  SpO2: 95% (12 Jul 2018 05:34) (95% - 95%)    PHYSICAL EXAMINATION:  Constitutional: WDWN; NAD  Eyes: anicteric sclera  Cardiovascular: +S1/S2, RRR; no M/R/G  Neurologic:  - Mental Status:  AAOx3; speech is fluent with intact naming, repetition, and comprehension  - Cranial Nerves II-XII:    II:  PERRLA; visual fields are full to confrontation  III, IV, VI:  EOMI, no nystagmus  V:  facial sensation is intact in the V1-V3 distribution bilaterally.  VII:  face is symmetric with normal eye closure and smile  VIII:  hearing is intact to finger rub  IX, X:  uvula is midline and soft palate rises symmetrically  XI:  head turning and shoulder shrug are intact bilaterally  XII:  tongue protrudes in the midline  - Motor:  strength is 5/5 throughout in b/l UE; normal muscle bulk and tone throughout; no pronator drift. Strength LE deferred given acute pain.    - Reflexes:  2+ and symmetric at the biceps, triceps, knees;  plantar reflexes downgoing bilaterally  - Sensory:  decreased sensation (light touch, vibration) in RLE (ankle to inguinal region)  - Coordination:  finger-nose-finger intact  - Gait:  deferred    LABS:                          11.9   9.6   )-----------( 305      ( 11 Jul 2018 05:55 )             37.2     07-11    135  |  96  |  11  ----------------------------<  109<H>  4.2   |  25  |  0.53    Ca    9.4      11 Jul 2018 05:55  Phos  4.4     07-11  Mg     1.9     07-11            RADIOLOGY & ADDITIONAL STUDIES:      IMPRESSION & RECOMMENDATIONS:

## 2018-07-12 NOTE — PROGRESS NOTE ADULT - PROBLEM SELECTOR PLAN 1
Acute back pain ddx broad inclusive of MSK strain vs spinal cord impingement/abscess or fracture, vs. gyn/pelvic inflammation/infxn, vs. bowel inflammation/constipation. Vital signs, labs and urine reassuring that there is no infectious process. Imaging identifies a herniated disk at L4/5 which is likely resulting in radiculopathy to LE's, consistent with exam. Per neurosx, no surgical intervention warranted at this time. However, more improvement in symptoms with pain management regimen would be expected for purely musculoskeletal physiology - pain relief not achieved with opiates. Pain service consulted to help manage pain long term. Of note, pt constipated but no abd distention or impaction noted on imaging, may be exacerbating symptoms. The patient also weak bilaterally in lower extremities, possibly due to pain vs. true neurologic deficit.   -lidocaine patch to lower back  -gabapentin 100 mg TID   -dilaudid 0.5 mg q3h IV PRN  -Percocet 5mg/325mg q6hr PRN for sev pain  - Pain consulted, appreciate recs  - f/u with pain management today regarding possibility of adding a long-term pain medication  - PT when pt able to participate  - neurology consult  - NSGY consult  - no cervical or thoracic imaging at this time Acute back pain ddx broad inclusive of MSK strain vs spinal cord impingement/abscess or fracture, vs. gyn/pelvic inflammation/infxn, vs. bowel inflammation/constipation. Vital signs, labs and urine reassuring that there is no infectious process. Imaging identifies a herniated disk at L4/5 which is likely resulting in radiculopathy to LE's, consistent with exam. Per neurosx, no surgical intervention warranted at this time. However, more improvement in symptoms with pain management regimen would be expected for purely musculoskeletal physiology - pain relief not achieved with opiates. Pain service consulted to help manage pain long term. Of note, pt constipated but no abd distention or impaction noted on imaging, may be exacerbating symptoms. The patient also weak bilaterally in lower extremities, possibly due to pain vs. true neurologic deficit.   -lidocaine patch to lower back  - Start oxycodone ER 10 mg q12h  -gabapentin 200 mg TID and flexeril 5 mg TID (as per neurology)  -Percocet 5mg/325mg q6hr PRN for sev pain  - d/c dilaudid   - f/u with pain management today regarding possibility of adding a long-term pain medication  - PT when pt able to participate  - neurology consult  - NSGY consult  - no cervical or thoracic imaging at this time Acute back pain ddx broad inclusive of MSK strain vs spinal cord impingement/abscess or fracture, vs. gyn/pelvic inflammation/infxn, vs. bowel inflammation/constipation. Vital signs, labs and urine reassuring that there is no infectious process. Imaging identifies a herniated disk at L4/5 which is likely resulting in radiculopathy to LE's, consistent with exam. Per neurosx, no surgical intervention warranted at this time. However, more improvement in symptoms with pain management regimen would be expected for purely musculoskeletal physiology - pain relief not achieved with opiates. Pain service consulted to help manage pain long term. Of note, pt constipated but no abd distention or impaction noted on imaging, may be exacerbating symptoms. The patient also weak bilaterally in lower extremities, possibly due to pain vs. true neurologic deficit.   -lidocaine patch to lower back  - Start oxycodone ER 10 mg q12h  -gabapentin 200 mg TID and flexeril 5 mg TID (as per neurology)  -Percocet 5mg/325mg q6hr PRN for sev pain  - Prednisone 50 mg daily  - possible spinal epidural for pain relief  - d/c dilaudid   - f/u with pain management today regarding possibility of adding a long-term pain medication  - PT following  - neurology consult following  - no cervical or thoracic imaging at this time

## 2018-07-12 NOTE — DISCHARGE NOTE ADULT - ADDITIONAL INSTRUCTIONS
On discharge, please follow-up with *********** On discharge, please follow-up with Dr. Shepherd at [facility]. Please call on Monday and tell them that you were seen in the hospital by Nurse Practitioner Jeffrey Woods and Dr. Shepherd and you need an appointment within 2 weeks.

## 2018-07-12 NOTE — PROGRESS NOTE ADULT - ASSESSMENT
Nutrition note  Discussed the plan with Dr Theodore   She is eating borderline meeting needs at this time   The patient may want a TF is a few days since the mucositis may increase   She does have a picky appetite but is eating oatmeal and yogurt   Calorie Count   Results Breakfast 5/15 300 kcal and 9 gms of pro   Lunch 80 kcal   No dinner menu available   Nursing thinks she is eating fair      RD will continue to follow    41yo woman with PMH anemia, herniated cervical disc, cholecystectomy, RYGB (2009), intussusception of jejunojejunal anastomosis (2015), and subsequent resection of dilated jejunojejunal anastomosis (Feb 2017) with small bowel anastomosis, recently admitted in June for pelvis laproscopy with lysis of adhesions, admitted with lower back pain.    #Acute herniated disc at L4/5 seen on MR lumbar spine with b/l lateral recess stenosis / mild L5 nerve root impingement  -MRI negative for cauda epuina/conus medullaris syndrome, neoplasm or abscess  -recommend prednisone 50mg PO x 7 days (7/10 - 7/16)  -increase gabapentin to 300TID   -increase flexeril to 10 bid; increase both as needed/ tolerated   -f/u neurosurgery recs 41yo woman with PMH anemia, herniated cervical disc, cholecystectomy, RYGB (2009), intussusception of jejunojejunal anastomosis (2015), and subsequent resection of dilated jejunojejunal anastomosis (Feb 2017) with small bowel anastomosis, recently admitted in June for pelvis laproscopy with lysis of adhesions, admitted with lower back pain.    #Acute herniated disc at L4/5 seen on MR lumbar spine with b/l lateral recess stenosis / mild L5 nerve root impingement  -MRI negative for cauda epuina/conus medullaris syndrome, neoplasm or abscess  -recommend prednisone 50mg PO x 7 days (7/10 - 7/16)  -increase gabapentin to 300TID   -increase flexeril to 10 bid; increase both as needed/ tolerated   -f/u neurosurgery recs  -for possible epidural steroid injection tomorrow

## 2018-07-12 NOTE — PROGRESS NOTE ADULT - SUBJECTIVE AND OBJECTIVE BOX
Pain Management Progress Note - [facility] [phone]    HPI: Patient seen at 09:20am. Patient laying down in bed, in no apparent distress. Patient complains of  lower back and R leg pain with some relief from current pain medication regimen.       Pertinent PMH:   ACUTE MIDLINE BACK PAIN  UTI (urinary tract infection)  Herniated cervical disc  Anemia  UTI (urinary tract infection)  Acute midline back pain, unspecified back location  Transition of care performed with sharing of clinical summary  Need for prophylactic measure  Nutrition, metabolism, and development symptoms  Herniated cervical disc  Constipation, unspecified constipation type  Acute midline back pain, unspecified back location  History of cholecystectomy  H/O abdominal hysterectomy  Status post bariatric surgery  LOWER BACK PAIN  Constipation, unspecified constipation type  Herniated cervical disc      Pain at: _x__Back ___Neck___Knee ___Hip ___Shoulder ___ Opioid tolerance    Pain is ___ sharp _x___dull ___burning _x__achy ___ Intensity: ____ mild _x___mod _x___severe     Location _____surgical site _____cervical __x___lumbar ____abd _____upper ext__x__R lower ext    Worse with _x___activity _x___movement _____physical therapy___ Rest    Improved with _x___medication _x___rest ____physical therapy    morphine  - Injectable  HYDROmorphone  Injectable  ondansetron Injectable  HYDROmorphone  Injectable  ketorolac   Injectable  LORazepam     Tablet  ondansetron Injectable  lidocaine   Patch  gabapentin  ferrous sulfate Oral Tab/Cap - Peds  cyanocobalamin  ferrous    sulfate  senna  polyethylene glycol 3350  oxyCODONE    5 mG/acetaminophen 325 mG  ondansetron    Tablet  cyclobenzaprine  HYDROmorphone  Injectable  lidocaine   Patch  HYDROmorphone  Injectable  oxyCODONE    5 mG/acetaminophen 325 mG  ondansetron Injectable  docusate sodium  predniSONE   Tablet  gabapentin  cyclobenzaprine  oxyCODONE    5 mG/acetaminophen 325 mG  oxyCODONE    5 mG/acetaminophen 325 mG  cyclobenzaprine  oxyCODONE  ER Tablet        ROS: Const:  _-__febrile   Eyes:___ENT:___CV: _-__chest pain  Resp: __-__sob  GI:_-__nausea __-_vomiting ____abd pain ___npo ___clears _x__full diet __bm  :___ Musk: __x_pain _x__spasm  Skin:___ Neuro:  _-__ybsvolqs_-__rukettivn__-__ numbness _x__weakness _x__paresthesia  Psych:_-__anxiety  Endo:___ Heme:___Allergy:___   _x___ All other systems reviewed and negative    Hemoglobin: 11.9 g/dL (07-11 @ 05:55)      T(C): 36.6 (07-12-18 @ 05:34), Max: 36.9 (07-11-18 @ 15:48)  HR: 68 (07-12-18 @ 05:34) (68 - 77)  BP: 109/71 (07-12-18 @ 05:34) (109/71 - 128/75)  RR: 17 (07-12-18 @ 05:34) (16 - 18)  SpO2: 95% (07-12-18 @ 05:34) (95% - 96%)  Wt(kg): --     PHYSICAL EXAM:  Gen Appearance: _x__no acute distress _x__appropriate         Neuro: _x__SILT feet____ EOM Intact Psych: AAOX3__, _x__mood/affect appropriate        Eyes: _x__conjunctiva WNL  _____ Pupils equal and round        ENT: _x__ears and nose atraumatic_x__ Hearing grossly intact        Neck: _x__trachea midline, no visible masses ___thyroid without palpable mass    Resp: ___Nml WOB____No tactile fremitus ___clear to auscultation    Cardio: ___extremities free from edema __x__pedal pulses palpable    GI/Abdomen: __x_soft __x___ Nontender__x____Nondistended_____HSM    Lymphatic: ___no palpable nodes in neck  _x__no palpable nodes calves and feet    Skin/Wound: ___Incision, ___Dressing c/d/i,   ____surrounding tissues soft,  ___drain/chest tube present____    Muscular: EHL _5__/5  Gastrocnemius_5__/5    ___absent clubbing/cyanosis         ASSESSMENT:  This is a 42y old Female with a history of herniated cervical disc presented to Ed with acute onset lower back and R leg pain, pain not well controlled with current pain medication regimen.      Recommended Treatment PLAN:  1. Recommend Percocet 5/325mg po 2 tablets Q4h prn moderate pain  2. Lidocaine patch to affected area, 12 hours on, 12 hours off  3. Oxycontin 10mg PO ER BID  Plan discussed with Dr. Joao Hwang

## 2018-07-12 NOTE — DISCHARGE NOTE ADULT - CARE PROVIDER_API CALL
Jaiden Shepherd), Anesthesiology; Pain Medicine  [address] [place] [address]  Phone: [phone]  Fax: [phone]

## 2018-07-12 NOTE — DISCHARGE NOTE ADULT - CARE PLAN
Principal Discharge DX:	Acute midline back pain, unspecified back location  Goal:	Decrease pain to a level that will allow you to be functional (able to go to work, take care of family, etc.)  Secondary Diagnosis:	Herniated cervical disc  Secondary Diagnosis:	Constipation, unspecified constipation type Principal Discharge DX:	Acute midline back pain, unspecified back location  Goal:	Decrease pain to a level that will allow you to be functional (able to go to work, take care of family, etc.)  Assessment and plan of treatment:	You came into the hospital with back pain. After getting imaging and examining you, you were found to have a small herniation in your lumbar spine. Otherwise, there were no findings on imaging. You were treated with pain medications and then a spinal epidural to help control your pain and regain functionality. Please continue taking medications as prescribed and follow-up as indicated.  Secondary Diagnosis:	Herniated cervical disc  Assessment and plan of treatment:	This is a chronic problem for you which has caused you pain in the past. If it continues to be a problem, please follow-up as an outpatient.  Secondary Diagnosis:	Constipation, unspecified constipation type  Assessment and plan of treatment:	One side effect of one of the pain medications (opioid) that you were taking in the hospital has a side effect of constipation. You were treated with senna, colace, and miralax in order to get you to have a bowel movement. Will continue medications until constipation is resolved. Principal Discharge DX:	Acute midline back pain, unspecified back location  Goal:	Decrease pain to a level that will allow you to be functional (able to go to work, take care of family, etc.)  Assessment and plan of treatment:	You came into the hospital with back pain. After getting imaging and examining you, you were found to have small herniations in your lumbar, cervical, and thoracic spine. However, none of these were impinging on the spinal cord. Otherwise, there were no findings on imaging. You were treated with pain medications and then a spinal epidural to help control your pain and regain functionality. Please continue taking medications as prescribed and follow-up as indicated.  Secondary Diagnosis:	Herniated cervical disc  Assessment and plan of treatment:	This is a chronic problem for you which has caused you pain in the past. If it continues to be a problem, please follow-up as an outpatient.  Secondary Diagnosis:	Constipation, unspecified constipation type  Assessment and plan of treatment:	One side effect of one of the pain medications (opioid) that you were taking in the hospital has a side effect of constipation. You were treated with senna, colace, and miralax in order to get you to have a bowel movement. Will continue medications until constipation is resolved.

## 2018-07-12 NOTE — PROGRESS NOTE ADULT - SUBJECTIVE AND OBJECTIVE BOX
INTERVAL HPI/OVERNIGHT EVENTS: Overnight, there were no acute events for the patient.     SUBJECTIVE: Patient seen and examined at bedside. This morning the patient explains that she is feeling somewhat better. She has been trying to move her legs in small increments and thinks that she is having less pain with movement as of this morning. Additionally, the patient thinks that the change in pain medications is helping, but not significantly. The patient denies any headache, chest pain, abdominal pain, nausea, vomiting, diarrhea, urinary incontinence, or paresthesias.     OBJECTIVE:    VITAL SIGNS:  ICU Vital Signs Last 24 Hrs  T(C): 36.6 (12 Jul 2018 05:34), Max: 36.9 (11 Jul 2018 15:48)  T(F): 97.9 (12 Jul 2018 05:34), Max: 98.4 (11 Jul 2018 15:48)  HR: 68 (12 Jul 2018 05:34) (68 - 77)  BP: 109/71 (12 Jul 2018 05:34) (109/71 - 128/75)  BP(mean): --  ABP: --  ABP(mean): --  RR: 17 (12 Jul 2018 05:34) (16 - 18)  SpO2: 95% (12 Jul 2018 05:34) (95% - 96%)      PHYSICAL EXAM:    General: NAD sitting still, moving legs in small slow movements up and down the bed.   HEENT: NC/AT, PERRL, clear conjunctiva, MMM  Neck: supple, no JVD  Respiratory: lungs CTA b/l, no wheezes, rales, or rhonchi  Cardiovascular: +S1/S2; RRR, no murmurs, rubs, or gallops  Abdomen: soft, non-tender, non-distended; +BS in all 4 quadrants  Back: paraspinal tenderness on lumbar palpation  Extremities: WWP, 2+ peripheral pulses b/l; no LE edema  Neurological: AOx3, on strength exam, lower extremities strength 4/5 bilaterally and sensation to light touch intact b/l. Upper extremity strength 5/5 with sensation intact.       MEDICATIONS:  MEDICATIONS  (STANDING):  cyclobenzaprine 5 milliGRAM(s) Oral three times a day  docusate sodium 100 milliGRAM(s) Oral three times a day  ferrous    sulfate 325 milliGRAM(s) Oral daily  gabapentin 200 milliGRAM(s) Oral three times a day  lidocaine   Patch 1 Patch Transdermal daily  oxyCODONE  ER Tablet 10 milliGRAM(s) Oral every 12 hours  polyethylene glycol 3350 17 Gram(s) Oral daily  predniSONE   Tablet 50 milliGRAM(s) Oral every 24 hours  senna 1 Tablet(s) Oral daily    MEDICATIONS  (PRN):  ondansetron Injectable 4 milliGRAM(s) IV Push every 6 hours PRN Nausea and/or Vomiting  oxyCODONE    5 mG/acetaminophen 325 mG 2 Tablet(s) Oral every 6 hours PRN Moderate Pain (4 - 6)      ALLERGIES:  Allergies    No Known Allergies    LABS:                        11.9   9.6   )-----------( 305      ( 11 Jul 2018 05:55 )             37.2     07-11    135  |  96  |  11  ----------------------------<  109<H>  4.2   |  25  |  0.53    Ca    9.4      11 Jul 2018 05:55  Phos  4.4     07-11  Mg     1.9     07-11            RADIOLOGY & ADDITIONAL TESTS:

## 2018-07-12 NOTE — DISCHARGE NOTE ADULT - MEDICATION SUMMARY - MEDICATIONS TO TAKE
I will START or STAY ON the medications listed below when I get home from the hospital:    predniSONE 50 mg oral tablet  -- 1 tab(s) by mouth every 24 hours  -- Indication: For Treatment    gabapentin 300 mg oral capsule  -- 1 cap(s) by mouth 3 times a day  -- Indication: For pain    ferrous sulfate 325 mg (65 mg elemental iron) oral tablet  -- 1 tab(s) by mouth 3 times a day  -- Indication: For prophylactic measure    docusate sodium 100 mg oral capsule  -- 1 cap(s) by mouth 3 times a day  -- Indication: For prophylactic measure    polyethylene glycol 3350 oral powder for reconstitution  -- 17 gram(s) by mouth 2 times a day  -- Indication: For prophylactic measure    senna oral tablet  -- 2 tab(s) by mouth once a day (at bedtime)  -- Indication: For prophylactic measure    cyclobenzaprine 10 mg oral tablet  -- 1 tab(s) by mouth 2 times a day  -- Indication: For prophylactic measure    Protonix 40 mg oral delayed release tablet  -- 1 tab(s) by mouth once a day MDD:1  -- It is very important that you take or use this exactly as directed.  Do not skip doses or discontinue unless directed by your doctor.  Obtain medical advice before taking any non-prescription drugs as some may affect the action of this medication.  Swallow whole.  Do not crush.    -- Indication: For Treatment     omeprazole 20 mg oral delayed release capsule  -- 1 cap(s) by mouth once a day  -- It is very important that you take or use this exactly as directed.  Do not skip doses or discontinue unless directed by your doctor.  Obtain medical advice before taking any non-prescription drugs as some may affect the action of this medication.  Swallow whole.  Do not crush.    -- Indication: For Treatment    cyanocobalamin 2500 mcg sublingual tablet  -- 1 tab(s) under tongue once a day  -- Indication: For prophylactic measure

## 2018-07-12 NOTE — DISCHARGE NOTE ADULT - MEDICATION SUMMARY - MEDICATIONS TO CHANGE
I will SWITCH the dose or number of times a day I take the medications listed below when I get home from the hospital:    tiZANidine 2 mg oral capsule  -- 1 cap(s) by mouth once (at bedtime)    ferrous sulfate 325 mg (65 mg elemental iron) oral tablet  -- 1 tab(s) by mouth 3 times a day    cyanocobalamin 2500 mcg sublingual tablet  -- 1 tab(s) under tongue once a day    Protonix 40 mg oral delayed release tablet  -- 1 tab(s) by mouth once a day MDD:1  -- It is very important that you take or use this exactly as directed.  Do not skip doses or discontinue unless directed by your doctor.  Obtain medical advice before taking any non-prescription drugs as some may affect the action of this medication.  Swallow whole.  Do not crush.    Zofran ODT 4 mg oral tablet, disintegrating  -- 1 tab(s) by mouth 3 times a day    omeprazole 20 mg oral delayed release capsule  -- 1 cap(s) by mouth once a day  -- It is very important that you take or use this exactly as directed.  Do not skip doses or discontinue unless directed by your doctor.  Obtain medical advice before taking any non-prescription drugs as some may affect the action of this medication.  Swallow whole.  Do not crush.    Anaprox-DS sodium 550 mg oral tablet  -- 1 tab(s) by mouth 2 times a day  -- Check with your doctor before becoming pregnant.  It is very important that you take or use this exactly as directed.  Do not skip doses or discontinue unless directed by your doctor.  May cause drowsiness or dizziness.  Obtain medical advice before taking any non-prescription drugs as some may affect the action of this medication.  Take with food or milk.    predniSONE 50 mg oral tablet  -- 1 tab(s) by mouth every 24 hours    oxyCODONE-acetaminophen 5 mg-325 mg oral tablet  -- 2 tab(s) by mouth every 4 hours, As needed, Moderate Pain (4 - 6)    oxyCODONE 10 mg oral tablet, extended release  -- 1 tab(s) by mouth every 12 hours    docusate sodium 100 mg oral capsule  -- 1 cap(s) by mouth 3 times a day    polyethylene glycol 3350 oral powder for reconstitution  -- 17 gram(s) by mouth 2 times a day    senna oral tablet  -- 2 tab(s) by mouth once a day (at bedtime)    cyclobenzaprine 10 mg oral tablet  -- 1 tab(s) by mouth 2 times a day    oxyCODONE-acetaminophen 5 mg-325 mg oral tablet  -- 2 tab(s) by mouth every 4 hours, As needed, Moderate Pain (4 - 6) MDD:Maixmum 4 pills per day    oxyCODONE 10 mg oral tablet, extended release  -- 1 tab(s) by mouth every 12 hours MDD:Maximim 2 pills per day

## 2018-07-12 NOTE — PROGRESS NOTE ADULT - PROBLEM SELECTOR PLAN 6
d/c home, pending response to pain medication    FULL CODE d/c home, pending response to pain medication and increased functional status    FULL CODE

## 2018-07-12 NOTE — PROGRESS NOTE ADULT - PROBLEM SELECTOR PLAN 3
Pt with chronic stable pain; some neck discomfort, but currently denying pain. Followed in o/p.   -O/P f/u

## 2018-07-12 NOTE — DISCHARGE NOTE ADULT - PLAN OF CARE
Decrease pain to a level that will allow you to be functional (able to go to work, take care of family, etc.) You came into the hospital with back pain. After getting imaging and examining you, you were found to have a small herniation in your lumbar spine. Otherwise, there were no findings on imaging. You were treated with pain medications and then a spinal epidural to help control your pain and regain functionality. Please continue taking medications as prescribed and follow-up as indicated. This is a chronic problem for you which has caused you pain in the past. If it continues to be a problem, please follow-up as an outpatient. One side effect of one of the pain medications (opioid) that you were taking in the hospital has a side effect of constipation. You were treated with senna, colace, and miralax in order to get you to have a bowel movement. Will continue medications until constipation is resolved. You came into the hospital with back pain. After getting imaging and examining you, you were found to have small herniations in your lumbar, cervical, and thoracic spine. However, none of these were impinging on the spinal cord. Otherwise, there were no findings on imaging. You were treated with pain medications and then a spinal epidural to help control your pain and regain functionality. Please continue taking medications as prescribed and follow-up as indicated.

## 2018-07-12 NOTE — PROGRESS NOTE ADULT - PROBLEM SELECTOR PLAN 2
Pt with h/o RYGB with subsequent complications, now constipated in setting of acute pain and opiates.    -senna  -miralax  -added colace tid Pt with h/o RYGB with subsequent complications, now constipated in setting of acute pain and opiates.    -senna  -miralax  -colace tid  -Zofran as needed for nausea

## 2018-07-12 NOTE — DISCHARGE NOTE ADULT - PATIENT PORTAL LINK FT
You can access the [facility] Patient Portal, offered by [facility], by registering with the following website: [email or web address]

## 2018-07-12 NOTE — DISCHARGE NOTE ADULT - HOSPITAL COURSE
42yoF with PMH anemia, herniated cervical disc, cholecystectomy, RYGB (2009), intussusception of jejunojejunal anastomosis (2015), and subsequent resection of dilated jejunojejunal anastomosis (Feb 2017) with small bowel anastomosis, recently admitted in June for pelvis laparoscopy with lysis of adhesions, who presents with sudden onset of lower back pain. Pt was walking to her car when she had sudden onset of midline lower back pain, sharp in quality, that radiated down legs with "pulsating" sensation R>L. Pain escalated to 10/10 and she was not able to walk due to the pain, which prompted her son to bring her to the ED.     In the ED, VSS and the patient was given Dilaudid 1mg (2x), Morphine 4mg, lorazepam 0.5mg, and Zofran (4mg, 8mg). CT A/P showed no acute intra-abdominal pathology and no renal or uretal calculus. MRI showed small central herniated disc at L4/L5 causing slight indentation of the thecal sac. Neurosurgery evaluated patient and recommended no surgery needed. The patient was given a pain regimen of a lidocaine patch, gabapentin 100 tid, dilaudid 0.5 mg q3h IV PRN, and percocet 5/325 q6h PO PRN with little improvement overnight on HD #1. Neurology was consulted and felt pain was most likely due to acute radiculopathy, and she was started on a 1 week course of prednisone 50 mg PO daily. She currently continues to have pain with movement, with the plan for pain management and exercise as tolerated to lead to improvement in back pain and functional status. 42yoF with PMH anemia, herniated cervical disc, cholecystectomy, RYGB (2009), intussusception of jejunojejunal anastomosis (2015), and subsequent resection of dilated jejunojejunal anastomosis (Feb 2017) with small bowel anastomosis, recently admitted in June for pelvis laparoscopy with lysis of adhesions, who presents with sudden onset of lower back pain. Pt was walking to her car when she had sudden onset of midline lower back pain, sharp in quality, that radiated down legs with "pulsating" sensation R>L. Pain escalated to 10/10 and she was not able to walk due to the pain, which prompted her son to bring her to the ED.     In the ED, VSS and the patient was given Dilaudid 1mg (2x), Morphine 4mg, lorazepam 0.5mg, and Zofran (4mg, 8mg). CT A/P showed no acute intra-abdominal pathology and no renal or uretal calculus. MRI showed small central herniated disc at L4/L5 causing slight indentation of the thecal sac. Neurosurgery evaluated patient and recommended no surgery needed. The patient has been followed by pain medicine and neurology for her pain. She was started on a pain regimen that included lidocaine patch, gabapentin, dilaudid, and percocet. Over her stay, the dosage of almost all of these medications was increased without much improvement in her pain. Dilaudid d/c and then oxycodone ER started with some improvement of pain. Neurology was consulted and felt pain was most likely due to acute radiculopathy, and she was started on a 1 week course of prednisone 50 mg PO daily. She currently continues to have pain with movement, got a spinal epidural today and has had improvement of pain, so can be discharged today. 42yoF with PMH anemia, herniated cervical disc, cholecystectomy, RYGB (2009), intussusception of jejunojejunal anastomosis (2015), and subsequent resection of dilated jejunojejunal anastomosis (Feb 2017) with small bowel anastomosis, recently admitted in June for pelvis laparoscopy with lysis of adhesions, who presents with sudden onset of lower back pain. Pt was walking to her car when she had sudden onset of midline lower back pain, sharp in quality, that radiated down legs with "pulsating" sensation R>L. Pain escalated to 10/10 and she was not able to walk due to the pain, which prompted her son to bring her to the ED.     In the ED, VSS and the patient was given Dilaudid 1mg (2x), Morphine 4mg, lorazepam 0.5mg, and Zofran (4mg, 8mg). CT A/P showed no acute intra-abdominal pathology and no renal or uretal calculus. MRI showed small central herniated disc at L4/L5 causing slight indentation of the thecal sac. Neurosurgery evaluated patient and recommended no surgery needed. The patient has been followed by pain medicine and neurology for her pain. She was started on a pain regimen that included lidocaine patch, gabapentin, dilaudid, and percocet. Over her stay, the dosage of almost all of these medications was increased without much improvement in her pain. Dilaudid d/c and then oxycodone ER started with some improvement of pain. Neurology was consulted and felt pain was most likely due to acute radiculopathy, and she was started on a 1 week course of prednisone 50 mg PO daily. She currently continues to have pain with movement, got a spinal epidural 7/13 and has had minimal improvement of pain. On 7/14, patient had an MR of the cervical and thoracic spine which showed a few more small herniations, but nothing impinging on the spinal cord so most likely not the cause of these symptoms. Pt has begun to walk a bit with a walker with pain, but this was a huge improvement from the minimal movement she has had since admission. Patient to be discharged with one supply of pain medications and follow-up.

## 2018-07-13 LAB
ANION GAP SERPL CALC-SCNC: 11 MMOL/L — SIGNIFICANT CHANGE UP (ref 5–17)
BUN SERPL-MCNC: 14 MG/DL — SIGNIFICANT CHANGE UP (ref 7–23)
CALCIUM SERPL-MCNC: 9.5 MG/DL — SIGNIFICANT CHANGE UP (ref 8.4–10.5)
CHLORIDE SERPL-SCNC: 100 MMOL/L — SIGNIFICANT CHANGE UP (ref 96–108)
CO2 SERPL-SCNC: 26 MMOL/L — SIGNIFICANT CHANGE UP (ref 22–31)
CREAT SERPL-MCNC: 0.5 MG/DL — SIGNIFICANT CHANGE UP (ref 0.5–1.3)
GLUCOSE SERPL-MCNC: 95 MG/DL — SIGNIFICANT CHANGE UP (ref 70–99)
HCT VFR BLD CALC: 36.9 % — SIGNIFICANT CHANGE UP (ref 34.5–45)
HGB BLD-MCNC: 11.8 G/DL — SIGNIFICANT CHANGE UP (ref 11.5–15.5)
MAGNESIUM SERPL-MCNC: 2.1 MG/DL — SIGNIFICANT CHANGE UP (ref 1.6–2.6)
MCHC RBC-ENTMCNC: 28.4 PG — SIGNIFICANT CHANGE UP (ref 27–34)
MCHC RBC-ENTMCNC: 32 G/DL — SIGNIFICANT CHANGE UP (ref 32–36)
MCV RBC AUTO: 88.7 FL — SIGNIFICANT CHANGE UP (ref 80–100)
PLATELET # BLD AUTO: 316 K/UL — SIGNIFICANT CHANGE UP (ref 150–400)
POTASSIUM SERPL-MCNC: 4.4 MMOL/L — SIGNIFICANT CHANGE UP (ref 3.5–5.3)
POTASSIUM SERPL-SCNC: 4.4 MMOL/L — SIGNIFICANT CHANGE UP (ref 3.5–5.3)
RBC # BLD: 4.16 M/UL — SIGNIFICANT CHANGE UP (ref 3.8–5.2)
RBC # FLD: 13.3 % — SIGNIFICANT CHANGE UP (ref 10.3–16.9)
SODIUM SERPL-SCNC: 137 MMOL/L — SIGNIFICANT CHANGE UP (ref 135–145)
WBC # BLD: 10 K/UL — SIGNIFICANT CHANGE UP (ref 3.8–10.5)
WBC # FLD AUTO: 10 K/UL — SIGNIFICANT CHANGE UP (ref 3.8–10.5)

## 2018-07-13 PROCEDURE — 99233 SBSQ HOSP IP/OBS HIGH 50: CPT | Mod: GC

## 2018-07-13 PROCEDURE — 62323 NJX INTERLAMINAR LMBR/SAC: CPT

## 2018-07-13 PROCEDURE — 99233 SBSQ HOSP IP/OBS HIGH 50: CPT

## 2018-07-13 RX ORDER — OXYCODONE HYDROCHLORIDE 5 MG/1
1 TABLET ORAL
Qty: 0 | Refills: 0 | COMMUNITY
Start: 2018-07-13

## 2018-07-13 RX ORDER — GABAPENTIN 400 MG/1
1 CAPSULE ORAL
Qty: 0 | Refills: 0 | COMMUNITY
Start: 2018-07-13

## 2018-07-13 RX ORDER — POLYETHYLENE GLYCOL 3350 17 G/17G
17 POWDER, FOR SOLUTION ORAL
Qty: 1020 | Refills: 0
Start: 2018-07-13 | End: 2018-08-11

## 2018-07-13 RX ORDER — SENNA PLUS 8.6 MG/1
2 TABLET ORAL
Qty: 60 | Refills: 0
Start: 2018-07-13 | End: 2018-08-11

## 2018-07-13 RX ORDER — POLYETHYLENE GLYCOL 3350 17 G/17G
17 POWDER, FOR SOLUTION ORAL
Qty: 0 | Refills: 0 | COMMUNITY
Start: 2018-07-13

## 2018-07-13 RX ORDER — CYCLOBENZAPRINE HYDROCHLORIDE 10 MG/1
1 TABLET, FILM COATED ORAL
Qty: 14 | Refills: 0
Start: 2018-07-13 | End: 2018-07-19

## 2018-07-13 RX ORDER — DOCUSATE SODIUM 100 MG
1 CAPSULE ORAL
Qty: 0 | Refills: 0 | COMMUNITY
Start: 2018-07-13

## 2018-07-13 RX ORDER — GABAPENTIN 400 MG/1
1 CAPSULE ORAL
Qty: 42 | Refills: 0
Start: 2018-07-13 | End: 2018-07-26

## 2018-07-13 RX ORDER — SODIUM CHLORIDE 9 MG/ML
200 INJECTION INTRAMUSCULAR; INTRAVENOUS; SUBCUTANEOUS ONCE
Qty: 0 | Refills: 0 | Status: COMPLETED | OUTPATIENT
Start: 2018-07-13 | End: 2018-07-13

## 2018-07-13 RX ORDER — DOCUSATE SODIUM 100 MG
1 CAPSULE ORAL
Qty: 90 | Refills: 0
Start: 2018-07-13 | End: 2018-08-11

## 2018-07-13 RX ORDER — OXYCODONE HYDROCHLORIDE 5 MG/1
1 TABLET ORAL
Qty: 14 | Refills: 0
Start: 2018-07-13 | End: 2018-07-19

## 2018-07-13 RX ORDER — SENNA PLUS 8.6 MG/1
2 TABLET ORAL
Qty: 0 | Refills: 0 | COMMUNITY
Start: 2018-07-13

## 2018-07-13 RX ORDER — CYCLOBENZAPRINE HYDROCHLORIDE 10 MG/1
1 TABLET, FILM COATED ORAL
Qty: 0 | Refills: 0 | COMMUNITY
Start: 2018-07-13

## 2018-07-13 RX ORDER — TIZANIDINE 4 MG/1
1 TABLET ORAL
Qty: 0 | Refills: 0 | COMMUNITY

## 2018-07-13 RX ORDER — LIDOCAINE 4 G/100G
0 CREAM TOPICAL
Qty: 0 | Refills: 0 | COMMUNITY
Start: 2018-07-13

## 2018-07-13 RX ORDER — SODIUM CHLORIDE 9 MG/ML
800 INJECTION INTRAMUSCULAR; INTRAVENOUS; SUBCUTANEOUS ONCE
Qty: 0 | Refills: 0 | Status: COMPLETED | OUTPATIENT
Start: 2018-07-13 | End: 2018-07-13

## 2018-07-13 RX ADMIN — LIDOCAINE 1 PATCH: 4 CREAM TOPICAL at 01:00

## 2018-07-13 RX ADMIN — SODIUM CHLORIDE 400 MILLILITER(S): 9 INJECTION INTRAMUSCULAR; INTRAVENOUS; SUBCUTANEOUS at 15:54

## 2018-07-13 RX ADMIN — OXYCODONE HYDROCHLORIDE 10 MILLIGRAM(S): 5 TABLET ORAL at 06:23

## 2018-07-13 RX ADMIN — LIDOCAINE 1 PATCH: 4 CREAM TOPICAL at 13:46

## 2018-07-13 RX ADMIN — OXYCODONE AND ACETAMINOPHEN 2 TABLET(S): 5; 325 TABLET ORAL at 03:04

## 2018-07-13 RX ADMIN — OXYCODONE HYDROCHLORIDE 10 MILLIGRAM(S): 5 TABLET ORAL at 18:29

## 2018-07-13 RX ADMIN — CYCLOBENZAPRINE HYDROCHLORIDE 10 MILLIGRAM(S): 10 TABLET, FILM COATED ORAL at 17:25

## 2018-07-13 RX ADMIN — OXYCODONE HYDROCHLORIDE 10 MILLIGRAM(S): 5 TABLET ORAL at 05:23

## 2018-07-13 RX ADMIN — SENNA PLUS 2 TABLET(S): 8.6 TABLET ORAL at 21:20

## 2018-07-13 RX ADMIN — OXYCODONE AND ACETAMINOPHEN 2 TABLET(S): 5; 325 TABLET ORAL at 22:20

## 2018-07-13 RX ADMIN — CYCLOBENZAPRINE HYDROCHLORIDE 10 MILLIGRAM(S): 10 TABLET, FILM COATED ORAL at 05:20

## 2018-07-13 RX ADMIN — Medication 100 MILLIGRAM(S): at 21:20

## 2018-07-13 RX ADMIN — Medication 100 MILLIGRAM(S): at 13:46

## 2018-07-13 RX ADMIN — Medication 325 MILLIGRAM(S): at 13:46

## 2018-07-13 RX ADMIN — SODIUM CHLORIDE 1600 MILLILITER(S): 9 INJECTION INTRAMUSCULAR; INTRAVENOUS; SUBCUTANEOUS at 15:54

## 2018-07-13 RX ADMIN — Medication 100 MILLIGRAM(S): at 05:20

## 2018-07-13 RX ADMIN — OXYCODONE AND ACETAMINOPHEN 2 TABLET(S): 5; 325 TABLET ORAL at 14:28

## 2018-07-13 RX ADMIN — Medication 50 MILLIGRAM(S): at 15:54

## 2018-07-13 RX ADMIN — POLYETHYLENE GLYCOL 3350 17 GRAM(S): 17 POWDER, FOR SOLUTION ORAL at 17:25

## 2018-07-13 RX ADMIN — OXYCODONE HYDROCHLORIDE 10 MILLIGRAM(S): 5 TABLET ORAL at 17:25

## 2018-07-13 RX ADMIN — OXYCODONE AND ACETAMINOPHEN 2 TABLET(S): 5; 325 TABLET ORAL at 04:04

## 2018-07-13 RX ADMIN — OXYCODONE AND ACETAMINOPHEN 2 TABLET(S): 5; 325 TABLET ORAL at 13:47

## 2018-07-13 RX ADMIN — GABAPENTIN 300 MILLIGRAM(S): 400 CAPSULE ORAL at 21:20

## 2018-07-13 RX ADMIN — GABAPENTIN 300 MILLIGRAM(S): 400 CAPSULE ORAL at 13:46

## 2018-07-13 RX ADMIN — OXYCODONE AND ACETAMINOPHEN 2 TABLET(S): 5; 325 TABLET ORAL at 21:20

## 2018-07-13 RX ADMIN — GABAPENTIN 300 MILLIGRAM(S): 400 CAPSULE ORAL at 05:20

## 2018-07-13 NOTE — PROGRESS NOTE ADULT - PROBLEM SELECTOR PLAN 1
Acute back pain ddx broad inclusive of MSK strain vs spinal cord impingement/abscess or fracture, vs. gyn/pelvic inflammation/infxn, vs. bowel inflammation/constipation. Vital signs, labs and urine reassuring that there is no infectious process. Imaging identifies a herniated disk at L4/5 which is likely resulting in radiculopathy to LE's, consistent with exam. Per neurosx, no surgical intervention warranted at this time. However, more improvement in symptoms with pain management regimen would be expected for purely musculoskeletal physiology - pain relief not achieved with opiates. Pain service consulted to help manage pain long term. Of note, pt constipated but no abd distention or impaction noted on imaging, may be exacerbating symptoms. The patient also weak bilaterally in lower extremities, possibly due to pain vs. true neurologic deficit.   -lidocaine patch to lower back  - Start oxycodone ER 10 mg q12h  -gabapentin 200 mg TID and flexeril 5 mg TID (as per neurology)  -Percocet 5mg/325mg q6hr PRN for sev pain  - Prednisone 50 mg daily  - possible spinal epidural for pain relief  - d/c dilaudid   - f/u with pain management today regarding possibility of adding a long-term pain medication  - PT following  - neurology consult following  - no cervical or thoracic imaging at this time Acute back pain ddx broad inclusive of MSK strain vs radiculopathy bowel inflammation/constipation. Vital signs, labs and urine reassuring that there is no infectious process. Imaging identifies a herniated disk at L4/5 which is likely resulting in radiculopathy to LE's, consistent with exam. Per neurosx, no surgical intervention warranted at this time. However, more improvement in symptoms with pain management regimen would be expected for purely musculoskeletal physiology - pain relief not achieved with opiates. Pain service consulted to help manage pain long term. Of note, pt constipated (no BM since admission) but no abd distention or impaction noted on imaging, may be exacerbating symptoms. The patient also weak bilaterally in lower extremities, possibly due to pain vs. true neurologic deficit.   - lidocaine patch to lower back  - c/w oxycodone ER 10 mg q12h  - increase gabapentin 300 mg TID and flexeril 10 mg TID (as per neurology)  - Percocet 5mg/325mg q4hr PRN for sev pain  - Prednisone 50 mg daily  - spinal epidural today  - Continue to assess pain and LE function  - f/u with pain management recs  - PT following  - f/u neurology recs

## 2018-07-13 NOTE — PROGRESS NOTE ADULT - SUBJECTIVE AND OBJECTIVE BOX
INTERVAL HPI/OVERNIGHT EVENTS:    SUBJECTIVE: Patient seen and examined at bedside.    OBJECTIVE:    VITAL SIGNS:  ICU Vital Signs Last 24 Hrs  T(C): 36.4 (13 Jul 2018 05:09), Max: 36.8 (12 Jul 2018 20:25)  T(F): 97.5 (13 Jul 2018 05:09), Max: 98.3 (12 Jul 2018 20:25)  HR: 65 (13 Jul 2018 05:09) (65 - 75)  BP: 109/64 (13 Jul 2018 05:09) (109/64 - 124/81)  BP(mean): --  ABP: --  ABP(mean): --  RR: 17 (13 Jul 2018 05:09) (16 - 18)  SpO2: 94% (13 Jul 2018 05:09) (94% - 97%)        CAPILLARY BLOOD GLUCOSE          PHYSICAL EXAM:    General: NAD  HEENT: NC/AT; PERRL, clear conjunctiva  Neck: supple  Respiratory: lungs CTA b/l, no wheezes or rhonchi  Cardiovascular: +S1/S2; RRR, no murmurs, rubs, or gallops  Abdomen: soft, non-tender, non-distended; +BS in all 4 quadrants  Extremities: WWP, 2+ peripheral pulses b/l; no LE edema  Skin: normal color and turgor; no rash  Neurological: AOx3, no focal deficits noted    MEDICATIONS:  MEDICATIONS  (STANDING):  cyclobenzaprine 10 milliGRAM(s) Oral two times a day  docusate sodium 100 milliGRAM(s) Oral three times a day  ferrous    sulfate 325 milliGRAM(s) Oral daily  gabapentin 300 milliGRAM(s) Oral three times a day  lidocaine   Patch 1 Patch Transdermal daily  oxyCODONE  ER Tablet 10 milliGRAM(s) Oral every 12 hours  polyethylene glycol 3350 17 Gram(s) Oral two times a day  predniSONE   Tablet 50 milliGRAM(s) Oral every 24 hours  senna 2 Tablet(s) Oral at bedtime    MEDICATIONS  (PRN):  ondansetron Injectable 4 milliGRAM(s) IV Push every 6 hours PRN Nausea and/or Vomiting  oxyCODONE    5 mG/acetaminophen 325 mG 2 Tablet(s) Oral every 4 hours PRN Moderate Pain (4 - 6)      ALLERGIES:  Allergies    No Known Allergies    Intolerances        LABS:                RADIOLOGY & ADDITIONAL TESTS: INTERVAL HPI/OVERNIGHT EVENTS: Overnight there were no acute events.     SUBJECTIVE: Patient seen and examined at bedside. This morning the patient explains that her pain has not improved at all over the past 24 hours. She is hopeful that she will be able to get the epidural injection today    OBJECTIVE:    VITAL SIGNS:  ICU Vital Signs Last 24 Hrs  T(C): 36.4 (13 Jul 2018 05:09), Max: 36.8 (12 Jul 2018 20:25)  T(F): 97.5 (13 Jul 2018 05:09), Max: 98.3 (12 Jul 2018 20:25)  HR: 65 (13 Jul 2018 05:09) (65 - 75)  BP: 109/64 (13 Jul 2018 05:09) (109/64 - 124/81)  BP(mean): --  ABP: --  ABP(mean): --  RR: 17 (13 Jul 2018 05:09) (16 - 18)  SpO2: 94% (13 Jul 2018 05:09) (94% - 97%)        CAPILLARY BLOOD GLUCOSE          PHYSICAL EXAM:    General: NAD  HEENT: NC/AT; PERRL, clear conjunctiva  Neck: supple  Respiratory: lungs CTA b/l, no wheezes or rhonchi  Cardiovascular: +S1/S2; RRR, no murmurs, rubs, or gallops  Abdomen: soft, non-tender, non-distended; +BS in all 4 quadrants  Extremities: WWP, 2+ peripheral pulses b/l; no LE edema  Skin: normal color and turgor; no rash  Neurological: AOx3, no focal deficits noted    MEDICATIONS:  MEDICATIONS  (STANDING):  cyclobenzaprine 10 milliGRAM(s) Oral two times a day  docusate sodium 100 milliGRAM(s) Oral three times a day  ferrous    sulfate 325 milliGRAM(s) Oral daily  gabapentin 300 milliGRAM(s) Oral three times a day  lidocaine   Patch 1 Patch Transdermal daily  oxyCODONE  ER Tablet 10 milliGRAM(s) Oral every 12 hours  polyethylene glycol 3350 17 Gram(s) Oral two times a day  predniSONE   Tablet 50 milliGRAM(s) Oral every 24 hours  senna 2 Tablet(s) Oral at bedtime    MEDICATIONS  (PRN):  ondansetron Injectable 4 milliGRAM(s) IV Push every 6 hours PRN Nausea and/or Vomiting  oxyCODONE    5 mG/acetaminophen 325 mG 2 Tablet(s) Oral every 4 hours PRN Moderate Pain (4 - 6)      ALLERGIES:  Allergies    No Known Allergies    Intolerances        LABS:                RADIOLOGY & ADDITIONAL TESTS: INTERVAL HPI/OVERNIGHT EVENTS: Overnight there were no acute events.     SUBJECTIVE: Patient seen and examined at bedside. This morning the patient explains that her pain has not improved at all over the past 24 hours. Today reports that whenever she moves her R leg, she now feels pulsing pain throughout her entire body. She is hopeful that she will be able to get the epidural injection today.     OBJECTIVE:    VITAL SIGNS:  ICU Vital Signs Last 24 Hrs  T(C): 36.4 (13 Jul 2018 05:09), Max: 36.8 (12 Jul 2018 20:25)  T(F): 97.5 (13 Jul 2018 05:09), Max: 98.3 (12 Jul 2018 20:25)  HR: 65 (13 Jul 2018 05:09) (65 - 75)  BP: 109/64 (13 Jul 2018 05:09) (109/64 - 124/81)  BP(mean): --  ABP: --  ABP(mean): --  RR: 17 (13 Jul 2018 05:09) (16 - 18)  SpO2: 94% (13 Jul 2018 05:09) (94% - 97%)      PHYSICAL EXAM:    General: NAD  HEENT: NC/AT; PERRL, clear conjunctiva  Neck: supple  Respiratory: lungs CTA b/l, no wheezes or rhonchi  Cardiovascular: +S1/S2; RRR, no murmurs, rubs, or gallops  Abdomen: soft, non-tender, non-distended; +BS in all 4 quadrants  Extremities: WWP, 2+ peripheral pulses b/l; no LE edema  Skin: normal color and turgor; no rash  Neurological: AOx3, no focal deficits noted    MEDICATIONS:  MEDICATIONS  (STANDING):  cyclobenzaprine 10 milliGRAM(s) Oral two times a day  docusate sodium 100 milliGRAM(s) Oral three times a day  ferrous    sulfate 325 milliGRAM(s) Oral daily  gabapentin 300 milliGRAM(s) Oral three times a day  lidocaine   Patch 1 Patch Transdermal daily  oxyCODONE  ER Tablet 10 milliGRAM(s) Oral every 12 hours  polyethylene glycol 3350 17 Gram(s) Oral two times a day  predniSONE   Tablet 50 milliGRAM(s) Oral every 24 hours  senna 2 Tablet(s) Oral at bedtime    MEDICATIONS  (PRN):  ondansetron Injectable 4 milliGRAM(s) IV Push every 6 hours PRN Nausea and/or Vomiting  oxyCODONE    5 mG/acetaminophen 325 mG 2 Tablet(s) Oral every 4 hours PRN Moderate Pain (4 - 6)      ALLERGIES:  Allergies    No Known Allergies    Intolerances        LABS:                RADIOLOGY & ADDITIONAL TESTS: INTERVAL HPI/OVERNIGHT EVENTS: Changed pain regimen as per neuro and pain yesterday. Bowel regimen increased. Overnight there were no acute events.     SUBJECTIVE: Patient seen and examined at bedside. This morning the patient explains that her pain has not improved at all over the past 24 hours. Today reports that whenever she moves her R leg, she now feels pulsing pain throughout her entire body. She is hopeful that she will be able to get the epidural injection today. No headaches, chest pain, palpitations, abdominal pain, nausea, or vomiting. Has not had a bowel movement since admission.     OBJECTIVE:    VITAL SIGNS:  ICU Vital Signs Last 24 Hrs  T(C): 36.4 (13 Jul 2018 05:09), Max: 36.8 (12 Jul 2018 20:25)  T(F): 97.5 (13 Jul 2018 05:09), Max: 98.3 (12 Jul 2018 20:25)  HR: 65 (13 Jul 2018 05:09) (65 - 75)  BP: 109/64 (13 Jul 2018 05:09) (109/64 - 124/81)  BP(mean): --  ABP: --  ABP(mean): --  RR: 17 (13 Jul 2018 05:09) (16 - 18)  SpO2: 94% (13 Jul 2018 05:09) (94% - 97%)      PHYSICAL EXAM:    General: NAD sitting still, barely moving legs  HEENT: NC/AT, PERRL, clear conjunctiva, MMM  Neck: supple, no JVD  Respiratory: lungs CTA b/l, no wheezes, rales, or rhonchi  Cardiovascular: +S1/S2; RRR, no murmurs, rubs, or gallops  Abdomen: soft, non-tender, non-distended;  hypoactive BS in all 4 quadrants  Back: paraspinal tenderness on lumbar palpation, difficult to assess as patient will not sit forward 2/2 pain  Extremities: WWP, 2+ peripheral pulses b/l; no LE edema  Neurological: AOx3, on strength exam, lower extremities strength 4/5 bilaterally and sensation to light touch intact b/l. Upper extremity strength 5/5 with sensation intact.       MEDICATIONS:  MEDICATIONS  (STANDING):  cyclobenzaprine 10 milliGRAM(s) Oral two times a day  docusate sodium 100 milliGRAM(s) Oral three times a day  ferrous    sulfate 325 milliGRAM(s) Oral daily  gabapentin 300 milliGRAM(s) Oral three times a day  lidocaine   Patch 1 Patch Transdermal daily  oxyCODONE  ER Tablet 10 milliGRAM(s) Oral every 12 hours  polyethylene glycol 3350 17 Gram(s) Oral two times a day  predniSONE   Tablet 50 milliGRAM(s) Oral every 24 hours  senna 2 Tablet(s) Oral at bedtime    MEDICATIONS  (PRN):  ondansetron Injectable 4 milliGRAM(s) IV Push every 6 hours PRN Nausea and/or Vomiting  oxyCODONE    5 mG/acetaminophen 325 mG 2 Tablet(s) Oral every 4 hours PRN Moderate Pain (4 - 6)      ALLERGIES:  Allergies    No Known Allergies      LABS:                          11.8   10.0  )-----------( 316      ( 13 Jul 2018 08:36 )             36.9   07-13    137  |  100  |  14  ----------------------------<  95  4.4   |  26  |  0.50    Ca    9.5      13 Jul 2018 08:36  Mg     2.1     07-13      RADIOLOGY & ADDITIONAL TESTS:     MR lumbar spine on admission: "Small central herniated disc at L4/L5 causing slight indentation on the thecal sac."

## 2018-07-13 NOTE — PROGRESS NOTE ADULT - ASSESSMENT
41yo woman with PMH anemia, herniated cervical disc, cholecystectomy, RYGB (2009), intussusception of jejunojejunal anastomosis (2015), and subsequent resection of dilated jejunojejunal anastomosis (Feb 2017) with small bowel anastomosis, recently admitted in June for pelvis laproscopy with lysis of adhesions, admitted with lower back pain.    #Acute herniated disc at L4/5 seen on MR lumbar spine with b/l lateral recess stenosis / mild L5 nerve root impingement  -MRI negative for cauda epuina/conus medullaris syndrome, neoplasm or abscess  -recommend prednisone 50mg PO x 7 days (7/10 - 7/16)  -c/w gabapentin to 300TID   -c/w flexeril to 10 bid; increase both as needed/ tolerated   -f/u neurosurgery recs  -for possible epidural steroid injection today 41yo woman with PMH anemia, herniated cervical disc, cholecystectomy, RYGB (2009), intussusception of jejunojejunal anastomosis (2015), and subsequent resection of dilated jejunojejunal anastomosis (Feb 2017) with small bowel anastomosis, recently admitted in June for pelvis laproscopy with lysis of adhesions, admitted with lower back pain.    #Acute herniated disc at L4/5 seen on MR lumbar spine with b/l lateral recess stenosis / mild L5 nerve root impingement  -MRI negative for cauda epuina/conus medullaris syndrome, neoplasm or abscess  -recommend prednisone 50mg PO x 7 days (7/10 - 7/16)  -c/w gabapentin to 300TID   -c/w flexeril to 10 bid; increase both as needed/ tolerated   -f/u neurosurgery recs  -s/p epidural steroid injection per IR today  -plantar reflexes upgoing R>L, recommends MR thoracic spine and MR cervical spine

## 2018-07-13 NOTE — PROGRESS NOTE ADULT - SUBJECTIVE AND OBJECTIVE BOX
NEUROLOGY CONSULT PROGRESS NOTE    INTERVAL HISTORY: Reports 10/10 pain (although appears comfortable), pain is radiating down LE bilaterally. Reports LE numbness (RLE>LLE) as well as new onset RUE tingling sensation and numbness. No fever, chills, SOB, n/v or diarrhea.     REVIEW OF SYSTEMS:  As per HPI, otherwise negative for Constitutional, Eyes, Ears/Nose/Mouth/Throat, Neck, Cardiovascular, Respiratory, Gastrointestinal, Genitourinary, Skin, Endocrine, Musculoskeletal, Psychiatric, and Hematologic/Lymphatic.    MEDICATIONS:  cyclobenzaprine 10 milliGRAM(s) Oral two times a day  docusate sodium 100 milliGRAM(s) Oral three times a day  ferrous    sulfate 325 milliGRAM(s) Oral daily  gabapentin 300 milliGRAM(s) Oral three times a day  lidocaine   Patch 1 Patch Transdermal daily  ondansetron Injectable 4 milliGRAM(s) IV Push every 6 hours PRN  oxyCODONE    5 mG/acetaminophen 325 mG 2 Tablet(s) Oral every 4 hours PRN  oxyCODONE  ER Tablet 10 milliGRAM(s) Oral every 12 hours  polyethylene glycol 3350 17 Gram(s) Oral two times a day  predniSONE   Tablet 50 milliGRAM(s) Oral every 24 hours  senna 2 Tablet(s) Oral at bedtime    VITAL SIGNS:  Vital Signs Last 24 Hrs  T(C): 36.4 (13 Jul 2018 05:09), Max: 36.8 (12 Jul 2018 20:25)  T(F): 97.5 (13 Jul 2018 05:09), Max: 98.3 (12 Jul 2018 20:25)  HR: 65 (13 Jul 2018 05:09) (65 - 75)  BP: 109/64 (13 Jul 2018 05:09) (109/64 - 124/81)  BP(mean): --  RR: 17 (13 Jul 2018 05:09) (16 - 18)  SpO2: 94% (13 Jul 2018 05:09) (94% - 97%)    PHYSICAL EXAMINATION:  Constitutional: WDWN; NAD  Eyes: anicteric sclera  Cardiovascular: +S1/S2, RRR; no M/R/G  Neurologic:  - Mental Status:  AAOx3; speech is fluent with intact naming, repetition, and comprehension  - Cranial Nerves II-XII:    II:  PERRLA; visual fields are full to confrontation  III, IV, VI:  EOMI, no nystagmus  V:  facial sensation is intact in the V1-V3 distribution bilaterally.  VII:  face is symmetric with normal eye closure and smile  VIII:  hearing is intact to finger rub  IX, X:  uvula is midline and soft palate rises symmetrically  XI:  head turning and shoulder shrug are intact bilaterally  XII:  tongue protrudes in the midline  - Motor:  strength is 5/5 throughout in b/l UE; normal muscle bulk and tone throughout; no pronator drift. Strength LE deferred given acute pain.    - Reflexes:  2+ and symmetric at the biceps, triceps, knees;  plantar reflexes downgoing bilaterally  - Sensory:  decreased sensation (light touch, vibration) in b/l LE: RLE>LLE (ankle to inguinal region); reports tingling sensation/numbing at RUE as well.  - Coordination:  finger-nose-finger intact  - Gait:  deferred    LABS:                          11.8   10.0  )-----------( 316      ( 13 Jul 2018 08:36 )             36.9     07-13    137  |  100  |  14  ----------------------------<  95  4.4   |  26  |  0.50    Ca    9.5      13 Jul 2018 08:36  Mg     2.1     07-13            RADIOLOGY & ADDITIONAL STUDIES: NEUROLOGY CONSULT PROGRESS NOTE    INTERVAL HISTORY: Reports 10/10 pain (although appears comfortable), pain is radiating down LE bilaterally. Reports LE numbness (RLE>LLE) as well as new onset RUE tingling sensation and numbness. No fever, chills, SOB, n/v or diarrhea.     REVIEW OF SYSTEMS:  As per HPI, otherwise negative for Constitutional, Eyes, Ears/Nose/Mouth/Throat, Neck, Cardiovascular, Respiratory, Gastrointestinal, Genitourinary, Skin, Endocrine, Musculoskeletal, Psychiatric, and Hematologic/Lymphatic.    MEDICATIONS:  cyclobenzaprine 10 milliGRAM(s) Oral two times a day  docusate sodium 100 milliGRAM(s) Oral three times a day  ferrous    sulfate 325 milliGRAM(s) Oral daily  gabapentin 300 milliGRAM(s) Oral three times a day  lidocaine   Patch 1 Patch Transdermal daily  ondansetron Injectable 4 milliGRAM(s) IV Push every 6 hours PRN  oxyCODONE    5 mG/acetaminophen 325 mG 2 Tablet(s) Oral every 4 hours PRN  oxyCODONE  ER Tablet 10 milliGRAM(s) Oral every 12 hours  polyethylene glycol 3350 17 Gram(s) Oral two times a day  predniSONE   Tablet 50 milliGRAM(s) Oral every 24 hours  senna 2 Tablet(s) Oral at bedtime    VITAL SIGNS:  Vital Signs Last 24 Hrs  T(C): 36.4 (13 Jul 2018 05:09), Max: 36.8 (12 Jul 2018 20:25)  T(F): 97.5 (13 Jul 2018 05:09), Max: 98.3 (12 Jul 2018 20:25)  HR: 65 (13 Jul 2018 05:09) (65 - 75)  BP: 109/64 (13 Jul 2018 05:09) (109/64 - 124/81)  BP(mean): --  RR: 17 (13 Jul 2018 05:09) (16 - 18)  SpO2: 94% (13 Jul 2018 05:09) (94% - 97%)    PHYSICAL EXAMINATION:  Constitutional: WDWN; NAD  Eyes: anicteric sclera  Cardiovascular: +S1/S2, RRR; no M/R/G  Neurologic:  - Mental Status:  AAOx3; speech is fluent with intact naming, repetition, and comprehension  - Cranial Nerves II-XII:    II:  PERRLA; visual fields are full to confrontation  III, IV, VI:  EOMI, no nystagmus  V:  facial sensation is intact in the V1-V3 distribution bilaterally.  VII:  face is symmetric with normal eye closure and smile  VIII:  hearing is intact to finger rub  IX, X:  uvula is midline and soft palate rises symmetrically  XI:  head turning and shoulder shrug are intact bilaterally  XII:  tongue protrudes in the midline  - Motor:  strength is 5/5 throughout in b/l UE; normal muscle bulk and tone throughout; no pronator drift. Strength LE deferred given acute pain.    - Reflexes:  2+ and symmetric at the biceps, triceps, knees;  plantar reflexes upgoing R>L  - Sensory:  decreased sensation (light touch, vibration) in b/l LE: RLE>LLE (ankle to inguinal region); reports tingling sensation/numbing at RUE as well.  - Coordination:  finger-nose-finger intact  - Gait:  deferred    LABS:                          11.8   10.0  )-----------( 316      ( 13 Jul 2018 08:36 )             36.9     07-13    137  |  100  |  14  ----------------------------<  95  4.4   |  26  |  0.50    Ca    9.5      13 Jul 2018 08:36  Mg     2.1     07-13            RADIOLOGY & ADDITIONAL STUDIES:

## 2018-07-13 NOTE — PROGRESS NOTE ADULT - ASSESSMENT
43yo woman with PMH anemia, herniated cervical disc, cholecystectomy, RYGB (2009), intussusception of jejunojejunal anastomosis (2015), and subsequent resection of dilated jejunojejunal anastomosis (Feb 2017) with small bowel anastomosis, recently admitted in June for pelvis laproscopy with lysis of adhesions, who presented with acute onset low back pain leading to inability to ambulate. Currently stable but with some improved pain on movement of pt's legs. MRI r/o cauda equina, will not get cervical or thoracic MRI yet because exam does not this pathology. Will continue to monitor pain control and adjust medications appropriately to improve pain relief. May elect to do spinal epidural for this patient. 43yo woman with PMH anemia, herniated cervical disc, cholecystectomy, RYGB (2009), intussusception of jejunojejunal anastomosis (2015), and subsequent resection of dilated jejunojejunal anastomosis (Feb 2017) with small bowel anastomosis, recently admitted in June for pelvis laproscopy with lysis of adhesions, who presented with acute onset low back pain leading to inability to ambulate. Currently stable but with some improved pain on movement of pt's legs. MRI r/o cauda equina, will not get cervical or thoracic MRI yet because exam does not this pathology.  Patient will get spinal epidural today. Will continue to monitor pain control and adjust medications appropriately to improve pain relief.

## 2018-07-13 NOTE — PROGRESS NOTE ADULT - PROBLEM SELECTOR PLAN 2
Pt with h/o RYGB with subsequent complications, now constipated in setting of acute pain and opiates.    -senna  -miralax  -colace tid  -Zofran as needed for nausea Pt with h/o RYGB with subsequent complications, now constipated in setting of acute pain and opiates.    -senna 2 tablets  -miralax BID  -colace TID  -Zofran as needed for nausea

## 2018-07-14 PROCEDURE — 99232 SBSQ HOSP IP/OBS MODERATE 35: CPT

## 2018-07-14 PROCEDURE — 72141 MRI NECK SPINE W/O DYE: CPT | Mod: 26

## 2018-07-14 PROCEDURE — 72146 MRI CHEST SPINE W/O DYE: CPT | Mod: 26

## 2018-07-14 RX ORDER — OXYCODONE HYDROCHLORIDE 5 MG/1
1 TABLET ORAL
Qty: 14 | Refills: 0 | OUTPATIENT
Start: 2018-07-14 | End: 2018-07-20

## 2018-07-14 RX ADMIN — OXYCODONE HYDROCHLORIDE 10 MILLIGRAM(S): 5 TABLET ORAL at 17:57

## 2018-07-14 RX ADMIN — GABAPENTIN 300 MILLIGRAM(S): 400 CAPSULE ORAL at 21:06

## 2018-07-14 RX ADMIN — Medication 325 MILLIGRAM(S): at 13:12

## 2018-07-14 RX ADMIN — Medication 100 MILLIGRAM(S): at 13:10

## 2018-07-14 RX ADMIN — OXYCODONE HYDROCHLORIDE 10 MILLIGRAM(S): 5 TABLET ORAL at 18:59

## 2018-07-14 RX ADMIN — POLYETHYLENE GLYCOL 3350 17 GRAM(S): 17 POWDER, FOR SOLUTION ORAL at 05:34

## 2018-07-14 RX ADMIN — OXYCODONE AND ACETAMINOPHEN 2 TABLET(S): 5; 325 TABLET ORAL at 21:06

## 2018-07-14 RX ADMIN — GABAPENTIN 300 MILLIGRAM(S): 400 CAPSULE ORAL at 13:10

## 2018-07-14 RX ADMIN — CYCLOBENZAPRINE HYDROCHLORIDE 10 MILLIGRAM(S): 10 TABLET, FILM COATED ORAL at 05:34

## 2018-07-14 RX ADMIN — OXYCODONE AND ACETAMINOPHEN 2 TABLET(S): 5; 325 TABLET ORAL at 22:06

## 2018-07-14 RX ADMIN — SENNA PLUS 2 TABLET(S): 8.6 TABLET ORAL at 21:06

## 2018-07-14 RX ADMIN — OXYCODONE HYDROCHLORIDE 10 MILLIGRAM(S): 5 TABLET ORAL at 06:38

## 2018-07-14 RX ADMIN — POLYETHYLENE GLYCOL 3350 17 GRAM(S): 17 POWDER, FOR SOLUTION ORAL at 17:57

## 2018-07-14 RX ADMIN — GABAPENTIN 300 MILLIGRAM(S): 400 CAPSULE ORAL at 05:34

## 2018-07-14 RX ADMIN — Medication 50 MILLIGRAM(S): at 14:43

## 2018-07-14 RX ADMIN — Medication 100 MILLIGRAM(S): at 05:34

## 2018-07-14 RX ADMIN — OXYCODONE HYDROCHLORIDE 10 MILLIGRAM(S): 5 TABLET ORAL at 05:38

## 2018-07-14 RX ADMIN — LIDOCAINE 1 PATCH: 4 CREAM TOPICAL at 13:10

## 2018-07-14 RX ADMIN — Medication 0.5 MILLIGRAM(S): at 10:14

## 2018-07-14 RX ADMIN — CYCLOBENZAPRINE HYDROCHLORIDE 10 MILLIGRAM(S): 10 TABLET, FILM COATED ORAL at 17:57

## 2018-07-14 RX ADMIN — LIDOCAINE 1 PATCH: 4 CREAM TOPICAL at 01:00

## 2018-07-14 RX ADMIN — Medication 100 MILLIGRAM(S): at 21:06

## 2018-07-14 NOTE — PROGRESS NOTE ADULT - PROBLEM SELECTOR PLAN 1
Acute back pain ddx broad inclusive of MSK strain vs radiculopathy bowel inflammation/constipation. Vital signs, labs and urine reassuring that there is no infectious process. Imaging identifies a herniated disk at L4/5 which is likely resulting in radiculopathy to LE's, consistent with exam. Per neurosx, no surgical intervention warranted at this time. However, more improvement in symptoms with pain management regimen would be expected for purely musculoskeletal physiology - pain relief not achieved with opiates. Pain service consulted to help manage pain long term. Of note, pt constipated (no BM since admission) but no abd distention or impaction noted on imaging, may be exacerbating symptoms. The patient also weak bilaterally in lower extremities, possibly due to pain vs. true neurologic deficit.   - lidocaine patch to lower back  - c/w oxycodone ER 10 mg q12h  - increase gabapentin 300 mg TID and flexeril 10 mg TID (as per neurology)  - Percocet 5mg/325mg q4hr PRN for sev pain  - Prednisone 50 mg daily  - spinal epidural today  - Continue to assess pain and LE function  - f/u with pain management recs  - PT following  - f/u neurology recs Acute back pain ddx broad inclusive of MSK strain vs radiculopathy bowel inflammation/constipation. Vital signs, labs and urine reassuring that there is no infectious process. Imaging identifies a herniated disk at L4/5 which is likely resulting in radiculopathy to LE's, consistent with exam. Per neurosx, no surgical intervention warranted at this time. However, more improvement in symptoms with pain management regimen would be expected for purely musculoskeletal physiology - pain relief not achieved with opiates. Pain service consulted to help manage pain long term. Of note, pt constipated (BM yesterday) but no abd distention or impaction noted on imaging, may be exacerbating symptoms. The patient also weak bilaterally in lower extremities, possibly due to pain vs. true neurologic deficit.   - lidocaine patch to lower back  - c/w oxycodone ER 10 mg q12h  - increase gabapentin 300 mg TID and flexeril 10 mg TID (as per neurology)  - Percocet 5mg/325mg q4hr PRN for sev pain  - Prednisone 50 mg daily  - spinal epidural 7/13 did not improve patient's pain  - Continue to assess pain and LE function  - f/u with pain management recs  - PT following  - f/u neurology recs

## 2018-07-14 NOTE — PROGRESS NOTE ADULT - PROVIDER SPECIALTY LIST ADULT
Internal Medicine
Neurology
Pain Medicine
Pain Medicine
Internal Medicine
Internal Medicine

## 2018-07-14 NOTE — DIETITIAN INITIAL EVALUATION ADULT. - OTHER INFO
42F admitted w severe back pain causing inability to ambulate, pain being managed at this time, DC course pending pain management. No significant PMHx, RYGB 2009 noted. Pt presenting with herniated cervical disc. No noted n/v/d/c, chewing/ swallowing/ pain impacting intake, skin WNL, no noted changes in UBW PTA. Noted to be tolerating diet % at this time with no complaints.

## 2018-07-14 NOTE — PROGRESS NOTE ADULT - PROBLEM SELECTOR PROBLEM 3
Herniated cervical disc

## 2018-07-14 NOTE — DIETITIAN INITIAL EVALUATION ADULT. - PROBLEM SELECTOR PLAN 1
Acute back pain ddx broad inclusive of MSK strain vs spinal cord impingement/abscess or fracture, vs. gyn/pelvic inflammation/infxn, vs. kidney stone vs. bowel inflammation/constipation. Vital signs, labs and urine reassuring that there is no infectious process. Imaging identifies a herniated disk at L4/5 which is likely resulting in radiculopathy to LE's, consistent with exam. Per neurosx, no surgical intervention warranted at this time. Pain management and mobility likely to improve symptoms. Pain relief not achieved with opiates, thus far. However, pt may have higher tolerance given recent use of same. Will try PO percocet, as less nauseous with same. Will consult pain service to help manage pain long term. Of note, pt constipated but no abd distention or impaction noted on imaging, which can be exacerbating symptoms.   -lidocaine patch to lower back  -gabapentin 100 mg TID   -Percocet 5mg/325mg q4hr PRN for sev pain  -Pain consult  -PT eval

## 2018-07-14 NOTE — PROGRESS NOTE ADULT - PROBLEM SELECTOR PROBLEM 1
Acute midline back pain, unspecified back location

## 2018-07-14 NOTE — PROGRESS NOTE ADULT - PROBLEM SELECTOR PLAN 2
Pt with h/o RYGB with subsequent complications, now constipated in setting of acute pain and opiates.    -senna 2 tablets  -miralax BID  -colace TID  -Zofran as needed for nausea Pt with h/o RYGB with subsequent complications, now constipated in setting of acute pain and opiates.  Had a bowel movement yesterday  -senna 2 tablets  -miralax BID  -colace TID  -Zofran as needed for nausea

## 2018-07-14 NOTE — PROGRESS NOTE ADULT - PROBLEM SELECTOR PLAN 5
Pt young and IMPROVE score of 0; no need for VTE ppx.

## 2018-07-14 NOTE — PROGRESS NOTE ADULT - PROBLEM SELECTOR PLAN 3
Pt with chronic stable pain; some neck discomfort, but currently denying pain. Followed in o/p.   -O/P f/u Pt with chronic stable pain; some neck discomfort, but currently denying pain. Followed in o/p.   -Had MRI of cervical and thoracic spine today showing small disc herniations in the cervical cord but no cord compression.   -O/P f/u

## 2018-07-14 NOTE — PROGRESS NOTE ADULT - PROBLEM SELECTOR PLAN 4
-Reg diet  -continue home iron

## 2018-07-14 NOTE — DIETITIAN INITIAL EVALUATION ADULT. - ENERGY NEEDS
Ideal body weight used for calculations as pt >120% of IBW.   50kg IBW, 144% IBW, BMI 29.3   guidelines for normal adult nutrition used

## 2018-07-14 NOTE — PROGRESS NOTE ADULT - ATTENDING COMMENTS
Pt with complaint of back pain from lower back radiating up to neck and head. Found to have acute herniated disc at L4/5 seen on MR lumbar spine with b/l lateral recess stenosis / mild L5 nerve root impingement. Had injection today which helped somewhat.    Exam today reveals LE hyperreflexia with BL upgoing toes but no clear clonus. Strength is intact at maximal effort, with variable effort due to pain in the LEs.    AP: Likely pain related to acute disc herniation. However, exam is concerning for myelopathy.    - check MRI T+C  - pain control  - nsgy  - if C+T spine do not show e/o acute myelopathy, no neurological contraindication to discharge.
Pain appears slightly improved compared to yesterday. Increase flexeril to 5 TID. Can increase gabapentin to 300 TID tomorrow. Will see if epidural steroid injection can be arranged inpatient, otherwise can be done outpatient after discharge.
Patient seen and examined at 11 AM.     Subjective: No improvement in pain, now states she has back pain when she flexes her neck. Complains of decreased sensation anterior R thigh. Unable to work with PT.     Exam: afebrile, VSS, appears comfortable with minimal movement  HEENT: MMM  CV: S1+S2,   Lungs: CTA b/l  Back: exquisite paraspinal muscle tenderness on R lumbar area, no spinal tenderness  Neuro: +R SLR, unable to lift legs due to pain but does lift minimally when asked to, able to move toes, sensation intact    A/P: 42 year old F w/ PMH of R cervical herniated disc, recent lysis of adhesions p/w back pain.     1. Acute back pain: likely lumbosacral sprain vs herniated disc at L4/L5, no neurosurgical intervention, r/o cauda equina w/ MRI overnight, despite increase in pain meds patient reports no symptom relief, now with pain upon neck flexion, low suspicion for infectious etiology in spine, neuro exam limited by pain, c/w pain control pain management recs, f/u neuro: 7 day course of steroids    I was physically present for the key portions of the evaluation and management (E/M) service provided.  I agree with the above history, physical, and plan which I have reviewed and edited where appropriate.     35 minutes spent on total encounter; more than 50% of the visit was spent counseling and/or coordinating care by the attending physician.     Plan discussed with Primary Team.
Patient seen and examined at 10 AM.     Subjective: Mild improvement in pain. Has been able to maneuver in bed a little more.     Exam: afebrile, VSS, appears comfortable with minimal movement  HEENT: MMM  CV: S1+S2,   Lungs: CTA b/l  Back: exquisite paraspinal muscle tenderness on R lumbar area, no spinal tenderness  Neuro: +R SLR, unable to lift legs due to pain but does lift minimally when asked to, able to move toes, sensation intact    A/P: 42 year old F w/ PMH of R cervical herniated disc, recent lysis of adhesions p/w back pain.     1. Acute back pain: herniated disc at L4/L5, no neurosurgical intervention, ruled out for cauda equina w/ MRI overnight, patient with mild relief with current pain regiment but knows to ask for meds when she needs, low suspicion for infectious etiology in spine, neuro exam limited by pain, c/w pain control pain management recs, c/w prednisone and muscle relaxant, no cervical or thoracic pathology so will hold off on MRI given improvement    I was physically present for the key portions of the evaluation and management (E/M) service provided.  I agree with the above history, physical, and plan which I have reviewed and edited where appropriate.     35 minutes spent on total encounter; more than 50% of the visit was spent counseling and/or coordinating care by the attending physician.     Plan discussed with Primary Team.
Pt seen and examined at bedside. Agree with above with the following additions/addendums    1)Back pain  2) Constipation  3) Herniated cervical disk    Pain improved after successful epidural. Had MRI cervical and thoracic spine today without acute findings. Pt felt uncomfortable leaving without a rolling walking. Will discuss with PT in AM. Likely DC tomorrow with followup for pain management
Patient seen and examined at bedside. Exam as above.     Feels better but still says pain is not controlled. Neuro attempted for patient to have epidural today but service busy. F/u pain managements recs. Plan for IR epidural in AM. Yet to work with PT and will likely need re-evaluation.
Patient seen and examined at bedside.     Pain improved. Able to move her legs more. Refuses to work with PT. Going down for epidural w/ IR.     If stable after epidural and seen by PT, stable for discharge. Will d/c with pain meds and pain management follow up.

## 2018-07-14 NOTE — PROGRESS NOTE ADULT - ASSESSMENT
41yo woman with PMH anemia, herniated cervical disc, cholecystectomy, RYGB (2009), intussusception of jejunojejunal anastomosis (2015), and subsequent resection of dilated jejunojejunal anastomosis (Feb 2017) with small bowel anastomosis, recently admitted in June for pelvis laproscopy with lysis of adhesions, who presented with acute onset low back pain leading to inability to ambulate. Currently stable but with some improved pain on movement of pt's legs. MRI r/o cauda equina, will not get cervical or thoracic MRI yet because exam does not this pathology.  Patient will get spinal epidural today. Will continue to monitor pain control and adjust medications appropriately to improve pain relief.

## 2018-07-14 NOTE — PROGRESS NOTE ADULT - SUBJECTIVE AND OBJECTIVE BOX
INTERVAL HPI/OVERNIGHT EVENTS: Overnight, there were no acute events for this patient.     SUBJECTIVE: Patient seen and examined at bedside. The patient explains that she still is in pain. She is getting frustrated that she is not getting any answers and that the scans she has gotten have been negative for anything that would explain her pain symptoms. Today she explains that she now feels numb in her legs and she thinks that they are swollen. Otherwise denying any chest pain, palpitations, shortness of breath, abdominal pain, N/V/D. Had a solid bowel movement yesterday.     OBJECTIVE:    VITAL SIGNS:  ICU Vital Signs Last 24 Hrs  T(C): 36.6 (14 Jul 2018 08:41), Max: 36.9 (13 Jul 2018 20:11)  T(F): 97.8 (14 Jul 2018 08:41), Max: 98.5 (13 Jul 2018 20:11)  HR: 55 (14 Jul 2018 08:41) (55 - 78)  BP: 119/62 (14 Jul 2018 08:41) (119/62 - 146/68)  BP(mean): --  ABP: --  ABP(mean): --  RR: 18 (14 Jul 2018 08:41) (16 - 18)  SpO2: 96% (14 Jul 2018 08:41) (95% - 96%)        PHYSICAL EXAM:    General: NAD, laying in bed, looks worried.   HEENT: NC/AT, PERRL, clear conjunctiva, MMM  Neck: supple, no JVD  Respiratory: lungs CTA b/l, no wheezes, rales, or rhonchi  Cardiovascular: +S1/S2; RRR, no murmurs, rubs, or gallops  Abdomen: soft, non-tender, non-distended;  hypoactive BS in all 4 quadrants  Back: paraspinal tenderness on lumbar palpation, difficult to assess as patient will not sit forward 2/2 pain  Extremities: WWP, 2+ peripheral pulses b/l; no LE edema  Neurological: AOx3, on strength exam, lower extremities strength 4/5 bilaterally and sensation to light touch intact b/l. Upper extremity strength 5/5 with sensation intact.       MEDICATIONS:  MEDICATIONS  (STANDING):  cyclobenzaprine 10 milliGRAM(s) Oral two times a day  docusate sodium 100 milliGRAM(s) Oral three times a day  ferrous    sulfate 325 milliGRAM(s) Oral daily  gabapentin 300 milliGRAM(s) Oral three times a day  lidocaine   Patch 1 Patch Transdermal daily  oxyCODONE  ER Tablet 10 milliGRAM(s) Oral every 12 hours  polyethylene glycol 3350 17 Gram(s) Oral two times a day  predniSONE   Tablet 50 milliGRAM(s) Oral every 24 hours  senna 2 Tablet(s) Oral at bedtime    MEDICATIONS  (PRN):  ondansetron Injectable 4 milliGRAM(s) IV Push every 6 hours PRN Nausea and/or Vomiting  oxyCODONE    5 mG/acetaminophen 325 mG 2 Tablet(s) Oral every 4 hours PRN Moderate Pain (4 - 6)      ALLERGIES:  Allergies    No Known Allergies    Intolerances        LABS:                        11.8   10.0  )-----------( 316      ( 13 Jul 2018 08:36 )             36.9     07-13    137  |  100  |  14  ----------------------------<  95  4.4   |  26  |  0.50    Ca    9.5      13 Jul 2018 08:36  Mg     2.1     07-13            RADIOLOGY & ADDITIONAL TESTS: INTERVAL HPI/OVERNIGHT EVENTS: Overnight, there were no acute events for this patient.     SUBJECTIVE: Patient seen and examined at bedside. The patient explains that she still is in pain. She is getting frustrated that she is not getting any answers and that the scans she has gotten have been negative for anything that would explain her pain symptoms. Today she explains that she now feels numb in her legs and she thinks that they are swollen. Otherwise denying any chest pain, palpitations, shortness of breath, abdominal pain, N/V/D. Had a solid bowel movement yesterday. Pt will have MRI of the cervical and thoracic spine done today.     OBJECTIVE:    VITAL SIGNS:  ICU Vital Signs Last 24 Hrs  T(C): 36.6 (14 Jul 2018 08:41), Max: 36.9 (13 Jul 2018 20:11)  T(F): 97.8 (14 Jul 2018 08:41), Max: 98.5 (13 Jul 2018 20:11)  HR: 55 (14 Jul 2018 08:41) (55 - 78)  BP: 119/62 (14 Jul 2018 08:41) (119/62 - 146/68)  BP(mean): --  ABP: --  ABP(mean): --  RR: 18 (14 Jul 2018 08:41) (16 - 18)  SpO2: 96% (14 Jul 2018 08:41) (95% - 96%)        PHYSICAL EXAM:    General: NAD, laying in bed, looks worried.   HEENT: NC/AT, PERRL, clear conjunctiva, MMM  Neck: supple, no JVD  Respiratory: lungs CTA b/l, no wheezes, rales, or rhonchi  Cardiovascular: +S1/S2; RRR, no murmurs, rubs, or gallops  Abdomen: soft, non-tender, non-distended;  +BS in all 4 quadrants  Back: paraspinal tenderness on lumbar palpation, difficult to assess anymore as patient is in extreme pain when she tries to sit forward  Extremities: WWP, 2+ peripheral pulses b/l; did not appreciate any LE swelling or edema.   Neurological: AOx3, on strength exam, lower extremities strength 4/5 bilaterally and sensation to light touch pt says that she feels L>R. Upper extremity strength 5/5 with sensation intact.       MEDICATIONS:  MEDICATIONS  (STANDING):  cyclobenzaprine 10 milliGRAM(s) Oral two times a day  docusate sodium 100 milliGRAM(s) Oral three times a day  ferrous    sulfate 325 milliGRAM(s) Oral daily  gabapentin 300 milliGRAM(s) Oral three times a day  lidocaine   Patch 1 Patch Transdermal daily  oxyCODONE  ER Tablet 10 milliGRAM(s) Oral every 12 hours  polyethylene glycol 3350 17 Gram(s) Oral two times a day  predniSONE   Tablet 50 milliGRAM(s) Oral every 24 hours  senna 2 Tablet(s) Oral at bedtime    MEDICATIONS  (PRN):  ondansetron Injectable 4 milliGRAM(s) IV Push every 6 hours PRN Nausea and/or Vomiting  oxyCODONE    5 mG/acetaminophen 325 mG 2 Tablet(s) Oral every 4 hours PRN Moderate Pain (4 - 6)      ALLERGIES:  Allergies    No Known Allergies    LABS:                        11.8   10.0  )-----------( 316      ( 13 Jul 2018 08:36 )             36.9     07-13    137  |  100  |  14  ----------------------------<  95  4.4   |  26  |  0.50    Ca    9.5      13 Jul 2018 08:36  Mg     2.1     07-13      RADIOLOGY & ADDITIONAL TESTS:   MRI cervical and thoracic spine: "Small central disc herniation at T7-8 with slight exaggeration of kyphosis at this level. No compression deformity or osseous edema. Otherwise, normal MRI of the thoracic spine. No cervical spinal stenosis or neural foraminal narrowing. No cord   compression or signal abnormality. Small disc herniations at C3-4, C4-5   and C5-6."

## 2018-07-14 NOTE — PROGRESS NOTE ADULT - PROBLEM SELECTOR PROBLEM 6
Transition of care performed with sharing of clinical summary

## 2018-07-15 VITALS
HEART RATE: 70 BPM | RESPIRATION RATE: 18 BRPM | DIASTOLIC BLOOD PRESSURE: 78 MMHG | OXYGEN SATURATION: 97 % | SYSTOLIC BLOOD PRESSURE: 117 MMHG | TEMPERATURE: 98 F

## 2018-07-15 PROCEDURE — 83735 ASSAY OF MAGNESIUM: CPT

## 2018-07-15 PROCEDURE — 85027 COMPLETE CBC AUTOMATED: CPT

## 2018-07-15 PROCEDURE — 62323 NJX INTERLAMINAR LMBR/SAC: CPT

## 2018-07-15 PROCEDURE — 97161 PT EVAL LOW COMPLEX 20 MIN: CPT

## 2018-07-15 PROCEDURE — 85730 THROMBOPLASTIN TIME PARTIAL: CPT

## 2018-07-15 PROCEDURE — 80053 COMPREHEN METABOLIC PANEL: CPT

## 2018-07-15 PROCEDURE — 99239 HOSP IP/OBS DSCHRG MGMT >30: CPT

## 2018-07-15 PROCEDURE — 84100 ASSAY OF PHOSPHORUS: CPT

## 2018-07-15 PROCEDURE — 96374 THER/PROPH/DIAG INJ IV PUSH: CPT

## 2018-07-15 PROCEDURE — 93005 ELECTROCARDIOGRAM TRACING: CPT

## 2018-07-15 PROCEDURE — 36415 COLL VENOUS BLD VENIPUNCTURE: CPT

## 2018-07-15 PROCEDURE — 97110 THERAPEUTIC EXERCISES: CPT

## 2018-07-15 PROCEDURE — 97530 THERAPEUTIC ACTIVITIES: CPT

## 2018-07-15 PROCEDURE — 72148 MRI LUMBAR SPINE W/O DYE: CPT

## 2018-07-15 PROCEDURE — 72146 MRI CHEST SPINE W/O DYE: CPT

## 2018-07-15 PROCEDURE — 80048 BASIC METABOLIC PNL TOTAL CA: CPT

## 2018-07-15 PROCEDURE — 81003 URINALYSIS AUTO W/O SCOPE: CPT

## 2018-07-15 PROCEDURE — 84702 CHORIONIC GONADOTROPIN TEST: CPT

## 2018-07-15 PROCEDURE — 99285 EMERGENCY DEPT VISIT HI MDM: CPT | Mod: 25

## 2018-07-15 PROCEDURE — 72141 MRI NECK SPINE W/O DYE: CPT

## 2018-07-15 PROCEDURE — 96376 TX/PRO/DX INJ SAME DRUG ADON: CPT

## 2018-07-15 PROCEDURE — 96375 TX/PRO/DX INJ NEW DRUG ADDON: CPT

## 2018-07-15 PROCEDURE — 74176 CT ABD & PELVIS W/O CONTRAST: CPT

## 2018-07-15 PROCEDURE — 85610 PROTHROMBIN TIME: CPT

## 2018-07-15 RX ADMIN — CYCLOBENZAPRINE HYDROCHLORIDE 10 MILLIGRAM(S): 10 TABLET, FILM COATED ORAL at 05:32

## 2018-07-15 RX ADMIN — GABAPENTIN 300 MILLIGRAM(S): 400 CAPSULE ORAL at 05:32

## 2018-07-15 RX ADMIN — GABAPENTIN 300 MILLIGRAM(S): 400 CAPSULE ORAL at 13:03

## 2018-07-15 RX ADMIN — LIDOCAINE 1 PATCH: 4 CREAM TOPICAL at 11:29

## 2018-07-15 RX ADMIN — Medication 50 MILLIGRAM(S): at 14:51

## 2018-07-15 RX ADMIN — OXYCODONE AND ACETAMINOPHEN 2 TABLET(S): 5; 325 TABLET ORAL at 14:57

## 2018-07-15 RX ADMIN — POLYETHYLENE GLYCOL 3350 17 GRAM(S): 17 POWDER, FOR SOLUTION ORAL at 17:06

## 2018-07-15 RX ADMIN — OXYCODONE HYDROCHLORIDE 10 MILLIGRAM(S): 5 TABLET ORAL at 05:32

## 2018-07-15 RX ADMIN — POLYETHYLENE GLYCOL 3350 17 GRAM(S): 17 POWDER, FOR SOLUTION ORAL at 05:32

## 2018-07-15 RX ADMIN — LIDOCAINE 1 PATCH: 4 CREAM TOPICAL at 01:47

## 2018-07-15 RX ADMIN — OXYCODONE HYDROCHLORIDE 10 MILLIGRAM(S): 5 TABLET ORAL at 17:06

## 2018-07-15 RX ADMIN — Medication 325 MILLIGRAM(S): at 11:30

## 2018-07-15 RX ADMIN — OXYCODONE AND ACETAMINOPHEN 2 TABLET(S): 5; 325 TABLET ORAL at 11:55

## 2018-07-15 RX ADMIN — OXYCODONE HYDROCHLORIDE 10 MILLIGRAM(S): 5 TABLET ORAL at 06:32

## 2018-07-15 RX ADMIN — OXYCODONE AND ACETAMINOPHEN 2 TABLET(S): 5; 325 TABLET ORAL at 10:53

## 2018-07-15 RX ADMIN — OXYCODONE AND ACETAMINOPHEN 2 TABLET(S): 5; 325 TABLET ORAL at 15:53

## 2018-07-15 RX ADMIN — Medication 100 MILLIGRAM(S): at 13:03

## 2018-07-15 RX ADMIN — Medication 100 MILLIGRAM(S): at 05:32

## 2018-07-15 RX ADMIN — CYCLOBENZAPRINE HYDROCHLORIDE 10 MILLIGRAM(S): 10 TABLET, FILM COATED ORAL at 17:07

## 2018-07-19 DIAGNOSIS — M48.061 SPINAL STENOSIS, LUMBAR REGION WITHOUT NEUROGENIC CLAUDICATION: ICD-10-CM

## 2018-07-19 DIAGNOSIS — Z98.84 BARIATRIC SURGERY STATUS: ICD-10-CM

## 2018-07-19 DIAGNOSIS — M50.21 OTHER CERVICAL DISC DISPLACEMENT, HIGH CERVICAL REGION: ICD-10-CM

## 2018-07-19 DIAGNOSIS — M51.16 INTERVERTEBRAL DISC DISORDERS WITH RADICULOPATHY, LUMBAR REGION: ICD-10-CM

## 2018-07-19 DIAGNOSIS — K59.00 CONSTIPATION, UNSPECIFIED: ICD-10-CM
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
## 2018-07-19 DIAGNOSIS — N39.0 URINARY TRACT INFECTION, SITE NOT SPECIFIED: ICD-10-CM

## 2018-07-19 DIAGNOSIS — M54.9 DORSALGIA, UNSPECIFIED: ICD-10-CM

## 2018-07-19 DIAGNOSIS — D64.9 ANEMIA, UNSPECIFIED: ICD-10-CM
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
## 2018-07-30 ENCOUNTER — APPOINTMENT (OUTPATIENT)
Dept: UROLOGY | Facility: CLINIC | Age: 43
End: 2018-07-30
Payer: MEDICAID

## 2018-07-30 VITALS — SYSTOLIC BLOOD PRESSURE: 109 MMHG | DIASTOLIC BLOOD PRESSURE: 72 MMHG | HEART RATE: 75 BPM | TEMPERATURE: 99 F

## 2018-07-30 PROCEDURE — 99213 OFFICE O/P EST LOW 20 MIN: CPT

## 2018-07-31 ENCOUNTER — APPOINTMENT (OUTPATIENT)
Dept: SURGERY | Facility: CLINIC | Age: 43
End: 2018-07-31

## 2018-08-15 ENCOUNTER — EMERGENCY (EMERGENCY)
Facility: HOSPITAL | Age: 43
LOS: 1 days | Discharge: HOME | End: 2018-08-15
Attending: EMERGENCY MEDICINE | Admitting: EMERGENCY MEDICINE
Payer: COMMERCIAL

## 2018-08-15 VITALS
TEMPERATURE: 98 F | OXYGEN SATURATION: 97 % | WEIGHT: 165.13 LBS | HEART RATE: 79 BPM | RESPIRATION RATE: 22 BRPM | DIASTOLIC BLOOD PRESSURE: 78 MMHG | SYSTOLIC BLOOD PRESSURE: 118 MMHG

## 2018-08-15 VITALS
RESPIRATION RATE: 20 BRPM | SYSTOLIC BLOOD PRESSURE: 116 MMHG | TEMPERATURE: 98 F | DIASTOLIC BLOOD PRESSURE: 60 MMHG | OXYGEN SATURATION: 98 % | HEART RATE: 72 BPM

## 2018-08-15 DIAGNOSIS — Z90.49 ACQUIRED ABSENCE OF OTHER SPECIFIED PARTS OF DIGESTIVE TRACT: Chronic | ICD-10-CM

## 2018-08-15 DIAGNOSIS — Z90.710 ACQUIRED ABSENCE OF BOTH CERVIX AND UTERUS: Chronic | ICD-10-CM

## 2018-08-15 PROCEDURE — 74177 CT ABD & PELVIS W/CONTRAST: CPT

## 2018-08-15 PROCEDURE — 74177 CT ABD & PELVIS W/CONTRAST: CPT | Mod: 26

## 2018-08-15 PROCEDURE — 96375 TX/PRO/DX INJ NEW DRUG ADDON: CPT

## 2018-08-15 PROCEDURE — 99285 EMERGENCY DEPT VISIT HI MDM: CPT

## 2018-08-15 PROCEDURE — 96374 THER/PROPH/DIAG INJ IV PUSH: CPT | Mod: XU

## 2018-08-15 PROCEDURE — 99284 EMERGENCY DEPT VISIT MOD MDM: CPT | Mod: 25

## 2018-08-15 PROCEDURE — 96376 TX/PRO/DX INJ SAME DRUG ADON: CPT

## 2018-08-15 PROCEDURE — 87086 URINE CULTURE/COLONY COUNT: CPT

## 2018-08-15 PROCEDURE — 81003 URINALYSIS AUTO W/O SCOPE: CPT

## 2018-08-15 PROCEDURE — 85025 COMPLETE CBC W/AUTO DIFF WBC: CPT

## 2018-08-15 PROCEDURE — 36415 COLL VENOUS BLD VENIPUNCTURE: CPT

## 2018-08-15 PROCEDURE — 80053 COMPREHEN METABOLIC PANEL: CPT

## 2018-08-15 PROCEDURE — 83690 ASSAY OF LIPASE: CPT

## 2018-08-15 PROCEDURE — 84702 CHORIONIC GONADOTROPIN TEST: CPT

## 2018-08-15 RX ORDER — IOHEXOL 300 MG/ML
30 INJECTION, SOLUTION INTRAVENOUS ONCE
Qty: 0 | Refills: 0 | Status: COMPLETED | OUTPATIENT
Start: 2018-08-15 | End: 2018-08-15

## 2018-08-15 RX ORDER — ONDANSETRON 8 MG/1
4 TABLET, FILM COATED ORAL ONCE
Qty: 0 | Refills: 0 | Status: COMPLETED | OUTPATIENT
Start: 2018-08-15 | End: 2018-08-15

## 2018-08-15 RX ORDER — MORPHINE SULFATE 50 MG/1
6 CAPSULE, EXTENDED RELEASE ORAL ONCE
Qty: 0 | Refills: 0 | Status: DISCONTINUED | OUTPATIENT
Start: 2018-08-15 | End: 2018-08-15

## 2018-08-15 RX ORDER — SODIUM CHLORIDE 9 MG/ML
1000 INJECTION INTRAMUSCULAR; INTRAVENOUS; SUBCUTANEOUS ONCE
Qty: 0 | Refills: 0 | Status: COMPLETED | OUTPATIENT
Start: 2018-08-15 | End: 2018-08-15

## 2018-08-15 RX ORDER — ONDANSETRON 8 MG/1
1 TABLET, FILM COATED ORAL
Qty: 20 | Refills: 0
Start: 2018-08-15

## 2018-08-15 RX ADMIN — IOHEXOL 30 MILLILITER(S): 300 INJECTION, SOLUTION INTRAVENOUS at 13:50

## 2018-08-15 RX ADMIN — MORPHINE SULFATE 6 MILLIGRAM(S): 50 CAPSULE, EXTENDED RELEASE ORAL at 14:06

## 2018-08-15 RX ADMIN — MORPHINE SULFATE 6 MILLIGRAM(S): 50 CAPSULE, EXTENDED RELEASE ORAL at 16:06

## 2018-08-15 RX ADMIN — SODIUM CHLORIDE 2000 MILLILITER(S): 9 INJECTION INTRAMUSCULAR; INTRAVENOUS; SUBCUTANEOUS at 13:50

## 2018-08-15 RX ADMIN — ONDANSETRON 4 MILLIGRAM(S): 8 TABLET, FILM COATED ORAL at 13:51

## 2018-08-15 RX ADMIN — MORPHINE SULFATE 6 MILLIGRAM(S): 50 CAPSULE, EXTENDED RELEASE ORAL at 13:51

## 2018-08-15 NOTE — ED ADULT NURSE NOTE - OBJECTIVE STATEMENT
Pt with no sig PMHx CO Abd Pain, Lower > Upper, Dysuria and constipation x3 days.  Pt denies N/V/D, SOB, Fevers, CP and Dizziness.  EKG obtained in Triage.

## 2018-08-15 NOTE — CONSULT NOTE ADULT - ASSESSMENT
41yo F with hx of RYGB (2009), intussusception of jejunojejunal anastomosis (2015) and resection of dilated jejunojejunal anastomosis 2017 with small bowel anastomosis abd recently s/p dx SARAVANAN murrietaA on June 2018.  Presented with lower abdominal pain, afebrile, vital sign stable, no leukocytosis and normal electrolytes. CT scan suggestive constipation with oral contrast passing through the anastomosis and 5.5cm hemorrhagic cyst of the right ovary.     Recommendations :   Soap suds enema to facilitate the bowel movement   Will re-assess later after enema  If failed, for manual disimpaction   Pain control, avoid narcotics     Plan d/w  and chief 41yo F with hx of RYGB (2009), intussusception of jejunojejunal anastomosis (2015) and resection of dilated jejunojejunal anastomosis 2017 with small bowel anastomosis abd recently s/p dx lit, GENI on June 2018.  Presented with lower abdominal pain, afebrile, vital sign stable, no leukocytosis and normal electrolytes. CT scan suggestive constipation with oral contrast passing through the anastomosis and 5.5cm hemorrhagic cyst of the right ovary.     Recommendations :   Soap suds enema to facilitate the bowel movement   Will re-assess later after enema  If failed, for manual disimpaction   Pain control, avoid narcotics   safe to discharge if +bowel movement with senna, colace and dulcolax    Plan d/w  and chief 41yo F with hx of RYGB (2009), intussusception of jejunojejunal anastomosis (2015) and resection of dilated jejunojejunal anastomosis 2017 with small bowel anastomosis abd recently s/p dx lit, GENI on June 2018.  Presented with lower abdominal pain, afebrile, vital sign stable, no leukocytosis and normal electrolytes. CT scan suggestive constipation with oral contrast passing through the anastomosis and 5.5cm hemorrhagic cyst of the right ovary.     Recommendations :   Soap suds enema to facilitate the bowel movement   Will re-assess later after enema  If failed, for manual disimpaction   Pain control, avoid narcotics   safe to discharge if +bowel movement with senna, colace, dulcolax and enema PRN    Plan d/w  and chief

## 2018-08-15 NOTE — ED PROVIDER NOTE - PHYSICAL EXAMINATION
CONSTITUTIONAL: Well-appearing; well-nourished; in no apparent distress.   HEAD: Normocephalic; atraumatic.   EYES:  conjunctiva and sclera clear  ENT: normal nose; no rhinorrhea; normal pharynx with no erythema or lesions.   NECK: Supple; non-tender;   CARDIOVASCULAR: Normal S1, S2; no murmurs, rubs, or gallops. Regular rate and rhythm.   RESPIRATORY: Breathing easily; breath sounds clear and equal bilaterally; no wheezes, rhonchi, or rales.  GI: Soft; non-distended; mild diffuse tenderness, no cva tenderness; no palpable organomegaly.   EXT: No cyanosis or edema; N/V intact  SKIN: Normal for age and race; warm; dry; good turgor; no apparent lesions or rash.   NEURO: A & O x 3; face symmetric; grossly unremarkable.   PSYCHOLOGICAL: The patient’s mood and manner are appropriate.

## 2018-08-15 NOTE — ED ADULT TRIAGE NOTE - OTHER COMPLAINTS
Reports generalized abd pain. hx of bariatric surgery 11 years ago and cholecystectomy june 2018. Denies vomiting, afebrile.

## 2018-08-15 NOTE — CONSULT NOTE ADULT - SUBJECTIVE AND OBJECTIVE BOX
HPI :   This is a 42yoF with PMH Morbid obesity s/p, cholecystectomy, RYGB () which complicated by intussusception of jejunojejunal anastomosis () s/p , and subsequently resection of dilated jejunojejunal anastomosis on 2017 with small bowel anastomosis, recently admitted in 2018 for pelvis laparoscopy with lysis of adhesions, readmitted for severe back pain 1 month ago found to have L4-5 disk herniation,    Currently she presented with pain for 1 week, pain is generalized and mainly located at the lower abdomen. She also reported constipation for the past 1 week. Passing gas.   She has been having taking percocet for her back pain, tried several bowel regimen and still unable to have bowel movement.     Otherwise, she has no vomiting, no PO intolerance, no dysuria, no vaginal/bloody BM. No recent colonoscopy       Vital Signs Last 24 Hrs  T(C): 36.8 (15 Aug 2018 17:21), Max: 36.8 (15 Aug 2018 12:44)  T(F): 98.3 (15 Aug 2018 17:21), Max: 98.3 (15 Aug 2018 17:21)  HR: 68 (15 Aug 2018 17:21) (68 - 79)  BP: 110/66 (15 Aug 2018 17:21) (110/66 - 118/78)  BP(mean): --  RR: 22 (15 Aug 2018 17:21) (22 - 22)  SpO2: 98% (15 Aug 2018 17:21) (97% - 98%)  I&O's Detail      General: NAD, resting comfortably in bed  C/V: NSR  Pulm: Nonlabored breathing, no respiratory distress  Abd: soft, tender at lower abdomen, non distended, no guarding, no rebound, no peritoneal, incisions C/D/I  Extrem: WWP, no edema, SCDs in place        LABS:                        13.4   6.4   )-----------( 312      ( 15 Aug 2018 13:18 )             41.6     08-15    138  |  101  |  13  ----------------------------<  92  4.0   |  26  |  0.60    Ca    9.5      15 Aug 2018 13:18    TPro  7.3  /  Alb  4.2  /  TBili  0.3  /  DBili  x   /  AST  39  /  ALT  53<H>  /  AlkPhos  106  08-15      Urinalysis Basic - ( 15 Aug 2018 13:18 )    Color: Yellow / Appearance: Clear / S.015 / pH: x  Gluc: x / Ketone: NEGATIVE  / Bili: Negative / Urobili: 0.2 E.U./dL   Blood: x / Protein: NEGATIVE mg/dL / Nitrite: NEGATIVE   Leuk Esterase: NEGATIVE / RBC: x / WBC x   Sq Epi: x / Non Sq Epi: x / Bacteria: x        RADIOLOGY & ADDITIONAL STUDIES:  Evaluation of the bowel demonstrates status post gastric bypass. There is   a trace amount of oral contrast in the bypassed portion of the stomach.   4.1 cm dilatation of a loop of small bowel in the left upper quadrant   adjacent to anastomotic bowel sutures. Oral contrast flows distally to   normal caliber mid to distal small bowel. High riding cecum in the right   upper quadrant. Normal appendix. No ascites is seen. Tiny fat-containing   umbilical hernia. Status post hysterectomy.    Images of the pelvis demonstrate resolution of the previously noted 4.2   cm left ovarian cyst on 2018. There is a new 5.5 cm right ovarian   cyst with fluid/fluid level suspicious for a hemorrhagic cyst or   endometrioma.     Evaluation of the osseous structures demonstrates L4-5 disc bulge.   Degenerative changes of the spine. Numerous soft tissue nodules in the   subcutaneous tissues of the right the left buttocks likely particle   injection.    IMPRESSION:  Status post gastric bypass. Mild dilatation of the proximal alimentary   limb with adjacent surgical sutures which is likely a localized small   bowel ileus. Oral contrast flows distally.    5.5 cm hemorrhagic cyst in the right ovary.  Evaluation of the bowel demonstrates status post gastric bypass. There is   a trace amount of oral contrast in the bypassed portion of the stomach.   4.1 cm dilatation of a loop of small bowel in the left upper quadrant   adjacent to anastomotic bowel sutures. Oral contrast flows distally to   normal caliber mid to distal small bowel. High riding cecum in the right   upper quadrant. Normal appendix. No ascites is seen. Tiny fat-containing   umbilical hernia. Status post hysterectomy.    Images of the pelvis demonstrate resolution of the previously noted 4.2   cm left ovarian cyst on 2018. There is a new 5.5 cm right ovarian   cyst with fluid/fluid level suspicious for a hemorrhagic cyst or   endometrioma.     Evaluation of the osseous structures demonstrates L4-5 disc bulge.   Degenerative changes of the spine. Numerous soft tissue nodules in the   subcutaneous tissues of the right the left buttocks likely particle   injection.    IMPRESSION:  Status post gastric bypass. Mild dilatation of the proximal alimentary   limb with adjacent surgical sutures which is likely a localized small   bowel ileus. Oral contrast flows distally.    5.5 cm hemorrhagic cyst in the right ovary.

## 2018-08-15 NOTE — ED ADULT NURSE NOTE - NSIMPLEMENTINTERV_GEN_ALL_ED
Implemented All Universal Safety Interventions:  [place] to call system. Call bell, personal items and telephone within reach. Instruct patient to call for assistance. Room bathroom lighting operational. Non-slip footwear when patient is off stretcher. Physically safe environment: no spills, clutter or unnecessary equipment. Stretcher in lowest position, wheels locked, appropriate side rails in place.

## 2018-08-15 NOTE — ED PROVIDER NOTE - OBJECTIVE STATEMENT
42yoF with PMH anemia, herniated cervical disc, cholecystectomy, RYGB (2009), intussusception of jejunojejunal anastomosis (2015), and subsequent resection of dilated jejunojejunal anastomosis (Feb 2017) with small bowel anastomosis, recently admitted in June for pelvis laparoscopy with lysis of adhesions, readmitted for severe back pain 1 month ago found to have L4-5 disk herniation 42yoF with PMH anemia, herniated cervical disc, cholecystectomy, RYGB (2009), intussusception of jejunojejunal anastomosis (2015), and subsequent resection of dilated jejunojejunal anastomosis (Feb 2017) with small bowel anastomosis, recently admitted in June for pelvis laparoscopy with lysis of adhesions, readmitted for severe back pain 1 month ago found to have L4-5 disk herniation, here w/ complaint of abdominal pain and nausea x3 days. not improving so came in. pain is diffuse. no gu or gyn symptoms. no fevers/chills. eating makes pain worse. surgeon is dr griggs

## 2018-08-19 DIAGNOSIS — K59.00 CONSTIPATION, UNSPECIFIED: ICD-10-CM

## 2018-08-19 DIAGNOSIS — R10.84 GENERALIZED ABDOMINAL PAIN: ICD-10-CM

## 2018-08-19 DIAGNOSIS — Z79.52 LONG TERM (CURRENT) USE OF SYSTEMIC STEROIDS: ICD-10-CM

## 2018-08-19 DIAGNOSIS — R11.0 NAUSEA: ICD-10-CM

## 2018-08-19 DIAGNOSIS — Z79.899 OTHER LONG TERM (CURRENT) DRUG THERAPY: ICD-10-CM

## 2019-02-02 ENCOUNTER — EMERGENCY (EMERGENCY)
Facility: HOSPITAL | Age: 44
LOS: 1 days | Discharge: HOME | End: 2019-02-02
Attending: EMERGENCY MEDICINE | Admitting: EMERGENCY MEDICINE
Payer: COMMERCIAL

## 2019-02-02 VITALS
DIASTOLIC BLOOD PRESSURE: 76 MMHG | HEART RATE: 60 BPM | TEMPERATURE: 98 F | WEIGHT: 169.98 LBS | OXYGEN SATURATION: 98 % | SYSTOLIC BLOOD PRESSURE: 133 MMHG | RESPIRATION RATE: 18 BRPM

## 2019-02-02 VITALS
DIASTOLIC BLOOD PRESSURE: 81 MMHG | TEMPERATURE: 98 F | RESPIRATION RATE: 16 BRPM | HEART RATE: 72 BPM | SYSTOLIC BLOOD PRESSURE: 129 MMHG | OXYGEN SATURATION: 97 %

## 2019-02-02 DIAGNOSIS — Z79.899 OTHER LONG TERM (CURRENT) DRUG THERAPY: ICD-10-CM

## 2019-02-02 DIAGNOSIS — R20.2 PARESTHESIA OF SKIN: ICD-10-CM

## 2019-02-02 DIAGNOSIS — M25.531 PAIN IN RIGHT WRIST: ICD-10-CM

## 2019-02-02 DIAGNOSIS — Z90.710 ACQUIRED ABSENCE OF BOTH CERVIX AND UTERUS: Chronic | ICD-10-CM

## 2019-02-02 DIAGNOSIS — Z90.49 ACQUIRED ABSENCE OF OTHER SPECIFIED PARTS OF DIGESTIVE TRACT: Chronic | ICD-10-CM

## 2019-02-02 DIAGNOSIS — Z79.82 LONG TERM (CURRENT) USE OF ASPIRIN: ICD-10-CM

## 2019-02-02 PROCEDURE — 73110 X-RAY EXAM OF WRIST: CPT | Mod: 26,RT

## 2019-02-02 PROCEDURE — 99283 EMERGENCY DEPT VISIT LOW MDM: CPT | Mod: 25

## 2019-02-02 PROCEDURE — 73110 X-RAY EXAM OF WRIST: CPT

## 2019-02-02 PROCEDURE — 99283 EMERGENCY DEPT VISIT LOW MDM: CPT

## 2019-02-02 PROCEDURE — 73110 X-RAY EXAM OF WRIST: CPT | Mod: 26

## 2019-02-02 RX ORDER — IBUPROFEN 200 MG
600 TABLET ORAL ONCE
Qty: 0 | Refills: 0 | Status: COMPLETED | OUTPATIENT
Start: 2019-02-02 | End: 2019-02-02

## 2019-02-02 NOTE — ED PROVIDER NOTE - DIAGNOSTIC INTERPRETATION
ER PA: Emily Rashid  INTERPRETATION:  no acute fracture; no soft tissue swelling noted; normal bony alignment.

## 2019-02-02 NOTE — ED PROVIDER NOTE - CARE PROVIDER_API CALL
Mena Pritchard)  Orthopaedic Surgery  [address]  [place] [address]  Phone: [phone]  Fax: [phone]    Khalif Herring)  Plastic Surgery; Surgery; Surgery of the Hand  [address], [address]  [place] [address]  Phone: [phone]  Fax: [phone]

## 2019-02-02 NOTE — ED ADULT NURSE NOTE - OBJECTIVE STATEMENT
Pt c/o right wrist pain not being able to move it well today, pt denies trauma, no swelling or redness noted skin cool pt awaiting MD and xray

## 2019-02-02 NOTE — ED ADULT TRIAGE NOTE - CHIEF COMPLAINT QUOTE
Patient complaining of left wrist pain and numbness for one week.   Patient denies any falls, injury, CP, SOB, dizziness or any other complaints at this time.

## 2019-02-02 NOTE — ED PROVIDER NOTE - NSFOLLOWUPINSTRUCTIONS_ED_ALL_ED_FT
Arthralgia    WHAT YOU NEED TO KNOW:    Arthralgia is pain in one or more joints, with no inflammation. It may be short-term and get better within 6 to 8 weeks. Arthralgia can be an early sign of arthritis. Arthralgia may be caused by a medical condition, such as a hormone disorder or a tumor. It may also be caused by an infection or injury.     DISCHARGE INSTRUCTIONS:    Medicines: The following medicines may be ordered for you:     Acetaminophen decreases pain. Ask how much to take and how often to take it. Follow directions. Acetaminophen can cause liver damage if not taken correctly.      NSAIDs decrease pain and prevent swelling. Ask your healthcare provider which medicine is right for you. Ask how much to take and when to take it. Take as directed. NSAIDs can cause stomach bleeding and kidney problems if not taken correctly.       Pain relief cream decreases pain. Use this cream as directed.      Take your medicine as directed. Contact your healthcare provider if you think your medicine is not helping or if you have side effects. Tell him of her if you are allergic to any medicine. Keep a list of the medicines, vitamins, and herbs you take. Include the amounts, and when and why you take them. Bring the list or the pill bottles to follow-up visits. Carry your medicine list with you in case of an emergency.    Follow up with your healthcare provider or specialist as directed: Write down your questions so you remember to ask them during your visits.     Self-care:     Apply heat to help decrease pain. Use a heating pad or heat wrap. Apply heat for 20 to 30 minutes every 2 hours for as many days as directed.       Rest as much as possible. Avoid activities that cause joint pain.       Apply ice to help decrease swelling and pain. Ice may also help prevent tissue damage. Use an ice pack, or put crushed ice in a plastic bag. Cover it with a towel and place it on your painful joint for 15 to 20 minutes every hour or as directed.       Support the joint with a brace or elastic wrap as directed.       Elevate your joint above the level of your heart as often as you can to help decrease swelling and pain. Prop your painful joint on pillows or blankets to keep it elevated comfortably.       Lose weight if you are overweight. Extra weight can put pressure on your joints and cause more pain. Ask your healthcare provider how much you should weigh. Ask him to help you create a weight loss plan.       Exercise regularly to help improve joint movement and to decrease pain. Ask about the best exercise plan for you. Low-impact exercises can help take the pressure off your joints. Examples are walking, swimming, and water aerobics.     Physical therapy: A physical therapist teaches you exercises to help improve movement and strength, and to decrease pain. Ask your healthcare provider if physical therapy is right for you.    Contact your healthcare provider or specialist if:     You have a fever.       You continue to have joint pain that cannot be relieved with heat, ice, or medicine.      You have pain and inflammation around your joint.      You have questions or concerns about your condition or care.    Return to the emergency department if:     You have sudden, severe pain when you move your joint.       You have a fever and shaking chills.      You cannot move your joint.      You lose feeling on the side of your body where you have the painful joint.

## 2019-02-02 NOTE — ED PROVIDER NOTE - ATTENDING CONTRIBUTION TO CARE
42 yo F with pmh of gastric bypass and arthritis c/o pain to R wrist and hand x 1 week (dominant hand). pain is volar aspect. atraumatic. tingling/numbness intermittent as well. difficulty gripping. on exam no swelling redness, sensation intact in all fingers, but weak  compared to contralateral side. xray neg for acute process. plan for splint and hand follow up

## 2019-02-02 NOTE — ED PROVIDER NOTE - PHYSICAL EXAMINATION
CONSTITUTIONAL: Well-appearing; well-nourished; in no apparent distress.   HEAD: Normocephalic; atraumatic.   EYES: PERRL; EOM intact; conjunctiva and sclera clear  ENT: normal nose; no rhinorrhea; normal pharynx with no erythema or lesions.   NECK: Supple; non-tender; no LAD  CARDIOVASCULAR: Normal S1, S2; no murmurs, rubs, or gallops. Regular rate and rhythm.   RESPIRATORY: Breathing easily; breath sounds clear and equal bilaterally; no wheezes, rhonchi, or rales.  GI: Soft; non-distended; non-tender; no palpable organomegaly.   MSK: R wrist- +tenderness throughout volar surface of wrist, limited  strength due to pain, sensation intact, pulses normal  EXT: No cyanosis or edema; N/V intact  SKIN: Normal for age and race; warm; dry; good turgor; no apparent lesions or rash.   NEURO: A & O x 3; face symmetric; grossly unremarkable.   PSYCHOLOGICAL: The patient’s mood and manner are appropriate.

## 2019-02-02 NOTE — ED PROVIDER NOTE - MEDICAL DECISION MAKING DETAILS
44 yo F RHD with pmh of gastric bypass and arthritis c/o pain to R wrist and hand x 1 week. Associated with occasional tingling in the hand. Pt having troulbe bending her fingers due to pain. Pt reports most pain is on volar aspect of wrist and into the 3rd, 4th and 5th fingers. Denies trauma or heavy lifting. Denies ha, dizziness, n/v, visual changes, neck pain. R wrist- +tenderness throughout volar surface of wrist, limited  strength due to pain, sensation intact, pulses normal 44 yo F RHD with pmh of gastric bypass and arthritis c/o pain to R wrist and hand x 1 week. Associated with occasional tingling in the hand. Pt having trouble bending her fingers due to pain. Pt reports most pain is on volar aspect of wrist and into the 3rd, 4th and 5th fingers. Denies trauma or heavy lifting. Denies ha, dizziness, n/v, visual changes, neck pain. R wrist- +tenderness throughout volar surface of wrist, limited  strength due to pain, sensation intact, pulses normal. Xray neg. Pt given thumb spica. Will f/u with hand

## 2019-02-06 ENCOUNTER — APPOINTMENT (OUTPATIENT)
Dept: UROLOGY | Facility: CLINIC | Age: 44
End: 2019-02-06
Payer: MEDICAID

## 2019-02-06 VITALS
OXYGEN SATURATION: 99 % | HEART RATE: 64 BPM | TEMPERATURE: 99 F | HEIGHT: 61 IN | BODY MASS INDEX: 30.4 KG/M2 | DIASTOLIC BLOOD PRESSURE: 74 MMHG | SYSTOLIC BLOOD PRESSURE: 119 MMHG | WEIGHT: 161 LBS

## 2019-02-06 PROCEDURE — 99213 OFFICE O/P EST LOW 20 MIN: CPT

## 2019-02-06 NOTE — HISTORY OF PRESENT ILLNESS
[FreeTextEntry1] : This is a very pleasant 42yo female referred by Dr. Mario for 1 year history of pelvic pain and urinary incontinence. Pain has resulted in multiple ER visits in the past year including May and September. U/A was negative at both ER visits. A CT scan was performed in September and was normal. Recent U/A negative, urine culture contaminated. She also complains of urgency, frequency, urge incontinence, stress incontinence. She wears 1 pad per day. \par \par 12/5/16 Here for f/u. Doing much better with oxybutynin, no side effects. She is still experiencing some stress incontinence. \par \par 3/6/17 Here for f/u. Feeling well, urinary symptoms and pelvic pain have essentially resolved. \par \par 9/13/17 Here for 6 month follow up. Doing well, no complaints. \par \par 7/30/18 Here for annual follow up. Doing well. Satisfied with oxybutynin ER 10mg \par \par 2/06/19 Here for follow up. Doing well. Satisfied with oxybutynin ER 10mg  [None] : None

## 2019-02-06 NOTE — PHYSICAL EXAM
[General Appearance - Well Developed] : well developed [General Appearance - Well Nourished] : well nourished [Normal Appearance] : normal appearance [Well Groomed] : well groomed [General Appearance - In No Acute Distress] : no acute distress [Abdomen Soft] : soft [Abdomen Tenderness] : non-tender [Costovertebral Angle Tenderness] : no ~M costovertebral angle tenderness [Oriented To Time, Place, And Person] : oriented to person, place, and time [Affect] : the affect was normal [Mood] : the mood was normal [Not Anxious] : not anxious [Normal Station and Gait] : the gait and station were normal for the patient's age [No Focal Deficits] : no focal deficits

## 2019-02-06 NOTE — ASSESSMENT
[FreeTextEntry1] : 44yo female with history of pelvic pain, urge and stress incontinence, improved with oxybutynin\par She is currently pleased with symptoms and would like to continue oxybutynin\par Medication renewed today\par Return precautions reviewed\par F/u 1 year or sooner prn

## 2019-02-28 ENCOUNTER — APPOINTMENT (OUTPATIENT)
Dept: ORTHOPEDIC SURGERY | Facility: CLINIC | Age: 44
End: 2019-02-28

## 2019-03-29 NOTE — H&P ADULT - PMH
Anemia    Herniated cervical disc    UTI (urinary tract infection)
I personally saw and examined the patient in detail.  I have spoken to the above provider regarding the assessment and plan of care.  I reviewed the above assessment and plan of care, and agree.  I have made changes in the body of the note where appropriate.  did not convert on ibutilide x 2.  To schedule DCCV.  Lovenox 100 stat given.  To go home on Toprol 25 QD and ELiquis 5 BID

## 2019-04-22 ENCOUNTER — EMERGENCY (EMERGENCY)
Facility: HOSPITAL | Age: 44
LOS: 1 days | Discharge: HOME | End: 2019-04-22
Attending: EMERGENCY MEDICINE | Admitting: EMERGENCY MEDICINE
Payer: MEDICAID

## 2019-04-22 VITALS
HEART RATE: 77 BPM | OXYGEN SATURATION: 97 % | WEIGHT: 169.98 LBS | SYSTOLIC BLOOD PRESSURE: 132 MMHG | TEMPERATURE: 98 F | RESPIRATION RATE: 18 BRPM | DIASTOLIC BLOOD PRESSURE: 74 MMHG | HEIGHT: 61 IN

## 2019-04-22 DIAGNOSIS — R10.31 RIGHT LOWER QUADRANT PAIN: ICD-10-CM

## 2019-04-22 DIAGNOSIS — Z90.49 ACQUIRED ABSENCE OF OTHER SPECIFIED PARTS OF DIGESTIVE TRACT: Chronic | ICD-10-CM

## 2019-04-22 DIAGNOSIS — Z90.710 ACQUIRED ABSENCE OF BOTH CERVIX AND UTERUS: Chronic | ICD-10-CM

## 2019-04-22 DIAGNOSIS — Z79.899 OTHER LONG TERM (CURRENT) DRUG THERAPY: ICD-10-CM

## 2019-04-22 DIAGNOSIS — Z79.52 LONG TERM (CURRENT) USE OF SYSTEMIC STEROIDS: ICD-10-CM

## 2019-04-22 DIAGNOSIS — R11.0 NAUSEA: ICD-10-CM

## 2019-04-22 LAB
ALBUMIN SERPL ELPH-MCNC: 4.2 G/DL — SIGNIFICANT CHANGE UP (ref 3.3–5)
ALP SERPL-CCNC: 92 U/L — SIGNIFICANT CHANGE UP (ref 40–120)
ALT FLD-CCNC: 20 U/L — SIGNIFICANT CHANGE UP (ref 10–45)
ANION GAP SERPL CALC-SCNC: 11 MMOL/L — SIGNIFICANT CHANGE UP (ref 5–17)
APPEARANCE UR: CLEAR — SIGNIFICANT CHANGE UP
APTT BLD: 29.8 SEC — SIGNIFICANT CHANGE UP (ref 27.5–36.3)
AST SERPL-CCNC: 23 U/L — SIGNIFICANT CHANGE UP (ref 10–40)
BASOPHILS # BLD AUTO: 0.03 K/UL — SIGNIFICANT CHANGE UP (ref 0–0.2)
BASOPHILS NFR BLD AUTO: 0.4 % — SIGNIFICANT CHANGE UP (ref 0–2)
BILIRUB SERPL-MCNC: 0.2 MG/DL — SIGNIFICANT CHANGE UP (ref 0.2–1.2)
BILIRUB UR-MCNC: NEGATIVE — SIGNIFICANT CHANGE UP
BUN SERPL-MCNC: 13 MG/DL — SIGNIFICANT CHANGE UP (ref 7–23)
CALCIUM SERPL-MCNC: 9 MG/DL — SIGNIFICANT CHANGE UP (ref 8.4–10.5)
CHLORIDE SERPL-SCNC: 107 MMOL/L — SIGNIFICANT CHANGE UP (ref 96–108)
CO2 SERPL-SCNC: 23 MMOL/L — SIGNIFICANT CHANGE UP (ref 22–31)
COLOR SPEC: YELLOW — SIGNIFICANT CHANGE UP
CREAT SERPL-MCNC: 0.49 MG/DL — LOW (ref 0.5–1.3)
DIFF PNL FLD: NEGATIVE — SIGNIFICANT CHANGE UP
EOSINOPHIL # BLD AUTO: 0.09 K/UL — SIGNIFICANT CHANGE UP (ref 0–0.5)
EOSINOPHIL NFR BLD AUTO: 1.1 % — SIGNIFICANT CHANGE UP (ref 0–6)
GLUCOSE SERPL-MCNC: 68 MG/DL — LOW (ref 70–99)
GLUCOSE UR QL: 100
HCT VFR BLD CALC: 36.5 % — SIGNIFICANT CHANGE UP (ref 34.5–45)
HGB BLD-MCNC: 11.7 G/DL — SIGNIFICANT CHANGE UP (ref 11.5–15.5)
IMM GRANULOCYTES NFR BLD AUTO: 0.4 % — SIGNIFICANT CHANGE UP (ref 0–1.5)
INR BLD: 0.99 — SIGNIFICANT CHANGE UP (ref 0.88–1.16)
KETONES UR-MCNC: NEGATIVE — SIGNIFICANT CHANGE UP
LEUKOCYTE ESTERASE UR-ACNC: NEGATIVE — SIGNIFICANT CHANGE UP
LYMPHOCYTES # BLD AUTO: 2.06 K/UL — SIGNIFICANT CHANGE UP (ref 1–3.3)
LYMPHOCYTES # BLD AUTO: 24 % — SIGNIFICANT CHANGE UP (ref 13–44)
MCHC RBC-ENTMCNC: 28.1 PG — SIGNIFICANT CHANGE UP (ref 27–34)
MCHC RBC-ENTMCNC: 32.1 GM/DL — SIGNIFICANT CHANGE UP (ref 32–36)
MCV RBC AUTO: 87.7 FL — SIGNIFICANT CHANGE UP (ref 80–100)
MONOCYTES # BLD AUTO: 0.81 K/UL — SIGNIFICANT CHANGE UP (ref 0–0.9)
MONOCYTES NFR BLD AUTO: 9.5 % — SIGNIFICANT CHANGE UP (ref 2–14)
NEUTROPHILS # BLD AUTO: 5.55 K/UL — SIGNIFICANT CHANGE UP (ref 1.8–7.4)
NEUTROPHILS NFR BLD AUTO: 64.6 % — SIGNIFICANT CHANGE UP (ref 43–77)
NITRITE UR-MCNC: NEGATIVE — SIGNIFICANT CHANGE UP
NRBC # BLD: 0 /100 WBCS — SIGNIFICANT CHANGE UP (ref 0–0)
PH UR: 6 — SIGNIFICANT CHANGE UP (ref 5–8)
PLATELET # BLD AUTO: 392 K/UL — SIGNIFICANT CHANGE UP (ref 150–400)
POTASSIUM SERPL-MCNC: 3.5 MMOL/L — SIGNIFICANT CHANGE UP (ref 3.5–5.3)
POTASSIUM SERPL-SCNC: 3.5 MMOL/L — SIGNIFICANT CHANGE UP (ref 3.5–5.3)
PROT SERPL-MCNC: 7.2 G/DL — SIGNIFICANT CHANGE UP (ref 6–8.3)
PROT UR-MCNC: NEGATIVE MG/DL — SIGNIFICANT CHANGE UP
PROTHROM AB SERPL-ACNC: 11.2 SEC — SIGNIFICANT CHANGE UP (ref 10–12.9)
RBC # BLD: 4.16 M/UL — SIGNIFICANT CHANGE UP (ref 3.8–5.2)
RBC # FLD: 14.6 % — HIGH (ref 10.3–14.5)
SODIUM SERPL-SCNC: 141 MMOL/L — SIGNIFICANT CHANGE UP (ref 135–145)
SP GR SPEC: 1.01 — SIGNIFICANT CHANGE UP (ref 1–1.03)
UROBILINOGEN FLD QL: 0.2 E.U./DL — SIGNIFICANT CHANGE UP
WBC # BLD: 8.57 K/UL — SIGNIFICANT CHANGE UP (ref 3.8–10.5)
WBC # FLD AUTO: 8.57 K/UL — SIGNIFICANT CHANGE UP (ref 3.8–10.5)

## 2019-04-22 PROCEDURE — 74177 CT ABD & PELVIS W/CONTRAST: CPT | Mod: 26

## 2019-04-22 PROCEDURE — 76830 TRANSVAGINAL US NON-OB: CPT | Mod: 26

## 2019-04-22 PROCEDURE — 76856 US EXAM PELVIC COMPLETE: CPT | Mod: 26

## 2019-04-22 PROCEDURE — 99285 EMERGENCY DEPT VISIT HI MDM: CPT

## 2019-04-22 RX ORDER — METOCLOPRAMIDE HCL 10 MG
10 TABLET ORAL ONCE
Qty: 0 | Refills: 0 | Status: COMPLETED | OUTPATIENT
Start: 2019-04-22 | End: 2019-04-22

## 2019-04-22 RX ORDER — ONDANSETRON 8 MG/1
4 TABLET, FILM COATED ORAL ONCE
Qty: 0 | Refills: 0 | Status: COMPLETED | OUTPATIENT
Start: 2019-04-22 | End: 2019-04-22

## 2019-04-22 RX ORDER — HYDROMORPHONE HYDROCHLORIDE 2 MG/ML
1 INJECTION INTRAMUSCULAR; INTRAVENOUS; SUBCUTANEOUS ONCE
Qty: 0 | Refills: 0 | Status: DISCONTINUED | OUTPATIENT
Start: 2019-04-22 | End: 2019-04-22

## 2019-04-22 RX ORDER — MORPHINE SULFATE 50 MG/1
4 CAPSULE, EXTENDED RELEASE ORAL ONCE
Qty: 0 | Refills: 0 | Status: DISCONTINUED | OUTPATIENT
Start: 2019-04-22 | End: 2019-04-22

## 2019-04-22 RX ORDER — SODIUM CHLORIDE 9 MG/ML
1000 INJECTION INTRAMUSCULAR; INTRAVENOUS; SUBCUTANEOUS ONCE
Qty: 0 | Refills: 0 | Status: COMPLETED | OUTPATIENT
Start: 2019-04-22 | End: 2019-04-22

## 2019-04-22 RX ORDER — IOHEXOL 300 MG/ML
30 INJECTION, SOLUTION INTRAVENOUS ONCE
Qty: 0 | Refills: 0 | Status: COMPLETED | OUTPATIENT
Start: 2019-04-22 | End: 2019-04-22

## 2019-04-22 RX ADMIN — HYDROMORPHONE HYDROCHLORIDE 1 MILLIGRAM(S): 2 INJECTION INTRAMUSCULAR; INTRAVENOUS; SUBCUTANEOUS at 19:58

## 2019-04-22 RX ADMIN — SODIUM CHLORIDE 1000 MILLILITER(S): 9 INJECTION INTRAMUSCULAR; INTRAVENOUS; SUBCUTANEOUS at 18:50

## 2019-04-22 RX ADMIN — SODIUM CHLORIDE 1000 MILLILITER(S): 9 INJECTION INTRAMUSCULAR; INTRAVENOUS; SUBCUTANEOUS at 17:48

## 2019-04-22 RX ADMIN — ONDANSETRON 4 MILLIGRAM(S): 8 TABLET, FILM COATED ORAL at 18:50

## 2019-04-22 RX ADMIN — Medication 10 MILLIGRAM(S): at 19:58

## 2019-04-22 RX ADMIN — Medication 10 MILLIGRAM(S): at 21:58

## 2019-04-22 RX ADMIN — IOHEXOL 30 MILLILITER(S): 300 INJECTION, SOLUTION INTRAVENOUS at 18:43

## 2019-04-22 RX ADMIN — MORPHINE SULFATE 4 MILLIGRAM(S): 50 CAPSULE, EXTENDED RELEASE ORAL at 18:50

## 2019-04-22 NOTE — ED ADULT TRIAGE NOTE - CHIEF COMPLAINT QUOTE
pt c/o diffuse lower abdominal pain, constipation since Saturday, hx of multiple abdominal surgeries for bowel obstruction and intussusception. denies fever/chills, vomiting.

## 2019-04-22 NOTE — ED ADULT NURSE REASSESSMENT NOTE - NS ED NURSE REASSESS COMMENT FT1
returned from CT, informed by transporter that "she kept throwing up in there". dr stratton made aware. awaiting meds for nausea/vomiting. will reassess

## 2019-04-22 NOTE — ED ADULT NURSE NOTE - OBJECTIVE STATEMENT
Pt presents complaining of lower abdominal pain and pressure x1 week. Pt states "for the past 3 weeks I feel like food is getting stuck and moving slow after I swallow it. I also have been nauseas and it has been getting worse as well as new abdominal pain and pressure over the last week. The last time I had a bowel movement was Saturday and I am worried I could have another obstruction". Pt denies any fevers, no lightheadedness, no dizziness, no cp, no sob, no vomiting, no urinary complaints.

## 2019-04-22 NOTE — ED PROVIDER NOTE - OBJECTIVE STATEMENT
44 yo F with prior gastric by pass surgery 12 years ago with prior SBOs also intusseception with 2 day hx of sharp RLQ pain nausea  no vomiting last BM  2 days ago  no vaginal bleeding or dc? LMP--  prior YOMAIRA - no sob or chest pain no dysuria or flank tenderness

## 2019-04-22 NOTE — ED ADULT NURSE REASSESSMENT NOTE - NS ED NURSE REASSESS COMMENT FT1
report from Pierce RN, pt [id number] appears comfortable. tolerated contrast for CT without vomiting. awaiting CT scan and surgical c/s

## 2019-04-22 NOTE — ED ADULT NURSE NOTE - CHPI ED NUR SYMPTOMS NEG
no blood in stool/no burning urination/no chills/no diarrhea/no fever/no hematuria/no abdominal distension/no dysuria/no vomiting

## 2019-04-22 NOTE — ED PROVIDER NOTE - CLINICAL SUMMARY MEDICAL DECISION MAKING FREE TEXT BOX
42 yo F with prior hx of gastric bypass 12 years ago also priro SBOs and intusseption with rlq pain x 2 days nausea as well no diarrhea  nl BMs - no vag bleeding or dc   U/S shows 3 cm left ovarian cyst nl flow no torsion noted - given antiemetics in ED-with improvement- seen and eval by Gen Surgery await final recs-

## 2019-04-22 NOTE — ED ADULT NURSE REASSESSMENT NOTE - NS ED NURSE REASSESS COMMENT FT1
medicated for nausea and vomiting, awaiting ct read and surgical c/s. vss medicated for nausea and vomiting, awaiting ct read and surgical c/s. vss. awaiting US

## 2019-04-22 NOTE — ED PROVIDER NOTE - ENMT NEGATIVE STATEMENT, MLM
Hospital Medicine Progress Note, Adult, Complex               Author: Lacy BREANN Thomas Date & Time created: 5/20/2017  7:58 PM     Interval History:  34YR OLD M WITH LEFT ANKEL CELLULITIS  5/20:  Reviewed chart:  U/s with left popliteal mass:  Ordered CT LE    Review of Systems:  Review of Systems   Constitutional: Negative for fever, chills and weight loss.   HENT: Negative for hearing loss and tinnitus.    Eyes: Negative for blurred vision, double vision and photophobia.   Respiratory: Negative for cough, hemoptysis and sputum production.    Cardiovascular: Negative for chest pain, palpitations and orthopnea.   Gastrointestinal: Negative for heartburn, nausea and vomiting.   Genitourinary: Negative for dysuria, urgency and frequency.   Musculoskeletal: Positive for joint pain (LEFT ANKEL SWELLING).   Skin: Negative for itching and rash.   Neurological: Negative for dizziness, tingling, tremors and headaches.       Physical Exam:  Physical Exam   Constitutional: He is oriented to person, place, and time. No distress.   HENT:   Head: Normocephalic and atraumatic.   Eyes: Right eye exhibits no discharge. Left eye exhibits no discharge.   Neck: Neck supple. No JVD present.   Cardiovascular: Normal rate and regular rhythm.    Pulmonary/Chest: No stridor. No respiratory distress. He has no wheezes. He has no rales.   Abdominal: Soft. He exhibits no distension. There is no tenderness. There is no rebound.   Musculoskeletal: He exhibits edema (LEFT ANKEL).   Neurological: He is alert and oriented to person, place, and time.   Skin: Skin is warm and dry. He is not diaphoretic. No erythema.       Labs:        Invalid input(s): [id number]      Recent Labs      05/18/17   0820  05/19/17   0219  05/20/17   0236   SODIUM  132*  138  133*   POTASSIUM  4.1  4.0  4.0   CHLORIDE  100  103  101   CO2  25  26  24   BUN  13  11  14   CREATININE  0.69  0.67  0.68   CALCIUM  9.2  8.9  9.1     Recent Labs      05/18/17   0820   17   0219  17   0236   ALTSGPT  76*   --    --    ASTSGOT  27   --    --    ALKPHOSPHAT  103*   --    --    TBILIRUBIN  0.9   --    --    GLUCOSE  104*  94  88     Recent Labs      17   0817   0236   RBC  4.57*  4.69*   HEMOGLOBIN  13.5*  13.6*   HEMATOCRIT  40.3*  42.4   PLATELETCT  471*  518*   PROTHROMBTM  15.1*   --    APTT  35.6   --    INR  1.15*   --      Recent Labs      17   0817   0236   WBC  17.7*  15.4*   NEUTSPOLYS  77.90*  70.90   LYMPHOCYTES  9.90*  15.20*   MONOCYTES  9.40  11.20   EOSINOPHILS  0.50  1.00   BASOPHILS  0.50  0.50   ASTSGOT  27   --    ALTSGPT  76*   --    ALKPHOSPHAT  103*   --    TBILIRUBIN  0.9   --            Hemodynamics:  Temp (24hrs), Av.1 °C (98.7 °F), Min:36.5 °C (97.7 °F), Max:37.4 °C (99.3 °F)  Temperature: 37.4 °C (99.3 °F)  Pulse  Av.3  Min: 95  Max: 126   Blood Pressure: 110/80 mmHg     Respiratory:    Respiration: 19, Pulse Oximetry: 95 %        RUL Breath Sounds: Clear, RML Breath Sounds: Clear, RLL Breath Sounds: Clear, TARYN Breath Sounds: Clear, LLL Breath Sounds: Clear  Fluids:    Intake/Output Summary (Last 24 hours) at 17  Last data filed at 17 1848   Gross per 24 hour   Intake   2100 ml   Output   3725 ml   Net  -1625 ml       GI/Nutrition:  Orders Placed This Encounter   Procedures   • Diet Order     Standing Status: Standing      Number of Occurrences: 1      Standing Expiration Date:      Order Specific Question:  Diet:     Answer:  Regular [1]     Medical Decision Making, by Problem:  Active Hospital Problems    Diagnosis   • Left leg cellulitis [L03.116]     34YR OLD M WITH LEFT ANKEL CELLULITIS  BETTER  UNASYN  VANCOMYCIN AS PER PHARMACY  ANKEL XRAY IS NOTED  ULTRASOUND NEGATIVE FOR DVT  :  Ordered CT LE left popliteal mass seen on U/s  BCs x2 negative  dc'd prednisone:  May cause elevated wbc, prescribed for back pain.  Update Tdap  Wbc  decreasing      HTN  LOSARTAN  LOPRESSOR    ADHD  CONTINIUE WITH ADDERALL    FC, PT/OT evals.      Crespo catheter: No Crespo                       Ears: no ear pain and no hearing problems.Nose: no nasal congestion and no nasal drainage.Mouth/Throat: no dysphagia, no hoarseness and no throat pain.Neck: no lumps, no pain, no stiffness and no swollen glands.

## 2019-04-22 NOTE — ED PROVIDER NOTE - CARE PROVIDER_API CALL
Gavin Camops)  Surgery  [address] [place] [address]  Phone: [phone]  Fax: [phone]  Follow Up Time:

## 2019-04-23 VITALS
HEART RATE: 65 BPM | TEMPERATURE: 98 F | OXYGEN SATURATION: 99 % | DIASTOLIC BLOOD PRESSURE: 67 MMHG | RESPIRATION RATE: 16 BRPM | SYSTOLIC BLOOD PRESSURE: 107 MMHG

## 2019-04-23 PROCEDURE — 96375 TX/PRO/DX INJ NEW DRUG ADDON: CPT

## 2019-04-23 PROCEDURE — 36415 COLL VENOUS BLD VENIPUNCTURE: CPT

## 2019-04-23 PROCEDURE — 74177 CT ABD & PELVIS W/CONTRAST: CPT

## 2019-04-23 PROCEDURE — 99284 EMERGENCY DEPT VISIT MOD MDM: CPT | Mod: 25

## 2019-04-23 PROCEDURE — 96376 TX/PRO/DX INJ SAME DRUG ADON: CPT

## 2019-04-23 PROCEDURE — 76830 TRANSVAGINAL US NON-OB: CPT

## 2019-04-23 PROCEDURE — 85025 COMPLETE CBC W/AUTO DIFF WBC: CPT

## 2019-04-23 PROCEDURE — 76856 US EXAM PELVIC COMPLETE: CPT

## 2019-04-23 PROCEDURE — 84702 CHORIONIC GONADOTROPIN TEST: CPT

## 2019-04-23 PROCEDURE — 80053 COMPREHEN METABOLIC PANEL: CPT

## 2019-04-23 PROCEDURE — 81003 URINALYSIS AUTO W/O SCOPE: CPT

## 2019-04-23 PROCEDURE — 96361 HYDRATE IV INFUSION ADD-ON: CPT

## 2019-04-23 PROCEDURE — 85730 THROMBOPLASTIN TIME PARTIAL: CPT

## 2019-04-23 PROCEDURE — 96374 THER/PROPH/DIAG INJ IV PUSH: CPT | Mod: XU

## 2019-04-23 PROCEDURE — 85610 PROTHROMBIN TIME: CPT

## 2019-04-23 RX ORDER — POLYETHYLENE GLYCOL 3350 17 G/17G
17 POWDER, FOR SOLUTION ORAL
Qty: 85 | Refills: 0
Start: 2019-04-23 | End: 2019-04-27

## 2019-04-23 RX ORDER — DOCUSATE SODIUM 100 MG
1 CAPSULE ORAL
Qty: 15 | Refills: 0
Start: 2019-04-23 | End: 2019-04-27

## 2019-04-23 RX ADMIN — ONDANSETRON 4 MILLIGRAM(S): 8 TABLET, FILM COATED ORAL at 00:32

## 2019-04-23 NOTE — CONSULT NOTE ADULT - SUBJECTIVE AND OBJECTIVE BOX
43Fh/o YOMAIRA, gastric bypass, SBOs, intussusception p/w 2 weeks of right flank pain and 3 days of lower abdominal 8/10 crampy pain. Patient has not had a BM since Saturday. Patient complains of nausea for 3 days and emesis today. Denies f/c/CP/SOB.     Vital Signs Last 24 Hrs  T(C): 36.8 (23 Apr 2019 00:40), Max: 36.9 (22 Apr 2019 20:01)  T(F): 98.2 (23 Apr 2019 00:40), Max: 98.5 (22 Apr 2019 20:01)  HR: 71 (23 Apr 2019 00:40) (61 - 77)  BP: 102/66 (23 Apr 2019 00:40) (102/66 - 132/74)  BP(mean): --  RR: 16 (23 Apr 2019 00:40) (16 - 18)  SpO2: 99% (23 Apr 2019 00:40) (96% - 100%)    Gen: NAD, AOx3  Pulm: Unlabored breathing, no respiratory distress  Abd: soft, NTND  Ext: WWP, no c/c/e                          11.7   8.57  )-----------( 392      ( 22 Apr 2019 17:34 )             36.5   22 Apr 2019 17:34    141    |  107    |  13     ----------------------------<  68     3.5     |  23     |  0.49     Ca    9.0        22 Apr 2019 17:34    TPro  7.2    /  Alb  4.2    /  TBili  0.2    /  DBili  x      /  AST  23     /  ALT  20     /  AlkPhos  92     22 Apr 2019 17:34  PT/INR - ( 22 Apr 2019 17:34 )   PT: 11.2 sec;   INR: 0.99          PTT - ( 22 Apr 2019 17:34 )  PTT:29.8 sec    < from: CT Abdomen and Pelvis w/ Oral Cont and w/ IV Cont (04.22.19 @ 21:22) >  INTERPRETATION:  CT of the ABDOMEN and PELVIS     INDICATION: Right lower quadrant pain for three days. Nausea. History of   small bowel obstruction.    TECHNIQUE: CT of the abdomen and pelvis with intravenous and oral   contrast. Axial, sagittal, and coronal images were obtained and reviewed.   90 cc of Optiray 350 administered intravenously. 10 cc discarded.    COMPARISON: CT scan of abdomen and pelvis from 8/15/2018.    FINDINGS:    Lower chest: Normal.    Liver: Normal.    Biliary system: Again post cholecystectomy. No biliary ductal dilatation.    Pancreas: Unremarkable.    Spleen: Unremarkable.    Adrenal glands: Unremarkable.    Kidneys: Normal.    Urinary Bladder: Unremarkable.    Reproductive organs: Again post hysterectomy. Resolution right adnexal   cyst. There is a new 3.7 cm left ovarian cyst.     Bowel/Peritoneum: Again post gastric bypass. No bowel obstruction. Normal   appendix. No ascites.     Lymph nodes: No lymphadenopathy.    Aorta/IVC: Normal caliber.    Abdominal wall: Tiny fat-containing umbilical hernia.    Bones/Soft tissues: Degenerative changes of the spine. Injection material   in the buttocks again visualized.      IMPRESSION:   1. Since 8/15/2018, a right ovarian cyst has resolved and there is a new   3.7 cm left ovarian cyst.    2. No bowel obstruction.      < end of copied text >

## 2019-04-23 NOTE — ED ADULT NURSE REASSESSMENT NOTE - NS ED NURSE REASSESS COMMENT FT1
labs reviewed with patient. surg c/s note reviewed - pt instructed to f/u w dr madrid this week. verbalized understanding

## 2019-04-23 NOTE — CONSULT NOTE ADULT - ASSESSMENT
43F h/o YOMAIRA, gastric bypass, SBOs, intussusception p/w 2 weeks of right flank pain and 3 days of lower abdominal pain    -No acute surgical intervention or reason for surgical admission at this time  -If discharged, send patient home with bowel regimen  -Followup with Dr. Campos this Friday  -d/w chief resident and Dr. Campos

## 2019-05-16 NOTE — ED ADULT NURSE NOTE - INTEGUMENTARY WDL
Lives at home with son  Denies alcohol or tobacco use
Color consistent with ethnicity/race, warm, dry intact, resilient.

## 2019-08-15 ENCOUNTER — MESSAGE (OUTPATIENT)
Age: 44
End: 2019-08-15

## 2019-09-04 NOTE — H&P ADULT. - LYMPH NODES
Bexarotene Counseling:  I discussed with the patient the risks of bexarotene including but not limited to hair loss, dry lips/skin/eyes, liver abnormalities, hyperlipidemia, pancreatitis, depression/suicidal ideation, photosensitivity, drug rash/allergic reactions, hypothyroidism, anemia, leukopenia, infection, cataracts, and teratogenicity.  Patient understands that they will need regular blood tests to check lipid profile, liver function tests, white blood cell count, thyroid function tests and pregnancy test if applicable. No lymphadedenopathy

## 2019-09-20 ENCOUNTER — EMERGENCY (EMERGENCY)
Facility: HOSPITAL | Age: 44
LOS: 1 days | Discharge: HOME | End: 2019-09-20
Admitting: EMERGENCY MEDICINE
Payer: MEDICAID

## 2019-09-20 VITALS
HEART RATE: 56 BPM | OXYGEN SATURATION: 98 % | RESPIRATION RATE: 18 BRPM | DIASTOLIC BLOOD PRESSURE: 61 MMHG | TEMPERATURE: 98 F | SYSTOLIC BLOOD PRESSURE: 119 MMHG

## 2019-09-20 DIAGNOSIS — Z90.49 ACQUIRED ABSENCE OF OTHER SPECIFIED PARTS OF DIGESTIVE TRACT: Chronic | ICD-10-CM

## 2019-09-20 DIAGNOSIS — Z90.710 ACQUIRED ABSENCE OF BOTH CERVIX AND UTERUS: Chronic | ICD-10-CM

## 2019-09-20 PROCEDURE — 99283 EMERGENCY DEPT VISIT LOW MDM: CPT

## 2019-09-20 RX ORDER — KETOROLAC TROMETHAMINE 30 MG/ML
60 SYRINGE (ML) INJECTION ONCE
Refills: 0 | Status: DISCONTINUED | OUTPATIENT
Start: 2019-09-20 | End: 2019-09-20

## 2019-09-20 RX ORDER — METHOCARBAMOL 500 MG/1
1 TABLET, FILM COATED ORAL
Qty: 15 | Refills: 0
Start: 2019-09-20 | End: 2019-09-24

## 2019-09-20 RX ORDER — METHOCARBAMOL 500 MG/1
500 TABLET, FILM COATED ORAL ONCE
Refills: 0 | Status: COMPLETED | OUTPATIENT
Start: 2019-09-20 | End: 2019-09-20

## 2019-09-20 RX ADMIN — Medication 60 MILLIGRAM(S): at 23:57

## 2019-09-20 RX ADMIN — METHOCARBAMOL 500 MILLIGRAM(S): 500 TABLET, FILM COATED ORAL at 23:57

## 2019-09-20 NOTE — ED ADULT NURSE NOTE - NSIMPLEMENTINTERV_GEN_ALL_ED
Implemented All Universal Safety Interventions:  [place] to call system. Call bell, personal items and telephone within reach. Instruct patient to call for assistance. Room bathroom lighting operational. Non-slip footwear when patient is off stretcher. Physically safe environment: no spills, clutter or unnecessary equipment. Stretcher in lowest position, wheels locked, appropriate side rails in place.

## 2019-09-20 NOTE — ED ADULT TRIAGE NOTE - ARRIVAL INFO ADDITIONAL COMMENTS
pt c/o right sided upper back pain since monday that is now radiating down her right side.  no cough or fever.  no trauma.  pt awoke with the pain.

## 2019-09-20 NOTE — ED ADULT NURSE NOTE - CHPI ED NUR SYMPTOMS NEG
no bladder dysfunction/no tingling/no fatigue/no difficulty bearing weight/no anorexia/no motor function loss/no numbness/no bowel dysfunction/no constipation/no neck tenderness

## 2019-09-21 PROCEDURE — 99284 EMERGENCY DEPT VISIT MOD MDM: CPT | Mod: 25

## 2019-09-21 PROCEDURE — 96372 THER/PROPH/DIAG INJ SC/IM: CPT

## 2019-09-21 RX ORDER — OXYCODONE AND ACETAMINOPHEN 5; 325 MG/1; MG/1
1 TABLET ORAL ONCE
Refills: 0 | Status: DISCONTINUED | OUTPATIENT
Start: 2019-09-21 | End: 2019-09-21

## 2019-09-21 RX ORDER — ONDANSETRON 8 MG/1
4 TABLET, FILM COATED ORAL ONCE
Refills: 0 | Status: COMPLETED | OUTPATIENT
Start: 2019-09-21 | End: 2019-09-21

## 2019-09-21 RX ADMIN — ONDANSETRON 4 MILLIGRAM(S): 8 TABLET, FILM COATED ORAL at 00:52

## 2019-09-21 RX ADMIN — OXYCODONE AND ACETAMINOPHEN 1 TABLET(S): 5; 325 TABLET ORAL at 01:15

## 2019-09-21 NOTE — ED PROVIDER NOTE - NSFOLLOWUPINSTRUCTIONS_ED_ALL_ED_FT
[facility]    Back Injury Prevention  Back injuries can be very painful. They can also be difficult to heal. After having one back injury, you are more likely to have another one again. It is important to learn how to avoid injuring or re-injuring your back. The following tips can help you to prevent a back injury.    What actions can I take to prevent back injuries?  Changes in your diet     Talk with your doctor about what to eat. Some foods can make the bones strong.  Talk with your doctor about how much calcium and vitamin D you need each day. These nutrients help to prevent weakening of the bones (osteoporosis).  Eat foods that have calcium. These include:  Dairy products.  Green leafy vegetables.  Food and drinks that have had calcium added to them (fortified).  Eat foods that have vitamin D. These include:  Milk.  Food and drinks that have had vitamin D added to them.  Take other supplements and vitamins only as told by your doctor.  Physical fitness    Physical fitness makes your bones and muscles strong. It also improves your balance and strength.  Exercise for 30 minutes per day on most days of the week, or as told by your doctor. Make sure to:  Do aerobic exercises, such as walking, jogging, biking, or swimming.  Do exercises that increase balance and strength, such as parul chi and yoga.  Do stretching exercises. This helps with flexibility.  Develop strong belly (abdominal) muscles. Your belly muscles help to support your back.  Stay at a healthy weight. This lowers your risk of a back injury.  Good posture    ImageImageImagePrevent back injuries by developing and maintaining a good posture. To do this:  Sit up straight and stand up straight. Avoid leaning forward when you sit or hunching over when you stand.  Choose chairs that have good low-back (lumbar) support.  If you work at a desk:   Sit close to it so you do not need to lean over.   Keep your chin tucked in.   Keep your neck drawn back.   Keep your elbows bent so that your arms make a corner (right angle).  When you drive:   Sit high and close to the steering wheel. Add a low-back support to your car seat, if needed.  Take breaks every hour if you are driving for long periods of time.  Avoid sitting or standing in one position for very long. Take breaks to get up, stretch, and walk around at least once every hour.  Sleep on your side with your knees slightly bent, or sleep on your back with a pillow under your knees.  Lifting, twisting, and reaching    ImageHeavy lifting  Avoid heavy lifting, especially lifting over and over again. If you must do heavy lifting:  Stretch before lifting.  Work slowly.  Rest between lifts.  Use a tool such as a cart or a stephany to move objects if one is available.  Make several small trips instead of carrying one heavy load.  Ask for help when you need it, especially when moving big objects.  Follow these steps when lifting:  Stand with your feet shoulder-width apart.  Get as close to the object as you can. Do not  a heavy object that is far from your body.  Use handles or lifting straps if they are available.  Bend at your knees. Squat down, but keep your heels off the floor.  Keep your shoulders back. Keep your chin tucked in. Keep your back straight.  Lift the object slowly while you tighten the muscles in your legs, belly, and bottom. Keep the object as close to the center of your body as possible.  Follow these steps when putting down a heavy load:  Stand with your feet shoulder-width apart.  Lower the object slowly while you tighten the muscles in your legs, belly, and bottom. Keep the object as close to the center of your body as possible.  Keep your shoulders back. Keep your chin tucked in. Keep your back straight.  Bend at your knees. Squat down, but keep your heels off the floor.  Use handles or lifting straps if they are available.  Twisting and reaching  Avoid lifting heavy objects above your waist.  Do not twist at your waist while you are lifting or carrying a load. If you need to turn, move your feet.  Do not bend over without bending at your knees.  Avoid reaching over your head, across a table, or for an object on a high surface.  Other things to do    ImageAvoid wet floors and icy ground. Keep sidewalks clear of ice to prevent falls.  Do not sleep on a mattress that is too soft or too hard.  Store heavier objects on shelves at waist level.  Store lighter objects on lower or higher shelves.  Find ways to lower your stress, such as:  Exercise.  Massage.  Relaxation techniques.  Talk with your doctor if you feel anxious or depressed. These conditions can make back pain worse.  Wear flat heel shoes with cushioned soles.  Use both shoulder straps when carrying a backpack.  Do not use any products that contain nicotine or tobacco, such as cigarettes and e-cigarettes. If you need help quitting, ask your doctor.  Summary  Back injuries can be very painful and difficult to heal.  You can keep your back healthy by making certain changes. These include eating foods that make bones strong, working on being physically fit, developing a good posture, and lifting heavy objects in a safe way.  This information is not intended to replace advice given to you by your health care provider. Make sure you discuss any questions you have with your health care provider.    Document Released: 06/05/2009 Document Revised: 02/08/2019 Document Reviewed: 02/08/2019  [employer] Interactive Patient Education © 2019 [employer]

## 2019-09-21 NOTE — ED PROVIDER NOTE - CLINICAL SUMMARY MEDICAL DECISION MAKING FREE TEXT BOX
Patient with reproducible back pain on exam. Pain worsen with movement. Pain was better controlled while in ED with pain meds. Recommend supportive care and f/u with spine specialist.

## 2019-09-21 NOTE — ED PROVIDER NOTE - CARE PROVIDER_API CALL
Jonel Sage)  Orthopaedic Surgery  [address] [place] [address]  Phone: [phone]  Fax: [phone]  Follow Up Time:

## 2019-09-21 NOTE — ED PROVIDER NOTE - MUSCULOSKELETAL, MLM
R sided paralumbar spinal tenderness reproducible on exam, no midline pain, no motor or sensory deficit, FROM of hip. R sided lumbar paraspinal tenderness reproducible on exam, no midline bone  pain, no motor or sensory deficit, FROM of right hip. also right sided cervical / thoracic pain with palpation , ambulating well, UE and LE 5/5 muscle strength , no motor or sensory deficit. NVI

## 2019-09-21 NOTE — ED PROVIDER NOTE - CONDITION AT DISCHARGE:
----- Message from Solitario Mendez MD sent at 4/27/2017  8:12 PM CDT -----  The dehydration looks slightly better but overall kidney function is not improved.  Cut the spironolactone in half and take 1/2 (25mg) on Monday, Wednesday, Friday.  Stay on the hctz for now and we'll recheck the lab in four to six weeks.    Results sent by email    
Lab results discussed with patient-given new dose recommendations-future lab appt made.  
Satisfactory

## 2019-09-21 NOTE — ED PROVIDER NOTE - PATIENT PORTAL LINK FT
You can access the FollowMyHealth Patient Portal offered by [facility] by registering at the following website: [email or web address]. By joining [facility]’s FollowMyHealth portal, you will also be able to view your health information using other applications (apps) compatible with our system.

## 2019-09-21 NOTE — ED PROVIDER NOTE - OBJECTIVE STATEMENT
44 y/o F with PMHx herniated disc at neck and lower back presents to the ED complaining of atraumatic back pain x 3-4 days without radiating pain or incontinence. Pt notes pain is worse when moving. Denies fever, cold symptoms, urinary symptoms, or abdominal pain; however, reports nausea secondary to pain.

## 2019-10-03 DIAGNOSIS — R11.0 NAUSEA: ICD-10-CM

## 2019-10-03 DIAGNOSIS — M54.5 LOW BACK PAIN: ICD-10-CM

## 2019-11-07 NOTE — ED PROVIDER NOTE - PROGRESS NOTE DETAILS
Chief Complaint:          Testicular pain    HPI:   17 y.o. male.  Pt has intermittent left testicular pain that last a few seconds to a minute..  Pt's pain radiates to the groin.  His  testicular ultrasound was normal  HPI      Past Medical History:        Past Medical History:   Diagnosis Date   • Acid reflux          Current Meds:     Current Outpatient Medications   Medication Sig Dispense Refill   • ACETAMINOPHEN PO Take  by mouth As Needed for Mild Pain .       No current facility-administered medications for this visit.         Allergies:      No Known Allergies     Past Surgical History:     Past Surgical History:   Procedure Laterality Date   • TESTICLE UNDESCENDED REPAIR Left          Social History:     Social History     Socioeconomic History   • Marital status: Unknown     Spouse name: Not on file   • Number of children: Not on file   • Years of education: Not on file   • Highest education level: Not on file   Tobacco Use   • Smoking status: Never Smoker   • Smokeless tobacco: Never Used   Substance and Sexual Activity   • Alcohol use: No     Frequency: Never   • Drug use: No       Family History:     Family History   Problem Relation Age of Onset   • Hypertension Father    • Diabetes Father    • Breast cancer Mother        Review of Systems:     Review of Systems   Constitutional: Negative for fatigue.   HENT: Negative for sinus pressure and sinus pain.    Eyes: Negative for pain and redness.   Respiratory: Negative for chest tightness.    Cardiovascular: Negative for chest pain.   Gastrointestinal: Negative for abdominal pain, constipation and diarrhea.   Endocrine: Negative for heat intolerance.   Genitourinary: Positive for testicular pain. Negative for dysuria and penile pain.   Musculoskeletal: Negative for back pain.   Skin: Negative for rash.   Allergic/Immunologic: Negative for food allergies.   Neurological: Negative for headaches.   Hematological: Does not bruise/bleed easily.  "  Psychiatric/Behavioral: The patient is not nervous/anxious.        I  Physical Exam:     Physical Exam   Constitutional: He is oriented to person, place, and time.   HENT:   Head: Normocephalic and atraumatic.   Right Ear: External ear normal.   Left Ear: External ear normal.   Nose: Nose normal.   Mouth/Throat: Oropharynx is clear and moist.   Eyes: Conjunctivae and EOM are normal. Pupils are equal, round, and reactive to light.   Neck: Normal range of motion. Neck supple. No thyromegaly present.   Cardiovascular: Normal rate, regular rhythm, normal heart sounds and intact distal pulses.   No murmur heard.  Pulmonary/Chest: Effort normal and breath sounds normal. No respiratory distress. He has no wheezes. He has no rales. He exhibits no tenderness.   Abdominal: Soft. Bowel sounds are normal. He exhibits no distension and no mass. There is no tenderness. No hernia.   Genitourinary: Penis normal.   Genitourinary Comments: Atrophic left testicle   Musculoskeletal: Normal range of motion. He exhibits no edema or tenderness.   Lymphadenopathy:     He has no cervical adenopathy.   Neurological: He is alert and oriented to person, place, and time. No cranial nerve deficit. He exhibits normal muscle tone. Coordination normal.   Skin: Skin is warm. No rash noted.   Psychiatric: He has a normal mood and affect. His behavior is normal. Judgment and thought content normal.   Nursing note and vitals reviewed.      Ht 165.1 cm (65\")   Wt 86.5 kg (190 lb 12.8 oz)   BMI 31.75 kg/m²    Procedure:         Assessment:     Encounter Diagnosis   Name Primary?   • Orchalgia Yes       No orders of the defined types were placed in this encounter.      Patient reports that he is not currently experiencing any symptoms of urinary incontinence.      Plan:   I discussed how he has no signs of epididymitis or hernia or tumor.    Patient's Body mass index is 31.75 kg/m². BMI is above normal parameters. Recommendations include: educational " material.      Smoking Cessation Counseling:  Never a smoker.  Patient does not currently use any tobacco products.     Counseling was given to patient and family for the following topics diagnostic results including: scrotal ultrasound and impressions as follows: ideopathic orchalgia. The interim medical history and current results were reviewed.  A treatment plan with follow-up was made for Orchalgia [N50.819]   This document has been electronically signed by Alex Alfonso MD November 7, 2019 2:06 PM       as per surgery. dc home with gi regimen and follow up with dr. griggs

## 2020-01-09 NOTE — PRE-OP CHECKLIST - SELECT TESTS ORDERED
CBC/INR/Type and Screen/HCG/Urinalysis/PT/PTT/EKG CMP/INR/EKG/CBC/HCG/Urinalysis/PT/PTT/Type and Screen No risk alerts present

## 2020-02-05 ENCOUNTER — APPOINTMENT (OUTPATIENT)
Dept: UROLOGY | Facility: CLINIC | Age: 45
End: 2020-02-05

## 2020-02-23 ENCOUNTER — INPATIENT (INPATIENT)
Facility: HOSPITAL | Age: 45
LOS: 1 days | Discharge: HOME | DRG: 392 | End: 2020-02-25
Attending: SURGERY | Admitting: SURGERY
Payer: MEDICAID

## 2020-02-23 VITALS
DIASTOLIC BLOOD PRESSURE: 75 MMHG | HEART RATE: 88 BPM | TEMPERATURE: 98 F | SYSTOLIC BLOOD PRESSURE: 123 MMHG | OXYGEN SATURATION: 100 % | WEIGHT: 175.05 LBS | RESPIRATION RATE: 18 BRPM

## 2020-02-23 DIAGNOSIS — Z00.00 ENCOUNTER FOR GENERAL ADULT MEDICAL EXAMINATION WITHOUT ABNORMAL FINDINGS: ICD-10-CM

## 2020-02-23 DIAGNOSIS — Z90.710 ACQUIRED ABSENCE OF BOTH CERVIX AND UTERUS: Chronic | ICD-10-CM

## 2020-02-23 DIAGNOSIS — Z98.84 BARIATRIC SURGERY STATUS: ICD-10-CM

## 2020-02-23 DIAGNOSIS — Z90.49 ACQUIRED ABSENCE OF OTHER SPECIFIED PARTS OF DIGESTIVE TRACT: Chronic | ICD-10-CM

## 2020-02-23 DIAGNOSIS — Z91.89 OTHER SPECIFIED PERSONAL RISK FACTORS, NOT ELSEWHERE CLASSIFIED: ICD-10-CM

## 2020-02-23 DIAGNOSIS — R10.9 UNSPECIFIED ABDOMINAL PAIN: ICD-10-CM

## 2020-02-23 DIAGNOSIS — D64.9 ANEMIA, UNSPECIFIED: ICD-10-CM

## 2020-02-23 LAB
ALBUMIN SERPL ELPH-MCNC: 4.5 G/DL — SIGNIFICANT CHANGE UP (ref 3.3–5)
ALP SERPL-CCNC: 106 U/L — SIGNIFICANT CHANGE UP (ref 40–120)
ALT FLD-CCNC: 6 U/L — LOW (ref 10–45)
ANION GAP SERPL CALC-SCNC: 10 MMOL/L — SIGNIFICANT CHANGE UP (ref 5–17)
APPEARANCE UR: CLEAR — SIGNIFICANT CHANGE UP
AST SERPL-CCNC: 28 U/L — SIGNIFICANT CHANGE UP (ref 10–40)
BASOPHILS # BLD AUTO: 0.04 K/UL — SIGNIFICANT CHANGE UP (ref 0–0.2)
BASOPHILS NFR BLD AUTO: 0.8 % — SIGNIFICANT CHANGE UP (ref 0–2)
BILIRUB SERPL-MCNC: 0.3 MG/DL — SIGNIFICANT CHANGE UP (ref 0.2–1.2)
BILIRUB UR-MCNC: NEGATIVE — SIGNIFICANT CHANGE UP
BUN SERPL-MCNC: 11 MG/DL — SIGNIFICANT CHANGE UP (ref 7–23)
CALCIUM SERPL-MCNC: 9.6 MG/DL — SIGNIFICANT CHANGE UP (ref 8.4–10.5)
CHLORIDE SERPL-SCNC: 105 MMOL/L — SIGNIFICANT CHANGE UP (ref 96–108)
CO2 SERPL-SCNC: 25 MMOL/L — SIGNIFICANT CHANGE UP (ref 22–31)
COLOR SPEC: YELLOW — SIGNIFICANT CHANGE UP
CREAT SERPL-MCNC: 0.58 MG/DL — SIGNIFICANT CHANGE UP (ref 0.5–1.3)
DIFF PNL FLD: NEGATIVE — SIGNIFICANT CHANGE UP
EOSINOPHIL # BLD AUTO: 0.05 K/UL — SIGNIFICANT CHANGE UP (ref 0–0.5)
EOSINOPHIL NFR BLD AUTO: 1 % — SIGNIFICANT CHANGE UP (ref 0–6)
GLUCOSE SERPL-MCNC: 92 MG/DL — SIGNIFICANT CHANGE UP (ref 70–99)
GLUCOSE UR QL: NEGATIVE — SIGNIFICANT CHANGE UP
HCT VFR BLD CALC: 37.1 % — SIGNIFICANT CHANGE UP (ref 34.5–45)
HGB BLD-MCNC: 11.1 G/DL — LOW (ref 11.5–15.5)
IMM GRANULOCYTES NFR BLD AUTO: 0.2 % — SIGNIFICANT CHANGE UP (ref 0–1.5)
KETONES UR-MCNC: NEGATIVE — SIGNIFICANT CHANGE UP
LEUKOCYTE ESTERASE UR-ACNC: NEGATIVE — SIGNIFICANT CHANGE UP
LYMPHOCYTES # BLD AUTO: 1.67 K/UL — SIGNIFICANT CHANGE UP (ref 1–3.3)
LYMPHOCYTES # BLD AUTO: 33.4 % — SIGNIFICANT CHANGE UP (ref 13–44)
MCHC RBC-ENTMCNC: 23.3 PG — LOW (ref 27–34)
MCHC RBC-ENTMCNC: 29.9 GM/DL — LOW (ref 32–36)
MCV RBC AUTO: 77.9 FL — LOW (ref 80–100)
MONOCYTES # BLD AUTO: 0.51 K/UL — SIGNIFICANT CHANGE UP (ref 0–0.9)
MONOCYTES NFR BLD AUTO: 10.2 % — SIGNIFICANT CHANGE UP (ref 2–14)
NEUTROPHILS # BLD AUTO: 2.72 K/UL — SIGNIFICANT CHANGE UP (ref 1.8–7.4)
NEUTROPHILS NFR BLD AUTO: 54.4 % — SIGNIFICANT CHANGE UP (ref 43–77)
NITRITE UR-MCNC: NEGATIVE — SIGNIFICANT CHANGE UP
NRBC # BLD: 0 /100 WBCS — SIGNIFICANT CHANGE UP (ref 0–0)
PH UR: 7 — SIGNIFICANT CHANGE UP (ref 5–8)
PLATELET # BLD AUTO: 408 K/UL — HIGH (ref 150–400)
POTASSIUM SERPL-MCNC: 4.3 MMOL/L — SIGNIFICANT CHANGE UP (ref 3.5–5.3)
POTASSIUM SERPL-SCNC: 4.3 MMOL/L — SIGNIFICANT CHANGE UP (ref 3.5–5.3)
PROT SERPL-MCNC: 7.3 G/DL — SIGNIFICANT CHANGE UP (ref 6–8.3)
PROT UR-MCNC: NEGATIVE MG/DL — SIGNIFICANT CHANGE UP
RBC # BLD: 4.76 M/UL — SIGNIFICANT CHANGE UP (ref 3.8–5.2)
RBC # FLD: 17.3 % — HIGH (ref 10.3–14.5)
SODIUM SERPL-SCNC: 140 MMOL/L — SIGNIFICANT CHANGE UP (ref 135–145)
SP GR SPEC: 1.02 — SIGNIFICANT CHANGE UP (ref 1–1.03)
UROBILINOGEN FLD QL: 1 E.U./DL — SIGNIFICANT CHANGE UP
WBC # BLD: 5 K/UL — SIGNIFICANT CHANGE UP (ref 3.8–10.5)
WBC # FLD AUTO: 5 K/UL — SIGNIFICANT CHANGE UP (ref 3.8–10.5)

## 2020-02-23 PROCEDURE — 93010 ELECTROCARDIOGRAM REPORT: CPT

## 2020-02-23 PROCEDURE — 99285 EMERGENCY DEPT VISIT HI MDM: CPT

## 2020-02-23 PROCEDURE — 74177 CT ABD & PELVIS W/CONTRAST: CPT | Mod: 26

## 2020-02-23 PROCEDURE — 99222 1ST HOSP IP/OBS MODERATE 55: CPT | Mod: GC

## 2020-02-23 RX ORDER — PANTOPRAZOLE SODIUM 20 MG/1
40 TABLET, DELAYED RELEASE ORAL
Refills: 0 | Status: DISCONTINUED | OUTPATIENT
Start: 2020-02-23 | End: 2020-02-25

## 2020-02-23 RX ORDER — SUCRALFATE 1 G
1 TABLET ORAL
Qty: 28 | Refills: 0
Start: 2020-02-23 | End: 2020-02-29

## 2020-02-23 RX ORDER — METOCLOPRAMIDE HCL 10 MG
10 TABLET ORAL ONCE
Refills: 0 | Status: COMPLETED | OUTPATIENT
Start: 2020-02-23 | End: 2020-02-23

## 2020-02-23 RX ORDER — ONDANSETRON 8 MG/1
4 TABLET, FILM COATED ORAL EVERY 6 HOURS
Refills: 0 | Status: DISCONTINUED | OUTPATIENT
Start: 2020-02-23 | End: 2020-02-24

## 2020-02-23 RX ORDER — FAMOTIDINE 10 MG/ML
1 INJECTION INTRAVENOUS
Qty: 14 | Refills: 0
Start: 2020-02-23 | End: 2020-02-29

## 2020-02-23 RX ORDER — SODIUM CHLORIDE 9 MG/ML
1000 INJECTION INTRAMUSCULAR; INTRAVENOUS; SUBCUTANEOUS ONCE
Refills: 0 | Status: COMPLETED | OUTPATIENT
Start: 2020-02-23 | End: 2020-02-23

## 2020-02-23 RX ORDER — SODIUM CHLORIDE 9 MG/ML
1000 INJECTION INTRAMUSCULAR; INTRAVENOUS; SUBCUTANEOUS
Refills: 0 | Status: DISCONTINUED | OUTPATIENT
Start: 2020-02-23 | End: 2020-02-25

## 2020-02-23 RX ORDER — ONDANSETRON 8 MG/1
4 TABLET, FILM COATED ORAL ONCE
Refills: 0 | Status: COMPLETED | OUTPATIENT
Start: 2020-02-23 | End: 2020-02-23

## 2020-02-23 RX ORDER — FAMOTIDINE 10 MG/ML
20 INJECTION INTRAVENOUS ONCE
Refills: 0 | Status: COMPLETED | OUTPATIENT
Start: 2020-02-23 | End: 2020-02-23

## 2020-02-23 RX ORDER — ACETAMINOPHEN 500 MG
650 TABLET ORAL EVERY 6 HOURS
Refills: 0 | Status: DISCONTINUED | OUTPATIENT
Start: 2020-02-23 | End: 2020-02-25

## 2020-02-23 RX ORDER — ONDANSETRON 8 MG/1
1 TABLET, FILM COATED ORAL
Qty: 20 | Refills: 0
Start: 2020-02-23

## 2020-02-23 RX ORDER — PANTOPRAZOLE SODIUM 20 MG/1
40 TABLET, DELAYED RELEASE ORAL ONCE
Refills: 0 | Status: COMPLETED | OUTPATIENT
Start: 2020-02-23 | End: 2020-02-23

## 2020-02-23 RX ORDER — IOHEXOL 300 MG/ML
30 INJECTION, SOLUTION INTRAVENOUS ONCE
Refills: 0 | Status: COMPLETED | OUTPATIENT
Start: 2020-02-23 | End: 2020-02-23

## 2020-02-23 RX ORDER — HYDROMORPHONE HYDROCHLORIDE 2 MG/ML
1 INJECTION INTRAMUSCULAR; INTRAVENOUS; SUBCUTANEOUS ONCE
Refills: 0 | Status: DISCONTINUED | OUTPATIENT
Start: 2020-02-23 | End: 2020-02-23

## 2020-02-23 RX ORDER — INFLUENZA VIRUS VACCINE 15; 15; 15; 15 UG/.5ML; UG/.5ML; UG/.5ML; UG/.5ML
0.5 SUSPENSION INTRAMUSCULAR ONCE
Refills: 0 | Status: DISCONTINUED | OUTPATIENT
Start: 2020-02-23 | End: 2020-02-25

## 2020-02-23 RX ORDER — MORPHINE SULFATE 50 MG/1
4 CAPSULE, EXTENDED RELEASE ORAL ONCE
Refills: 0 | Status: DISCONTINUED | OUTPATIENT
Start: 2020-02-23 | End: 2020-02-23

## 2020-02-23 RX ORDER — FERROUS SULFATE 325(65) MG
325 TABLET ORAL DAILY
Refills: 0 | Status: DISCONTINUED | OUTPATIENT
Start: 2020-02-23 | End: 2020-02-23

## 2020-02-23 RX ORDER — PREGABALIN 225 MG/1
1000 CAPSULE ORAL ONCE
Refills: 0 | Status: COMPLETED | OUTPATIENT
Start: 2020-02-23 | End: 2020-02-23

## 2020-02-23 RX ORDER — SODIUM CHLORIDE 9 MG/ML
1000 INJECTION INTRAMUSCULAR; INTRAVENOUS; SUBCUTANEOUS
Refills: 0 | Status: DISCONTINUED | OUTPATIENT
Start: 2020-02-23 | End: 2020-02-23

## 2020-02-23 RX ADMIN — Medication 10 MILLIGRAM(S): at 20:59

## 2020-02-23 RX ADMIN — PANTOPRAZOLE SODIUM 40 MILLIGRAM(S): 20 TABLET, DELAYED RELEASE ORAL at 22:47

## 2020-02-23 RX ADMIN — SODIUM CHLORIDE 100 MILLILITER(S): 9 INJECTION INTRAMUSCULAR; INTRAVENOUS; SUBCUTANEOUS at 23:58

## 2020-02-23 RX ADMIN — SODIUM CHLORIDE 1000 MILLILITER(S): 9 INJECTION INTRAMUSCULAR; INTRAVENOUS; SUBCUTANEOUS at 13:49

## 2020-02-23 RX ADMIN — ONDANSETRON 4 MILLIGRAM(S): 8 TABLET, FILM COATED ORAL at 13:50

## 2020-02-23 RX ADMIN — MORPHINE SULFATE 4 MILLIGRAM(S): 50 CAPSULE, EXTENDED RELEASE ORAL at 16:52

## 2020-02-23 RX ADMIN — MORPHINE SULFATE 4 MILLIGRAM(S): 50 CAPSULE, EXTENDED RELEASE ORAL at 14:04

## 2020-02-23 RX ADMIN — IOHEXOL 30 MILLILITER(S): 300 INJECTION, SOLUTION INTRAVENOUS at 13:50

## 2020-02-23 RX ADMIN — MORPHINE SULFATE 4 MILLIGRAM(S): 50 CAPSULE, EXTENDED RELEASE ORAL at 17:07

## 2020-02-23 RX ADMIN — ONDANSETRON 4 MILLIGRAM(S): 8 TABLET, FILM COATED ORAL at 19:28

## 2020-02-23 RX ADMIN — MORPHINE SULFATE 4 MILLIGRAM(S): 50 CAPSULE, EXTENDED RELEASE ORAL at 13:49

## 2020-02-23 RX ADMIN — SODIUM CHLORIDE 1000 MILLILITER(S): 9 INJECTION INTRAMUSCULAR; INTRAVENOUS; SUBCUTANEOUS at 19:42

## 2020-02-23 RX ADMIN — HYDROMORPHONE HYDROCHLORIDE 1 MILLIGRAM(S): 2 INJECTION INTRAMUSCULAR; INTRAVENOUS; SUBCUTANEOUS at 19:12

## 2020-02-23 RX ADMIN — Medication 10 MILLIGRAM(S): at 16:51

## 2020-02-23 RX ADMIN — ONDANSETRON 4 MILLIGRAM(S): 8 TABLET, FILM COATED ORAL at 22:47

## 2020-02-23 RX ADMIN — HYDROMORPHONE HYDROCHLORIDE 1 MILLIGRAM(S): 2 INJECTION INTRAMUSCULAR; INTRAVENOUS; SUBCUTANEOUS at 19:17

## 2020-02-23 RX ADMIN — FAMOTIDINE 20 MILLIGRAM(S): 10 INJECTION INTRAVENOUS at 13:50

## 2020-02-23 NOTE — H&P ADULT - PROBLEM SELECTOR PLAN 3
- microcytic anemia in setting of gastric bypass, Hgb 11.1, MCV 77  - pt denies meds but should probably be on Fe supplement  - hold off for now since acute abd pain  - collateral in AM - microcytic anemia in setting of gastric bypass, Hgb 11.1, MCV 77  - pt denies meds but should probably be on Fe supplement  - hold off for now since acute abd pain  - collateral in AM  - Fe panel, B12, folate

## 2020-02-23 NOTE — H&P ADULT - HISTORY OF PRESENT ILLNESS
44yoF with a PMH of gastric bypass (RNYGB 2009), intus  susception of jejenum 2015, overdistention of jejenum 2017, SBO s/p GENI 2018 presents with 3 days of abdominal pain. Pain is described as primarily epigastric with some RLQ component, sharp, 8/10, nonradiating made worse with food. While in the ED she reports a single episode of nonbloody bilious vomiting after trying to eat some crackers. ROS otherwise negative for fever, chills, chest pain, SOB, c/d, numbness, or tingling.  She reports that she takes no medications whatsoever. Denies drug allergies. Denies tobacco, etoh, or drugs. Lives in [place] with her  and kids. 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 Basilio Lawrence.     ED:  VS: T 98.2, /75, HR 88, RR 18 sat 100% on room air  Labs all WNL except Hgb 11.1, MCV 77  Imaging: CT A/P w/ oral contrast shows s/p gastric bypass. No evidence of obstruction. Normal appendix.   Intervention: Sx called, said no Sx intervention, okay to D/C from Sx perspective. Given opioids, antiemetics, and 1L NS 44yoF with a PMH of gastric bypass (RNYGB 2009), intussusception of jejenum 2015, overdistention of jejenum 2017, SBO s/p GENI 2018 presents with 3 days of abdominal pain. Pain is described as primarily epigastric with some RLQ component, sharp, 8/10, nonradiating made worse with food. While in the ED she reports a single episode of nonbloody bilious vomiting after trying to eat some crackers. ROS otherwise negative for fever, chills, chest pain, SOB, c/d, numbness, or tingling.  She reports that she takes no medications whatsoever. Denies drug allergies. Denies tobacco, etoh, or drugs. Lives in [place] with her  and kids. 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 Basilio Lawrence.     ED:  VS: T 98.2, /75, HR 88, RR 18 sat 100% on room air  Labs all WNL except Hgb 11.1, MCV 77  Imaging: CT A/P w/ oral contrast shows s/p gastric bypass. No evidence of obstruction. Normal appendix.   Intervention: Sx called, said no Sx intervention, okay to D/C from Sx perspective. Given opioids, antiemetics, and 1L NS

## 2020-02-23 NOTE — ED ADULT NURSE NOTE - CHPI ED NUR SYMPTOMS NEG
no fever/no diarrhea/no dysuria/no hematuria/no vomiting/no blood in stool/no burning urination/no chills/no abdominal distension

## 2020-02-23 NOTE — H&P ADULT - ATTENDING COMMENTS
44 year old female patient with past medical history of bariatric surgery came in with severe abdominal pain and nausea and vomiting. Patient had a negative CT scan ruling out obstruction. Patient is admitted for pain management and vomiting.  1. Abdominal Pain  patient voices severe pain - however appears comfortable on physical exam  belly is soft, non distended  tylenol PRN for moderate pain  clear diet  general surgery consult is done- unlikely obstruction, passing gas  start reglan as prokinetic agent  PPI therapy for possible gastritis  2. Nausea and Vomiting  Reglan IV

## 2020-02-23 NOTE — H&P ADULT - ASSESSMENT
44yoF with a PMH of gastric bypass (RNYGB 2009), intussusception of jejenum 2015, overdistention of jejenum 2017, SBO s/p GENI 2018 presents with 3 days of abdominal pain.

## 2020-02-23 NOTE — ED ADULT NURSE REASSESSMENT NOTE - NS ED NURSE REASSESS COMMENT FT1
"I think the last one(hydromorphone 1mg) is better than the morning one (morphine 4mg)" pt denies nausea at this time.

## 2020-02-23 NOTE — H&P ADULT - PROBLEM SELECTOR PLAN 2
- s/p RY - s/p RNYGB 2009  - reported no medication but should be on Fe and B12 supplements  - collateral from PCP in AM

## 2020-02-23 NOTE — ED PROVIDER NOTE - CLINICAL SUMMARY MEDICAL DECISION MAKING FREE TEXT BOX
43 y/o F with a PMHx of intussusception in 2019 and s/p gastric bypass 10 years ago presents to ED complaining of mid upper gastric and RUQ pain. Differentials includes possible intussusception, SBO, colitis, diverticulitis. Awaiting abd/pelvis CT, will evaluate and reassess. 45 y/o F with a PMHx of intussusception in 2019 and s/p gastric bypass 10 years ago presents to ED complaining of mid upper gastric and RUQ pain. Differentials includes possible intussusception, SBO, colitis, diverticulitis. Awaiting abd/pelvis CT, will evaluate and reassess. mayi po no N/V  surgery eval pt stable for dc from a surgical standpoint 43 y/o F with a PMHx of intussusception in 2019 and s/p gastric bypass 10 years ago presents to ED complaining of mid upper gastric and RUQ pain. Differentials includes possible intussusception, SBO, colitis, diverticulitis. Awaiting abd/pelvis CT, will evaluate and reassess. mayi po no N/V  surgery eval pt stable for dc from a surgical standpoint  pt cant mayi -- cont nausea -- no vomitig c/o fo abd pain epigastric assoc with eating --po rec admission and GI eval 43 y/o F with a PMHx of intussusception in 2019 and s/p gastric bypass 10 years ago presents to ED complaining of mid upper gastric and RUQ pain. Differentials includes possible intussusception, SBO, colitis, diverticulitis. Awaiting abd/pelvis CT, will evaluate and reassess.  surgery eval pt --stable for dc from a surgical standpoint -- pt cant tolerate po- cont nausea -- no vomiting- c/o of abd pain epigastric assoc with eating --po rec admission and GI eval

## 2020-02-23 NOTE — H&P ADULT - NSHPLABSRESULTS_GEN_ALL_CORE
11.1   5.00  )-----------( 408      ( 2020 14:04 )             37.1           140  |  105  |  11  ----------------------------<  92  4.3   |  25  |  0.58    Ca    9.6      2020 14:04    TPro  7.3  /  Alb  4.5  /  TBili  0.3  /  DBili  x   /  AST  28  /  ALT  6<L>  /  AlkPhos  106                Urinalysis Basic - ( 2020 14:14 )    Color: Yellow / Appearance: Clear / S.020 / pH: x  Gluc: x / Ketone: NEGATIVE  / Bili: Negative / Urobili: 1.0 E.U./dL   Blood: x / Protein: NEGATIVE mg/dL / Nitrite: NEGATIVE   Leuk Esterase: NEGATIVE / RBC: x / WBC x   Sq Epi: x / Non Sq Epi: x / Bacteria: x            Lactate Trend            CAPILLARY BLOOD GLUCOSE

## 2020-02-23 NOTE — ED ADULT NURSE NOTE - OBJECTIVE STATEMENT
pt to ED for constant epigastric pain, intermittent R flank pain for 3days. Unknown aggravating or alleviating factor, denies dysuria, frequency, bleeding symptoms. pt is complaining nausea, pain but denies vomiting, diarrhea, fever/chills, general ache, sob, cp, dizziness, last BM is yesterday, last menstruation is 2yrs ago

## 2020-02-23 NOTE — H&P ADULT - PROBLEM SELECTOR PLAN 4
.  F: 100cc/hr NS  E: PRN  N: advance as tolerated  DVT: lovenox  GI: PPI  bowel: none  glucose: none  pain: tylenol  dispo: F  code: full

## 2020-02-23 NOTE — ED PROVIDER NOTE - PATIENT PORTAL LINK FT
You can access the FollowMyHealth Patient Portal offered by [facility] by registering at the following website: [email or web address]. By joining [employer]’s FollowMyHealth portal, you will also be able to view your health information using other applications (apps) compatible with our system.

## 2020-02-23 NOTE — ED PROVIDER NOTE - NSFOLLOWUPINSTRUCTIONS_ED_ALL_ED_FT
Abdominal Pain, Adult  Abdominal pain can be caused by many things. Often, abdominal pain is not serious and it gets better with no treatment or by being treated at home. However, sometimes abdominal pain is serious. Your health care provider will do a medical history and a physical exam to try to determine the cause of your abdominal pain.  Follow these instructions at home:  Take over-the-counter and prescription medicines only as told by your health care provider. Do not take a laxative unless told by your health care provider.Drink enough fluid to keep your urine clear or pale yellow.Watch your condition for any changes.Keep all follow-up visits as told by your health care provider. This is important.Contact a health care provider if:  Your abdominal pain changes or gets worse.You are not hungry or you lose weight without trying.You are constipated or have diarrhea for more than 2–3 days.You have pain when you urinate or have a bowel movement.Your abdominal pain wakes you up at night.Your pain gets worse with meals, after eating, or with certain foods.You are throwing up and cannot keep anything down.You have a fever.Get help right away if:  Your pain does not go away as soon as your health care provider told you to expect.You cannot stop throwing up.Your pain is only in areas of the abdomen, such as the right side or the left lower portion of the abdomen.You have bloody or black stools, or stools that look like tar.You have severe pain, cramping, or bloating in your abdomen.You have signs of dehydration, such as:  Dark urine, very little urine, or no urine.Cracked lips.Dry mouth.Sunken eyes.Sleepiness.Weakness.This information is not intended to replace advice given to you by your health care provider. Make sure you discuss any questions you have with your health care provider.    Document Released: 09/27/2006 Document Revised: 07/07/2017 Document Reviewed: 05/31/2017  [employer] Patient Education © 2020 [employer]

## 2020-02-23 NOTE — ED PROVIDER NOTE - OBJECTIVE STATEMENT
43 y/o F with a PMHx of intussusception in 2019 and s/p gastric bypass 10 years ago, with a coli lab done 3-4 years ago, presents to ED complaining of mid upper gastric and RUQ pain for the past 2-3 days. She denies fever, chills, CP, SOB, flank pain, vomiting, hx of urinary symptoms, or hx of diverticulitis. Of not, pt's appendix is still present. 43 y/o F with a PMHx of intussusception in 2019 and s/p gastric bypass 10 years ago, s/p YOMAIRA, lap choly done 3-4 years ago, presents to ED complaining of mid upper gastric and RUQ pain for the past 2-3 days. She denies fever, chills, CP, SOB, flank pain, vomiting, hx of urinary symptoms, or hx of diverticulitis. Of not, pt's appendix is still present.

## 2020-02-23 NOTE — H&P ADULT - NSHPPHYSICALEXAM_GEN_ALL_CORE
PHYSICAL EXAM:  GENERAL: NAD, well-developed  HEAD:  Atraumatic, Normocephalic  EYES: EOMI, PERRLA, conjunctiva and sclera clear  NECK: Supple, No JVD  CHEST/LUNG: Clear to auscultation bilaterally; No wheeze  HEART: Regular rate and rhythm; No murmurs, rubs, or gallops  ABDOMEN: Soft, minimal tenderness epigastrium and RLQ, Nondistended; Bowel sounds present  EXTREMITIES:, No clubbing, cyanosis, or edema  PSYCH: AAOx3  NEUROLOGY: non-focal  SKIN: No rashes or lesions

## 2020-02-23 NOTE — CONSULT NOTE ADULT - SUBJECTIVE AND OBJECTIVE BOX
HPI:  This is a 43 y/o F with PMH of morbid obesity s/p RNYGB  c/b intussuception of JJ , overdistention of JJ s/p resection in  and SBO s/p pelvic GENI in 2018. She presents to the ED for 3 days of abdominal pain.   Pain described as on/off, worst with food, mainly epigastric but also RLQ. Accompagnied by some Nausea, no vomiting, no fever, last BM 3 days prior to presentation but still passing gas, no dysuria.   Never had a colonoscopy      PAST MEDICAL & SURGICAL HISTORY:  Intussusception  UTI (urinary tract infection)  Herniated cervical disc  Anemia  History of cholecystectomy  H/O abdominal hysterectomy  Status post bariatric surgery: Gastric bypass status for obesity      MEDICATIONS  (STANDING):  metoclopramide Injectable 10 milliGRAM(s) IV Push once    MEDICATIONS  (PRN):      Allergies    No Known Allergies    Intolerances        SOCIAL HISTORY: non smoker     FAMILY HISTORY: no history of colorectal cancer       Vital Signs Last 24 Hrs  T(C): 36.9 (2020 20:10), Max: 36.9 (2020 20:10)  T(F): 98.5 (2020 20:10), Max: 98.5 (2020 20:10)  HR: 73 (2020 20:10) (62 - 88)  BP: 105/68 (2020 20:10) (105/68 - 123/75)  BP(mean): --  RR: 16 (2020 20:10) (16 - 18)  SpO2: 100% (2020 20:10) (98% - 100%)    PHYSICAL EXAM  Neuro: awake and alert, no focal deficit   HEENT: normocephalic, no scleral ictera   Pulm: non labored breathing on room air, lungs are clear to auscultation   CV: regular rate and rhythm, no murmur to ausculation, no carotid bruit   GI: abdomen is soft, mildly tender in RLQ, no guarding or rebound, no hepatomegaly   MSK: no swelling, no deformity  Skin: no rash, no wound   Psych: cooperative, appropriate mood and affect     LABS:                        11.1   5.00  )-----------( 408      ( 2020 14:04 )             37.1     02-23    140  |  105  |  11  ----------------------------<  92  4.3   |  25  |  0.58    Ca    9.6      2020 14:04    TPro  7.3  /  Alb  4.5  /  TBili  0.3  /  DBili  x   /  AST  28  /  ALT  6<L>  /  AlkPhos  106        Urinalysis Basic - ( 2020 14:14 )    Color: Yellow / Appearance: Clear / S.020 / pH: x  Gluc: x / Ketone: NEGATIVE  / Bili: Negative / Urobili: 1.0 E.U./dL   Blood: x / Protein: NEGATIVE mg/dL / Nitrite: NEGATIVE   Leuk Esterase: NEGATIVE / RBC: x / WBC x   Sq Epi: x / Non Sq Epi: x / Bacteria: x        RADIOLOGY & ADDITIONAL STUDIES:

## 2020-02-23 NOTE — H&P ADULT - NSHPREVIEWOFSYSTEMS_GEN_ALL_CORE
CONSTITUTIONAL: No weakness, fevers or chills  EYES/ENT: No visual changes;  No vertigo or throat pain   NECK: No pain or stiffness  RESPIRATORY: No cough, wheezing, hemoptysis; No shortness of breath  CARDIOVASCULAR: No chest pain or palpitations  GASTROINTESTINAL: per hpi  GENITOURINARY: No dysuria, frequency or hematuria  NEUROLOGICAL: No numbness or weakness  SKIN: No itching, burning, rashes, or lesions   All other review of systems is negative unless indicated above.

## 2020-02-23 NOTE — H&P ADULT - PROBLEM SELECTOR PLAN 1
- abd pain associated with nausea and one episode of non bloody bilious vomiting. Pain primarily located in epigastrium and RLQ. Pt reports passing gas. Exam with minimal tenderness in epigastrium and RLQ  - CT A/P w/ oral contrast shows s/p gastric bypass. No evidence of obstruction. Normal appendix.  - Sx consulted, rec no Sx intervention, okay to D/C from their persepctive.   - pt admitted for symptomatic control  - DDx: gastritis vs partial SBO  - will start PPI, fluids, - abd pain associated with nausea and one episode of non bloody bilious vomiting. Pain primarily located in epigastrium and RLQ. Pt reports passing gas. Exam with minimal tenderness in epigastrium and RLQ  - CT A/P w/ oral contrast shows s/p gastric bypass. No evidence of obstruction. Normal appendix.  - Sx consulted, rec no Sx intervention, okay to D/C from their persepctive.   - pt admitted for symptomatic control  - DDx: gastritis vs partial SBO  - will start PPI, fluids, advance diet as tolerated

## 2020-02-23 NOTE — ED PROVIDER NOTE - CARE PLAN
Principal Discharge DX:	Status post bariatric surgery  Secondary Diagnosis:	Abdominal pain Principal Discharge DX:	Status post bariatric surgery  Secondary Diagnosis:	Abdominal pain  Secondary Diagnosis:	Nausea

## 2020-02-23 NOTE — H&P ADULT - NSICDXPASTSURGICALHX_GEN_ALL_CORE_FT
PAST SURGICAL HISTORY:  H/O abdominal hysterectomy     History of cholecystectomy     Status post bariatric surgery Gastric bypass status for obesity

## 2020-02-23 NOTE — H&P ADULT - NSICDXPASTMEDICALHX_GEN_ALL_CORE_FT
PAST MEDICAL HISTORY:  Anemia     Herniated cervical disc     Intussusception     UTI (urinary tract infection)

## 2020-02-23 NOTE — H&P ADULT - PROBLEM SELECTOR PLAN 5
PCP is Dr. Basilio Lawrence.     1) PCP Contacted on Admission: (Y/N) --> Name & Phone #:  2) Date of Contact with PCP: TBD  3) PCP Contacted at Discharge: TBD  4) Summary of Handoff Given to PCP: TBD   5) Post-Discharge Appointment Date: TBD

## 2020-02-23 NOTE — PATIENT PROFILE ADULT - BRADEN SCORE
Ventricular Rate : 95   Atrial Rate : 95   P-R Interval : 122   QRS Duration : 86   Q-T Interval : 342   QTC Calculation(Bezet) : 434   P Axis : 52   R Axis : 35   T Axis : 38   Diagnosis : Sinus rhythm with Fusion complexes~Possible Right ventricular hypertrophy~ST elevation in inferior leads with reciprocal changes, consistent with injury~****Abnormal ECG****~When compared with ECG of 09-JUN-2017 18:51,sinus rhythm has replaced accelerated junctional rhythm, ST segments have normalized in lateral leads ~ precordial leads but persistn in inferolateral limb leads.~Patient is post-op and remains admitted to the Pediatric Surgical Heart Unit; team is aware of findings.~Confirmed by fellow KATIE LAMA (Peds Fellow)TYLER ([id number]) on 6/12/2017 9:20:30 AM~Confirmed by HENRY GARCIA MD ([id number]) on 6/12/2017 2:14:54 PM     
23

## 2020-02-23 NOTE — ED ADULT TRIAGE NOTE - OTHER COMPLAINTS
reports nausea, no vomiting, diarrhea, fevers. hx of gastric bypass 10 years ago and intussusception last year. denies tenderness on palpation.

## 2020-02-23 NOTE — ED PROVIDER NOTE - CARE PROVIDER_API CALL
Gavin Campos)  Surgery  [address] [place] [address]  Phone: [phone]  Fax: [phone]  Follow Up Time: 1-3 Days

## 2020-02-24 LAB
ANION GAP SERPL CALC-SCNC: 9 MMOL/L — SIGNIFICANT CHANGE UP (ref 5–17)
BUN SERPL-MCNC: 5 MG/DL — LOW (ref 7–23)
CALCIUM SERPL-MCNC: 8.8 MG/DL — SIGNIFICANT CHANGE UP (ref 8.4–10.5)
CHLORIDE SERPL-SCNC: 106 MMOL/L — SIGNIFICANT CHANGE UP (ref 96–108)
CO2 SERPL-SCNC: 24 MMOL/L — SIGNIFICANT CHANGE UP (ref 22–31)
CREAT SERPL-MCNC: 0.55 MG/DL — SIGNIFICANT CHANGE UP (ref 0.5–1.3)
CULTURE RESULTS: NO GROWTH — SIGNIFICANT CHANGE UP
FERRITIN SERPL-MCNC: 20 NG/ML — SIGNIFICANT CHANGE UP (ref 15–150)
FOLATE SERPL-MCNC: 12.8 NG/ML — SIGNIFICANT CHANGE UP
GLUCOSE SERPL-MCNC: 103 MG/DL — HIGH (ref 70–99)
HCG SERPL-ACNC: <0 MIU/ML — SIGNIFICANT CHANGE UP
HCT VFR BLD CALC: 30.9 % — LOW (ref 34.5–45)
HGB BLD-MCNC: 9.4 G/DL — LOW (ref 11.5–15.5)
IRON SATN MFR SERPL: 12 % — LOW (ref 14–50)
IRON SATN MFR SERPL: 47 UG/DL — SIGNIFICANT CHANGE UP (ref 30–160)
MAGNESIUM SERPL-MCNC: 2 MG/DL — SIGNIFICANT CHANGE UP (ref 1.6–2.6)
MCHC RBC-ENTMCNC: 23.4 PG — LOW (ref 27–34)
MCHC RBC-ENTMCNC: 30.4 GM/DL — LOW (ref 32–36)
MCV RBC AUTO: 76.9 FL — LOW (ref 80–100)
NRBC # BLD: 0 /100 WBCS — SIGNIFICANT CHANGE UP (ref 0–0)
PLATELET # BLD AUTO: 324 K/UL — SIGNIFICANT CHANGE UP (ref 150–400)
POTASSIUM SERPL-MCNC: 4.1 MMOL/L — SIGNIFICANT CHANGE UP (ref 3.5–5.3)
POTASSIUM SERPL-SCNC: 4.1 MMOL/L — SIGNIFICANT CHANGE UP (ref 3.5–5.3)
RBC # BLD: 4.02 M/UL — SIGNIFICANT CHANGE UP (ref 3.8–5.2)
RBC # FLD: 17.2 % — HIGH (ref 10.3–14.5)
SODIUM SERPL-SCNC: 139 MMOL/L — SIGNIFICANT CHANGE UP (ref 135–145)
SPECIMEN SOURCE: SIGNIFICANT CHANGE UP
TIBC SERPL-MCNC: 379 UG/DL — SIGNIFICANT CHANGE UP (ref 220–430)
UIBC SERPL-MCNC: 332 UG/DL — SIGNIFICANT CHANGE UP (ref 110–370)
VIT B12 SERPL-MCNC: >2000 PG/ML — HIGH (ref 232–1245)
WBC # BLD: 6.01 K/UL — SIGNIFICANT CHANGE UP (ref 3.8–10.5)
WBC # FLD AUTO: 6.01 K/UL — SIGNIFICANT CHANGE UP (ref 3.8–10.5)

## 2020-02-24 PROCEDURE — 99223 1ST HOSP IP/OBS HIGH 75: CPT

## 2020-02-24 RX ORDER — METOCLOPRAMIDE HCL 10 MG
10 TABLET ORAL EVERY 6 HOURS
Refills: 0 | Status: DISCONTINUED | OUTPATIENT
Start: 2020-02-24 | End: 2020-02-25

## 2020-02-24 RX ORDER — ENOXAPARIN SODIUM 100 MG/ML
40 INJECTION SUBCUTANEOUS EVERY 24 HOURS
Refills: 0 | Status: DISCONTINUED | OUTPATIENT
Start: 2020-02-24 | End: 2020-02-25

## 2020-02-24 RX ADMIN — ENOXAPARIN SODIUM 40 MILLIGRAM(S): 100 INJECTION SUBCUTANEOUS at 05:44

## 2020-02-24 RX ADMIN — Medication 650 MILLIGRAM(S): at 11:17

## 2020-02-24 RX ADMIN — Medication 10 MILLIGRAM(S): at 13:16

## 2020-02-24 RX ADMIN — PANTOPRAZOLE SODIUM 40 MILLIGRAM(S): 20 TABLET, DELAYED RELEASE ORAL at 06:30

## 2020-02-24 RX ADMIN — Medication 650 MILLIGRAM(S): at 18:13

## 2020-02-24 RX ADMIN — Medication 10 MILLIGRAM(S): at 20:01

## 2020-02-24 RX ADMIN — Medication 650 MILLIGRAM(S): at 19:13

## 2020-02-24 RX ADMIN — PREGABALIN 1000 MICROGRAM(S): 225 CAPSULE ORAL at 01:06

## 2020-02-24 RX ADMIN — Medication 650 MILLIGRAM(S): at 12:18

## 2020-02-24 NOTE — CONSULT NOTE ADULT - ASSESSMENT
This is a 43 y/o F with PMH of morbid obesity s/p RNYGB 2009 c/b intussuception of JJ 2015, overdistention of JJ s/p resection in 2017 and SBO s/p pelvic GENI in 2018. She presents to the ED for 3 days of abdominal pain.   Hemodynamically stable, non peritoneal on exam   WBC 5.0   CT with no acute findings     Plan:  - Patient can be discharged if passes PO challenge   - Follow up with Dr Campos as needed   - Discussed with chief resident
44yr old F with PMHx significant for Obesity sp gastric bypass (RYGB 2009 - has lost 90lbs since surgery), Hx of jejunal intussusception 2015, overdistention of jejenum 2017, SBO s/p GENI 2018 who was admitted for evaluation of abdominal pain. GI consulted for evaluation of the abdominal pain    # Abdominal pain -   - post RYGB pain (>10yrs post -op)  - ddx - anastomotic/marginal ulcer, internal hernia, stomal stenosis, gastric remnant distention, H pylori gastritis  - currently undergoing another po challenge  - we will review her CT with radiology  - if failed please keep her NPO from midnite  - we will plan for endoscopic evaluation tomorrow as schedule permits  - pain control per primary team    Will discuss with attending Dr Freeman

## 2020-02-24 NOTE — PROGRESS NOTE ADULT - SUBJECTIVE AND OBJECTIVE BOX
SUBJECTIVE: Patient seen and examined bedside by chief resident. Patient denies nausea or vomiting. No flatus or BM. Pain controlled. Has not been started on a diet.     enoxaparin Injectable 40 milliGRAM(s) SubCutaneous every 24 hours    MEDICATIONS  (PRN):  acetaminophen   Tablet .. 650 milliGRAM(s) Oral every 6 hours PRN Temp greater or equal to 38C (100.4F), Moderate Pain (4 - 6)  metoclopramide Injectable 10 milliGRAM(s) IV Push every 6 hours PRN nausea/vomiting      I&O's Detail    2020 07:01  -  2020 07:00  --------------------------------------------------------  IN:    sodium chloride 0.9%.: 900 mL  Total IN: 900 mL    OUT:  Total OUT: 0 mL    Total NET: 900 mL          Vital Signs Last 24 Hrs  T(C): 36.7 (2020 05:40), Max: 36.9 (2020 20:10)  T(F): 98.1 (2020 05:40), Max: 98.5 (2020 20:10)  HR: 65 (2020 05:40) (58 - 88)  BP: 104/63 (2020 05:40) (104/63 - 147/87)  BP(mean): --  RR: 18 (2020 05:40) (16 - 20)  SpO2: 98% (2020 05:40) (97% - 100%)    General: NAD, resting comfortably in bed  C/V: Regular rate  Pulm: Nonlabored breathing, no respiratory distress  Abd: soft, ND, NT  Extrem: WWP, no edema, SCDs in place    LABS:                        9.4    6.01  )-----------( 324      ( 2020 06:41 )             30.9     02-24    139  |  106  |  5<L>  ----------------------------<  103<H>  4.1   |  24  |  0.55    Ca    8.8      2020 06:41  Mg     2.0         TPro  7.3  /  Alb  4.5  /  TBili  0.3  /  DBili  x   /  AST  28  /  ALT  6<L>  /  AlkPhos  106        Urinalysis Basic - ( 2020 14:14 )    Color: Yellow / Appearance: Clear / S.020 / pH: x  Gluc: x / Ketone: NEGATIVE  / Bili: Negative / Urobili: 1.0 E.U./dL   Blood: x / Protein: NEGATIVE mg/dL / Nitrite: NEGATIVE   Leuk Esterase: NEGATIVE / RBC: x / WBC x   Sq Epi: x / Non Sq Epi: x / Bacteria: x                (20 @ 17:56)

## 2020-02-24 NOTE — PROGRESS NOTE ADULT - ASSESSMENT
43 y/o F with PMH of morbid obesity s/p RNYGB 2009 c/b intussusception of JJ 2015, overdistention of JJ s/p resection in 2017 and SBO s/p pelvic GENI in 2018. She presents to the ED for 3 days of abdominal pain.   Hemodynamically stable, non peritoneal on exam   WBC 5.0   CT with no acute findings     Plan:  - Transfer to surgery team 2; discussed with primary team  - PO challenge today, d/c if tolerates 43 y/o F with PMH of morbid obesity s/p RNYGB 2009 c/b intussusception of JJ 2015, overdistention of JJ s/p resection in 2017 and SBO s/p pelvic GENI in 2018. She presents to the ED for 3 days of abdominal pain.   Hemodynamically stable, non peritoneal on exam   WBC 5.0   CT with no acute findings     Plan:  - Transfer to surgery team 2; discussed with primary team  - GI consult  - PO challenge today, d/c if tolerates

## 2020-02-24 NOTE — CONSULT NOTE ADULT - SUBJECTIVE AND OBJECTIVE BOX
HPI:  44yr old F with PMHx significant for Obesity sp gastric bypass (RYGB ), Hx of jejunal intussusception , overdistention of jejenum , SBO s/p GENI  who was admitted for evaluation of abdominal pain.     presents with 3 days of abdominal pain. Pain is described as primarily epigastric with some RLQ component, sharp, 8/10, nonradiating made worse with food. While in the ED she reports a single episode of nonbloody bilious vomiting after trying to eat some crackers. ROS otherwise negative for fever, chills, chest pain, SOB, c/d, numbness, or tingling.  She reports that she takes no medications whatsoever. Denies drug allergies. Denies tobacco, etoh, or drugs. Lives in [place] with her  and kids. Works in [employer]. PCP is Dr. Basilio Lawrence.     ED:  VS: T 98.2, /75, HR 88, RR 18 sat 100% on room air  Labs all WNL except Hgb 11.1, MCV 77  Imaging: CT A/P w/ oral contrast shows s/p gastric bypass. No evidence of obstruction. Normal appendix.   Intervention: Sx called, said no Sx intervention, okay to D/C from Sx perspective. Given opioids, antiemetics, and 1L NS (2020 21:10)      PAST MEDICAL & SURGICAL HISTORY:  Intussusception  UTI (urinary tract infection)  Herniated cervical disc  Anemia  History of cholecystectomy  H/O abdominal hysterectomy  Status post bariatric surgery: Gastric bypass status for obesity      REVIEW OF SYSTEMS  Apart from items noted in the HPI a 10point ROS was negative      MEDICATIONS  (STANDING):  enoxaparin Injectable 40 milliGRAM(s) SubCutaneous every 24 hours  influenza   Vaccine 0.5 milliLiter(s) IntraMuscular once  pantoprazole    Tablet 40 milliGRAM(s) Oral before breakfast  sodium chloride 0.9%. 1000 milliLiter(s) (100 mL/Hr) IV Continuous <Continuous>    MEDICATIONS  (PRN):  acetaminophen   Tablet .. 650 milliGRAM(s) Oral every 6 hours PRN Temp greater or equal to 38C (100.4F), Moderate Pain (4 - 6)  metoclopramide Injectable 10 milliGRAM(s) IV Push every 6 hours PRN nausea/vomiting      Allergies  No Known Allergies    Intolerances      SOCIAL HISTORY:  Denies toxic or illicit habits    FAMILY HISTORY:  No pertinent family history in first degree relatives      Vital Signs Last 24 Hrs  T(C): 37.4 (2020 12:45), Max: 37.4 (2020 12:45)  T(F): 99.4 (2020 12:45), Max: 99.4 (2020 12:45)  HR: 74 (2020 12:45) (58 - 74)  BP: 116/70 (2020 12:45) (104/63 - 147/87)  BP(mean): --  RR: 17 (2020 12:45) (16 - 20)  SpO2: 97% (2020 12:45) (97% - 100%)    PHYSICAL EXAM:  GEN:     Constitutional:    Eyes:    ENMT:    Neck:    Breasts:    Back:    Respiratory:    Cardiovascular:    Gastrointestinal:    Genitourinary:    Rectal:    Extremities:    Vascular:    Neurological:    Skin:    Lymph Nodes:    Musculoskeletal:    Psychiatric:        LABS:                        9.4    6.01  )-----------( 324      ( 2020 06:41 )             30.9     02-24    139  |  106  |  5<L>  ----------------------------<  103<H>  4.1   |  24  |  0.55    Ca    8.8      2020 06:41  Mg     2.0     -    TPro  7.3  /  Alb  4.5  /  TBili  0.3  /  DBili  x   /  AST  28  /  ALT  6<L>  /  AlkPhos  106  02-      Urinalysis Basic - ( 2020 14:14 )    Color: Yellow / Appearance: Clear / S.020 / pH: x  Gluc: x / Ketone: NEGATIVE  / Bili: Negative / Urobili: 1.0 E.U./dL   Blood: x / Protein: NEGATIVE mg/dL / Nitrite: NEGATIVE   Leuk Esterase: NEGATIVE / RBC: x / WBC x   Sq Epi: x / Non Sq Epi: x / Bacteria: x        RADIOLOGY & ADDITIONAL STUDIES: HPI:  44yr old F with PMHx significant for Obesity sp gastric bypass (RYGB  - has lost 90lbs since surgery), Hx of jejunal intussusception , overdistention of jejenum , SBO s/p GENI  who was admitted for evaluation of abdominal pain. GI consulted for evaluation of the abdominal pain  Patient reports onset of abdominal pain on Friday (epigastric location, pressure, 6/10, nil radiation, no identified aggravating or alleviating factors, lasted for a few minutes and dissipated on its own). Pain reoccurred on Saturday with more intensity, located in same epigastric area, but radiates to the umbilicus, and R side of the abdomen, constant, 8/10, aggravated by po intake, associated with nausea, and emesis (x2, non bloody, non bilious), associated chills, diaphoretic spells, and headache. She otherwise denies NSAID use, bleeding, abdominal distention, fever, sick contacts, recent travel, leg swelling, dysuria, chest pain, palpitations diaphoresis, SOB/KIMBLE, falls, LOC/syncope.      EGD -   Colonoscopy -      PAST MEDICAL & SURGICAL HISTORY:  Intussusception  UTI (urinary tract infection)  Herniated cervical disc  Anemia  History of cholecystectomy  H/O abdominal hysterectomy  Status post bariatric surgery: Gastric bypass status for obesity      REVIEW OF SYSTEMS  Apart from items noted in the HPI a 10point ROS was negative      MEDICATIONS  (STANDING):  enoxaparin Injectable 40 milliGRAM(s) SubCutaneous every 24 hours  influenza   Vaccine 0.5 milliLiter(s) IntraMuscular once  pantoprazole    Tablet 40 milliGRAM(s) Oral before breakfast  sodium chloride 0.9%. 1000 milliLiter(s) (100 mL/Hr) IV Continuous <Continuous>    MEDICATIONS  (PRN):  acetaminophen   Tablet .. 650 milliGRAM(s) Oral every 6 hours PRN Temp greater or equal to 38C (100.4F), Moderate Pain (4 - 6)  metoclopramide Injectable 10 milliGRAM(s) IV Push every 6 hours PRN nausea/vomiting      Allergies  No Known Allergies    Intolerances      SOCIAL HISTORY:  Denies toxic or illicit habits    FAMILY HISTORY:  No pertinent family history in first degree relatives      Vital Signs Last 24 Hrs  T(C): 37.4 (2020 12:45), Max: 37.4 (2020 12:45)  T(F): 99.4 (2020 12:45), Max: 99.4 (2020 12:45)  HR: 74 (2020 12:45) (58 - 74)  BP: 116/70 (2020 12:45) (104/63 - 147/87)  BP(mean): --  RR: 17 (2020 12:45) (16 - 20)  SpO2: 97% (2020 12:45) (97% - 100%)    PHYSICAL EXAM:  GEN: middle aged F lying in bed in no distress, anicteric, afebrile, not pale  Eyes: ANTHONY  Neck: supple  Respiratory: CTA  Cardiovascular: s1 s2 no M RRR  Gastrointestinal: full, soft, BS+, +tenderness RUQ, epigastric area, NR, NG no masses or organs palpated  Rectal: deferred  Extremities: no limb edema  Neurological: AAOx3 non focal  Skin: intact        LABS:                        9.4    6.01  )-----------( 324      ( 2020 06:41 )             30.9     139  |  106  |  5<L>  ----------------------------<  103<H>  4.1   |  24  |  0.55    Ca    8.8      2020 06:41  Mg     2.0     02-24    TPro  7.3  /  Alb  4.5  /  TBili  0.3  /  DBili  x   /  AST  28  /  ALT  6<L>  /  AlkPhos  106  02-23    Urinalysis Basic - ( 2020 14:14 )  Color: Yellow / Appearance: Clear / S.020 / pH: x  Gluc: x / Ketone: NEGATIVE  / Bili: Negative / Urobili: 1.0 E.U./dL   Blood: x / Protein: NEGATIVE mg/dL / Nitrite: NEGATIVE   Leuk Esterase: NEGATIVE / RBC: x / WBC x   Sq Epi: x / Non Sq Epi: x / Bacteria: x        RADIOLOGY & ADDITIONAL STUDIES:  CT Abdomen and Pelvis w/ Oral Cont and w/ IV Cont (20 @ 17:56) >  IMPRESSION:  Status post gastric bypass. No evidence of obstruction. Normal appendix.

## 2020-02-25 ENCOUNTER — TRANSCRIPTION ENCOUNTER (OUTPATIENT)
Age: 45
End: 2020-02-25

## 2020-02-25 VITALS
TEMPERATURE: 98 F | HEART RATE: 53 BPM | RESPIRATION RATE: 16 BRPM | SYSTOLIC BLOOD PRESSURE: 130 MMHG | OXYGEN SATURATION: 95 % | DIASTOLIC BLOOD PRESSURE: 79 MMHG

## 2020-02-25 LAB
ANION GAP SERPL CALC-SCNC: 11 MMOL/L — SIGNIFICANT CHANGE UP (ref 5–17)
APTT BLD: 30.8 SEC — SIGNIFICANT CHANGE UP (ref 27.5–36.3)
BUN SERPL-MCNC: 10 MG/DL — SIGNIFICANT CHANGE UP (ref 7–23)
CALCIUM SERPL-MCNC: 9.2 MG/DL — SIGNIFICANT CHANGE UP (ref 8.4–10.5)
CHLORIDE SERPL-SCNC: 105 MMOL/L — SIGNIFICANT CHANGE UP (ref 96–108)
CO2 SERPL-SCNC: 25 MMOL/L — SIGNIFICANT CHANGE UP (ref 22–31)
CREAT SERPL-MCNC: 0.64 MG/DL — SIGNIFICANT CHANGE UP (ref 0.5–1.3)
GLUCOSE SERPL-MCNC: 92 MG/DL — SIGNIFICANT CHANGE UP (ref 70–99)
HCT VFR BLD CALC: 33.5 % — LOW (ref 34.5–45)
HGB BLD-MCNC: 10 G/DL — LOW (ref 11.5–15.5)
INR BLD: 1.03 — SIGNIFICANT CHANGE UP (ref 0.88–1.16)
MAGNESIUM SERPL-MCNC: 2.1 MG/DL — SIGNIFICANT CHANGE UP (ref 1.6–2.6)
MCHC RBC-ENTMCNC: 23.4 PG — LOW (ref 27–34)
MCHC RBC-ENTMCNC: 29.9 GM/DL — LOW (ref 32–36)
MCV RBC AUTO: 78.3 FL — LOW (ref 80–100)
NRBC # BLD: 0 /100 WBCS — SIGNIFICANT CHANGE UP (ref 0–0)
PHOSPHATE SERPL-MCNC: 3.7 MG/DL — SIGNIFICANT CHANGE UP (ref 2.5–4.5)
PLATELET # BLD AUTO: 355 K/UL — SIGNIFICANT CHANGE UP (ref 150–400)
POTASSIUM SERPL-MCNC: 3.9 MMOL/L — SIGNIFICANT CHANGE UP (ref 3.5–5.3)
POTASSIUM SERPL-SCNC: 3.9 MMOL/L — SIGNIFICANT CHANGE UP (ref 3.5–5.3)
PROTHROM AB SERPL-ACNC: 11.7 SEC — SIGNIFICANT CHANGE UP (ref 10–12.9)
RBC # BLD: 4.28 M/UL — SIGNIFICANT CHANGE UP (ref 3.8–5.2)
RBC # FLD: 17.5 % — HIGH (ref 10.3–14.5)
SODIUM SERPL-SCNC: 141 MMOL/L — SIGNIFICANT CHANGE UP (ref 135–145)
WBC # BLD: 4.66 K/UL — SIGNIFICANT CHANGE UP (ref 3.8–10.5)
WBC # FLD AUTO: 4.66 K/UL — SIGNIFICANT CHANGE UP (ref 3.8–10.5)

## 2020-02-25 PROCEDURE — 83735 ASSAY OF MAGNESIUM: CPT

## 2020-02-25 PROCEDURE — 83550 IRON BINDING TEST: CPT

## 2020-02-25 PROCEDURE — 86901 BLOOD TYPING SEROLOGIC RH(D): CPT

## 2020-02-25 PROCEDURE — 84702 CHORIONIC GONADOTROPIN TEST: CPT

## 2020-02-25 PROCEDURE — 93005 ELECTROCARDIOGRAM TRACING: CPT

## 2020-02-25 PROCEDURE — 96375 TX/PRO/DX INJ NEW DRUG ADDON: CPT

## 2020-02-25 PROCEDURE — 87086 URINE CULTURE/COLONY COUNT: CPT

## 2020-02-25 PROCEDURE — 36415 COLL VENOUS BLD VENIPUNCTURE: CPT

## 2020-02-25 PROCEDURE — 82746 ASSAY OF FOLIC ACID SERUM: CPT

## 2020-02-25 PROCEDURE — 86850 RBC ANTIBODY SCREEN: CPT

## 2020-02-25 PROCEDURE — 83540 ASSAY OF IRON: CPT

## 2020-02-25 PROCEDURE — 85730 THROMBOPLASTIN TIME PARTIAL: CPT

## 2020-02-25 PROCEDURE — 96376 TX/PRO/DX INJ SAME DRUG ADON: CPT

## 2020-02-25 PROCEDURE — 82728 ASSAY OF FERRITIN: CPT

## 2020-02-25 PROCEDURE — 84100 ASSAY OF PHOSPHORUS: CPT

## 2020-02-25 PROCEDURE — 82607 VITAMIN B-12: CPT

## 2020-02-25 PROCEDURE — 99285 EMERGENCY DEPT VISIT HI MDM: CPT | Mod: 25

## 2020-02-25 PROCEDURE — 96374 THER/PROPH/DIAG INJ IV PUSH: CPT | Mod: XU

## 2020-02-25 PROCEDURE — 85027 COMPLETE CBC AUTOMATED: CPT

## 2020-02-25 PROCEDURE — 80053 COMPREHEN METABOLIC PANEL: CPT

## 2020-02-25 PROCEDURE — 85025 COMPLETE CBC W/AUTO DIFF WBC: CPT

## 2020-02-25 PROCEDURE — 74177 CT ABD & PELVIS W/CONTRAST: CPT

## 2020-02-25 PROCEDURE — 85610 PROTHROMBIN TIME: CPT

## 2020-02-25 PROCEDURE — 81003 URINALYSIS AUTO W/O SCOPE: CPT

## 2020-02-25 PROCEDURE — 80048 BASIC METABOLIC PNL TOTAL CA: CPT

## 2020-02-25 RX ORDER — SODIUM CHLORIDE 9 MG/ML
1000 INJECTION INTRAMUSCULAR; INTRAVENOUS; SUBCUTANEOUS
Refills: 0 | Status: DISCONTINUED | OUTPATIENT
Start: 2020-02-25 | End: 2020-02-25

## 2020-02-25 RX ORDER — PANTOPRAZOLE SODIUM 20 MG/1
1 TABLET, DELAYED RELEASE ORAL
Qty: 60 | Refills: 0
Start: 2020-02-25 | End: 2020-03-25

## 2020-02-25 RX ADMIN — Medication 10 MILLIGRAM(S): at 05:30

## 2020-02-25 RX ADMIN — Medication 10 MILLIGRAM(S): at 18:35

## 2020-02-25 RX ADMIN — ENOXAPARIN SODIUM 40 MILLIGRAM(S): 100 INJECTION SUBCUTANEOUS at 05:24

## 2020-02-25 RX ADMIN — Medication 650 MILLIGRAM(S): at 06:30

## 2020-02-25 RX ADMIN — Medication 650 MILLIGRAM(S): at 05:26

## 2020-02-25 RX ADMIN — PANTOPRAZOLE SODIUM 40 MILLIGRAM(S): 20 TABLET, DELAYED RELEASE ORAL at 05:24

## 2020-02-25 RX ADMIN — Medication 650 MILLIGRAM(S): at 18:35

## 2020-02-25 NOTE — DISCHARGE NOTE PROVIDER - NSDCCPCAREPLAN_GEN_ALL_CORE_FT
PRINCIPAL DISCHARGE DIAGNOSIS  Diagnosis: Status post bariatric surgery  Assessment and Plan of Treatment:       SECONDARY DISCHARGE DIAGNOSES  Diagnosis: Nausea  Assessment and Plan of Treatment:     Diagnosis: Abdominal pain  Assessment and Plan of Treatment:

## 2020-02-25 NOTE — DISCHARGE NOTE NURSING/CASE MANAGEMENT/SOCIAL WORK - PATIENT PORTAL LINK FT
You can access the FollowMyHealth Patient Portal offered by [facility] by registering at the following website: [email or web address]. By joining [employer]’s FollowMyHealth portal, you will also be able to view your health information using other applications (apps) compatible with our system.

## 2020-02-25 NOTE — PROGRESS NOTE ADULT - SUBJECTIVE AND OBJECTIVE BOX
SUBJECTIVE: Pt seen and examined at bedside with chief. Pt denies any complaints. Pain well controlled. Denies N/V.     MEDICATIONS  (STANDING):  enoxaparin Injectable 40 milliGRAM(s) SubCutaneous every 24 hours  influenza   Vaccine 0.5 milliLiter(s) IntraMuscular once  pantoprazole    Tablet 40 milliGRAM(s) Oral before breakfast  sodium chloride 0.9%. 1000 milliLiter(s) (100 mL/Hr) IV Continuous <Continuous>    MEDICATIONS  (PRN):  acetaminophen   Tablet .. 650 milliGRAM(s) Oral every 6 hours PRN Temp greater or equal to 38C (100.4F), Moderate Pain (4 - 6)  metoclopramide Injectable 10 milliGRAM(s) IV Push every 6 hours PRN nausea/vomiting      Vital Signs Last 24 Hrs  T(C): 36.9 (2020 05:30), Max: 37.4 (2020 12:45)  T(F): 98.4 (2020 05:30), Max: 99.4 (2020 12:45)  HR: 58 (2020 05:30) (58 - 74)  BP: 119/74 (2020 05:30) (115/72 - 119/74)  BP(mean): --  RR: 17 (2020 05:30) (16 - 17)  SpO2: 95% (2020 05:30) (95% - 100%)    PHYSICAL EXAM:      Constitutional: A&Ox3    Respiratory: non labored breathing, no respiratory distress    Gastrointestinal: soft ND,NT, No rebound or guarding     Genitourinary: voiding     Extremities: (-) edema                  I&O's Detail      LABS:                        9.4    6.01  )-----------( 324      ( 2020 06:41 )             30.9     02-24    139  |  106  |  5<L>  ----------------------------<  103<H>  4.1   |  24  |  0.55    Ca    8.8      2020 06:41  Mg     2.0     -    TPro  7.3  /  Alb  4.5  /  TBili  0.3  /  DBili  x   /  AST  28  /  ALT  6<L>  /  AlkPhos  106  02-      Urinalysis Basic - ( 2020 14:14 )    Color: Yellow / Appearance: Clear / S.020 / pH: x  Gluc: x / Ketone: NEGATIVE  / Bili: Negative / Urobili: 1.0 E.U./dL   Blood: x / Protein: NEGATIVE mg/dL / Nitrite: NEGATIVE   Leuk Esterase: NEGATIVE / RBC: x / WBC x   Sq Epi: x / Non Sq Epi: x / Bacteria: x        RADIOLOGY & ADDITIONAL STUDIES:

## 2020-02-25 NOTE — PROGRESS NOTE ADULT - ASSESSMENT
44yoF with a PMH of gastric bypass (RNYGB 2009), intussusception of jejenum 2015, overdistention of jejenum 2017, SBO s/p GENI 2018 presents with 3 days of abdominal pain. Pt was transferred to surgery to have an EGD on 2/25    Pain/nausea control  NPO @mn, IVF  Lovenox for DVT ppx  SCDs, OOBA,   AM labs

## 2020-02-25 NOTE — DISCHARGE NOTE PROVIDER - HOSPITAL COURSE
44yoF with a PMH of gastric bypass (RNYGB 2009), intussusception of jejenum 2015, overdistention of jejenum 2017, SBO s/p GENI 2018 presents with 3 days of abdominal pain. Pain is described as primarily epigastric with some RLQ component, sharp, 8/10, nonradiating made worse with food. While in the ED she reports a single episode of nonbloody bilious vomiting after trying to eat some crackers. ROS otherwise negative for fever, chills, chest pain, SOB, c/d, numbness, or tingling.  She reports that she takes no medications whatsoever. Denies drug allergies. Denies tobacco, etoh, or drugs. Lives in [place] with her  and kids. 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 Basilio Lawrence.         Patient was admitted to medicine service for PO challenge and r/o gastritis. She was transferred to the surgery team on hospital day 1 given her surgical history 44yoF with a PMH of gastric bypass (RNYGB 2009), intussusception of jejenum 2015, overdistention of jejenum 2017, SBO s/p GENI 2018 presents with 3 days of abdominal pain. Pain is described as primarily epigastric with some RLQ component, sharp, 8/10, nonradiating made worse with food. While in the ED she reports a single episode of nonbloody bilious vomiting after trying to eat some crackers. ROS otherwise negative for fever, chills, chest pain, SOB, c/d, numbness, or tingling.  She reports that she takes no medications whatsoever. Denies drug allergies. Denies tobacco, etoh, or drugs. 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 Basilio Lawrence.         Patient was admitted to medicine service for PO challenge and r/o gastritis. She was transferred to the surgery team on hospital day 1 given her surgical history. GI was consulted and patient underwent EGD on 2/25 which was normal. Patient's post-procedural course was uncomplicated. Diet was advanced as tolerated and pain was well controlled on medication. On day of discharge, pt deemed stable and ready to return home with plan to follow up as an outpatient.

## 2020-02-25 NOTE — DISCHARGE NOTE PROVIDER - NSDCFUADDAPPT_GEN_ALL_CORE_FT
Please call Dr. Campos's office to schedule a follow-up appointment as needed.    Please also call your primary care provider to schedule a follow up visit with them as well.

## 2020-02-25 NOTE — DISCHARGE NOTE PROVIDER - CARE PROVIDER_API CALL
Gavin Campos)  Surgery  [address] [place] [address]  Phone: [phone]  Fax: [phone]  Follow Up Time:

## 2020-02-25 NOTE — DISCHARGE NOTE PROVIDER - NSDCFUADDINST_GEN_ALL_CORE_FT
Protonix: please take this twice daily for the next month, and then once daily for another month.     General Discharge Instructions:  Please resume all regular home medications unless specifically advised not to take a particular medication. Also, please take any new medications as prescribed.  Please get plenty of rest, continue to ambulate several times per day, and drink adequate amounts of fluids.     Warning Signs:  Please call your doctor or nurse practitioner if you experience the following:  *You experience new chest pain, pressure, squeezing or tightness.  *New or worsening cough, shortness of breath, or wheeze.  *If you are vomiting and cannot keep down fluids or your medications.  *You are getting dehydrated due to continued vomiting, diarrhea, or other reasons. Signs of dehydration include dry mouth, rapid heartbeat, or feeling dizzy or faint when standing.  *You see blood or dark/black material when you vomit or have a bowel movement.  *You experience burning when you urinate, have blood in your urine, or experience a discharge.  *Your pain is not improving within 8-12 hours or is not gone within 24 hours. Call or return immediately if your pain is getting worse, changes location, or moves to your chest or back.  *You have shaking chills, or fever greater than 101.5 degrees Fahrenheit or 38 degrees Celsius.  *Any change in your symptoms, or any new symptoms that concern you.

## 2020-02-25 NOTE — DISCHARGE NOTE PROVIDER - NSDCMRMEDTOKEN_GEN_ALL_CORE_FT
cyanocobalamin 2500 mcg sublingual tablet: 1 tab(s) sublingual once a day  ferrous sulfate 325 mg (65 mg elemental iron) oral tablet: 1 tab(s) orally 3 times a day cyanocobalamin 2500 mcg sublingual tablet: 1 tab(s) sublingual once a day  ferrous sulfate 325 mg (65 mg elemental iron) oral tablet: 1 tab(s) orally 3 times a day  pantoprazole 40 mg oral delayed release tablet: 1 tab(s) orally 2 times a day

## 2020-03-02 DIAGNOSIS — D64.9 ANEMIA, UNSPECIFIED: ICD-10-CM

## 2020-03-02 DIAGNOSIS — M50.20 OTHER CERVICAL DISC DISPLACEMENT, UNSPECIFIED CERVICAL REGION: ICD-10-CM

## 2020-03-02 DIAGNOSIS — Z90.710 ACQUIRED ABSENCE OF BOTH CERVIX AND UTERUS: ICD-10-CM

## 2020-03-02 DIAGNOSIS — Z90.49 ACQUIRED ABSENCE OF OTHER SPECIFIED PARTS OF DIGESTIVE TRACT: ICD-10-CM

## 2020-03-02 DIAGNOSIS — Z98.890 OTHER SPECIFIED POSTPROCEDURAL STATES: ICD-10-CM

## 2020-03-02 DIAGNOSIS — R10.9 UNSPECIFIED ABDOMINAL PAIN: ICD-10-CM

## 2020-03-02 DIAGNOSIS — Z98.84 BARIATRIC SURGERY STATUS: ICD-10-CM

## 2020-05-13 NOTE — PROGRESS NOTE ADULT - PROBLEM/PLAN-4
Date of Consent: 05/13/2020    Sponsor: [facility]    Study Title/IRB Number: Observational study of Sars-CoV2 Immunoglobulin G (IgG) seroprevalence among the [facility] population over time 2020.163  Principle Investigator: Kourtney Page, PhD    Did the patient need translation services? No   name: N/A    Prior to the Informed Consent (IC) being signed, or any study protocol required data collection, testing, procedure, or intervention being performed, the following was done and/or discussed:   Patient was given a paper copy of the IC for review    Patient was given study FAQ   Purpose of the study and qualifications to participate    Study design and tests or procedures done at this visit   Confidentiality and HIPAA Authorization for Release of Medical Records for the research trial/ subject's rights/research related injury   Risk, Benefits, Alternative Treatments, Compensation and Costs   Participation in the research trial is voluntary and patient may withdraw at anytime   Contact information for study related questions    Patient verbalizes understanding of the above: Yes  Contact information for PI and IRB given to patient: Yes  Patient able to adequately summarize: the purpose of the study, the risks associated with the study, and all procedures, testing, and follow-ups associated with the study: Yes    The consent was discussed verbally with the patient and all questions were answered satisfactorily. Patient gave verbal consent for the Seroprevalence research study with an IRB approval date of 05/08/2020.      The Consent, Consent Witness and name of Clinical Research Coordinator consenting was captured and documented in [employer].    All Inclusion and Exclusion Criteria reviewed, subject meets all Inclusion criteria and does not meet any Exclusion Criteria at this time.     Patient Eligibility was confirmed.    Patient responded to survey questions.    The following biospecimen 
collection procedures were collected:    -Nasopharyngeal Swab Collection  -Blood collection         
DISPLAY PLAN FREE TEXT

## 2020-06-16 NOTE — ED PROVIDER NOTE - NS ED NOTE AC HIGH RISK COUNTRIES
Discharge Note     (SSA) is following for ongoing discharge planning to Skilled Nursing Facility (SNF). Patient has been accepted by [facility] and insurance authorization has been obtained. Discussed with pt and pt's friend, Randee, who are aware and agreeable to placement at [facility]. Per pt request, writer requested Randee call pt via pt's personal phone about clothing drop off. Per Unit SW, patient anticipated to be ready for discharge today. Writer faxed pt's most recent COVID lab to admissions and Saniya confirmed she received the fax. Per RN, pt is safest to travel via ambulance. SSA has made arrangements through [facility] ([phone]) to be picked up at 5pm.     Receiving Facility: [facility]  Receiving Facility Address: [address] [place] [address]   Receiving Facility City/State: [facility]  Receiving Facility Phone Number: [phone]  Receiving Facility Fax Number: [phone]    Above discussed with Unit SW Arianna  Updated MARCE Huang  RN to call report to facility [phone]  Robe Kothari in Admissions @ [phone] [phone]  SSA to follow until discharged.    No

## 2020-07-22 NOTE — ED PROVIDER NOTE - CHIEF COMPLAINT
X ray to room     Mary Augustine RN  07/22/20 3003 The patient is a 43y Female complaining of back pain general.

## 2020-08-21 ENCOUNTER — EMERGENCY (EMERGENCY)
Facility: HOSPITAL | Age: 45
LOS: 1 days | Discharge: HOME | End: 2020-08-21
Attending: EMERGENCY MEDICINE | Admitting: EMERGENCY MEDICINE
Payer: MEDICAID

## 2020-08-21 VITALS
HEART RATE: 80 BPM | TEMPERATURE: 98 F | HEIGHT: 61 IN | DIASTOLIC BLOOD PRESSURE: 83 MMHG | OXYGEN SATURATION: 100 % | SYSTOLIC BLOOD PRESSURE: 129 MMHG | RESPIRATION RATE: 18 BRPM | WEIGHT: 169.98 LBS

## 2020-08-21 DIAGNOSIS — Z90.49 ACQUIRED ABSENCE OF OTHER SPECIFIED PARTS OF DIGESTIVE TRACT: Chronic | ICD-10-CM

## 2020-08-21 DIAGNOSIS — Z90.710 ACQUIRED ABSENCE OF BOTH CERVIX AND UTERUS: Chronic | ICD-10-CM

## 2020-08-21 PROBLEM — K56.1 INTUSSUSCEPTION: Chronic | Status: ACTIVE | Noted: 2020-02-23

## 2020-08-21 PROCEDURE — 99283 EMERGENCY DEPT VISIT LOW MDM: CPT

## 2020-08-21 PROCEDURE — 73080 X-RAY EXAM OF ELBOW: CPT | Mod: 26,RT

## 2020-08-21 PROCEDURE — 73080 X-RAY EXAM OF ELBOW: CPT

## 2020-08-21 RX ORDER — TRAMADOL HYDROCHLORIDE 50 MG/1
1 TABLET ORAL
Qty: 15 | Refills: 0
Start: 2020-08-21

## 2020-08-21 RX ORDER — TRAMADOL HYDROCHLORIDE 50 MG/1
50 TABLET ORAL ONCE
Refills: 0 | Status: DISCONTINUED | OUTPATIENT
Start: 2020-08-21 | End: 2020-08-21

## 2020-08-21 RX ADMIN — TRAMADOL HYDROCHLORIDE 50 MILLIGRAM(S): 50 TABLET ORAL at 14:41

## 2020-08-21 RX ADMIN — TRAMADOL HYDROCHLORIDE 50 MILLIGRAM(S): 50 TABLET ORAL at 15:08

## 2020-08-21 NOTE — ED PROVIDER NOTE - OBJECTIVE STATEMENT
45 y/o f presents c/o atraumatic right elbow pain x 1 week.  Pt reports gradual onset of pain which is worsening over the past 2 days despite taking ibuprofen.  Pt stating pain worse with movement.  Denies numbness/tingling to ext, weakness, all other ROS negative.

## 2020-08-21 NOTE — ED ADULT TRIAGE NOTE - CHIEF COMPLAINT QUOTE
Patient complaining of right elbow pain for three days.  Patient denies any fall, injury, N/V/D, fevers or any other complaints at this time.

## 2020-08-21 NOTE — ED PROVIDER NOTE - NSFOLLOWUPINSTRUCTIONS_ED_ALL_ED_FT
R.I.C.E. Treatment    WHAT YOU NEED TO KNOW:    R.I.C.E. treatment is a 4-step process used to decrease swelling and pain caused by an injury. R.I.C.E. stands for rest, ice, compression, and elevation. R.I.C.E. should be done within 24 to 48 hours after an injury.Ice and Elevation     Ice and Elevation         DISCHARGE INSTRUCTIONS:    Return to the emergency department if:     Your pain is severe.      You have severe swelling or deformity.      You have numbness in the injured area.    Contact your healthcare provider if:     Your pain and swelling does not go away after a few days.      You have questions or concerns about your condition or care.    How to use R.I.C.E. treatment:     Rest your injured area as directed. You may need to stop using, or keep weight off, the injury for 48 hours or longer. Your healthcare provider may recommend crutches or another device. Return to your usual activities as directed.       Apply ice on your injured area for 15 to 20 minutes every 4 hours or as directed. Use an ice pack, or put crushed ice in a plastic bag. Cover it with a towel. Ice helps prevent tissue damage and decreases swelling and pain.      Compress, or keep pressure on, the injured area. Compression will help decrease swelling and support the injured area. Use an elastic bandage, air stirrup, splint, or sling as directed. If you use an elastic bandage to wrap your injured area, make sure the bandage is not too tight.       Elevate the injured area above the level of your heart as often as you can. This will help decrease swelling and pain. Prop the injured area on pillows or blankets to keep it elevated comfortably.     Follow up with your healthcare provider as directed: Write down your questions so you remember to ask them during your visits.

## 2020-08-21 NOTE — ED PROVIDER NOTE - PATIENT PORTAL LINK FT
You can access the FollowMyHealth Patient Portal offered by [facility] by registering at the following website: [email or web address]. By joining [employer]’s FollowMyHealth portal, you will also be able to view your health information using other applications (apps) compatible with our system.

## 2020-08-21 NOTE — ED ADULT NURSE NOTE - NSIMPLEMENTINTERV_GEN_ALL_ED
Implemented All Universal Safety Interventions:  [place] to call system. Call bell, personal items and telephone within reach. Instruct patient to call for assistance. Room bathroom lighting operational. Non-slip footwear when patient is off stretcher. Physically safe environment: no spills, clutter or unnecessary equipment. Stretcher in lowest position, wheels locked, appropriate side rails in place.

## 2020-08-21 NOTE — ED PROVIDER NOTE - CLINICAL SUMMARY MEDICAL DECISION MAKING FREE TEXT BOX
43 y/o f presents c/o atraumatic right elbow pain x 4 days; xr neg, ext NVI, ace wrap applied, will d/c with RICE, NSAIDs, f/u ortho

## 2020-08-21 NOTE — ED ADULT NURSE NOTE - OBJECTIVE STATEMENT
Patient complaining of right elbow pain for three days.  Patient denies any fall, injury, N/V/D, fevers or any other complaints at this time.  Pain increases on flexion, rotation of arm. Pain starts laterally radiates to posterior arm. No obvious DCAPBTLS found at this time. Positive PMS x4 extremities, limited ROM to right elbow.  Alert and oriented x3.

## 2020-08-21 NOTE — ED ADULT NURSE NOTE - CHPI ED NUR SYMPTOMS NEG
no numbness/no stiffness/no bruising/no deformity/no weakness/no difficulty bearing weight/no abrasion/no fever/no back pain

## 2020-08-21 NOTE — ED PROVIDER NOTE - MUSCULOSKELETAL, MLM
right elbow with no swelling, +tenderness to lateral aspect, no deformity, +FROM, strength 5/5, sensation intact distally, radial pulse 2+

## 2020-08-21 NOTE — ED PROVIDER NOTE - CARE PROVIDER_API CALL
Pedro Herring  ORTHOPAEDIC SURGERY  [address] [place] [address], [address]  [place] [address]  Phone: [phone]  Fax: [phone]  Follow Up Time:

## 2020-08-21 NOTE — ED PROVIDER NOTE - ATTENDING CONTRIBUTION TO CARE
right elbow pain for 1 week, worse with movement, on lateral aspect over lateral epicondyle , no known repetative movement, xray ok ,   likely lateral epicondylitis, NSAIDs, ace, ortho f/u ,

## 2020-08-24 ENCOUNTER — APPOINTMENT (OUTPATIENT)
Dept: UROLOGY | Facility: CLINIC | Age: 45
End: 2020-08-24
Payer: MEDICAID

## 2020-08-24 VITALS
WEIGHT: 170 LBS | HEART RATE: 66 BPM | TEMPERATURE: 98.2 F | SYSTOLIC BLOOD PRESSURE: 130 MMHG | BODY MASS INDEX: 32.1 KG/M2 | DIASTOLIC BLOOD PRESSURE: 83 MMHG | HEIGHT: 61 IN

## 2020-08-24 PROCEDURE — 99213 OFFICE O/P EST LOW 20 MIN: CPT

## 2020-08-24 NOTE — PHYSICAL EXAM
[General Appearance - Well Developed] : well developed [General Appearance - Well Nourished] : well nourished [Normal Appearance] : normal appearance [General Appearance - In No Acute Distress] : no acute distress [Well Groomed] : well groomed [Abdomen Soft] : soft [Abdomen Tenderness] : non-tender [Costovertebral Angle Tenderness] : no ~M costovertebral angle tenderness [Oriented To Time, Place, And Person] : oriented to person, place, and time [Affect] : the affect was normal [Mood] : the mood was normal [Not Anxious] : not anxious [Normal Station and Gait] : the gait and station were normal for the patient's age [No Focal Deficits] : no focal deficits

## 2020-08-24 NOTE — ASSESSMENT
[FreeTextEntry1] : 43yo female with history of pelvic pain, urge and stress incontinence, improved with oxybutynin\par She is currently pleased with symptoms and would like to continue oxybutynin\par Medication renewed today\par Return precautions reviewed\par F/u 1 year or sooner prn

## 2020-08-24 NOTE — HISTORY OF PRESENT ILLNESS
[FreeTextEntry1] : This is a very pleasant 40yo female referred by Dr. Mario for 1 year history of pelvic pain and urinary incontinence. Pain has resulted in multiple ER visits in the past year including May and September. U/A was negative at both ER visits. A CT scan was performed in September and was normal. Recent U/A negative, urine culture contaminated. She also complains of urgency, frequency, urge incontinence, stress incontinence. She wears 1 pad per day. \par \par 12/5/16 Here for f/u. Doing much better with oxybutynin, no side effects. She is still experiencing some stress incontinence. \par \par 3/6/17 Here for f/u. Feeling well, urinary symptoms and pelvic pain have essentially resolved. \par \par 9/13/17 Here for 6 month follow up. Doing well, no complaints. \par \par 7/30/18 Here for annual follow up. Doing well. Satisfied with oxybutynin ER 10mg \par \par 2/06/19 Here for follow up. Doing well. Satisfied with oxybutynin ER 10mg\par \par 8/24/20 Here for f/u. Doing well.  Satisfied with oxybutynin ER 10mg [None] : None

## 2020-08-25 DIAGNOSIS — M25.521 PAIN IN RIGHT ELBOW: ICD-10-CM

## 2020-09-18 NOTE — ED PROVIDER NOTE - OBJECTIVE STATEMENT
42 yo F RHD with pmh of gastric bypass and arthritis c/o pain to R wrist and hand x 1 week. Associated with occasional tingling in the hand. Pt having troulbe bending her fingers due to pain. Pt reports most pain is on volar aspect of wrist and into the 3rd, 4th and 5th fingers. Denies trauma or heavy lifting. Denies ha, dizziness, n/v, visual changes, neck pain. 135 42 yo F RHD with pmh of gastric bypass and arthritis c/o pain to R wrist and hand x 1 week. Associated with occasional tingling in the hand. Pt having trouble bending her fingers due to pain. Pt reports most pain is on volar aspect of wrist and into the 3rd, 4th and 5th fingers. Denies trauma or heavy lifting. Denies ha, dizziness, n/v, visual changes, neck pain. Denies sleeping with her arm hanging.

## 2020-12-04 ENCOUNTER — EMERGENCY (EMERGENCY)
Facility: HOSPITAL | Age: 45
LOS: 1 days | Discharge: HOME | End: 2020-12-04
Admitting: EMERGENCY MEDICINE
Payer: MEDICAID

## 2020-12-04 VITALS
RESPIRATION RATE: 18 BRPM | SYSTOLIC BLOOD PRESSURE: 119 MMHG | HEART RATE: 71 BPM | TEMPERATURE: 98 F | WEIGHT: 175.05 LBS | HEIGHT: 61 IN | DIASTOLIC BLOOD PRESSURE: 75 MMHG | OXYGEN SATURATION: 99 %

## 2020-12-04 VITALS
TEMPERATURE: 99 F | RESPIRATION RATE: 18 BRPM | DIASTOLIC BLOOD PRESSURE: 82 MMHG | SYSTOLIC BLOOD PRESSURE: 135 MMHG | OXYGEN SATURATION: 99 % | HEART RATE: 75 BPM

## 2020-12-04 DIAGNOSIS — Z90.710 ACQUIRED ABSENCE OF BOTH CERVIX AND UTERUS: Chronic | ICD-10-CM

## 2020-12-04 DIAGNOSIS — Z90.49 ACQUIRED ABSENCE OF OTHER SPECIFIED PARTS OF DIGESTIVE TRACT: Chronic | ICD-10-CM

## 2020-12-04 PROCEDURE — 99284 EMERGENCY DEPT VISIT MOD MDM: CPT

## 2020-12-04 PROCEDURE — 87086 URINE CULTURE/COLONY COUNT: CPT

## 2020-12-04 PROCEDURE — 81001 URINALYSIS AUTO W/SCOPE: CPT

## 2020-12-04 PROCEDURE — 96372 THER/PROPH/DIAG INJ SC/IM: CPT

## 2020-12-04 PROCEDURE — 99284 EMERGENCY DEPT VISIT MOD MDM: CPT | Mod: 25

## 2020-12-04 RX ORDER — OXYCODONE AND ACETAMINOPHEN 5; 325 MG/1; MG/1
1 TABLET ORAL ONCE
Refills: 0 | Status: DISCONTINUED | OUTPATIENT
Start: 2020-12-04 | End: 2020-12-04

## 2020-12-04 RX ORDER — KETOROLAC TROMETHAMINE 30 MG/ML
30 SYRINGE (ML) INJECTION ONCE
Refills: 0 | Status: DISCONTINUED | OUTPATIENT
Start: 2020-12-04 | End: 2020-12-04

## 2020-12-04 RX ORDER — LIDOCAINE 4 G/100G
1 CREAM TOPICAL ONCE
Refills: 0 | Status: COMPLETED | OUTPATIENT
Start: 2020-12-04 | End: 2020-12-04

## 2020-12-04 RX ORDER — METHOCARBAMOL 500 MG/1
1 TABLET, FILM COATED ORAL
Qty: 21 | Refills: 0
Start: 2020-12-04 | End: 2020-12-10

## 2020-12-04 RX ORDER — MORPHINE SULFATE 50 MG/1
4 CAPSULE, EXTENDED RELEASE ORAL ONCE
Refills: 0 | Status: DISCONTINUED | OUTPATIENT
Start: 2020-12-04 | End: 2020-12-04

## 2020-12-04 RX ORDER — DIAZEPAM 5 MG
5 TABLET ORAL ONCE
Refills: 0 | Status: DISCONTINUED | OUTPATIENT
Start: 2020-12-04 | End: 2020-12-04

## 2020-12-04 RX ORDER — MELOXICAM 15 MG/1
1 TABLET ORAL
Qty: 7 | Refills: 0
Start: 2020-12-04 | End: 2020-12-10

## 2020-12-04 RX ORDER — LIDOCAINE 4 G/100G
1 CREAM TOPICAL
Qty: 7 | Refills: 0
Start: 2020-12-04 | End: 2020-12-10

## 2020-12-04 RX ADMIN — Medication 1 TABLET(S): at 18:28

## 2020-12-04 RX ADMIN — Medication 30 MILLIGRAM(S): at 17:20

## 2020-12-04 RX ADMIN — LIDOCAINE 1 PATCH: 4 CREAM TOPICAL at 20:42

## 2020-12-04 RX ADMIN — MORPHINE SULFATE 4 MILLIGRAM(S): 50 CAPSULE, EXTENDED RELEASE ORAL at 20:42

## 2020-12-04 RX ADMIN — OXYCODONE AND ACETAMINOPHEN 1 TABLET(S): 5; 325 TABLET ORAL at 20:43

## 2020-12-04 RX ADMIN — Medication 5 MILLIGRAM(S): at 16:51

## 2020-12-04 RX ADMIN — Medication 30 MILLIGRAM(S): at 16:51

## 2020-12-04 RX ADMIN — OXYCODONE AND ACETAMINOPHEN 1 TABLET(S): 5; 325 TABLET ORAL at 18:28

## 2020-12-04 RX ADMIN — Medication 30 MILLIGRAM(S): at 19:49

## 2020-12-04 RX ADMIN — LIDOCAINE 1 PATCH: 4 CREAM TOPICAL at 16:57

## 2020-12-04 RX ADMIN — MORPHINE SULFATE 4 MILLIGRAM(S): 50 CAPSULE, EXTENDED RELEASE ORAL at 19:50

## 2020-12-04 NOTE — ED PROVIDER NOTE - NSFOLLOWUPINSTRUCTIONS_ED_ALL_ED_FT
Please take medication as directed and follow-up with your PMD. Return to the Emergency Department if you have any new or worsening symptoms, or if you have any concerns.    ORTHOPEDIC CARE CENTER  [facility] 5TH FLOOR  [phone]  [address] [place] [id number]     HOURS: 12PM-4PM    Please reach out to Yodit Quinn ([facility] ED clinical referral coordinator) to assist you with your follow-up appointment.     Monday - Friday 11am-7pm  [phone]  [email or web address]          Back Pain    WHAT YOU NEED TO KNOW:    Back pain is common. You may have back pain and muscle spasms. You may feel sore or stiff on one or both sides of your back. The pain may spread to your lower body. Conditions that affect the spine, joints, or muscles can cause back pain. These may include arthritis, spinal stenosis (narrowing of the spinal column), muscle tension, or breakdown of the spinal discs.    DISCHARGE INSTRUCTIONS:    Call your local emergency number (911 in the [place]) if:   •You have severe back pain with chest pain.      •You cannot control your urine or bowel movements.      •Your pain becomes so severe that you cannot walk.      Return to the emergency department if:   •You have pain, numbness, or weakness in one or both legs.      •You have severe back pain, nausea, and vomiting.      •You have severe back pain that spreads to your side or genital area.      Call your doctor if:   •You have back pain that does not get better with rest and pain medicine.      •You have a fever.      •You have pain that worsens when you are on your back or when you rest.      •You have pain that worsens when you cough or sneeze.      •You lose weight without trying.      •You have questions or concerns about your condition or care.      Medicines: You may need any of the following:   •NSAIDs, such as ibuprofen, help decrease swelling, pain, and fever. This medicine is available with or without a doctor's order. NSAIDs can cause stomach bleeding or kidney problems in certain people. If you take blood thinner medicine, always ask your healthcare provider if NSAIDs are safe for you. Always read the medicine label and follow directions.      •Acetaminophen decreases pain and fever. It is available without a doctor's order. Ask how much to take and how often to take it. Follow directions. Read the labels of all other medicines you are using to see if they also contain acetaminophen, or ask your doctor or pharmacist. Acetaminophen can cause liver damage if not taken correctly. Do not use more than 4 grams (4,000 milligrams) total of acetaminophen in one day.       •Muscle relaxers help decrease muscle spasms and back pain.      •Prescription pain medicine may be given. Ask your healthcare provider how to take this medicine safely. Some prescription pain medicines contain acetaminophen. Do not take other medicines that contain acetaminophen without talking to your healthcare provider. Too much acetaminophen may cause liver damage. Prescription pain medicine may cause constipation. Ask your healthcare provider how to prevent or treat constipation.       •Take your medicine as directed. Contact your healthcare provider if you think your medicine is not helping or if you have side effects. Tell him or her if you are allergic to any medicine. Keep a list of the medicines, vitamins, and herbs you take. Include the amounts, and when and why you take them. Bring the list or the pill bottles to follow-up visits. Carry your medicine list with you in case of an emergency.      How to manage your back pain:   •Apply ice on your back for 15 to 20 minutes every hour or as directed. Use an ice pack, or put crushed ice in a plastic bag. Cover it with a towel before you apply it to your skin. Ice helps prevent tissue damage and decreases pain.      •Apply heat on your back for 20 to 30 minutes every 2 hours for as many days as directed. Heat helps decrease pain and muscle spasms.      •Stay active as much as you can without causing more pain. Bed rest could make your back pain worse. Avoid heavy lifting until your pain is gone.      •Go to physical therapy as directed. A physical therapist can teach you exercises to help improve movement and strength, and to decrease pain.      Follow up with your doctor in 2 weeks, or as directed: You might need to see a specialist. Write down your questions so you remember to ask them during your visits.       © Copyright [employer] 2020           back to top                          © Copyright [employer] 2020

## 2020-12-04 NOTE — ED ADULT NURSE NOTE - OBJECTIVE STATEMENT
Pt. was received into the ED, axox3, stating that she is having "severe" lower back pain.  Pt. states that she has been suffering from lower back pain x2 years and will was admitted to the hospital for the same reason.  Pt. is having difficulty bearing weight.  Pt. denies having any injuries to the area.

## 2020-12-04 NOTE — ED PROVIDER NOTE - PHYSICAL EXAMINATION
GENERAL: WD/WN, in NAD  NEURO: Alert and oriented. CN II-XII intact. Motor strength 5/5 in all extremities.  SILT in all extremities.  Symmetric DTR  +2 bilaterally in biceps, BR, patellar.  HEAD: NC/AT  EYES: Clear bilaterally; Pupils equal and round  ENT: OP clear, no rhinorrhea  PULM: No signs of respiratory distress; lungs clear bilaterally  CV: RRR, S1S2, No MRG. Symmetric distal pulses bilaterally.  GI: Abdomen soft, nontender.  No abdominal masses or abnormal pulsations appreciated.  Normal rectal tone.  SKIN: normal color and turgor; no rash or lesions  MSK: + TTP TO PARASPINAL MUSCLE, NO SPINAL TTP, NEGATIVE SLR, NO RASH, AMBULATORY IN THE ED WITHOUT ABNORMAL GAIT

## 2020-12-04 NOTE — ED PROVIDER NOTE - NS ED ROS FT
CONSTITUTIONAL: No fever/chills.  NEURO: no headache; no tingling/numbness/weakness in extremities; no saddle anesthesia.  CV: no chest pain or palpitations  PULM: no dyspnea, cough, or hemoptysis  GI: no abdominal pain; no N/V; no BRBPR or melena; no diarrhea or fecal incontinence  : no dysuria/hematura/frequency; no urinary incontinence or retention  MSK: no neck pain; no joint pain; no swelling of extremities, + RIGHT LOWER BACK PAIN WITH RADIATION TO THE RLL  DERM: no rash or lesions

## 2020-12-04 NOTE — ED PROVIDER NOTE - OBJECTIVE STATEMENT
44 y/o female with a PMHx of fibromyalgia and HNP of the lumbar spine is present here in the ED c/o reoccurring pain to the lower back x2 days. Her pain is located on the lower back with radiation of pain to the right lower leg. She describes a tight sensation with difficulty walking or standing up straight due to the pain. She self-medicated with tylenol yesterday without improvement of her pain. She admits having similar pain x2 years ago. She denies the following: fever, chills, dizziness, chest pain, sob, abdominal pain, urinary symptoms, diarrhea, constipation, numbness/tingling to extremities, loss of urine/bm, spinal injections, hx of CA.

## 2020-12-04 NOTE — ED PROVIDER NOTE - CLINICAL SUMMARY MEDICAL DECISION MAKING FREE TEXT BOX
44 y/o female here in the ED c/o back pain with radiation of pain to the right lower leg with + ttp to paraspinal muscles. Neuro intact. Pt admits to hx of current pain associated with HNP. No urinary symptoms and UA negative, therefore no UTI. No chest pain, sob, do not suspect acs, pe, dissection. No fever and no recent injection, do not suspect spinal abscess. 44 y/o female here in the ED c/o back pain with radiation of pain to the right lower leg with + ttp to paraspinal muscles. Neuro intact. Pt admits to hx of current pain associated with HNP. No urinary symptoms. No chest pain, sob, do not suspect acs, pe, dissection. No fever and no recent injection, do not suspect spinal abscess. 46 y/o female here in the ED c/o back pain with radiation of pain to the right lower leg with + ttp to paraspinal muscles. Neuro intact. Pt admits to hx of current pain associated with HNP. No urinary symptoms. No chest pain, sob, do not suspect acs, pe, dissection. No fever and no recent injection, do not suspect spinal abscess. Pain treated and improved. UA + for UTI. No fever and no CVAT. Do not suspect pyelonephritis. I have discussed the discharge plan with the patient. The patient agrees with the plan, as discussed.  The patient understands Emergency Department diagnosis is a preliminary diagnosis often based on limited information and that the patient must adhere to the follow-up plan as discussed.  The patient understands that if the symptoms worsen or if prescribed medications do not have the desired/planned effect that the patient may return to the Emergency Department at any time for further evaluation and treatment.

## 2020-12-04 NOTE — ED ADULT NURSE NOTE - NSIMPLEMENTINTERV_GEN_ALL_ED
Implemented All Universal Safety Interventions:  [place] to call system. Call bell, personal items and telephone within reach. Instruct patient to call for assistance. Room bathroom lighting operational. Non-slip footwear when patient is off stretcher. Physically safe environment: no spills, clutter or unnecessary equipment. Stretcher in lowest position, wheels locked, appropriate side rails in place.

## 2020-12-04 NOTE — ED PROVIDER NOTE - PATIENT PORTAL LINK FT
You can access the FollowMyHealth Patient Portal offered by [facility] by registering at the following website: [email or web address]. By joining [facility]’s FollowMyHealth portal, you will also be able to view your health information using other applications (apps) compatible with our system.

## 2020-12-08 DIAGNOSIS — M54.5 LOW BACK PAIN: ICD-10-CM

## 2020-12-16 ENCOUNTER — APPOINTMENT (OUTPATIENT)
Dept: SPINE | Facility: CLINIC | Age: 45
End: 2020-12-16
Payer: MEDICAID

## 2020-12-16 DIAGNOSIS — M51.26 OTHER INTERVERTEBRAL DISC DISPLACEMENT, LUMBAR REGION: ICD-10-CM

## 2020-12-16 PROCEDURE — 99204 OFFICE O/P NEW MOD 45 MIN: CPT | Mod: 95

## 2020-12-16 RX ORDER — GABAPENTIN 300 MG/1
300 CAPSULE ORAL
Qty: 45 | Refills: 0 | Status: ACTIVE | COMMUNITY
Start: 2020-12-16 | End: 1900-01-01

## 2020-12-16 RX ORDER — METHYLPREDNISOLONE 4 MG/1
4 TABLET ORAL
Qty: 1 | Refills: 0 | Status: ACTIVE | COMMUNITY
Start: 2020-12-16 | End: 1900-01-01

## 2020-12-16 NOTE — REVIEW OF SYSTEMS
[Numbness] : numbness [Difficulty Walking] : difficulty walking [As Noted in HPI] : as noted in HPI [Negative] : Heme/Lymph

## 2021-01-04 ENCOUNTER — TRANSCRIPTION ENCOUNTER (OUTPATIENT)
Age: 46
End: 2021-01-04

## 2021-01-11 ENCOUNTER — OUTPATIENT (OUTPATIENT)
Dept: OUTPATIENT SERVICES | Facility: HOSPITAL | Age: 46
LOS: 1 days | End: 2021-01-11

## 2021-01-11 ENCOUNTER — APPOINTMENT (OUTPATIENT)
Dept: MRI IMAGING | Facility: CLINIC | Age: 46
End: 2021-01-11
Payer: MEDICAID

## 2021-01-11 ENCOUNTER — APPOINTMENT (OUTPATIENT)
Dept: RADIOLOGY | Facility: CLINIC | Age: 46
End: 2021-01-11
Payer: MEDICAID

## 2021-01-11 DIAGNOSIS — Z90.49 ACQUIRED ABSENCE OF OTHER SPECIFIED PARTS OF DIGESTIVE TRACT: Chronic | ICD-10-CM

## 2021-01-11 DIAGNOSIS — Z90.710 ACQUIRED ABSENCE OF BOTH CERVIX AND UTERUS: Chronic | ICD-10-CM

## 2021-01-11 PROCEDURE — 72114 X-RAY EXAM L-S SPINE BENDING: CPT | Mod: 26

## 2021-01-11 PROCEDURE — 72148 MRI LUMBAR SPINE W/O DYE: CPT | Mod: 26

## 2021-01-19 ENCOUNTER — APPOINTMENT (OUTPATIENT)
Dept: SURGERY | Facility: CLINIC | Age: 46
End: 2021-01-19
Payer: MEDICAID

## 2021-01-19 VITALS
BODY MASS INDEX: 36.44 KG/M2 | TEMPERATURE: 97.1 F | DIASTOLIC BLOOD PRESSURE: 74 MMHG | OXYGEN SATURATION: 98 % | HEART RATE: 77 BPM | SYSTOLIC BLOOD PRESSURE: 116 MMHG | HEIGHT: 61 IN | WEIGHT: 193 LBS

## 2021-01-19 DIAGNOSIS — Z87.898 PERSONAL HISTORY OF OTHER SPECIFIED CONDITIONS: ICD-10-CM

## 2021-01-19 PROCEDURE — 99072 ADDL SUPL MATRL&STAF TM PHE: CPT

## 2021-01-19 PROCEDURE — 99212 OFFICE O/P EST SF 10 MIN: CPT

## 2021-01-19 RX ORDER — CALCIUM CITRATE/VITAMIN D3 200MG-6.25
200-250 TABLET ORAL DAILY
Qty: 60 | Refills: 6 | Status: ACTIVE | COMMUNITY
Start: 2021-01-19 | End: 1900-01-01

## 2021-01-19 RX ORDER — MULTIVITAMIN/IRON/FOLIC ACID 18MG-0.4MG
TABLET ORAL
Qty: 30 | Refills: 6 | Status: ACTIVE | COMMUNITY
Start: 2021-01-19 | End: 1900-01-01

## 2021-01-19 RX ORDER — IRON PS COMPLEX/B12/FOLIC ACID 150-25-1
150-25-1 CAPSULE ORAL
Qty: 30 | Refills: 6 | Status: ACTIVE | COMMUNITY
Start: 2021-01-19 | End: 1900-01-01

## 2021-01-19 RX ORDER — VITAMIN B COMPLEX
2500 TABLET ORAL
Qty: 12 | Refills: 6 | Status: ACTIVE | COMMUNITY
Start: 2021-01-19 | End: 1900-01-01

## 2021-01-19 NOTE — HISTORY OF PRESENT ILLNESS
[de-identified] : Pt is a 46 y/o F with PMHx of obesity (BMI 36), fibromyalgia, spine problems and PSHx of RYGB 12 years ago and revision of anastomosis for intussusception in 2017 who presents today for follow up. Pt presents today c/o abdominal pain and constipation. She states the constipation began a few months ago and she's been having BM every 2-3 days rather than her normal daily BM. She also endorses lower abdominal pain that is exacerbated when she uses the bathroom and reaches a level of 7 or 8 out of 10. Pt states she drinks 4-5 bottles of water a day. She reports she has recently gained weight. States she eats 3 meals a day but that she hasn't been consuming a lot of food so she doesn't understand why she's been gaining weight. Pt states she is not taking vitamins, rx sent to pharmacy.

## 2021-01-19 NOTE — END OF VISIT
[FreeTextEntry3] : All medical record entries made by the Scribe were at my, ACACIA Locke, discretion and personally dictated by me on 01/19/2021 . I have reviewed the chart and agree that the record accurately reflects my personal performance of the history, physical exam, assessment and plan. I have also personally directed, reviewed and agreed to the chart.

## 2021-01-19 NOTE — ASSESSMENT
[FreeTextEntry1] : Pt is a 44 y/o F with PMHx of obesity (BMI 36), fibromyalgia, spine problems and PSHx of RYGB 12 years ago and revision of anastomosis for intussusception in 2017 who presents today for follow up with abdominal pain and constipation. I recommend patient take a daily fiber supplement such as Metamucil or Benefiber. I ordered an abdominal CT, UGI series and blood work for further investigation. Pt states she does not take vitamins so I sent prescriptions for vitamins and iron. I reminded pt to eat small, frequent meals throughout the day. Patient will meet our dietician to discuss nutritional counseling. She will follow up after above.\par

## 2021-01-19 NOTE — PLAN
[FreeTextEntry1] : CT abd pel\par UGI series\par Blood work\par Rx vitamins\par Fiber supplement\par See dietician\par F/u after above

## 2021-01-19 NOTE — ADDENDUM
[FreeTextEntry1] : This note was written by Maranda Post on 01/19/2021 acting as scribe for ACACIA Locke.

## 2021-01-25 PROBLEM — M51.26 LUMBAR HERNIATED DISC: Status: ACTIVE | Noted: 2020-12-16

## 2021-01-26 ENCOUNTER — APPOINTMENT (OUTPATIENT)
Dept: SPINE | Facility: CLINIC | Age: 46
End: 2021-01-26
Payer: MEDICAID

## 2021-01-26 DIAGNOSIS — A49.9 URINARY TRACT INFECTION, SITE NOT SPECIFIED: ICD-10-CM

## 2021-01-26 DIAGNOSIS — Z82.49 FAMILY HISTORY OF ISCHEMIC HEART DISEASE AND OTHER DISEASES OF THE CIRCULATORY SYSTEM: ICD-10-CM

## 2021-01-26 DIAGNOSIS — Z80.3 FAMILY HISTORY OF MALIGNANT NEOPLASM OF BREAST: ICD-10-CM

## 2021-01-26 DIAGNOSIS — N39.0 URINARY TRACT INFECTION, SITE NOT SPECIFIED: ICD-10-CM

## 2021-01-26 DIAGNOSIS — M51.26 OTHER INTERVERTEBRAL DISC DISPLACEMENT, LUMBAR REGION: ICD-10-CM

## 2021-01-26 DIAGNOSIS — Z83.49 FAMILY HISTORY OF OTHER ENDOCRINE, NUTRITIONAL AND METABOLIC DISEASES: ICD-10-CM

## 2021-01-26 PROCEDURE — 99215 OFFICE O/P EST HI 40 MIN: CPT | Mod: 95

## 2021-01-26 NOTE — DATA REVIEWED
[de-identified] : L-spine on 1/11/2021 in PACS showed L4-S1 DDD without thecal sac compression [de-identified] : L-spine AP/Lat/F/E on 1/11/2021 in PACS

## 2021-01-26 NOTE — HISTORY OF PRESENT ILLNESS
[de-identified] : Patient is a 46 yo F with PMH of cervical and lumbar herniated disc, anemia, marked obesity h/o gastric bypass with laparoscopic surgery for lysis of adhesion in 2018 presents neurosurgical evaluation.\par She reports chronic lower back pain for over 2 years with h/o hospitalization in 2018 for pain management where MRI revealed multilevel mild cervical/lumbar herniated disc. Pain was intermittent but got significantly worse for past one month without injury/trauma. She visited [facility] ED on 12/4/2020 where she dx UTI and treated with abs and pain medications and discharged to home with OP follow up.\par She has been taking Naproxen, Percocet from ED and Flexeril without improvement. She describes pain as severe constant dull pain on her lower back radiating to R hip/thigh/foot with numbness causing significant difficulty in ambulation. Pain is worsening by walking/sitting/standing/any body movement and unable to recall alleviating factors. Previously she tried physical therapy for one year which made her worsening symptoms and has not been going for the past year. She endorses chronic stress incontinence previously controlled with medications but reports incontinence recurred with new onset of urinary frequency for the past 2-3 months. She denies hematuria/dysuria/fever/chills/n/v. \par She ambulates without assistive device.

## 2021-01-26 NOTE — REASON FOR VISIT
[New Patient Visit] : a new patient visit [Referred By: _________] : Patient was referred by JIM [Patient Declined  Services] : - None: Patient declined  services [Home] : at home, [unfilled] , at the time of the visit. [Medical Office: ([facility])___] : at the medical office located in  [FreeTextEntry3] : Patient prefers her son (Darius) to translate [TWNoteComboBox1] : Panamanian

## 2021-01-26 NOTE — ASSESSMENT
[FreeTextEntry1] : PLAN\par - Xray L-spine  6 views\par - MRI L-spine wo\par - continue Naproxen with medrol pack/gabapentin 300mg QHS\par - f/u with urology for recurrent urinary incontinence and recent h/o UTI treatment

## 2021-01-26 NOTE — PHYSICAL EXAM
[General Appearance - Alert] : alert [General Appearance - In No Acute Distress] : in no acute distress [General Appearance - Well Nourished] : well nourished [General Appearance - Well-Appearing] : healthy appearing [Oriented To Time, Place, And Person] : oriented to person, place, and time [Impaired Insight] : insight and judgment were intact [Affect] : the affect was normal [Memory Recent] : recent memory was not impaired [Person] : oriented to person [Place] : oriented to place [Time] : oriented to time [Limited Balance] : the patient's balance was impaired [Antalgic] : antalgic [FreeTextEntry8] : abnormal gait [Able to toe walk] : the patient was not able to toe walk [Able to heel walk] : the patient was not able to heel walk

## 2021-01-26 NOTE — END OF VISIT
[FreeTextEntry3] : It was nice to meet the patient today on telehealth.  We reviewed her recent x-rays and MRI.  I do not see anything that requires surgical intervention.  I recommend that she follow-up with pain management and her primary care doctor.  We discussed the natural history of lumbar disc disease.  I answered all of her questions to the best my ability.  We will always remain available for her should she have any other questions or concerns.\par \[facility] Michael Nix M.D., M.Sc.\[facility] \[facility] Department of Neurosurgery\[facility] [employer] [facility]\par [facility]\par [place], [place]\par [email or web address]\par [phone] [Time Spent: ___ minutes] : I have spent [unfilled] minutes of time on the encounter.

## 2021-01-26 NOTE — DATA REVIEWED
[de-identified] : C/T spine wo on 7/14/2018 in PACS\par L-spine wo on 7/8/2018 in PACS showed mild herniated disc most prominent @ L4-5

## 2021-01-26 NOTE — REASON FOR VISIT
[New Patient Visit] : a new patient visit [Referred By: _________] : Patient was referred by JIM [Home] : at home, [unfilled] , at the time of the visit. [Other Location: e.g. Home (Enter Location, City,State)___] : at [unfilled] [Family Member] : family member [Verbal consent obtained from patient] : the patient, [unfilled] [Patient Declined  Services] : - None: Patient declined  services [FreeTextEntry3] : Patient prefers her son (Darius) to translate [TWNoteComboBox1] : Lebanese

## 2021-01-26 NOTE — HISTORY OF PRESENT ILLNESS
[FreeTextEntry1] : back pain to RLE for ~2 years [de-identified] : Patient is a 44 yo F with PMH of cervical and lumbar herniated disc, anemia, marked obesity h/o gastric bypass with laparoscopic surgery for lysis of adhesion in 2018 presents neurosurgical evaluation.\par She reports chronic lower back pain for over 2 years with h/o hospitalization in 2018 for pain management where MRI revealed multilevel mild cervical/lumbar herniated disc. Pain was intermittent but got significantly worse for past one month without injury/trauma. She visited [facility] ED on 12/4/2020 where she dx UTI and treated with abs and pain medications and discharged to home with OP follow up.\par She has been taking Naproxen, Percocet from ED and Flexeril without improvement. She describes pain as severe constant dull pain on her lower back radiating to R hip/thigh/foot with numbness causing significant difficulty in ambulation. Pain is worsening by walking/sitting/standing/any body movement and unable to recall alleviating factors. Previously she tried physical therapy for one year which made her worsening symptoms and has not been going for the past year. She endorses chronic stress incontinence previously controlled with medications but reports incontinence recurred with new onset of urinary frequency for the past 2-3 months. She denies hematuria/dysuria/fever/chills/n/v. \par She ambulates without assistive device.

## 2021-01-26 NOTE — PHYSICAL EXAM
[General Appearance - Alert] : alert [General Appearance - In No Acute Distress] : in no acute distress [General Appearance - Well Nourished] : well nourished [General Appearance - Well-Appearing] : healthy appearing [Oriented To Time, Place, And Person] : oriented to person, place, and time [Impaired Insight] : insight and judgment were intact [Affect] : the affect was normal [Memory Recent] : recent memory was not impaired [Place] : oriented to place [Person] : oriented to person [Time] : oriented to time [Limited Balance] : the patient's balance was impaired [Antalgic] : antalgic [FreeTextEntry8] : abnormal gait [Able to toe walk] : the patient was not able to toe walk [Able to heel walk] : the patient was not able to heel walk

## 2021-01-29 ENCOUNTER — APPOINTMENT (OUTPATIENT)
Dept: CT IMAGING | Facility: HOSPITAL | Age: 46
End: 2021-01-29

## 2021-01-31 ENCOUNTER — EMERGENCY (EMERGENCY)
Facility: HOSPITAL | Age: 46
LOS: 1 days | Discharge: HOME | End: 2021-01-31
Attending: EMERGENCY MEDICINE | Admitting: EMERGENCY MEDICINE
Payer: MEDICAID

## 2021-01-31 VITALS
SYSTOLIC BLOOD PRESSURE: 134 MMHG | OXYGEN SATURATION: 100 % | RESPIRATION RATE: 18 BRPM | DIASTOLIC BLOOD PRESSURE: 64 MMHG | HEART RATE: 78 BPM | TEMPERATURE: 98 F

## 2021-01-31 VITALS
DIASTOLIC BLOOD PRESSURE: 76 MMHG | TEMPERATURE: 98 F | HEIGHT: 61 IN | OXYGEN SATURATION: 100 % | SYSTOLIC BLOOD PRESSURE: 134 MMHG | RESPIRATION RATE: 16 BRPM | HEART RATE: 85 BPM

## 2021-01-31 DIAGNOSIS — R10.31 RIGHT LOWER QUADRANT PAIN: ICD-10-CM

## 2021-01-31 DIAGNOSIS — Z79.2 LONG TERM (CURRENT) USE OF ANTIBIOTICS: ICD-10-CM

## 2021-01-31 DIAGNOSIS — Z90.710 ACQUIRED ABSENCE OF BOTH CERVIX AND UTERUS: Chronic | ICD-10-CM

## 2021-01-31 DIAGNOSIS — N39.0 URINARY TRACT INFECTION, SITE NOT SPECIFIED: ICD-10-CM

## 2021-01-31 DIAGNOSIS — R10.2 PELVIC AND PERINEAL PAIN: ICD-10-CM

## 2021-01-31 DIAGNOSIS — Z79.899 OTHER LONG TERM (CURRENT) DRUG THERAPY: ICD-10-CM

## 2021-01-31 DIAGNOSIS — Z79.1 LONG TERM (CURRENT) USE OF NON-STEROIDAL ANTI-INFLAMMATORIES (NSAID): ICD-10-CM

## 2021-01-31 DIAGNOSIS — R10.32 LEFT LOWER QUADRANT PAIN: ICD-10-CM

## 2021-01-31 DIAGNOSIS — Z90.49 ACQUIRED ABSENCE OF OTHER SPECIFIED PARTS OF DIGESTIVE TRACT: Chronic | ICD-10-CM

## 2021-01-31 LAB
APPEARANCE UR: CLEAR — SIGNIFICANT CHANGE UP
BACTERIA # UR AUTO: PRESENT /HPF
BILIRUB UR-MCNC: NEGATIVE — SIGNIFICANT CHANGE UP
COLOR SPEC: YELLOW — SIGNIFICANT CHANGE UP
DIFF PNL FLD: NEGATIVE — SIGNIFICANT CHANGE UP
EPI CELLS # UR: ABNORMAL /HPF (ref 0–5)
GLUCOSE UR QL: NEGATIVE — SIGNIFICANT CHANGE UP
KETONES UR-MCNC: NEGATIVE — SIGNIFICANT CHANGE UP
LEUKOCYTE ESTERASE UR-ACNC: ABNORMAL
NITRITE UR-MCNC: NEGATIVE — SIGNIFICANT CHANGE UP
PH UR: 6 — SIGNIFICANT CHANGE UP (ref 5–8)
PROT UR-MCNC: NEGATIVE MG/DL — SIGNIFICANT CHANGE UP
RBC CASTS # UR COMP ASSIST: < 5 /HPF — SIGNIFICANT CHANGE UP
SP GR SPEC: 1.02 — SIGNIFICANT CHANGE UP (ref 1–1.03)
UROBILINOGEN FLD QL: 0.2 E.U./DL — SIGNIFICANT CHANGE UP
WBC UR QL: ABNORMAL /HPF

## 2021-01-31 PROCEDURE — 87086 URINE CULTURE/COLONY COUNT: CPT

## 2021-01-31 PROCEDURE — 99284 EMERGENCY DEPT VISIT MOD MDM: CPT

## 2021-01-31 PROCEDURE — 99283 EMERGENCY DEPT VISIT LOW MDM: CPT

## 2021-01-31 PROCEDURE — 81001 URINALYSIS AUTO W/SCOPE: CPT

## 2021-01-31 PROCEDURE — 87184 SC STD DISK METHOD PER PLATE: CPT

## 2021-01-31 PROCEDURE — 87186 SC STD MICRODIL/AGAR DIL: CPT

## 2021-01-31 RX ORDER — CEPHALEXIN 500 MG
500 CAPSULE ORAL ONCE
Refills: 0 | Status: COMPLETED | OUTPATIENT
Start: 2021-01-31 | End: 2021-01-31

## 2021-01-31 RX ORDER — ACETAMINOPHEN 500 MG
650 TABLET ORAL ONCE
Refills: 0 | Status: COMPLETED | OUTPATIENT
Start: 2021-01-31 | End: 2021-01-31

## 2021-01-31 RX ORDER — CEPHALEXIN 500 MG
1 CAPSULE ORAL
Qty: 10 | Refills: 0
Start: 2021-01-31 | End: 2021-02-04

## 2021-01-31 RX ADMIN — Medication 500 MILLIGRAM(S): at 19:03

## 2021-01-31 RX ADMIN — Medication 650 MILLIGRAM(S): at 18:09

## 2021-01-31 NOTE — ED PROVIDER NOTE - NSFOLLOWUPINSTRUCTIONS_ED_ALL_ED_FT
It is unclear what exactly is causing your symptoms! It is important to continue following up with your doctor outside the hospital and to return to ER for: Persistent fever/vomiting, uncontrolled pain, worsening swelling, worsening breathing, worsening lightheaded, spreading redness.     Can take tylenol 650mg every 6hrs as needed for pain.  Follow up with your primary doctor within 1-2 days.   Return for persistent fever/vomit, uncontrolled pain, difficulty breathing, worsening lightheaded.  Follow up with gastroenterologist for persistent symptoms. Can call [phone] to schedule appointment.     SEEK IMMEDIATE MEDICAL CARE IF YOU HAVE ANY OF THE FOLLOWING SYMPTOMS: worsening abdominal pain, uncontrollable vomiting, profuse diarrhea, inability to have bowel movements or pass gas, black or bloody stools, fever accompanying chest pain or back pain, or fainting. These symptoms may represent a serious problem that is an emergency. Do not wait to see if the symptoms will go away. Get medical help right away. Call 911 and do not drive yourself to the hospital.    Abdominal Pain, Adult    Abdominal pain can be caused by many things. Often, abdominal pain is not serious and it gets better with no treatment or by being treated at home. However, sometimes abdominal pain is serious. Your health care provider will do a medical history and a physical exam to try to determine the cause of your abdominal pain.    Follow these instructions at home:  Take over-the-counter and prescription medicines only as told by your health care provider. Do not take a laxative unless told by your health care provider.  Drink enough fluid to keep your urine clear or pale yellow.  Watch your condition for any changes.  Keep all follow-up visits as told by your health care provider. This is important.    Contact a health care provider if:  Your abdominal pain changes or gets worse.  You are not hungry or you lose weight without trying.  You are constipated or have diarrhea for more than 2–3 days.  You have pain when you urinate or have a bowel movement.  Your abdominal pain wakes you up at night.  Your pain gets worse with meals, after eating, or with certain foods.  You are throwing up and cannot keep anything down.  You have a fever.    Get help right away if:  Your pain does not go away as soon as your health care provider told you to expect.  You cannot stop throwing up.  Your pain is only in areas of the abdomen, such as the right side or the left lower portion of the abdomen.  You have bloody or black stools, or stools that look like tar.  You have severe pain, cramping, or bloating in your abdomen.  You have signs of dehydration, such as:  Dark urine, very little urine, or no urine.  Cracked lips.  Dry mouth.  Sunken eyes.  Sleepiness.  Weakness. It is unclear what exactly is causing your symptoms! It is important to continue following up with your doctor outside the hospital and to return to ER for: Persistent fever/vomiting, uncontrolled pain, worsening swelling, worsening breathing, worsening lightheaded, spreading redness.     Can take tylenol 650mg every 6hrs as needed for pain.  Follow up with your primary doctor within 1-2 days.   Return for persistent fever/vomit, uncontrolled pain, difficulty breathing, worsening lightheaded.  Follow up with gastroenterologist for persistent symptoms. Can call [phone] to schedule appointment.   Follow up with urologist for persistent symptoms. Can call [phone] to schedule appointment. Can also follow up at [facility] urology clinic. Can call [phone] to schedule appointment.     You possibly have a UTI as well.  Take antibiotics as prescribed.   Stay well hydrated.        Urinary Tract Infection    A urinary tract infection (UTI) is an infection of any part of the urinary tract, which includes the kidneys, ureters, bladder, and urethra. Risk factors include ignoring your need to urinate, wiping back to front if female, being an uncircumcised male, and having diabetes or a weak immune system. Symptoms include frequent urination, pain or burning with urination, foul smelling urine, cloudy urine, pain in the lower abdomen, blood in the urine, and fever. If you were prescribed an antibiotic medicine, take it as told by your health care provider. Do not stop taking the antibiotic even if you start to feel better.    SEEK IMMEDIATE MEDICAL CARE IF YOU HAVE ANY OF THE FOLLOWING SYMPTOMS: severe back or abdominal pain, fever, inability to keep fluids or medicine down, dizziness/lightheadedness, or a change in mental status.     SEEK IMMEDIATE MEDICAL CARE IF YOU HAVE ANY OF THE FOLLOWING SYMPTOMS: worsening abdominal pain, uncontrollable vomiting, profuse diarrhea, inability to have bowel movements or pass gas, black or bloody stools, fever accompanying chest pain or back pain, or fainting. These symptoms may represent a serious problem that is an emergency. Do not wait to see if the symptoms will go away. Get medical help right away. Call 911 and do not drive yourself to the hospital.    Abdominal Pain, Adult    Abdominal pain can be caused by many things. Often, abdominal pain is not serious and it gets better with no treatment or by being treated at home. However, sometimes abdominal pain is serious. Your health care provider will do a medical history and a physical exam to try to determine the cause of your abdominal pain.    Follow these instructions at home:  Take over-the-counter and prescription medicines only as told by your health care provider. Do not take a laxative unless told by your health care provider.  Drink enough fluid to keep your urine clear or pale yellow.  Watch your condition for any changes.  Keep all follow-up visits as told by your health care provider. This is important.    Contact a health care provider if:  Your abdominal pain changes or gets worse.  You are not hungry or you lose weight without trying.  You are constipated or have diarrhea for more than 2–3 days.  You have pain when you urinate or have a bowel movement.  Your abdominal pain wakes you up at night.  Your pain gets worse with meals, after eating, or with certain foods.  You are throwing up and cannot keep anything down.  You have a fever.    Get help right away if:  Your pain does not go away as soon as your health care provider told you to expect.  You cannot stop throwing up.  Your pain is only in areas of the abdomen, such as the right side or the left lower portion of the abdomen.  You have bloody or black stools, or stools that look like tar.  You have severe pain, cramping, or bloating in your abdomen.  You have signs of dehydration, such as:  Dark urine, very little urine, or no urine.  Cracked lips.  Dry mouth.  Sunken eyes.  Sleepiness.  Weakness.

## 2021-01-31 NOTE — ED PROVIDER NOTE - OBJECTIVE STATEMENT
Conversive in english, prefers son at bedside to assist w/ translation.  45F PMH fibromyalgia (on lyrica), p/w b/l lower abd pain, x2mos, constant. Associated w/ mild dysuria. States she has been treated for UTI w/o relief. States she followed up w/ her PMD, GYN and GI doctor and no one able to figure out what is causing her symptoms. Came to ED today bec she wants to figure out what is causing her pain. No sudden changes to symptoms. Stopped taking any pain meds bec "nothing is helping."  Denies f/c, NVD, black/bloody stool, focal weakness/numbness, lightheadedness, back pain, rashes, joint pains, recent travel, recent antibiotic use, sick contacts, SOB/CP, rhinorrhea/nasal congestion, sore throat, cough. Normal PO intake, normal BMs.

## 2021-01-31 NOTE — ED PROVIDER NOTE - CARE PLAN
Principal Discharge DX:	Abdominal pain   Principal Discharge DX:	Abdominal pain  Secondary Diagnosis:	UTI (urinary tract infection)

## 2021-01-31 NOTE — ED PROVIDER NOTE - PROGRESS NOTE DETAILS
[place]: UA weakly positive. Given mild dysuria, will tx for UTI. abd remains soft NTND. Clinically no indication for further emergent ED workup or hospitalization at this time. Comfortable for dc, outpt f/u.

## 2021-01-31 NOTE — ED PROVIDER NOTE - TOBACCO USE
Constitutional: (-) fever (-) vomiting  Eyes/ENT: (-) eye discharge, (-) runny nose  Cardiovascular: (+) chest pain, (-) syncope  Respiratory: (-) cough, (-) shortness of breath  Gastrointestinal: (-) vomiting, (-) diarrhea, (-) abdominal pain  : (-) dysuria   Musculoskeletal: (-) neck pain, (-) back pain, (-) joint pain  Integumentary: (-) rash, (-) edema  Neurological: (-) headache, (-)loc  Allergic/Immunologic: (-) pruritus  Endocrine: No history of thyroid disease or diabetes. Unknown if ever smoked

## 2021-01-31 NOTE — ED PROVIDER NOTE - CLINICAL SUMMARY MEDICAL DECISION MAKING FREE TEXT BOX
45F PMH fibromyalgia (on lyrica), p/w b/l lower abd pain, x2mos, constant. Associated w/ mild dysuria. States she has been treated for UTI w/o relief. States she followed up w/ her PMD, GYN and GI doctor and no one able to figure out what is causing her symptoms. Came to ED today bec she wants to figure out what is causing her pain. No sudden changes to symptoms. Stopped taking any pain meds bec "nothing is helping."   No other systemic symptoms. Vitals wnl, exam as above. Very well appearing.  ddx: Unclear etiology. Possible cystitis. Benign abdomen, chronic pain.  UA, symptom control, reassess.

## 2021-01-31 NOTE — ED PROVIDER NOTE - PATIENT PORTAL LINK FT
You can access the FollowMyHealth Patient Portal offered by [facility] by registering at the following website: [email or web address]. By joining [employer]’s FollowMyHealth portal, you will also be able to view your health information using other applications (apps) compatible with our system.

## 2021-02-01 ENCOUNTER — NON-APPOINTMENT (OUTPATIENT)
Age: 46
End: 2021-02-01

## 2021-02-01 LAB
25(OH)D3 SERPL-MCNC: 22.7 NG/ML
A-TOCOPHEROL VIT E SERPL-MCNC: 11.3 MG/L
ALBUMIN SERPL ELPH-MCNC: 4.1 G/DL
ALP BLD-CCNC: 132 U/L
ALT SERPL-CCNC: 16 U/L
ANION GAP SERPL CALC-SCNC: 11 MMOL/L
AST SERPL-CCNC: 20 U/L
BASOPHILS # BLD AUTO: 0.05 K/UL
BASOPHILS NFR BLD AUTO: 0.8 %
BETA+GAMMA TOCOPHEROL SERPL-MCNC: 1.3 MG/L
BILIRUB SERPL-MCNC: 0.3 MG/DL
BUN SERPL-MCNC: 14 MG/DL
CA-I SERPL-SCNC: 1.28 MMOL/L
CALCIUM SERPL-MCNC: 9.5 MG/DL
CALCIUM SERPL-MCNC: 9.5 MG/DL
CHLORIDE SERPL-SCNC: 104 MMOL/L
CHOLEST SERPL-MCNC: 254 MG/DL
CO2 SERPL-SCNC: 23 MMOL/L
CREAT SERPL-MCNC: 0.63 MG/DL
EOSINOPHIL # BLD AUTO: 0.06 K/UL
EOSINOPHIL NFR BLD AUTO: 1 %
ESTIMATED AVERAGE GLUCOSE: 105 MG/DL
FOLATE SERPL-MCNC: 12.8 NG/ML
GLUCOSE SERPL-MCNC: 95 MG/DL
HBA1C MFR BLD HPLC: 5.3 %
HCT VFR BLD CALC: 30.9 %
HDLC SERPL-MCNC: 69 MG/DL
HGB BLD-MCNC: 8.9 G/DL
IMM GRANULOCYTES NFR BLD AUTO: 0.2 %
INR PPP: 0.95 RATIO
IRON SATN MFR SERPL: 4 %
IRON SERPL-MCNC: 19 UG/DL
LDLC SERPL CALC-MCNC: 157 MG/DL
LYMPHOCYTES # BLD AUTO: 1.9 K/UL
LYMPHOCYTES NFR BLD AUTO: 31.7 %
MAN DIFF?: NORMAL
MCHC RBC-ENTMCNC: 20.4 PG
MCHC RBC-ENTMCNC: 28.8 GM/DL
MCV RBC AUTO: 70.7 FL
MONOCYTES # BLD AUTO: 0.55 K/UL
MONOCYTES NFR BLD AUTO: 9.2 %
NEUTROPHILS # BLD AUTO: 3.43 K/UL
NEUTROPHILS NFR BLD AUTO: 57.1 %
NONHDLC SERPL-MCNC: 185 MG/DL
PARATHYROID HORMONE INTACT: 51 PG/ML
PLATELET # BLD AUTO: 555 K/UL
POTASSIUM SERPL-SCNC: 4.3 MMOL/L
PREALB SERPL NEPH-MCNC: 27 MG/DL
PROT SERPL-MCNC: 6.7 G/DL
PT BLD: 11.3 SEC
RBC # BLD: 4.37 M/UL
RBC # FLD: 19.9 %
SODIUM SERPL-SCNC: 138 MMOL/L
TIBC SERPL-MCNC: 511 UG/DL
TRIGL SERPL-MCNC: 138 MG/DL
TSH SERPL-ACNC: 0.81 UIU/ML
UIBC SERPL-MCNC: 492 UG/DL
VIT A SERPL-MCNC: 41.4 UG/DL
VIT B1 SERPL-MCNC: 154.9 NMOL/L
VIT B12 SERPL-MCNC: >2000 PG/ML
WBC # FLD AUTO: 6 K/UL
ZINC SERPL-MCNC: 89 UG/DL

## 2021-02-02 LAB
-  AMPICILLIN/SULBACTAM: SIGNIFICANT CHANGE UP
-  AMPICILLIN: SIGNIFICANT CHANGE UP
-  CEFAZOLIN: SIGNIFICANT CHANGE UP
-  CEFTRIAXONE: SIGNIFICANT CHANGE UP
-  CIPROFLOXACIN: SIGNIFICANT CHANGE UP
-  ERTAPENEM: SIGNIFICANT CHANGE UP
-  GENTAMICIN: SIGNIFICANT CHANGE UP
-  NITROFURANTOIN: SIGNIFICANT CHANGE UP
-  PIPERACILLIN/TAZOBACTAM: SIGNIFICANT CHANGE UP
-  TOBRAMYCIN: SIGNIFICANT CHANGE UP
-  TRIMETHOPRIM/SULFAMETHOXAZOLE: SIGNIFICANT CHANGE UP
METHOD TYPE: SIGNIFICANT CHANGE UP

## 2021-02-03 ENCOUNTER — EMERGENCY (EMERGENCY)
Facility: HOSPITAL | Age: 46
LOS: 1 days | Discharge: HOME | End: 2021-02-03
Attending: EMERGENCY MEDICINE | Admitting: EMERGENCY MEDICINE
Payer: MEDICAID

## 2021-02-03 VITALS
DIASTOLIC BLOOD PRESSURE: 79 MMHG | HEART RATE: 90 BPM | WEIGHT: 190.04 LBS | OXYGEN SATURATION: 98 % | SYSTOLIC BLOOD PRESSURE: 145 MMHG | TEMPERATURE: 98 F | RESPIRATION RATE: 18 BRPM | HEIGHT: 61 IN

## 2021-02-03 VITALS
TEMPERATURE: 99 F | RESPIRATION RATE: 18 BRPM | DIASTOLIC BLOOD PRESSURE: 83 MMHG | SYSTOLIC BLOOD PRESSURE: 136 MMHG | OXYGEN SATURATION: 98 % | HEART RATE: 86 BPM

## 2021-02-03 DIAGNOSIS — Z20.822 CONTACT WITH AND (SUSPECTED) EXPOSURE TO COVID-19: ICD-10-CM

## 2021-02-03 DIAGNOSIS — Z90.49 ACQUIRED ABSENCE OF OTHER SPECIFIED PARTS OF DIGESTIVE TRACT: Chronic | ICD-10-CM

## 2021-02-03 DIAGNOSIS — R30.0 DYSURIA: ICD-10-CM

## 2021-02-03 DIAGNOSIS — R10.9 UNSPECIFIED ABDOMINAL PAIN: ICD-10-CM

## 2021-02-03 DIAGNOSIS — Z90.710 ACQUIRED ABSENCE OF BOTH CERVIX AND UTERUS: Chronic | ICD-10-CM

## 2021-02-03 DIAGNOSIS — R10.31 RIGHT LOWER QUADRANT PAIN: ICD-10-CM

## 2021-02-03 DIAGNOSIS — R10.32 LEFT LOWER QUADRANT PAIN: ICD-10-CM

## 2021-02-03 LAB
-  AMIKACIN: SIGNIFICANT CHANGE UP
-  AMPICILLIN/SULBACTAM: SIGNIFICANT CHANGE UP
-  AMPICILLIN: SIGNIFICANT CHANGE UP
-  CEFAZOLIN: SIGNIFICANT CHANGE UP
-  CEFEPIME: SIGNIFICANT CHANGE UP
-  CEFTRIAXONE: SIGNIFICANT CHANGE UP
-  CIPROFLOXACIN: SIGNIFICANT CHANGE UP
-  ERTAPENEM: SIGNIFICANT CHANGE UP
-  GENTAMICIN: SIGNIFICANT CHANGE UP
-  MEROPENEM: SIGNIFICANT CHANGE UP
-  NITROFURANTOIN: SIGNIFICANT CHANGE UP
-  PIPERACILLIN/TAZOBACTAM: SIGNIFICANT CHANGE UP
-  TOBRAMYCIN: SIGNIFICANT CHANGE UP
-  TRIMETHOPRIM/SULFAMETHOXAZOLE: SIGNIFICANT CHANGE UP
ALBUMIN SERPL ELPH-MCNC: 4.2 G/DL — SIGNIFICANT CHANGE UP (ref 3.3–5)
ALP SERPL-CCNC: 113 U/L — SIGNIFICANT CHANGE UP (ref 40–120)
ALT FLD-CCNC: 32 U/L — SIGNIFICANT CHANGE UP (ref 10–45)
ANION GAP SERPL CALC-SCNC: 10 MMOL/L — SIGNIFICANT CHANGE UP (ref 5–17)
ANISOCYTOSIS BLD QL: SLIGHT — SIGNIFICANT CHANGE UP
APPEARANCE UR: CLEAR — SIGNIFICANT CHANGE UP
AST SERPL-CCNC: 28 U/L — SIGNIFICANT CHANGE UP (ref 10–40)
BASOPHILS # BLD AUTO: 0 K/UL — SIGNIFICANT CHANGE UP (ref 0–0.2)
BASOPHILS NFR BLD AUTO: 0 % — SIGNIFICANT CHANGE UP (ref 0–2)
BILIRUB SERPL-MCNC: 0.2 MG/DL — SIGNIFICANT CHANGE UP (ref 0.2–1.2)
BILIRUB UR-MCNC: NEGATIVE — SIGNIFICANT CHANGE UP
BUN SERPL-MCNC: 11 MG/DL — SIGNIFICANT CHANGE UP (ref 7–23)
CALCIUM SERPL-MCNC: 9.5 MG/DL — SIGNIFICANT CHANGE UP (ref 8.4–10.5)
CHLORIDE SERPL-SCNC: 103 MMOL/L — SIGNIFICANT CHANGE UP (ref 96–108)
CO2 SERPL-SCNC: 25 MMOL/L — SIGNIFICANT CHANGE UP (ref 22–31)
COLOR SPEC: YELLOW — SIGNIFICANT CHANGE UP
CREAT SERPL-MCNC: 0.65 MG/DL — SIGNIFICANT CHANGE UP (ref 0.5–1.3)
CULTURE RESULTS: SIGNIFICANT CHANGE UP
DACRYOCYTES BLD QL SMEAR: SLIGHT — SIGNIFICANT CHANGE UP
DIFF PNL FLD: NEGATIVE — SIGNIFICANT CHANGE UP
EOSINOPHIL # BLD AUTO: 0.07 K/UL — SIGNIFICANT CHANGE UP (ref 0–0.5)
EOSINOPHIL NFR BLD AUTO: 0.9 % — SIGNIFICANT CHANGE UP (ref 0–6)
GIANT PLATELETS BLD QL SMEAR: PRESENT — SIGNIFICANT CHANGE UP
GLUCOSE SERPL-MCNC: 102 MG/DL — HIGH (ref 70–99)
GLUCOSE UR QL: NEGATIVE — SIGNIFICANT CHANGE UP
HCG UR QL: NEGATIVE — SIGNIFICANT CHANGE UP
HCT VFR BLD CALC: 30.5 % — LOW (ref 34.5–45)
HGB BLD-MCNC: 8.8 G/DL — LOW (ref 11.5–15.5)
HYPOCHROMIA BLD QL: SLIGHT — SIGNIFICANT CHANGE UP
KETONES UR-MCNC: NEGATIVE — SIGNIFICANT CHANGE UP
LEUKOCYTE ESTERASE UR-ACNC: NEGATIVE — SIGNIFICANT CHANGE UP
LYMPHOCYTES # BLD AUTO: 1.24 K/UL — SIGNIFICANT CHANGE UP (ref 1–3.3)
LYMPHOCYTES # BLD AUTO: 15.6 % — SIGNIFICANT CHANGE UP (ref 13–44)
MACROCYTES BLD QL: SLIGHT — SIGNIFICANT CHANGE UP
MANUAL SMEAR VERIFICATION: SIGNIFICANT CHANGE UP
MCHC RBC-ENTMCNC: 20 PG — LOW (ref 27–34)
MCHC RBC-ENTMCNC: 28.9 GM/DL — LOW (ref 32–36)
MCV RBC AUTO: 69.2 FL — LOW (ref 80–100)
METHOD TYPE: SIGNIFICANT CHANGE UP
METHOD TYPE: SIGNIFICANT CHANGE UP
MICROCYTES BLD QL: SLIGHT — SIGNIFICANT CHANGE UP
MONOCYTES # BLD AUTO: 0.07 K/UL — SIGNIFICANT CHANGE UP (ref 0–0.9)
MONOCYTES NFR BLD AUTO: 0.9 % — LOW (ref 2–14)
NEUTROPHILS # BLD AUTO: 6.22 K/UL — SIGNIFICANT CHANGE UP (ref 1.8–7.4)
NEUTROPHILS NFR BLD AUTO: 78.3 % — HIGH (ref 43–77)
NITRITE UR-MCNC: NEGATIVE — SIGNIFICANT CHANGE UP
ORGANISM # SPEC MICROSCOPIC CNT: SIGNIFICANT CHANGE UP
OVALOCYTES BLD QL SMEAR: SLIGHT — SIGNIFICANT CHANGE UP
PH UR: 6 — SIGNIFICANT CHANGE UP (ref 5–8)
PLAT MORPH BLD: ABNORMAL
PLATELET # BLD AUTO: 424 K/UL — HIGH (ref 150–400)
POIKILOCYTOSIS BLD QL AUTO: SLIGHT — SIGNIFICANT CHANGE UP
POLYCHROMASIA BLD QL SMEAR: SLIGHT — SIGNIFICANT CHANGE UP
POTASSIUM SERPL-MCNC: 4.1 MMOL/L — SIGNIFICANT CHANGE UP (ref 3.5–5.3)
POTASSIUM SERPL-SCNC: 4.1 MMOL/L — SIGNIFICANT CHANGE UP (ref 3.5–5.3)
PROT SERPL-MCNC: 7.3 G/DL — SIGNIFICANT CHANGE UP (ref 6–8.3)
PROT UR-MCNC: NEGATIVE MG/DL — SIGNIFICANT CHANGE UP
RBC # BLD: 4.41 M/UL — SIGNIFICANT CHANGE UP (ref 3.8–5.2)
RBC # FLD: 20.4 % — HIGH (ref 10.3–14.5)
RBC BLD AUTO: ABNORMAL
SARS-COV-2 RNA SPEC QL NAA+PROBE: SIGNIFICANT CHANGE UP
SODIUM SERPL-SCNC: 138 MMOL/L — SIGNIFICANT CHANGE UP (ref 135–145)
SP GR SPEC: 1.01 — SIGNIFICANT CHANGE UP (ref 1–1.03)
SPECIMEN SOURCE: SIGNIFICANT CHANGE UP
STOMATOCYTES BLD QL SMEAR: SLIGHT — SIGNIFICANT CHANGE UP
TARGETS BLD QL SMEAR: SLIGHT — SIGNIFICANT CHANGE UP
UROBILINOGEN FLD QL: 0.2 E.U./DL — SIGNIFICANT CHANGE UP
VARIANT LYMPHS # BLD: 4.3 % — SIGNIFICANT CHANGE UP (ref 0–6)
WBC # BLD: 7.94 K/UL — SIGNIFICANT CHANGE UP (ref 3.8–10.5)
WBC # FLD AUTO: 7.94 K/UL — SIGNIFICANT CHANGE UP (ref 3.8–10.5)

## 2021-02-03 PROCEDURE — 80053 COMPREHEN METABOLIC PANEL: CPT

## 2021-02-03 PROCEDURE — 74177 CT ABD & PELVIS W/CONTRAST: CPT | Mod: 26,MA

## 2021-02-03 PROCEDURE — 76856 US EXAM PELVIC COMPLETE: CPT | Mod: 26

## 2021-02-03 PROCEDURE — 76830 TRANSVAGINAL US NON-OB: CPT | Mod: 26

## 2021-02-03 PROCEDURE — 76856 US EXAM PELVIC COMPLETE: CPT

## 2021-02-03 PROCEDURE — U0003: CPT

## 2021-02-03 PROCEDURE — 87491 CHLMYD TRACH DNA AMP PROBE: CPT

## 2021-02-03 PROCEDURE — 81025 URINE PREGNANCY TEST: CPT

## 2021-02-03 PROCEDURE — 93005 ELECTROCARDIOGRAM TRACING: CPT

## 2021-02-03 PROCEDURE — 76830 TRANSVAGINAL US NON-OB: CPT

## 2021-02-03 PROCEDURE — 74177 CT ABD & PELVIS W/CONTRAST: CPT

## 2021-02-03 PROCEDURE — 87040 BLOOD CULTURE FOR BACTERIA: CPT

## 2021-02-03 PROCEDURE — 81003 URINALYSIS AUTO W/O SCOPE: CPT

## 2021-02-03 PROCEDURE — 87591 N.GONORRHOEAE DNA AMP PROB: CPT

## 2021-02-03 PROCEDURE — 36415 COLL VENOUS BLD VENIPUNCTURE: CPT

## 2021-02-03 PROCEDURE — 85025 COMPLETE CBC W/AUTO DIFF WBC: CPT

## 2021-02-03 PROCEDURE — 99285 EMERGENCY DEPT VISIT HI MDM: CPT

## 2021-02-03 PROCEDURE — 93010 ELECTROCARDIOGRAM REPORT: CPT

## 2021-02-03 PROCEDURE — 87086 URINE CULTURE/COLONY COUNT: CPT

## 2021-02-03 PROCEDURE — U0005: CPT

## 2021-02-03 PROCEDURE — 99284 EMERGENCY DEPT VISIT MOD MDM: CPT | Mod: 25

## 2021-02-03 RX ORDER — SODIUM CHLORIDE 9 MG/ML
1000 INJECTION INTRAMUSCULAR; INTRAVENOUS; SUBCUTANEOUS ONCE
Refills: 0 | Status: COMPLETED | OUTPATIENT
Start: 2021-02-03 | End: 2021-02-03

## 2021-02-03 RX ORDER — MEROPENEM 1 G/30ML
1000 INJECTION INTRAVENOUS ONCE
Refills: 0 | Status: DISCONTINUED | OUTPATIENT
Start: 2021-02-03 | End: 2021-02-03

## 2021-02-03 RX ADMIN — SODIUM CHLORIDE 1000 MILLILITER(S): 9 INJECTION INTRAMUSCULAR; INTRAVENOUS; SUBCUTANEOUS at 19:32

## 2021-02-03 NOTE — ED PROVIDER NOTE - OBJECTIVE STATEMENT
46 yo F with recent ED visit now called back to ED for admission due to abd pain chills and dysuria + ESBL proteus mirabils on Urine Cx- no flank pain or vomiting - slight nausea  appetite ok since her ED visit     old hx-    45F PMH fibromyalgia (on lyrica), p/w b/l lower abd pain, x2mos, constant. Associated w/ mild dysuria. States she has been treated for UTI w/o relief. States she followed up w/ her PMD, GYN and GI doctor and no one able to figure out what is causing her symptoms. Came to ED today bec she wants to figure out what is causing her pain. No sudden changes to symptoms. Stopped taking any pain meds bec "nothing is helping."  Denies f/c, NVD, black/bloody stool, focal weakness/numbness, lightheadedness, back pain, rashes, joint pains, recent travel, recent antibiotic use, sick contacts, SOB/CP, rhinorrhea/nasal congestion, sore throat, cough. Normal PO intake, normal BMs.

## 2021-02-03 NOTE — ED PROVIDER NOTE - SHIFT CHANGE DETAILS
45  f frequent UTI, recent culture pos E. coli and proteus ESBL; still has dysuria but UA clean - ID consulted - abx not indicated per ID.   Follow up US and CT.  If neg, DC home no abx.

## 2021-02-03 NOTE — ED PROVIDER NOTE - CARE PROVIDER_API CALL
Yes Jose Sanchez)  Internal Medicine  [address]  [place], [place] [address]  Phone: [phone]  Fax: [phone]  Follow Up Time: 4-6 Days

## 2021-02-03 NOTE — ED ADULT TRIAGE NOTE - OTHER COMPLAINTS
c/o continued dysuria, pelvic pain and bilateral lower back pain x few days, on po antibiotics for uti.  denies fever, chills.

## 2021-02-03 NOTE — ED PROVIDER NOTE - CLINICAL SUMMARY MEDICAL DECISION MAKING FREE TEXT BOX
46 yo F with recurrent UTIs and lower abd pain  no N/V  no CVAT  urine today neg-  had + ESBL p mirabilis on 1-31-21-  no hx STDS  1 partner sexually active  no prior hx of GC/chlamydia  -no vaginal itching- will get TV sono - kathryn Sanchez

## 2021-02-03 NOTE — ED PROVIDER NOTE - CARE PLAN
Principal Discharge DX:	ESBL (extended spectrum beta-lactamase) producing bacteria infection  Secondary Diagnosis:	UTI (urinary tract infection)   Principal Discharge DX:	Dysuria  Secondary Diagnosis:	Abdominal pain

## 2021-02-03 NOTE — ED PROVIDER NOTE - NSFOLLOWUPINSTRUCTIONS_ED_ALL_ED_FT
Stop antibiotics.  Call your doctor tomorrow to arrange follow up.  Please also follow up with Dr Mesa (Infectious Diseases doctor) - call tomorrow.  Call the [facility] OB[facility]N clinic tomorrow to arrange follow up for the ovarian cyst.    Return to the Emergency Department if you have any new or worsening symptoms, or if you have any concerns.  ======================    Ovarian Cyst    WHAT YOU NEED TO KNOW:    An ovarian cyst is a fluid-filled sac that grows in or on an ovary. You have 2 ovaries, 1 on each side of your uterus. They are small, about the shape of an almond. Ovarian cysts are common in women who have regular monthly cycles. During your monthly cycle, eggs are released from the ovaries. The cyst usually contains fluid but may sometimes have blood or tissue in it. Most ovarian cysts are harmless and go away without treatment in a few months. Some cysts can grow large, cause pain, or break open.     Female Reproductive System         DISCHARGE INSTRUCTIONS:    Call your local emergency number (911 in the ) if:   •You have severe pain with fever and vomiting.      •You have sudden, severe abdominal pain.      •You are too weak, faint, or dizzy to stand up.      •You are breathing very quickly.      Call your doctor or gynecologist if:   •Your periods are early, late, or more painful than usual.      •You have questions or concerns about your condition or care.      Medicines: You may need any of the following:   •Birth control pills may help control your monthly cycle, prevent cysts, or cause them to shrink.      •Acetaminophen decreases pain and fever. It is available without a doctor's order. Ask how much to take and how often to take it. Follow directions. Read the labels of all other medicines you are using to see if they also contain acetaminophen, or ask your doctor or pharmacist. Acetaminophen can cause liver damage if not taken correctly. Do not use more than 4 grams (4,000 milligrams) total of acetaminophen in one day.       •NSAIDs, such as ibuprofen, help decrease swelling, pain, and fever. This medicine is available with or without a doctor's order. NSAIDs can cause stomach bleeding or kidney problems in certain people. If you take blood thinner medicine, always ask your healthcare provider if NSAIDs are safe for you. Always read the medicine label and follow directions.      •Prescription pain medicine may be given. Ask your healthcare provider how to take this medicine safely. Some prescription pain medicines contain acetaminophen. Do not take other medicines that contain acetaminophen without talking to your healthcare provider. Too much acetaminophen may cause liver damage. Prescription pain medicine may cause constipation. Ask your healthcare provider how to prevent or treat constipation.       •Take your medicine as directed. Contact your healthcare provider if you think your medicine is not helping or if you have side effects. Tell him or her if you are allergic to any medicine. Keep a list of the medicines, vitamins, and herbs you take. Include the amounts, and when and why you take them. Bring the list or the pill bottles to follow-up visits. Carry your medicine list with you in case of an emergency.      Manage ovarian cysts: You can manage a current cyst and help healthcare providers find future cysts early.  •Apply heat to decrease pain and cramping from a cyst. Sit in a warm bath, or place a heating pad (turned on low) on your abdomen. Do this for 15 to 20 minutes every hour for comfort.      •Get regular pelvic exams or Pap smears. This will help providers find any new ovarian cysts. Tell your healthcare provider about any unusual changes in your monthly cycle.      Follow up with your doctor or gynecologist as directed: Write down your questions so you remember to ask them during your visits.  ========================    Dysuria    WHAT YOU NEED TO KNOW:    Dysuria is difficulty urinating, or pain, burning, or discomfort with urination. Dysuria is usually a symptom of another problem.     DISCHARGE INSTRUCTIONS:    Return to the emergency department if:   •You have severe back, side, or abdominal pain.       •You have fever and shaking chills.       •You vomit several times in a row.       Contact your healthcare provider if:   •Your symptoms do not go away, even after treatment.       •You have questions or concerns about your condition or care.       Medicines:   •Medicines may be given to help treat a bacterial infection or help decrease bladder spasms.      •Take your medicine as directed. Contact your healthcare provider if you think your medicine is not helping or if you have side effects. Tell him of her if you are allergic to any medicine. Keep a list of the medicines, vitamins, and herbs you take. Include the amounts, and when and why you take them. Bring the list or the pill bottles to follow-up visits. Carry your medicine list with you in case of an emergency.      Follow up with your healthcare provider as directed: Your healthcare provider may also refer you to a urologist or nephrologist to have additional testing. Write down your questions so you remember to ask them during your visits.     Manage your dysuria:   •Drink more liquids. Liquids help flush out bacteria that may be causing an infection. Ask your healthcare provider how much liquid to drink each day and which liquids are best for you.       •Take sitz baths as directed. Fill a bathtub with 4 to 6 inches of warm water. You may also use a sitz bath pan that fits over a toilet. Sit in the sitz bath for 20 minutes. Do this 2 to 3 times a day, or as directed. The warm water can help decrease pain and swelling.

## 2021-02-03 NOTE — ED PROVIDER NOTE - PROGRESS NOTE DETAILS
case dw dr roman- ID- - p mirabilis ESBL on 1/31 cx - low counts--  rec to see if any other source to explain urinary sx -  U/A neg today-- hold meropenem for now repeat urine cx  pt well appearing ? chills at times  labs wnl Lab and imaging results reviewed with patient and her .  Gets intermittent urinary burning but none currently; also intermittent pelvic pain bilaterally and pain in her back, but none now.  Appears comfortable.   Tolerating PO.  She wants a new GYN and agrees to follow up with [facility] on [facility] regarding ovarian cyst.  She will also arrange follow up with her primary care physician and will call Dr Mesa for ID follow up.

## 2021-02-03 NOTE — ED PROVIDER NOTE - PATIENT PORTAL LINK FT
You can access the FollowMyHealth Patient Portal offered by [facility] by registering at the following website: [email or web address]. By joining [employer]’s FollowMyHealth portal, you will also be able to view your health information using other applications (apps) compatible with our system.

## 2021-02-03 NOTE — ED ADULT NURSE NOTE - OBJECTIVE STATEMENT
46 y/o female presents to ED for admission for ESBL on urine. pt c/o persistent lower abdominal pain x 2 months associated w/ chills. Denies fever, NVD, hematuria, flank pain. Pt has been treated for UTI w/o relief of sx.

## 2021-02-05 LAB
C TRACH RRNA SPEC QL NAA+PROBE: SIGNIFICANT CHANGE UP
CULTURE RESULTS: SIGNIFICANT CHANGE UP
N GONORRHOEA RRNA SPEC QL NAA+PROBE: SIGNIFICANT CHANGE UP
SPECIMEN SOURCE: SIGNIFICANT CHANGE UP
SPECIMEN SOURCE: SIGNIFICANT CHANGE UP

## 2021-02-08 LAB
CULTURE RESULTS: SIGNIFICANT CHANGE UP
CULTURE RESULTS: SIGNIFICANT CHANGE UP
SPECIMEN SOURCE: SIGNIFICANT CHANGE UP
SPECIMEN SOURCE: SIGNIFICANT CHANGE UP

## 2021-02-10 ENCOUNTER — APPOINTMENT (OUTPATIENT)
Dept: UROLOGY | Facility: CLINIC | Age: 46
End: 2021-02-10

## 2021-02-11 ENCOUNTER — APPOINTMENT (OUTPATIENT)
Dept: INFECTIOUS DISEASE | Facility: CLINIC | Age: 46
End: 2021-02-11

## 2021-02-23 ENCOUNTER — APPOINTMENT (OUTPATIENT)
Dept: SURGERY | Facility: CLINIC | Age: 46
End: 2021-02-23

## 2021-03-04 ENCOUNTER — APPOINTMENT (OUTPATIENT)
Dept: RADIOLOGY | Facility: HOSPITAL | Age: 46
End: 2021-03-04

## 2021-06-10 ENCOUNTER — EMERGENCY (EMERGENCY)
Facility: HOSPITAL | Age: 46
LOS: 1 days | Discharge: HOME | End: 2021-06-10
Attending: EMERGENCY MEDICINE | Admitting: EMERGENCY MEDICINE
Payer: MEDICAID

## 2021-06-10 VITALS
HEART RATE: 93 BPM | SYSTOLIC BLOOD PRESSURE: 129 MMHG | OXYGEN SATURATION: 97 % | HEIGHT: 61 IN | DIASTOLIC BLOOD PRESSURE: 79 MMHG | TEMPERATURE: 98 F | RESPIRATION RATE: 18 BRPM

## 2021-06-10 DIAGNOSIS — Z90.710 ACQUIRED ABSENCE OF BOTH CERVIX AND UTERUS: ICD-10-CM
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
## 2021-06-10 DIAGNOSIS — Z86.2 PERSONAL HISTORY OF DISEASES OF THE BLOOD AND BLOOD-FORMING ORGANS AND CERTAIN DISORDERS INVOLVING THE IMMUNE MECHANISM: ICD-10-CM

## 2021-06-10 DIAGNOSIS — Z87.39 PERSONAL HISTORY OF OTHER DISEASES OF THE MUSCULOSKELETAL SYSTEM AND CONNECTIVE TISSUE: ICD-10-CM

## 2021-06-10 DIAGNOSIS — M79.641 PAIN IN RIGHT HAND: ICD-10-CM

## 2021-06-10 DIAGNOSIS — Z90.710 ACQUIRED ABSENCE OF BOTH CERVIX AND UTERUS: Chronic | ICD-10-CM

## 2021-06-10 DIAGNOSIS — Z90.49 ACQUIRED ABSENCE OF OTHER SPECIFIED PARTS OF DIGESTIVE TRACT: Chronic | ICD-10-CM

## 2021-06-10 DIAGNOSIS — Z90.49 ACQUIRED ABSENCE OF OTHER SPECIFIED PARTS OF DIGESTIVE TRACT: ICD-10-CM

## 2021-06-10 DIAGNOSIS — M50.20 OTHER CERVICAL DISC DISPLACEMENT, UNSPECIFIED CERVICAL REGION: ICD-10-CM

## 2021-06-10 DIAGNOSIS — S60.221A CONTUSION OF RIGHT HAND, INITIAL ENCOUNTER: ICD-10-CM

## 2021-06-10 DIAGNOSIS — M79.7 FIBROMYALGIA: ICD-10-CM
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
## 2021-06-10 DIAGNOSIS — Z98.84 BARIATRIC SURGERY STATUS: ICD-10-CM

## 2021-06-10 PROCEDURE — 73130 X-RAY EXAM OF HAND: CPT | Mod: 26,RT

## 2021-06-10 PROCEDURE — 99283 EMERGENCY DEPT VISIT LOW MDM: CPT | Mod: 25

## 2021-06-10 PROCEDURE — 73130 X-RAY EXAM OF HAND: CPT

## 2021-06-10 NOTE — ED PROVIDER NOTE - MUSCULOSKELETAL, MLM
right hand with darkened discoloration of thenar eminence. tender to touch. normal rom of fingers. skin intact. radial pulse 2+

## 2021-06-10 NOTE — ED PROVIDER NOTE - CLINICAL SUMMARY MEDICAL DECISION MAKING FREE TEXT BOX
right hand contusion. xray done and neg for fracture. rec ice/ nsaids. already on oxycodone for fibromyalgia. to f/u with pmd. return precautions discussed

## 2021-06-10 NOTE — ED ADULT TRIAGE NOTE - ARRIVAL INFO ADDITIONAL COMMENTS
pt c/o right arm pain and stiffness after pulling something yesterday.   of note pt had right elbow injection at pain management today for chronic tennis elbow and they told her there was no correlation.

## 2021-06-10 NOTE — ED ADULT NURSE NOTE - PMH
Anemia    Herniated cervical disc    Intussusception    UTI (urinary tract infection)     Anemia    Fibromyalgia    Herniated cervical disc    Intussusception    UTI (urinary tract infection)

## 2021-06-10 NOTE — ED PROVIDER NOTE - PATIENT PORTAL LINK FT
You can access the FollowMyHealth Patient Portal offered by [facility] by registering at the following website: [email or web address]. By joining [employer]’s FollowMyHealth portal, you will also be able to view your health information using other applications (apps) compatible with our system.

## 2021-06-10 NOTE — ED ADULT TRIAGE NOTE - BP NONINVASIVE SYSTOLIC (MM HG)
08/01/2019      Dandy Burton MD  [address] [place] [address] DR MUNOZC  [place] [address]    Jonnie Sheets Jr.  1944    Chief Complaint   Patient presents with   • [facility]     Hospital Follow Up For Poplliteal Artery Aneurysm. Test [id number] US Venous Doppler Lower Extremity Bilateral . Patient denies any stroke like symptoms.        Dear Dandy Burton MD       HPI  I had the pleasure of seeing your patient Jonnie Sheets Jr. in the office today.  Thank you kindly for this consultation.  As you recall, Jonnie Sheets Jr. is a 74 y.o.  male who you are currently following for routine health maintenance.  He he was recently hospitalized for COPD exacerbation and pneumonia.  He did have an array of testing including a CT of the chest and an ultrasound of his left lower extremity for swelling.  He was incidentally found to have a left popliteal artery aneurysm during his scan.  He denies any claudication to his lower extremities he does get knee injections to his left knee, which he reports does help.  He is maintained on Mevacor.  He was a previous smoker up until June 2019.    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)    • Umbilical hernia        Past Surgical History:   Procedure Laterality Date   • COLON RESECTION Right 1/19/2017    Procedure: LAPAROSCOPIC ASSISTED RIGHT COLON RESECTION RIGHT, UMBILICAL HERNIA REPAIR;  Surgeon: Mario Ellison MD;  Location: [facility] OR;  Service:    • COLONOSCOPY     • COLONOSCOPY N/A 1/4/2017    Procedure:  COLONOSCOPY WITH ANESTHESIA;  Surgeon: Tee Kong MD;  Location: [facility] ENDOSCOPY;  Service:    • COLONOSCOPY N/A 2018    Procedure: COLONOSCOPY WITH ANESTHESIA;  Surgeon: Tee Kong MD;  Location: [facility] ENDOSCOPY;  Service:    • ENDOSCOPY N/A 2017    Procedure: ESOPHAGOGASTRODUODENOSCOPY WITH ANESTHESIA;  Surgeon: Tee Kong MD;  Location: [facility] ENDOSCOPY;  Service:    • ENDOSCOPY N/A 3/9/2018    Procedure: ESOPHAGOGASTRODUODENOSCOPY WITH ANESTHESIA;  Surgeon: Francisco Harmon DO;  Location: [facility] ENDOSCOPY;  Service:    • KNEE SURGERY      PINS PLACED AND LATER REMOVED S/P FRACTURE   • MOUTH BIOPSY     • TONSILLECTOMY         Family History   Problem Relation Age of Onset   • Heart disease Father    • Colon cancer Neg Hx    • Colon polyps Neg Hx        Social History     Socioeconomic History   • Marital status:      Spouse name: Not on file   • Number of children: Not on file   • Years of education: Not on file   • Highest education level: Not on file   Tobacco Use   • Smoking status: Former Smoker     Packs/day: 0.50     Years: 55.00     Pack years: 27.50     Types: Cigarettes     Last attempt to quit: 2019     Years since quittin.1   • Smokeless tobacco: Never Used   Substance and Sexual Activity   • Alcohol use: Yes     Comment: 3-4 times a week, liquor   • Drug use: No   • Sexual activity: Defer       Allergies   Allergen Reactions   • Acetylcholinesterase Other (See Comments)     Had trouble coming out of anesthesia, Dr Ellison inst pt family to make sure pt never receive this again, if he does he will wind up on respirator         Current Outpatient Medications:   •  budesonide-formoterol (SYMBICORT) 160-4.5 MCG/ACT inhaler, Inhale 2 puffs by mouth 2 (Two) Times a Day., Disp: 10.2 g, Rfl: 0  •  ferrous sulfate (IRON SUPPLEMENT) 325 (65 FE) MG tablet, Take 1 tablet by mouth Daily With Breakfast., Disp: , Rfl:   •  furosemide (LASIX) 20 MG tablet, Take 20 mg by  "mouth Daily., Disp: , Rfl:   •  ipratropium-albuterol (DUO-NEB) 0.5-2.5 mg/3 ml nebulizer, Take 3 mL by nebulization 4 (Four) Times a Day., Disp: 360 mL, Rfl: 2  •  lovastatin (MEVACOR) 40 MG tablet, Take 40 mg by mouth Every Night., Disp: , Rfl:   •  omeprazole (priLOSEC) 20 MG capsule, Take 20 mg by mouth Daily., Disp: , Rfl:   •  saccharomyces boulardii (FLORASTOR) 250 MG capsule, Take 250 mg by mouth 2 (Two) Times a Day., Disp: , Rfl:   •  benzonatate (TESSALON) 200 MG capsule, Take 1 capsule by mouth 3 (Three) Times a Day As Needed for Cough., Disp: 20 capsule, Rfl: 0  •  cetirizine (ZyrTEC) 10 MG chewable tablet, Chew 1 tablet Daily., Disp: , Rfl:   •  fluticasone (FLONASE) 50 MCG/ACT nasal spray, 2 sprays into the nostril(s) as directed by provider Daily. AS needed for nasal congestion/allergies, Disp: , Rfl:   •  predniSONE (DELTASONE) 10 MG tablet, Take 2 tabs once daily x 1 day, then 1.5 tabs once daily for 3 days, then 1 tab once daily for 3 days, then 0.5 tab once daily for 3 days, Disp: 11 tablet, Rfl: 0      Review of Systems   Constitutional: Positive for activity change.   HENT: Negative.    Eyes: Negative.    Respiratory: Positive for shortness of breath.    Cardiovascular: Negative.    Gastrointestinal: Negative.    Endocrine: Negative.    Genitourinary: Negative.    Musculoskeletal: Negative.    Skin: Negative.    Allergic/Immunologic: Negative.    Neurological: Negative.    Hematological: Negative.    Psychiatric/Behavioral: Negative.    All other systems reviewed and are negative.      /78 (BP Location: Left arm, Patient Position: Sitting, Cuff Size: Adult)   Pulse 98   Ht 165.1 cm (65\")   Wt 71.3 kg (157 lb 3.2 oz)   SpO2 93%   BMI 26.16 kg/m²   Physical Exam   Constitutional: He is oriented to person, place, and time. He appears well-developed and well-nourished. No distress.   HENT:   Head: Normocephalic and atraumatic.   Mouth/Throat: Oropharynx is clear and moist and mucous " membranes are normal.   Eyes: Pupils are equal, round, and reactive to light.   Neck: Normal range of motion. Neck supple. No JVD present. Carotid bruit is not present.   Cardiovascular: Normal rate, regular rhythm, S1 normal, S2 normal, normal heart sounds, intact distal pulses and normal pulses. Exam reveals no gallop and no friction rub.   No murmur heard.  Pulses:       Femoral pulses are 2+ on the right side, and 2+ on the left side.       Popliteal pulses are 2+ on the right side, and 2+ on the left side.        Dorsalis pedis pulses are 2+ on the right side, and 2+ on the left side.        Posterior tibial pulses are 2+ on the right side, and 2+ on the left side.   Pulmonary/Chest: Effort normal. He has decreased breath sounds.   Abdominal: Soft. Normal appearance, normal aorta and bowel sounds are normal. He exhibits no abdominal bruit. There is no hepatosplenomegaly. There is no tenderness.   Musculoskeletal: Normal range of motion.     Vascular Status -  His right foot exhibits no edema. His left foot exhibits no edema.  Neurological: He is alert and oriented to person, place, and time. He has normal strength. No cranial nerve deficit.   Skin: Skin is warm, dry and intact. He is not diaphoretic.   Psychiatric: He has a normal mood and affect. His behavior is normal. Judgment and thought content normal. Cognition and memory are normal.     Diagnostic data:  History: Swelling     IMPRESSION:  Impression:  1. There is no evidence of deep venous thrombosis or superficial  thrombophlebitis of right or left lower extremities.  2. Incidental finding of a small left popliteal artery aneurysm  measuring 1.1 x 1.85 x 0.829 cm. Further imaging/evaluation may be  warranted.     Comments: Bilateral lower extremity venous duplex exam was performed  using color Doppler flow, Doppler waveform analysis, and grayscale  imaging, with and without compression. There is no evidence of deep  venous thrombosis in the common  femoral, superficial femoral, popliteal,  peroneal, anterior tibial, and posterior tibial veins bilaterally. No  thrombus is identified in the saphenofemoral junctions and greater  saphenous veins bilaterally.         This report was finalized on 06/24/2019 14:26 by Dr. Tomas Ryan MD.      EXAMINATION:   CT ABDOMEN PELVIS WO CONTRAST-  5/10/2019 12:16 PM CDT     HISTORY: CT ABDOMEN PELVIS WO CONTRAST- 5/10/2019 11:32 AM CDT     HISTORY: pain, vomiting, diarrhea      COMPARISON: 04/24/2019      DOSE LENGTH PRODUCT: 2 90 mGy cm. Automated exposure control was also  utilized to decrease patient radiation dose.     TECHNIQUE: Noncontrast enhanced images of the abdomen and pelvis  obtained without oral contrast. Multiplanar reformatted images were  provided for review.     FINDINGS:   Lung bases: Chronic changes noted in the lung bases..      LIVER: No focal liver lesion.      BILIARY SYSTEM: The gallbladder is unremarkable. No intrahepatic or  extrahepatic ductal dilatation.      PANCREAS: No focal pancreatic lesion.      SPLEEN: Unremarkable.      KIDNEYS AND ADRENALS: Bilateral kidneys and adrenal glands are  unremarkable.  Vascular calcifications noted in the renal arteries..     RETROPERITONEUM: No mass, lymphadenopathy or hemorrhage.      GI TRACT: No evidence of obstruction or bowel wall thickening.  Diverticulitis is noted in the sigmoid colon. Compared to prior exam of  04/24/2019 this is improved. However residual does persist..     OTHER: There is no mesenteric mass, lymphadenopathy or fluid collection.  The osseous structures and soft tissues demonstrate no worrisome  lesions. Old compression fracture of L1 is noted. There is retropulsion  of L1 into the lumbar canal this is unchanged     Vascular calcifications noted in the abdominal aorta      PELVIS: No mass lesion, fluid collection or significant lymphadenopathy  is seen in the pelvis. The urinary bladder is normal in appearance.      IMPRESSION:  1. Changes of diverticulitis. Compared to April 24 this is improved  however residual does persist.  2. Old compression fracture of L1 with some retropulsion into the lumbar  canal  3. Vascular calcifications present abdominal aorta. Infrarenal aorta is  ectatic measuring 3.1 x 3.4 cm..         This report was finalized on 05/10/2019 12:21 by Dr. Olegario Graham MD.  No results found.    Patient Active Problem List   Diagnosis   • Colon cancer (CMS/HCC)   • Personal history of colon cancer   • Anorexia   • Black stool   • Acute renal failure (CMS/HCC)   • Diverticular disease of large intestine with complication   • Septic shock (CMS/[facility]), resolved   • Diverticulitis large intestine w/o perforation or abscess w/o bleeding   • Diarrhea, improved   • Acute kidney injury (CMS/[facility]), secondary to sepsis, dialysis initiated    • High anion gap metabolic acidosis, improved   • Lactic acidosis, resolved   • Leukocytosis, resolved   • C. difficile colitis   • Hypokalemia   • Anemia   • Hypomagnesemia   • Hypoxia   • COPD with acute exacerbation (CMS/[facility])   • Nausea vomiting and diarrhea   • Acute hypercapnic respiratory failure (CMS/[facility])   • Weight loss   • Tobacco dependence   • Popliteal artery aneurysm (CMS/HCC)   • History of Clostridium difficile colitis   • Moderate malnutrition (CMS/[facility])         ICD-10-CM ICD-9-CM   1. Popliteal artery aneurysm (CMS/HCC) I72.4 442.3   2. Abdominal aortic aneurysm (AAA) without rupture (CMS/[facility]) I71.4 441.4   3. Bilateral carotid artery stenosis I65.23 433.10     433.30         Plan: After thoroughly evaluating Jonnie Sheets Jr., I believe the best course of action is to remain conservative from vascular standpoint.  He does have evidence of popliteal artery aneurysm on the left however not big enough for any intervention.  This will need to continue to be followed.  In reviewing previous testing, he also has a small abdominal aortic aneurysm as well measuring 3.1 x 3.4  melania.  We will see him back in 6 months with repeat noninvasive testing for continued surveillance, including a carotid duplex for surveillance, and ultrasound of the aorta, and bilateral lower extremity arterial duplex.  The patient can continue taking their current medication regimen as previously planned.  This was all discussed in full with complete understanding.    Thank you for allowing me to participate in the care of your patient.  Please do not hesitate with any questions or concerns.  I will keep you aware of any further encounters with Jonnie Sheets Jr..        Sincerely yours,         PRATEEK Saleem            129

## 2021-06-10 NOTE — ED ADULT NURSE NOTE - OBJECTIVE STATEMENT
PAtient arrives ambulatory c/o R hand pain (bruise noted to palm) radiating up entire right arm to neck.  Patient states she was pushing a chair yesterday when she developed the pain.  No other injuries.

## 2021-06-10 NOTE — ED PROVIDER NOTE - NSFOLLOWUPINSTRUCTIONS_ED_ALL_ED_FT
Consulte a sorensen proveedor de atención primaria en 2-3 días. Llame para magdi linda. Si tiene algún problema con el seguimiento, llame al Coordinador de referencias de ED al [phone]. Regrese a la lux de emergencias si los síntomas empeoran u otras preocupaciones.      Contusión en adultos    LO QUE NECESITA SABER:    Magdi contusión es un moretón que aparece en la piel después de magdi lesión. Un moretón se forma cuando se rompen los vasos sanguíneos pequeños, bartolo no se rompe la piel. La kike se filtra a los tejidos cercanos, adrienne los tejidos blandos o los músculos.    INSTRUCCIONES SOBRE [facility]ITAL[facility]:    Busque atención médica de inmediato si:  •Le surge dificultad para  el área lesionada.      •Usted tiene hormigueo o entumecimiento en el área lesionada o cerca de ésta.      •El área de sorensen mano o pie que se encuentra debajo del moretón se pone fría o pálida.      Llame a sorensen médico si:  •Usted encuentra un bulto nuevo en el área lesionada.      •Elisa síntomas no mejoran después de 4 o 5 días de tratamiento.      •Usted tiene preguntas o inquietudes acerca de sorensen condición o cuidado.      Medicamentos:Es posible que usted necesite alguno de los siguientes:   •Los MASOOD,adrienne el ibuprofeno, ayudan a disminuir la inflamación, el dolor y la fiebre. Mis medicamento está disponible con o sin magdi receta médica. Los MASOOD pueden causar sangrado estomacal o problemas renales en ciertas personas. Si usted georgette un medicamento anticoagulante, siempre pregúntele a sorensen médico si los MASOOD son seguros para usted. Siempre rossi la etiqueta de mis medicamento y siga las instrucciones.      •Puede administrarsepodrían administrarse. Pregunte al médico cómo debe stefan mis medicamento de forma pettit. Algunos medicamentos recetados para el dolor contienen acetaminofén. No tome otros medicamentos que contengan acetaminofén sin consultarlo con sorensen médico. Demasiado acetaminofeno puede causar daño al hígado. Los medicamentos recetados para el dolor podrían causar estreñimiento. Pregunte a sorensen médico adrienne prevenir o tratar estreñimiento.      •[place] elisa medicamentos adrienne se le haya indicado.Consulte con sorensen médico si usted william que sorensen medicamento no le está ayudando o si presenta efectos secundarios. Infórmele si es alérgico a cualquier medicamento. Mantenga magdi lista actualizada de los medicamentos, las vitaminas y los productos herbales que georgette. Incluya los siguientes datos de los medicamentos: cantidad, frecuencia y motivo de administración. Traiga con usted la lista o los envases de las píldoras a elisa citas de seguimiento. Lleve la lista de los medicamentos con usted en uriel de magdi emergencia.      Ayude a que sorensen contusión sane:  •Repose el área lesionadao úsela menos que de costumbre. Si a usted le salieron moretones en sorensen pierna o pie, es posible que deba usar muletas o un bastón para caminar. [place] le ayudará a no apoyarse en la parte lesionada del cuerpo.      •Aplique hielopara bajar la inflamación y calmar el dolor. El hielo también puede contribuir a evitar el daño de los tejidos. Use magdi compresa de hielo o ponga hielo triturado en magdi bolsa de plástico. Cubra el hielo con magdi toalla y colóquelo sobre el moretón melody 15 a 20 minutos cada hora o según le indiquen.      •Use compresiónpara apoyar el área y disminuir la hinchazón. Coloque un vendaje elástico alrededor de la vincenzo sobre el músculo lesionado. Asegúrese de que el vendaje no quede demasiado apretado. Se debería poder meter 1 dedo entre el vendaje y la piel.      •Eleve la parte lesionada de sorensen cuerpopor encima del nivel de sorensen corazón para ayudar a aliviar el dolor y la inflamación. Use almohadas, cobijas o toallas enrolladas para elevar el área tan a menudo adrienne sea posible.      •No consuma alcoholsegún las indicaciones. El alcohol puede retardar la curación.      •No estire los músculos lesionadosinmediatamente después de la lesión. Pregunte a sorensen médico cuándo y cómo se puede estirar con precaución después de sorensen lesión. Los estiramientos suaves pueden ayudar a aumentar la flexibilidad.      •No masajee el área ni utilice almohadillas térmicasen la contusión inmediatamente después de la lesión. El calor y los masajes podrían retrasar la sanación. Sorensen médico podría indicarle que aplique calor después de varios días. En getachew momento, el calor comenzará a servir para sanar la lesión.      Evite otra contusión:  •Estírese y entre en calor antes de practicar deportes o hacer ejercicio.      •Use equipo de protección cuando practique deportes. Algunos ejemplos son las espinilleras y las prendas acolchadas.      •Si comienza a hacer magdi nueva actividad física, empiece poco a poco para darle tiempo al cuerpo de acostumbrarse.      Acuda a la consulta de control con sorensen médico según las indicaciones:Anote elisa preguntas para que se acuerde de hacerlas melody elisa visitas.

## 2021-06-10 NOTE — ED PROVIDER NOTE - OBJECTIVE STATEMENT
history of fibromyalgia on lyrica and oxycodone, here with right hand pain since yesterday. Reports she was pushing a chair at home and injured hand. Notes some bruising and swelling. Tried ibuprofen without relief. No fever/chills. Pain radiates up arm.

## 2021-06-10 NOTE — ED PROVIDER NOTE - PMH
Anemia    Fibromyalgia    Herniated cervical disc    Intussusception    UTI (urinary tract infection)

## 2021-08-09 NOTE — ED ADULT TRIAGE NOTE - NS ED NURSE BANDS TYPE
Psychiatric Follow Up Progress Note    Patient: El Jasmine Date: 2021   : 1993         AGE: 28 year old MR#: [id number]     This visit was performed via live interactive two-way video with patient's verbal consent.   Clinician Location: Clinic.  Patient Location: Home.     Chief Complaint:  \"Depressed.\"    History of Present Illness:  Patient reports anxiety is much improved and while depression is improved symptoms persist.  Patient also continues drinking, had 1 pt of liquor just last night rid patient admits to some suicidal ideation whenever he drinks heavily.    Patient had to miss his AODA psychotherapy evaluation as he was required to stay at work.    Recent PHQ 2/9 Score    PHQ 2:  Date Adult PHQ 2 Score Adult PHQ 2 Interpretation   2021 5 Further screening needed       PHQ 9:  Date Adult PHQ 9 Score Adult PHQ 9 Interpretation   2021 13 Moderate Depression       Most Recent KALPESH 7 Score           Vital Signs:   There were no vitals taken for this visit.    Medications:  Current Outpatient Medications   Medication Sig   • nalTREXone (REVIA) 50 MG tablet Take 1 tablet by mouth at bedtime.   • mirtazapine (REMERON) 45 MG tablet Take 1 tablet by mouth nightly. Indications: Major Depressive Disorder, kalpesh   • Multiple Vitamins-Minerals (Multivitamin Adult, Minerals,) Tab Take 1 tablet by mouth daily.     No current facility-administered medications for this visit.       Allergies:  ALLERGIES:  No Active Allergies    Laboratory Tests or other studies:  no further labwork indicated    MENTAL STATUS EXAM:  Appearance:  Well-groomed, good eye contact, appears stated age.   Behavior:  Calmed, pleasant, interactive.   Cooperativity:  Cooperative, forthcoming, appears reliable.    Speech:  Normal rate, tone and volume.   Language:  No abnormality noted.    Mood:  Noted in History of Present Illness.  Affect:  Mood-congruent, stable, normal range.  Thought Process:  Linear, logical, goal-directed.     Thought Content:  No overt delusions or abnormality noted.    Perception:  No hallucinations, not responding to internal stimuli.   Consciousness:  Awake and alert.   Orientation:  Oriented to person, place and time.   Musculoskeletal: No abnormal or involuntary muscle movements observed.  Memory:  Good, able to demonstrate accurate historical recall for recent and more remote events and discussions.  Attention:  Good, no fluctuation or obvious deficit noted and able to follow the conversation.   Fund of Knowledge:  Consistent with education and experiences as evidenced by vocabulary.   Insight:  Good, based on appropriate recognition of impact of psychiatric symptoms and need for treatment.   Judgment:  Good, based on recent decisions.  Safety:  Noted in History of Present Illness.  Motivation to pursue treatment:  Good.     Diagnosis:   Major depressive disorder, severe (CMS/HCC)  (primary encounter diagnosis)  Panic disorder with agoraphobia  Alcohol use disorder, moderate, in early remission (CMS/[facility])  ELIZABETH (generalized anxiety disorder)    Assessment and Plan:   28-year-old male with some improvement though with some ongoing depression including suicidal ideation during periods of intoxication from alcohol.  1. Diagnosis, treatment options, common medication side effects, benefits and risks, etc. all reviewed with patient today.  2. We agreed to increase Remeron 45 mg q.h.s. and hope to see further improvement in signs and symptoms of mood and anxiety.  3. Patient intends on rescheduling the missed AODA evaluation; patient continues on naltrexone.  4. Suicidal ideation occurs when patient inebriated.  Patient does not believe he is at risk for self-harm from what I gather and he does have suicide hotline.    Follow up: 2 months      Chadd Singh MD        Name band;

## 2021-08-19 ENCOUNTER — RX RENEWAL (OUTPATIENT)
Age: 46
End: 2021-08-19

## 2021-08-27 NOTE — ED ADULT NURSE NOTE - CAS DISCH BELONGINGS RETURNED
Pt here with CC of midsternal chest pain that she describes as a minor discomfort, denies any radiation of the pain. She states that she does have a cardiac history with SVT and X 2 ablations. She took herself of lopressor and recently placed herself back on it after noticing the onset of chest pain.       Anat Matias RN  08/27/21 [id number] Not applicable

## 2021-09-05 NOTE — H&P ADULT - ASSESSMENT
yes
43yo woman with PMH anemia, herniated cervical disc, cholecystectomy, RYGB (2009), intussusception of jejunojejunal anastomosis (2015), and subsequent resection of dilated jejunojejunal anastomosis (Feb 2017) with small bowel anastomosis, recently admitted in June for pelvis laproscopy with lysis of adhesions, who is admitted with lower back pain.

## 2021-10-28 ENCOUNTER — EMERGENCY (EMERGENCY)
Facility: HOSPITAL | Age: 46
LOS: 1 days | Discharge: HOME | End: 2021-10-28
Attending: EMERGENCY MEDICINE | Admitting: EMERGENCY MEDICINE
Payer: MEDICAID

## 2021-10-28 VITALS
DIASTOLIC BLOOD PRESSURE: 82 MMHG | TEMPERATURE: 98 F | RESPIRATION RATE: 19 BRPM | HEART RATE: 82 BPM | OXYGEN SATURATION: 96 % | SYSTOLIC BLOOD PRESSURE: 145 MMHG

## 2021-10-28 VITALS
TEMPERATURE: 98 F | OXYGEN SATURATION: 95 % | WEIGHT: 190.04 LBS | HEIGHT: 61 IN | SYSTOLIC BLOOD PRESSURE: 148 MMHG | RESPIRATION RATE: 18 BRPM | DIASTOLIC BLOOD PRESSURE: 86 MMHG | HEART RATE: 86 BPM

## 2021-10-28 DIAGNOSIS — Z87.442 PERSONAL HISTORY OF URINARY CALCULI: ICD-10-CM

## 2021-10-28 DIAGNOSIS — Z90.49 ACQUIRED ABSENCE OF OTHER SPECIFIED PARTS OF DIGESTIVE TRACT: Chronic | ICD-10-CM

## 2021-10-28 DIAGNOSIS — R07.89 OTHER CHEST PAIN: ICD-10-CM

## 2021-10-28 DIAGNOSIS — R06.02 SHORTNESS OF BREATH: ICD-10-CM

## 2021-10-28 DIAGNOSIS — M79.7 FIBROMYALGIA: ICD-10-CM

## 2021-10-28 DIAGNOSIS — D64.9 ANEMIA, UNSPECIFIED: ICD-10-CM

## 2021-10-28 DIAGNOSIS — Z90.49 ACQUIRED ABSENCE OF OTHER SPECIFIED PARTS OF DIGESTIVE TRACT: ICD-10-CM

## 2021-10-28 DIAGNOSIS — Z87.440 PERSONAL HISTORY OF URINARY (TRACT) INFECTIONS: ICD-10-CM

## 2021-10-28 DIAGNOSIS — Z90.710 ACQUIRED ABSENCE OF BOTH CERVIX AND UTERUS: Chronic | ICD-10-CM

## 2021-10-28 LAB
ALBUMIN SERPL ELPH-MCNC: 5 G/DL — SIGNIFICANT CHANGE UP (ref 3.3–5)
ALP SERPL-CCNC: 134 U/L — HIGH (ref 40–120)
ALT FLD-CCNC: 37 U/L — SIGNIFICANT CHANGE UP (ref 10–45)
ANION GAP SERPL CALC-SCNC: 12 MMOL/L — SIGNIFICANT CHANGE UP (ref 5–17)
AST SERPL-CCNC: 36 U/L — SIGNIFICANT CHANGE UP (ref 10–40)
BASOPHILS # BLD AUTO: 0.02 K/UL — SIGNIFICANT CHANGE UP (ref 0–0.2)
BASOPHILS NFR BLD AUTO: 0.4 % — SIGNIFICANT CHANGE UP (ref 0–2)
BILIRUB SERPL-MCNC: 0.3 MG/DL — SIGNIFICANT CHANGE UP (ref 0.2–1.2)
BUN SERPL-MCNC: 7 MG/DL — SIGNIFICANT CHANGE UP (ref 7–23)
CALCIUM SERPL-MCNC: 10 MG/DL — SIGNIFICANT CHANGE UP (ref 8.4–10.5)
CHLORIDE SERPL-SCNC: 106 MMOL/L — SIGNIFICANT CHANGE UP (ref 96–108)
CO2 SERPL-SCNC: 25 MMOL/L — SIGNIFICANT CHANGE UP (ref 22–31)
CREAT SERPL-MCNC: 0.54 MG/DL — SIGNIFICANT CHANGE UP (ref 0.5–1.3)
EOSINOPHIL # BLD AUTO: 0.03 K/UL — SIGNIFICANT CHANGE UP (ref 0–0.5)
EOSINOPHIL NFR BLD AUTO: 0.6 % — SIGNIFICANT CHANGE UP (ref 0–6)
GLUCOSE SERPL-MCNC: 111 MG/DL — HIGH (ref 70–99)
HCT VFR BLD CALC: 35.9 % — SIGNIFICANT CHANGE UP (ref 34.5–45)
HGB BLD-MCNC: 11.1 G/DL — LOW (ref 11.5–15.5)
IMM GRANULOCYTES NFR BLD AUTO: 0.2 % — SIGNIFICANT CHANGE UP (ref 0–1.5)
LYMPHOCYTES # BLD AUTO: 1.55 K/UL — SIGNIFICANT CHANGE UP (ref 1–3.3)
LYMPHOCYTES # BLD AUTO: 31.6 % — SIGNIFICANT CHANGE UP (ref 13–44)
MCHC RBC-ENTMCNC: 23.7 PG — LOW (ref 27–34)
MCHC RBC-ENTMCNC: 30.9 GM/DL — LOW (ref 32–36)
MCV RBC AUTO: 76.5 FL — LOW (ref 80–100)
MONOCYTES # BLD AUTO: 0.36 K/UL — SIGNIFICANT CHANGE UP (ref 0–0.9)
MONOCYTES NFR BLD AUTO: 7.3 % — SIGNIFICANT CHANGE UP (ref 2–14)
NEUTROPHILS # BLD AUTO: 2.93 K/UL — SIGNIFICANT CHANGE UP (ref 1.8–7.4)
NEUTROPHILS NFR BLD AUTO: 59.9 % — SIGNIFICANT CHANGE UP (ref 43–77)
NRBC # BLD: 0 /100 WBCS — SIGNIFICANT CHANGE UP (ref 0–0)
PLATELET # BLD AUTO: 426 K/UL — HIGH (ref 150–400)
POTASSIUM SERPL-MCNC: 3.9 MMOL/L — SIGNIFICANT CHANGE UP (ref 3.5–5.3)
POTASSIUM SERPL-SCNC: 3.9 MMOL/L — SIGNIFICANT CHANGE UP (ref 3.5–5.3)
PROT SERPL-MCNC: 7.9 G/DL — SIGNIFICANT CHANGE UP (ref 6–8.3)
RBC # BLD: 4.69 M/UL — SIGNIFICANT CHANGE UP (ref 3.8–5.2)
RBC # FLD: 16.7 % — HIGH (ref 10.3–14.5)
SODIUM SERPL-SCNC: 143 MMOL/L — SIGNIFICANT CHANGE UP (ref 135–145)
TROPONIN T SERPL-MCNC: 0.01 NG/ML — SIGNIFICANT CHANGE UP (ref 0–0.01)
WBC # BLD: 4.9 K/UL — SIGNIFICANT CHANGE UP (ref 3.8–10.5)
WBC # FLD AUTO: 4.9 K/UL — SIGNIFICANT CHANGE UP (ref 3.8–10.5)

## 2021-10-28 PROCEDURE — 99284 EMERGENCY DEPT VISIT MOD MDM: CPT | Mod: 25

## 2021-10-28 PROCEDURE — 99285 EMERGENCY DEPT VISIT HI MDM: CPT

## 2021-10-28 PROCEDURE — 71275 CT ANGIOGRAPHY CHEST: CPT | Mod: 26,MC

## 2021-10-28 PROCEDURE — 94640 AIRWAY INHALATION TREATMENT: CPT

## 2021-10-28 PROCEDURE — 96374 THER/PROPH/DIAG INJ IV PUSH: CPT | Mod: XU

## 2021-10-28 PROCEDURE — 80053 COMPREHEN METABOLIC PANEL: CPT

## 2021-10-28 PROCEDURE — 36415 COLL VENOUS BLD VENIPUNCTURE: CPT

## 2021-10-28 PROCEDURE — 93005 ELECTROCARDIOGRAM TRACING: CPT

## 2021-10-28 PROCEDURE — 71275 CT ANGIOGRAPHY CHEST: CPT | Mod: MC

## 2021-10-28 PROCEDURE — 85025 COMPLETE CBC W/AUTO DIFF WBC: CPT

## 2021-10-28 PROCEDURE — 84484 ASSAY OF TROPONIN QUANT: CPT

## 2021-10-28 RX ORDER — FAMOTIDINE 10 MG/ML
20 INJECTION INTRAVENOUS ONCE
Refills: 0 | Status: COMPLETED | OUTPATIENT
Start: 2021-10-28 | End: 2021-10-28

## 2021-10-28 RX ORDER — FAMOTIDINE 10 MG/ML
1 INJECTION INTRAVENOUS
Qty: 14 | Refills: 0
Start: 2021-10-28 | End: 2021-11-03

## 2021-10-28 RX ORDER — MORPHINE SULFATE 50 MG/1
2 CAPSULE, EXTENDED RELEASE ORAL ONCE
Refills: 0 | Status: DISCONTINUED | OUTPATIENT
Start: 2021-10-28 | End: 2021-10-28

## 2021-10-28 RX ORDER — ALPRAZOLAM 0.25 MG
0.25 TABLET ORAL ONCE
Refills: 0 | Status: DISCONTINUED | OUTPATIENT
Start: 2021-10-28 | End: 2021-10-28

## 2021-10-28 RX ORDER — OXYCODONE AND ACETAMINOPHEN 5; 325 MG/1; MG/1
1 TABLET ORAL ONCE
Refills: 0 | Status: DISCONTINUED | OUTPATIENT
Start: 2021-10-28 | End: 2021-10-28

## 2021-10-28 RX ORDER — ALBUTEROL 90 UG/1
2 AEROSOL, METERED ORAL ONCE
Refills: 0 | Status: COMPLETED | OUTPATIENT
Start: 2021-10-28 | End: 2021-10-28

## 2021-10-28 RX ADMIN — OXYCODONE AND ACETAMINOPHEN 1 TABLET(S): 5; 325 TABLET ORAL at 23:35

## 2021-10-28 RX ADMIN — MORPHINE SULFATE 2 MILLIGRAM(S): 50 CAPSULE, EXTENDED RELEASE ORAL at 21:59

## 2021-10-28 RX ADMIN — Medication 0.25 MILLIGRAM(S): at 20:19

## 2021-10-28 RX ADMIN — FAMOTIDINE 20 MILLIGRAM(S): 10 INJECTION INTRAVENOUS at 23:43

## 2021-10-28 RX ADMIN — ALBUTEROL 2 PUFF(S): 90 AEROSOL, METERED ORAL at 23:43

## 2021-10-28 NOTE — ED ADULT TRIAGE NOTE - CHIEF COMPLAINT QUOTE
Pt presents reporting shortness of breath and left sided electric chest pain for 3 days. Pt reports shortness of breath prevents her from sleeping. Pt denies fevers, cough. Pt denies cardiac or respiratory hx.

## 2021-10-28 NOTE — ED ADULT NURSE NOTE - OBJECTIVE STATEMENT
patient received alert and oriented x4.  complain of shortness of breath. no distress noted. denies cough,.

## 2021-10-28 NOTE — ED PROVIDER NOTE - OBJECTIVE STATEMENT
45 y/o F pt with PHMx of anemia and fibromyalgia presents to ED c/o difficulty breathing x4 days. Pt has been having SOB with associated left side chest tightness preventing her from falling asleep. Pt denies fever, cough, cold, or congestion, but has experienced chills at home. In ED pt is afebrile, speaking full sentences, and VS stable.

## 2021-10-28 NOTE — ED PROVIDER NOTE - CPE EDP PSYCH NORM
08/05/18 0210   Provider Notification   Provider Name/Title WANDA Martin CNM   Method of Notification Phone   Request Evaluate - Remote   Notification Reason Patient Arrived;Labor Status;Uterine Activity;Pain;SVE;Status Update;Maternal Vital Sign Change   Orders received to admit to labor and delivery     normal...

## 2021-10-28 NOTE — ED PROVIDER NOTE - CLINICAL SUMMARY MEDICAL DECISION MAKING FREE TEXT BOX
47 y/o F presents to ED with cp and SOB. Plan for labs, CTA PE study, EKG, and reassess. 47 y/o F presents to ED with cp and SOB. Plan for labs, CTA PE study, EKG, and reassess.  Pt given morphine IV and percocet po for exacerbation of chronic pain.  CTA wnl, labs wnl. Pt vitals stable. Instructions given to follow up with primary physician in 1-2 days, return to ED for worsening condition. Pt expresses understanding.

## 2021-10-28 NOTE — ED PROVIDER NOTE - NSICDXPASTMEDICALHX_GEN_ALL_CORE_FT
PAST MEDICAL HISTORY:  Anemia     Fibromyalgia     Herniated cervical disc     Intussusception     UTI (urinary tract infection)

## 2021-10-28 NOTE — ED PROVIDER NOTE - PATIENT PORTAL LINK FT
You can access the FollowMyHealth Patient Portal offered by [facility] by registering at the following website: [email or web address]. By joining [employer]’s FollowMyHealth portal, you will also be able to view your health information using other applications (apps) compatible with our system.

## 2021-10-29 PROBLEM — M79.7 FIBROMYALGIA: Chronic | Status: ACTIVE | Noted: 2021-06-10

## 2021-12-02 NOTE — ED ADULT NURSE NOTE - OBJECTIVE STATEMENT
Patient to ED complaining of left thumb/hand pain x 2 weeks, worsening in intensity today after hitting her hand while getting dressed this morning. As per patient, pain has radiated to the left shoulder and feels like a stabbing pain. EKG completed, NSR noted. Radial pulses intact, palpable and strong. Cap refill less than 3 seconds. Yes

## 2022-01-04 NOTE — ED PROVIDER NOTE - RELIEVING FACTORS
Outpatient Care Management Note:    Voice mail message left for Shashank Sousa and his daughter, Leah Gonzalez, with my contact information, requesting a call back  none

## 2022-03-20 ENCOUNTER — RX RENEWAL (OUTPATIENT)
Age: 47
End: 2022-03-20

## 2022-03-31 ENCOUNTER — EMERGENCY (EMERGENCY)
Facility: HOSPITAL | Age: 47
LOS: 1 days | Discharge: HOME | End: 2022-03-31
Attending: EMERGENCY MEDICINE | Admitting: EMERGENCY MEDICINE
Payer: MEDICAID

## 2022-03-31 VITALS
SYSTOLIC BLOOD PRESSURE: 145 MMHG | HEIGHT: 61 IN | TEMPERATURE: 97 F | RESPIRATION RATE: 18 BRPM | HEART RATE: 63 BPM | OXYGEN SATURATION: 99 % | WEIGHT: 190.04 LBS | DIASTOLIC BLOOD PRESSURE: 83 MMHG

## 2022-03-31 VITALS
RESPIRATION RATE: 20 BRPM | OXYGEN SATURATION: 99 % | DIASTOLIC BLOOD PRESSURE: 74 MMHG | SYSTOLIC BLOOD PRESSURE: 110 MMHG | HEART RATE: 60 BPM

## 2022-03-31 DIAGNOSIS — Z98.84 BARIATRIC SURGERY STATUS: ICD-10-CM

## 2022-03-31 DIAGNOSIS — Z90.710 ACQUIRED ABSENCE OF BOTH CERVIX AND UTERUS: ICD-10-CM

## 2022-03-31 DIAGNOSIS — M79.7 FIBROMYALGIA: ICD-10-CM

## 2022-03-31 DIAGNOSIS — R11.0 NAUSEA: ICD-10-CM

## 2022-03-31 DIAGNOSIS — R51.9 HEADACHE, UNSPECIFIED: ICD-10-CM

## 2022-03-31 DIAGNOSIS — Z87.440 PERSONAL HISTORY OF URINARY (TRACT) INFECTIONS: ICD-10-CM

## 2022-03-31 DIAGNOSIS — M50.20 OTHER CERVICAL DISC DISPLACEMENT, UNSPECIFIED CERVICAL REGION: ICD-10-CM

## 2022-03-31 DIAGNOSIS — Z90.49 ACQUIRED ABSENCE OF OTHER SPECIFIED PARTS OF DIGESTIVE TRACT: Chronic | ICD-10-CM

## 2022-03-31 DIAGNOSIS — Z87.19 PERSONAL HISTORY OF OTHER DISEASES OF THE DIGESTIVE SYSTEM: ICD-10-CM

## 2022-03-31 DIAGNOSIS — Z90.49 ACQUIRED ABSENCE OF OTHER SPECIFIED PARTS OF DIGESTIVE TRACT: ICD-10-CM

## 2022-03-31 DIAGNOSIS — Z90.710 ACQUIRED ABSENCE OF BOTH CERVIX AND UTERUS: Chronic | ICD-10-CM

## 2022-03-31 LAB
ALBUMIN SERPL ELPH-MCNC: 4.1 G/DL — SIGNIFICANT CHANGE UP (ref 3.3–5)
ALP SERPL-CCNC: 118 U/L — SIGNIFICANT CHANGE UP (ref 40–120)
ALT FLD-CCNC: 11 U/L — SIGNIFICANT CHANGE UP (ref 10–45)
ANION GAP SERPL CALC-SCNC: 9 MMOL/L — SIGNIFICANT CHANGE UP (ref 5–17)
AST SERPL-CCNC: 18 U/L — SIGNIFICANT CHANGE UP (ref 10–40)
BASOPHILS # BLD AUTO: 0.05 K/UL — SIGNIFICANT CHANGE UP (ref 0–0.2)
BASOPHILS NFR BLD AUTO: 0.8 % — SIGNIFICANT CHANGE UP (ref 0–2)
BILIRUB SERPL-MCNC: <0.2 MG/DL — SIGNIFICANT CHANGE UP (ref 0.2–1.2)
BUN SERPL-MCNC: 10 MG/DL — SIGNIFICANT CHANGE UP (ref 7–23)
CALCIUM SERPL-MCNC: 9.2 MG/DL — SIGNIFICANT CHANGE UP (ref 8.4–10.5)
CHLORIDE SERPL-SCNC: 108 MMOL/L — SIGNIFICANT CHANGE UP (ref 96–108)
CO2 SERPL-SCNC: 25 MMOL/L — SIGNIFICANT CHANGE UP (ref 22–31)
CREAT SERPL-MCNC: 0.6 MG/DL — SIGNIFICANT CHANGE UP (ref 0.5–1.3)
EGFR: 111 ML/MIN/1.73M2 — SIGNIFICANT CHANGE UP
EOSINOPHIL # BLD AUTO: 0.07 K/UL — SIGNIFICANT CHANGE UP (ref 0–0.5)
EOSINOPHIL NFR BLD AUTO: 1.2 % — SIGNIFICANT CHANGE UP (ref 0–6)
GLUCOSE SERPL-MCNC: 103 MG/DL — HIGH (ref 70–99)
HCT VFR BLD CALC: 35.1 % — SIGNIFICANT CHANGE UP (ref 34.5–45)
HGB BLD-MCNC: 10.5 G/DL — LOW (ref 11.5–15.5)
IMM GRANULOCYTES NFR BLD AUTO: 0.2 % — SIGNIFICANT CHANGE UP (ref 0–1.5)
LYMPHOCYTES # BLD AUTO: 2.62 K/UL — SIGNIFICANT CHANGE UP (ref 1–3.3)
LYMPHOCYTES # BLD AUTO: 44 % — SIGNIFICANT CHANGE UP (ref 13–44)
MCHC RBC-ENTMCNC: 22.5 PG — LOW (ref 27–34)
MCHC RBC-ENTMCNC: 29.9 GM/DL — LOW (ref 32–36)
MCV RBC AUTO: 75.3 FL — LOW (ref 80–100)
MONOCYTES # BLD AUTO: 0.49 K/UL — SIGNIFICANT CHANGE UP (ref 0–0.9)
MONOCYTES NFR BLD AUTO: 8.2 % — SIGNIFICANT CHANGE UP (ref 2–14)
NEUTROPHILS # BLD AUTO: 2.71 K/UL — SIGNIFICANT CHANGE UP (ref 1.8–7.4)
NEUTROPHILS NFR BLD AUTO: 45.6 % — SIGNIFICANT CHANGE UP (ref 43–77)
NRBC # BLD: 0 /100 WBCS — SIGNIFICANT CHANGE UP (ref 0–0)
PLATELET # BLD AUTO: 417 K/UL — HIGH (ref 150–400)
POTASSIUM SERPL-MCNC: 4 MMOL/L — SIGNIFICANT CHANGE UP (ref 3.5–5.3)
POTASSIUM SERPL-SCNC: 4 MMOL/L — SIGNIFICANT CHANGE UP (ref 3.5–5.3)
PROT SERPL-MCNC: 6.8 G/DL — SIGNIFICANT CHANGE UP (ref 6–8.3)
RBC # BLD: 4.66 M/UL — SIGNIFICANT CHANGE UP (ref 3.8–5.2)
RBC # FLD: 17.5 % — HIGH (ref 10.3–14.5)
SODIUM SERPL-SCNC: 142 MMOL/L — SIGNIFICANT CHANGE UP (ref 135–145)
WBC # BLD: 5.95 K/UL — SIGNIFICANT CHANGE UP (ref 3.8–10.5)
WBC # FLD AUTO: 5.95 K/UL — SIGNIFICANT CHANGE UP (ref 3.8–10.5)

## 2022-03-31 PROCEDURE — 70450 CT HEAD/BRAIN W/O DYE: CPT | Mod: ME

## 2022-03-31 PROCEDURE — 99285 EMERGENCY DEPT VISIT HI MDM: CPT

## 2022-03-31 PROCEDURE — 96374 THER/PROPH/DIAG INJ IV PUSH: CPT

## 2022-03-31 PROCEDURE — 80053 COMPREHEN METABOLIC PANEL: CPT

## 2022-03-31 PROCEDURE — 85025 COMPLETE CBC W/AUTO DIFF WBC: CPT

## 2022-03-31 PROCEDURE — G1004: CPT

## 2022-03-31 PROCEDURE — 36415 COLL VENOUS BLD VENIPUNCTURE: CPT

## 2022-03-31 PROCEDURE — 70450 CT HEAD/BRAIN W/O DYE: CPT | Mod: 26,ME

## 2022-03-31 PROCEDURE — 96375 TX/PRO/DX INJ NEW DRUG ADDON: CPT

## 2022-03-31 PROCEDURE — 99284 EMERGENCY DEPT VISIT MOD MDM: CPT | Mod: 25

## 2022-03-31 RX ORDER — KETOROLAC TROMETHAMINE 30 MG/ML
15 SYRINGE (ML) INJECTION ONCE
Refills: 0 | Status: DISCONTINUED | OUTPATIENT
Start: 2022-03-31 | End: 2022-03-31

## 2022-03-31 RX ORDER — METOCLOPRAMIDE HCL 10 MG
10 TABLET ORAL ONCE
Refills: 0 | Status: COMPLETED | OUTPATIENT
Start: 2022-03-31 | End: 2022-03-31

## 2022-03-31 RX ORDER — TRAMADOL HYDROCHLORIDE 50 MG/1
1 TABLET ORAL
Qty: 15 | Refills: 0
Start: 2022-03-31

## 2022-03-31 RX ORDER — SODIUM CHLORIDE 9 MG/ML
1000 INJECTION INTRAMUSCULAR; INTRAVENOUS; SUBCUTANEOUS ONCE
Refills: 0 | Status: COMPLETED | OUTPATIENT
Start: 2022-03-31 | End: 2022-03-31

## 2022-03-31 RX ADMIN — Medication 104 MILLIGRAM(S): at 18:27

## 2022-03-31 RX ADMIN — Medication 15 MILLIGRAM(S): at 18:27

## 2022-03-31 RX ADMIN — SODIUM CHLORIDE 1000 MILLILITER(S): 9 INJECTION INTRAMUSCULAR; INTRAVENOUS; SUBCUTANEOUS at 18:27

## 2022-03-31 NOTE — ED ADULT NURSE NOTE - CHIEF COMPLAINT QUOTE
Patient c/o headache x 1 month, taking Tylenol with no relief.  Also taking Oxycodone for knee pain.  Denies vomiting.  Patient states the headache is too bad she needs to keep her eyes closed.

## 2022-03-31 NOTE — ED PROVIDER NOTE - CLINICAL SUMMARY MEDICAL DECISION MAKING FREE TEXT BOX
45 yo female co worsening headaches in frequency and intensity.  no red flag symptoms.  will get ct head.  if normal, could be migraines or possibly fibromyalgia/tension flare  will check labs, give pain meds 45 yo female co worsening headaches in frequency and intensity.  no red flag symptoms.  will get ct head.  if normal, could be migraines or possibly fibromyalgia/tension flare  will check labs, give pain meds  labs show improved anemia  ct head no acute findings  will give rx for pain meds  pt already has an appt with her pain management doctor next week  will also refer for neurologist

## 2022-03-31 NOTE — ED PROVIDER NOTE - OBJECTIVE STATEMENT
bilateral headache for past month, were happening every few days, says now they happen every day  some nausea, no vomiting  no neck pain, no fever  some photophobia  no hx of migraines

## 2022-03-31 NOTE — ED PROVIDER NOTE - NSFOLLOWUPINSTRUCTIONS_ED_ALL_ED_FT
Acute Headache    WHAT YOU NEED TO KNOW:    An acute headache is pain or discomfort that may start suddenly and get worse quickly. You may have an acute headache only when you feel stress or eat certain foods. Other acute headache pain can happen every day, and sometimes several times a day.     DISCHARGE INSTRUCTIONS:    Return to the emergency department if:   •You have severe pain.      •You have numbness or weakness on one side of your face or body.      •You have a headache that occurs after a blow to the head, a fall, or other trauma.       •You have a headache, are forgetful or confused, or have trouble speaking.      •You have a headache, stiff neck, and a fever.      Call your doctor if:   •You have a constant headache and are vomiting.      •You have a headache each day that does not get better, even after treatment.      •You have changes in your headaches, or new symptoms that occur when you have a headache.      •You have questions or concerns about your condition or care.      Medicines: You may need any of the following:   •Prescription pain medicine may be given. The medicine your healthcare provider recommends will depend on the kind of headaches you have. You will need to take prescription headache medicines as directed to prevent a problem called rebound headache. These headaches happen with regular use of pain relievers for headache disorders.      •NSAIDs, such as ibuprofen, help decrease swelling, pain, and fever. This medicine is available with or without a doctor's order. NSAIDs can cause stomach bleeding or kidney problems in certain people. If you take blood thinner medicine, always ask your healthcare provider if NSAIDs are safe for you. Always read the medicine label and follow directions.      •Acetaminophen decreases pain and fever. It is available without a doctor's order. Ask how much to take and how often to take it. Follow directions. Read the labels of all other medicines you are using to see if they also contain acetaminophen, or ask your doctor or pharmacist. Acetaminophen can cause liver damage if not taken correctly. Do not use more than 3 grams (3,000 milligrams) total of acetaminophen in one day.       •Antidepressants may be given for some kinds of headaches.       •Take your medicine as directed. Contact your healthcare provider if you think your medicine is not helping or if you have side effects. Tell him or her if you are allergic to any medicine. Keep a list of the medicines, vitamins, and herbs you take. Include the amounts, and when and why you take them. Bring the list or the pill bottles to follow-up visits. Carry your medicine list with you in case of an emergency.      Manage your symptoms:   •Apply heat or ice on the headache area. Use a heat or ice pack. For an ice pack, you can also put crushed ice in a plastic bag. Cover the pack or bag with a towel before you apply it to your skin. Ice and heat both help decrease pain, and heat also helps decrease muscle spasms. Apply heat for 20 to 30 minutes every 2 hours. Apply ice for 15 to 20 minutes every hour. Apply heat or ice for as long and for as many days as directed. You may alternate heat and ice.      •Relax your muscles. Lie down in a comfortable position and close your eyes. Relax your muscles slowly. Start at your toes and work your way up your body.      •Keep a record of your headaches. Write down when your headaches start and stop. Include your symptoms and what you were doing when the headache began. Record what you ate or drank for 24 hours before the headache started. Describe the pain and where it hurts. Keep track of what you did to treat your headache and if it worked.       Prevent an acute headache:   •Avoid anything that triggers an acute headache. Examples include exposure to chemicals, going to high altitude, or not getting enough sleep. Create a regular sleep routine. Go to sleep at the same time and wake up at the same time each day. Do not use electronic devices before bedtime. These may trigger a headache or prevent you from sleeping well.      •Do not smoke. Nicotine and other chemicals in cigarettes and cigars can trigger an acute headache or make it worse. Ask your healthcare provider for information if you currently smoke and need help to quit. E-cigarettes or smokeless tobacco still contain nicotine. Talk to your healthcare provider before you use these products.       •Limit alcohol as directed. Alcohol can trigger an acute headache or make it worse. If you have cluster headaches, do not drink alcohol during an episode. For other types of headaches, ask your healthcare provider if it is safe for you to drink alcohol. Ask how much is safe for you to drink, and how often.      •Exercise as directed. Exercise can reduce tension and help with headache pain. Aim for 30 minutes of physical activity on most days of the week. Your healthcare provider can help you create an exercise plan.      •Eat a variety of healthy foods. Healthy foods include fruits, vegetables, low-fat dairy products, lean meats, fish, whole grains, and cooked beans. Your healthcare provider or dietitian can help you create meals plans if you need to avoid foods that trigger headaches.      Follow up with your healthcare provider as directed: Bring your headache record with you when you see your healthcare provider. Write down your questions so you remember to ask them during your visits.       © Copyright [employer] 2022           back to top                          © Copyright [employer] 2022

## 2022-03-31 NOTE — ED ADULT NURSE NOTE - OBJECTIVE STATEMENT
Presents for c/o headache x 1 mos without relief from tylenol, also take oxycodone for knee pain that hasn't relieved headache, increased over the last month, began as intermittent headache and has progressed to constant over the week, associated with pressure on top and sides of head, neck pain, and photophobia/noise sensitivity. Denies nausea/vomiting.     On assessment- AOx4, breathing even and unlabored on RA, no apparent distress, VSS in triage, able to speak in clear coherent sentences, steady gait unassisted, neuro intact with no apparent facial asymmetry, PERRLA.

## 2022-03-31 NOTE — ED PROVIDER NOTE - PATIENT PORTAL LINK FT
You can access the FollowMyHealth Patient Portal offered by [facility] by registering at the following website: [email or web address]. By joining [employer]’s FollowMyHealth portal, you will also be able to view your health information using other applications (apps) compatible with our system.

## 2022-04-08 NOTE — H&P ADULT. - NS NEC GEN PE MLT EXAM PC
-Uncontrolled  -Pt takes Janumet and Repaglinide at home  -Start standing dose Admelog AC  -Continue sliding scale insulin coverage  -Continue glucose monitoring  -Continue low carb diet No bruits; no thyromegaly or nodules

## 2022-06-15 ENCOUNTER — APPOINTMENT (OUTPATIENT)
Dept: UROLOGY | Facility: CLINIC | Age: 47
End: 2022-06-15
Payer: MEDICAID

## 2022-06-15 VITALS — SYSTOLIC BLOOD PRESSURE: 110 MMHG | DIASTOLIC BLOOD PRESSURE: 69 MMHG | TEMPERATURE: 97.7 F | HEART RATE: 60 BPM

## 2022-06-15 DIAGNOSIS — N39.46 MIXED INCONTINENCE: ICD-10-CM

## 2022-06-15 DIAGNOSIS — M48.061 SPINAL STENOSIS, LUMBAR REGION WITHOUT NEUROGENIC CLAUDICATION: ICD-10-CM

## 2022-06-15 PROCEDURE — 99213 OFFICE O/P EST LOW 20 MIN: CPT

## 2022-06-15 RX ORDER — SULFAMETHOXAZOLE AND TRIMETHOPRIM 800; 160 MG/1; MG/1
800-160 TABLET ORAL
Qty: 10 | Refills: 0 | Status: ACTIVE | COMMUNITY
Start: 2022-06-15 | End: 1900-01-01

## 2022-06-16 NOTE — REVIEW OF SYSTEMS
[Negative] : Constitutional [Abdominal Pain] : abdominal pain [Constipation] : no constipation [Diarrhea] : no diarrhea [Dysuria] : no dysuria [Pelvic Pain] : pelvic pain

## 2022-06-16 NOTE — HISTORY OF PRESENT ILLNESS
[FreeTextEntry1] : This is a very pleasant 42yo female referred by Dr. Mario for 1 year history of pelvic pain and urinary incontinence. Pain has resulted in multiple ER visits in the past year including May and September. U/A was negative at both ER visits. A CT scan was performed in September and was normal. Recent U/A negative, urine culture contaminated. She also complains of urgency, frequency, urge incontinence, stress incontinence. She wears 1 pad per day. \par \par 12/5/16 Here for f/u. Doing much better with oxybutynin, no side effects. She is still experiencing some stress incontinence. \par \par 3/6/17 Here for f/u. Feeling well, urinary symptoms and pelvic pain have essentially resolved. \par \par 9/13/17 Here for 6 month follow up. Doing well, no complaints. \par \par 7/30/18 Here for annual follow up. Doing well. Satisfied with oxybutynin ER 10mg \par \par 2/06/19 Here for follow up. Doing well. Satisfied with oxybutynin ER 10mg\par \par 8/24/20 Here for f/u. Doing well.  Satisfied with oxybutynin ER 10mg\par \par 6/15/22 Here for f/u. Starting 2 weeks ago developed suprapubic pain, constipation. Symptoms previously well controlled on oxybutynin but she stopped this when prescription ran out. Had left knee replacement 2 months ago, still in wheelchair but able to stand  [Urinary Urgency] : urinary urgency [Urinary Frequency] : urinary frequency [5] : 5 [Suprapubic] : suprapubic area

## 2022-06-16 NOTE — PHYSICAL EXAM
[General Appearance - Well Developed] : well developed [General Appearance - Well Nourished] : well nourished [Normal Appearance] : normal appearance [Well Groomed] : well groomed [General Appearance - In No Acute Distress] : no acute distress [Abdomen Soft] : soft [Abdomen Tenderness] : non-tender [Costovertebral Angle Tenderness] : no ~M costovertebral angle tenderness [Oriented To Time, Place, And Person] : oriented to person, place, and time [Affect] : the affect was normal [Mood] : the mood was normal [Not Anxious] : not anxious [Normal Station and Gait] : the gait and station were normal for the patient's age [No Focal Deficits] : no focal deficits [Urethral Meatus] : normal urethra [Urinary Bladder Findings] : the bladder was normal on palpation [External Female Genitalia] : normal external genitalia [] : no rash

## 2022-06-16 NOTE — ASSESSMENT
[FreeTextEntry1] : 47yo female with history of pelvic pain, urge and stress incontinence, previously improved with oxybutynin\par Oxybutynin renewed today\par C/o pelvic pressure and discomfort. Will treat emiprically for UTI\par [facility]

## 2022-06-22 ENCOUNTER — APPOINTMENT (OUTPATIENT)
Dept: UROLOGY | Facility: CLINIC | Age: 47
End: 2022-06-22

## 2022-06-22 VITALS
HEART RATE: 66 BPM | DIASTOLIC BLOOD PRESSURE: 67 MMHG | BODY MASS INDEX: 35.87 KG/M2 | OXYGEN SATURATION: 95 % | SYSTOLIC BLOOD PRESSURE: 101 MMHG | WEIGHT: 190 LBS | HEIGHT: 61 IN | TEMPERATURE: 98.3 F

## 2022-06-22 DIAGNOSIS — R35.0 FREQUENCY OF MICTURITION: ICD-10-CM

## 2022-06-22 DIAGNOSIS — M62.89 OTHER SPECIFIED DISORDERS OF MUSCLE: ICD-10-CM

## 2022-06-22 PROCEDURE — ZZZZZ: CPT

## 2022-06-23 NOTE — ED PROVIDER NOTE - CHPI ED SYMPTOMS POS
PAIN Benzoyl Peroxide Counseling: Patient counseled that medicine may cause skin irritation and bleach clothing.  In the event of skin irritation, the patient was advised to reduce the amount of the drug applied or use it less frequently.   The patient verbalized understanding of the proper use and possible adverse effects of benzoyl peroxide.  All of the patient's questions and concerns were addressed.

## 2022-06-27 LAB
APPEARANCE: CLEAR
BACTERIA UR CULT: ABNORMAL
BACTERIA: ABNORMAL
BILIRUBIN URINE: NEGATIVE
BLOOD URINE: NEGATIVE
CALCIUM OXALATE CRYSTALS: ABNORMAL
COLOR: YELLOW
GLUCOSE QUALITATIVE U: NEGATIVE
HYALINE CASTS: 0 /LPF
KETONES URINE: NEGATIVE
LEUKOCYTE ESTERASE URINE: NEGATIVE
MICROSCOPIC-UA: NORMAL
NITRITE URINE: NEGATIVE
PH URINE: 6
PROTEIN URINE: ABNORMAL
RED BLOOD CELLS URINE: 3 /HPF
SPECIFIC GRAVITY URINE: 1.03
SQUAMOUS EPITHELIAL CELLS: 7 /HPF
UROBILINOGEN URINE: NORMAL
WHITE BLOOD CELLS URINE: 2 /HPF

## 2022-06-27 RX ORDER — CEFPODOXIME PROXETIL 100 MG/1
100 TABLET, FILM COATED ORAL
Qty: 10 | Refills: 0 | Status: ACTIVE | COMMUNITY
Start: 2022-06-27 | End: 1900-01-01

## 2022-06-27 RX ORDER — CEFDINIR 300 MG/1
300 CAPSULE ORAL TWICE DAILY
Qty: 10 | Refills: 0 | Status: ACTIVE | COMMUNITY
Start: 2022-06-27 | End: 1900-01-01

## 2022-07-19 ENCOUNTER — APPOINTMENT (OUTPATIENT)
Dept: NEUROLOGY | Facility: CLINIC | Age: 47
End: 2022-07-19

## 2022-07-22 NOTE — ED PROVIDER NOTE - CPE EDP SKIN NORM
Pt has clothing, food wear, cellphone, own walker, cryo cuff cooler, a bag pack with content, and a shad pack. Pt has a pair of glasses, wearing a yellow metal chain with yellow metal pendant. Pt is also wearing a pair of yellow metal earrings and a yellow metal ring. Ot also has house keys./walker normal...

## 2022-07-29 ENCOUNTER — APPOINTMENT (OUTPATIENT)
Dept: SURGERY | Facility: CLINIC | Age: 47
End: 2022-07-29

## 2022-07-29 VITALS
HEIGHT: 61 IN | BODY MASS INDEX: 38.14 KG/M2 | DIASTOLIC BLOOD PRESSURE: 69 MMHG | SYSTOLIC BLOOD PRESSURE: 106 MMHG | OXYGEN SATURATION: 97 % | TEMPERATURE: 97.3 F | HEART RATE: 73 BPM | WEIGHT: 202 LBS

## 2022-07-29 DIAGNOSIS — Z00.00 ENCOUNTER FOR GENERAL ADULT MEDICAL EXAMINATION W/OUT ABNORMAL FINDINGS: ICD-10-CM

## 2022-07-29 PROCEDURE — 99214 OFFICE O/P EST MOD 30 MIN: CPT

## 2022-07-29 NOTE — PLAN
[FreeTextEntry1] : diet and vit compliance\par labs\par f/u pelvic specialist as referred by Uro\par ct a/p to evalaute pelvic pain\par f/u 1 yr, will call pt to review results of any pertinent findings

## 2022-07-29 NOTE — HISTORY OF PRESENT ILLNESS
[Urinary Urgency] : urinary urgency [Urinary Frequency] : urinary frequency [Nocturia] : nocturia [Straining] : straining [Weak Stream] : weak stream [FreeTextEntry1] : PCP:\par \par CC: urinary urgency and frequency \par \par HPI: 46 yr old female with CC of urgency and frequency since . Was referred by Dr. Smith. Has had urinary frequency and urgency that was well controlled with oxybutynin. On her most recent visit, 6/15/22, she had developed suprapubic pain and recurrent urgency and frequency after having a LTKR in April. She was restarted on oxybutynin but this is not helping. She feels like she has to urinate, but nothing comes out. She needs to push and strain to get the urine out. No stress or urge incontinence. She uses a wheelchair and walker to ambulate due to knee and shoulder surgery in 2022. She is taking oxycodone at least 1X/day. \par She has suprapubic pain. She also has pulling feeling during BMs that is more recent. She has a history of constipation and uses laxatives to help with BM. \par \par No dysuria, gross hematuria. \par Pain with sexual intercourse. \par Nocturia: 6X dribbles of urine \par No personal history of stones.\par \par PVR: 5 ml  \par \par PMH: herniated lumbar discs, SBO, breast pain and fibrocystic breast disease, - vaginal \par PSH: gastric bypass, LTKR, right elbow surgery, cholecystectomy, hysterectomy, 3 intestinal surgeries- 1 for obstruction and 2 resections. \par FH: paternal grandfather kidney stones, maternal grandmother breast cancer, paternal uncle liver CA \par Meds: Lyrica, b12, oxybutynin ER, oxycodone PRN, omeprazole \par Allergies: NKDA \par Social: nonsmoker, denies alcohol, on disability, lives with  and children \par  [Urinary Incontinence] : no urinary incontinence [Intermittency] : no intermittency [Dysuria] : no dysuria [Hematuria - Gross] : no gross hematuria

## 2022-07-29 NOTE — ASSESSMENT
[FreeTextEntry1] : \par \par Impression/plan: 46 yr old female with PMH of constipation, chronic pain on opioids, multiple abdominal surgeries presenting with long standing history of suprapubic pain, difficulty urinating, urinary urgency, frequency, and nocturia.\par \par Plan\par -UA, UCx\par -PFPT- [facility] referral placed\par \par RTC 3 months to re-evaluate after PT \par \par I, Dr. Elke Lin, personally performed the evaluation and management (E/M) services for this established patient who presents today with (a) new problem(s)/exacerbation of (an) existing condition(s).  That E/M includes conducting the clinically appropriate interval history &/or exam, assessing all new/exacerbated conditions, and establishing a new plan of care.  Today, my JESENIA, was here to observe &/or participate in the visit & follow plan of care established by me.\par \par \par \par

## 2022-07-29 NOTE — HISTORY OF PRESENT ILLNESS
[de-identified] : Pt is a 44 y/o F with PMHx of obesity (BMI 36), fibromyalgia, spine problems and PSHx of RYGB 13 years ago and revision of anastomosis for intussusception in 2017 who presents today for follow up. Pt states she underwent L knee replacement and R elbow sx currently using a wheelchair to go out of the house as she is in PT. Pt has lost about 60 lbs total since her sx but recently has been gaining weight, about 20 lbs in the past 2 yrs. States she is following her diet for the most part, will consult with nutrition today to ensure pt is making healthy food choices. Pt exercise consists of PT. States she has not been taking her vit for several months, states there were no rx's sent. Informed pt all of her daily vit are OTC, but will rx iron today. Reviewed vit regimen, pt understands and will restart. Denies any n,v,c,d,reflux. States she is moving her bowels once daily. Pt has had a lower pelvic pain present since 2017, states the pain feels dull and heavy and symptoms are daily. Pt recently saw Uro for this and was tx'd empirically for UTI with a urine recheck. Pt was referred to a pelvic specialist by Uro. States the pain is still present and has not underwent scan for this. Will send for CT A/P.

## 2022-07-29 NOTE — PHYSICAL EXAM
[Edema] : no peripheral edema [Respiration, Rhythm And Depth] : normal respiratory rhythm and effort [Exaggerated Use Of Accessory Muscles For Inspiration] : no accessory muscle use [Abdomen Soft] : soft [Abdomen Tenderness] : non-tender [Costovertebral Angle Tenderness] : no ~M costovertebral angle tenderness [] : no rash [No Focal Deficits] : no focal deficits [Oriented To Time, Place, And Person] : oriented to person, place, and time [Affect] : the affect was normal [Mood] : the mood was normal [Not Anxious] : not anxious [No Palpable Adenopathy] : no palpable adenopathy [Urethral Meatus] : the meatus of the urethra showed no abnormalities [FreeTextEntry1] : LTK scar

## 2022-08-01 ENCOUNTER — NON-APPOINTMENT (OUTPATIENT)
Age: 47
End: 2022-08-01

## 2022-08-01 RX ORDER — ERGOCALCIFEROL 1.25 MG/1
1.25 MG CAPSULE, LIQUID FILLED ORAL
Qty: 12 | Refills: 0 | Status: ACTIVE | COMMUNITY
Start: 2022-08-01 | End: 1900-01-01

## 2022-08-05 ENCOUNTER — APPOINTMENT (OUTPATIENT)
Dept: CT IMAGING | Facility: HOSPITAL | Age: 47
End: 2022-08-05

## 2022-08-05 ENCOUNTER — OUTPATIENT (OUTPATIENT)
Dept: OUTPATIENT SERVICES | Facility: HOSPITAL | Age: 47
LOS: 1 days | End: 2022-08-05
Payer: MEDICAID

## 2022-08-05 DIAGNOSIS — Z90.710 ACQUIRED ABSENCE OF BOTH CERVIX AND UTERUS: Chronic | ICD-10-CM

## 2022-08-05 DIAGNOSIS — Z90.49 ACQUIRED ABSENCE OF OTHER SPECIFIED PARTS OF DIGESTIVE TRACT: Chronic | ICD-10-CM

## 2022-08-05 PROCEDURE — 74177 CT ABD & PELVIS W/CONTRAST: CPT

## 2022-08-05 PROCEDURE — 74177 CT ABD & PELVIS W/CONTRAST: CPT | Mod: 26

## 2022-08-22 NOTE — ED PROVIDER NOTE - PRINCIPAL DIAGNOSIS
PAST MEDICAL HISTORY:  Congenital absence of one kidney     No pertinent past medical history      Abdominal pain

## 2022-08-23 ENCOUNTER — APPOINTMENT (OUTPATIENT)
Dept: SURGERY | Facility: CLINIC | Age: 47
End: 2022-08-23

## 2022-08-23 PROCEDURE — 99214 OFFICE O/P EST MOD 30 MIN: CPT | Mod: 95

## 2022-08-23 NOTE — HISTORY OF PRESENT ILLNESS
[Home] : at home, [unfilled] , at the time of the visit. [Medical Office: ([facility])___] : at the medical office located in  [Verbal consent obtained from patient] : the patient, [unfilled] [de-identified] : Pt is a 45 y/o F with pmhx of RYGB 13 yrs ago, s/p revision for intussusception, fibromyalgia, spine problems, morbid obesity BMI 38, who presents today feeling well via teb for f/u. Pt states since her last visit she has been doing well, denies any new issues or concerns. Pt was sent for a CT A/P for pelvic pain which came back normal. Pt was seeing urology and was tx'ed empiracally for a UTI with ongoing pelvic pain and was referred to a pelvic specialist. Pt states she has this appt coming up soon. Denies any new abd pn, n,v,c,d. Pt states she is tolerating her diet, reports being in PT for recent L knee sx and also underwent recent R elbow sx. Pt exercise is limited at this time and is only doing her PT for physical activity as she was advised to be in wheelchair following her knee sx. Pt states she is vit compliant, was sent vit d and iron after her recent lab work which she has been taking compliantly. Reports taking all of her other vit daily as directed. States she moves her bowels daily most days, sometimes q1-2 days. Pt states she has not weighed herself recently but believes she is around the same wt as her last visit. Pt states regarding her diet, some days she eats better than other as she sometimes does not want to eat. Advised pt to supplement with her protein shake for a meal replacement as needed, will consult with nutrition today. Pt understands the importance of diet compliance in helping her lost more wt given her limited exercise capacity at this time.

## 2022-08-24 LAB
25(OH)D3 SERPL-MCNC: 14.9 NG/ML
A-TOCOPHEROL VIT E SERPL-MCNC: 9.9 MG/L
ALBUMIN SERPL ELPH-MCNC: 4.4 G/DL
ALP BLD-CCNC: 146 U/L
ALT SERPL-CCNC: 32 U/L
ANION GAP SERPL CALC-SCNC: 11 MMOL/L
APTT BLD: 32.1 SEC
AST SERPL-CCNC: 20 U/L
BASOPHILS # BLD AUTO: 0.02 K/UL
BASOPHILS NFR BLD AUTO: 0.3 %
BETA+GAMMA TOCOPHEROL SERPL-MCNC: 0.7 MG/L
BILIRUB SERPL-MCNC: 0.2 MG/DL
BUN SERPL-MCNC: 11 MG/DL
CA-I SERPL-SCNC: 5 MG/DL
CALCIUM SERPL-MCNC: 9.3 MG/DL
CALCIUM SERPL-MCNC: 9.3 MG/DL
CHLORIDE SERPL-SCNC: 105 MMOL/L
CHOLEST SERPL-MCNC: 190 MG/DL
CO2 SERPL-SCNC: 25 MMOL/L
CREAT SERPL-MCNC: 0.52 MG/DL
EGFR: 115 ML/MIN/1.73M2
EOSINOPHIL # BLD AUTO: 0.08 K/UL
EOSINOPHIL NFR BLD AUTO: 1.2 %
ESTIMATED AVERAGE GLUCOSE: 108 MG/DL
FOLATE SERPL-MCNC: >20 NG/ML
GLUCOSE SERPL-MCNC: 102 MG/DL
HBA1C MFR BLD HPLC: 5.4 %
HCT VFR BLD CALC: 39.4 %
HDLC SERPL-MCNC: 54 MG/DL
HGB BLD-MCNC: 12.7 G/DL
IMM GRANULOCYTES NFR BLD AUTO: 0.2 %
INR PPP: 0.97 RATIO
IRON SATN MFR SERPL: 29 %
IRON SERPL-MCNC: 93 UG/DL
LDLC SERPL CALC-MCNC: 79 MG/DL
LYMPHOCYTES # BLD AUTO: 1.99 K/UL
LYMPHOCYTES NFR BLD AUTO: 30.6 %
MAN DIFF?: NORMAL
MCHC RBC-ENTMCNC: 30.2 PG
MCHC RBC-ENTMCNC: 32.2 GM/DL
MCV RBC AUTO: 93.8 FL
MONOCYTES # BLD AUTO: 0.5 K/UL
MONOCYTES NFR BLD AUTO: 7.7 %
NEUTROPHILS # BLD AUTO: 3.9 K/UL
NEUTROPHILS NFR BLD AUTO: 60 %
NONHDLC SERPL-MCNC: 136 MG/DL
PARATHYROID HORMONE INTACT: 61 PG/ML
PLATELET # BLD AUTO: 330 K/UL
POTASSIUM SERPL-SCNC: 3.9 MMOL/L
PREALB SERPL NEPH-MCNC: 24 MG/DL
PROT SERPL-MCNC: 6.6 G/DL
PT BLD: 11.2 SEC
RBC # BLD: 4.2 M/UL
RBC # FLD: 13.7 %
SODIUM SERPL-SCNC: 141 MMOL/L
TIBC SERPL-MCNC: 324 UG/DL
TRIGL SERPL-MCNC: 288 MG/DL
TSH SERPL-ACNC: 1.32 UIU/ML
UIBC SERPL-MCNC: 231 UG/DL
VIT A SERPL-MCNC: 38.4 UG/DL
VIT B1 SERPL-MCNC: 160.5 NMOL/L
VIT B12 SERPL-MCNC: 439 PG/ML
WBC # FLD AUTO: 6.5 K/UL
ZINC SERPL-MCNC: 79 UG/DL

## 2022-09-23 ENCOUNTER — APPOINTMENT (OUTPATIENT)
Dept: ENDOCRINOLOGY | Facility: CLINIC | Age: 47
End: 2022-09-23

## 2022-09-23 VITALS
HEIGHT: 61 IN | BODY MASS INDEX: 38.51 KG/M2 | SYSTOLIC BLOOD PRESSURE: 117 MMHG | DIASTOLIC BLOOD PRESSURE: 76 MMHG | WEIGHT: 204 LBS | HEART RATE: 68 BPM

## 2022-09-23 PROCEDURE — 99205 OFFICE O/P NEW HI 60 MIN: CPT

## 2022-09-23 RX ORDER — TIRZEPATIDE 5 MG/.5ML
5 INJECTION, SOLUTION SUBCUTANEOUS
Qty: 2 | Refills: 0 | Status: ACTIVE | COMMUNITY
Start: 2022-09-23 | End: 1900-01-01

## 2022-09-23 NOTE — HISTORY OF PRESENT ILLNESS
[FreeTextEntry1] : 47 y/o F with pmhx of RYGB 13 yrs ago, s/p revision for intussusception, fibromyalgia, spine problems, morbid obesity\par here for initial evaluation and management of metabolic issues\par generally feels well and endorses no acute complaints.\par reports cc of worsening weight gain. admits to heavy starch diet, particularly w/ late night binging. states this is due to pain from fibromyalgia. reports sedentary lifestyle due to joint issues. denies past falls or fractures. has gained most weight after RnY. no post prandial symptoms of hypoglycemia. She otherwise denies any f/c, CP, SOB, palpitations, tremors, depressed mood, anxiety, palpitations, n/v, stool/urinary abn, skin/weight changes, heat/cold intolerance, HAs, breast/nipple changes, polyuria/polydipsia/nocturia or other complaints.\par \par

## 2022-09-23 NOTE — ASSESSMENT
[Long Term Vascular Complications] : long term vascular complications of diabetes [Carbohydrate Consistent Diet] : carbohydrate consistent diet [Importance of Diet and Exercise] : importance of diet and exercise to improve glycemic control, achieve weight loss and improve cardiovascular health [Hypoglycemia Management] : hypoglycemia management [Self Monitoring of Blood Glucose] : self monitoring of blood glucose [Weight Loss] : weight loss [FreeTextEntry1] : 1) Obesity, Morbid: Class I, complicated by hyperglycemia and dyslipidemia on a statin. High risk of metabolic syndrome and future complications. Discussed options including meds, bariatric surgery and lifestyle modification. RB and alternatives discussed. Questions answered and she verbalized understanding. Refer to nutrition and start hypocaloric, hypocarb diet in addition to exercise regimen. ). as no 5-7% weight loss observed on f/u, will proceed w. incretin analogue initiation. start mounjaro 2.5 mg, titrate as tolerated, samples provided. reviewed r/b/a and s.e, Verbalized understanding and agrees with treatment plan, will contact MD and seek emergency medical care if condition changes.\par \par

## 2022-09-29 ENCOUNTER — NON-APPOINTMENT (OUTPATIENT)
Age: 47
End: 2022-09-29

## 2022-11-02 ENCOUNTER — APPOINTMENT (OUTPATIENT)
Dept: UROLOGY | Facility: CLINIC | Age: 47
End: 2022-11-02

## 2022-11-02 VITALS
HEART RATE: 74 BPM | SYSTOLIC BLOOD PRESSURE: 118 MMHG | DIASTOLIC BLOOD PRESSURE: 76 MMHG | HEIGHT: 61 IN | WEIGHT: 190 LBS | BODY MASS INDEX: 35.87 KG/M2 | TEMPERATURE: 97.2 F

## 2022-11-02 DIAGNOSIS — R10.2 PELVIC AND PERINEAL PAIN: ICD-10-CM

## 2022-11-02 DIAGNOSIS — N32.81 OVERACTIVE BLADDER: ICD-10-CM

## 2022-11-02 PROCEDURE — 99213 OFFICE O/P EST LOW 20 MIN: CPT

## 2022-11-02 NOTE — ASSESSMENT
[FreeTextEntry1] : 46yo female with history of pelvic pain, urge and stress incontinence, previously improved with oxybutynin\par Currently undergoing pelvic floor therapy\par Pain, urgency improved\par Primary c/o FLASH\par She will continue Pelvic floor therapy for now\par F/u 3 months with Dr. Lin

## 2022-11-02 NOTE — HISTORY OF PRESENT ILLNESS
[Urinary Urgency] : urinary urgency [Urinary Frequency] : urinary frequency [FreeTextEntry1] : This is a very pleasant 42yo female referred by Dr. Mario for 1 year history of pelvic pain and urinary incontinence. Pain has resulted in multiple ER visits in the past year including May and September. U/A was negative at both ER visits. A CT scan was performed in September and was normal. Recent U/A negative, urine culture contaminated. She also complains of urgency, frequency, urge incontinence, stress incontinence. She wears 1 pad per day. \par \par 12/5/16 Here for f/u. Doing much better with oxybutynin, no side effects. She is still experiencing some stress incontinence. \par \par 3/6/17 Here for f/u. Feeling well, urinary symptoms and pelvic pain have essentially resolved. \par \par 9/13/17 Here for 6 month follow up. Doing well, no complaints. \par \par 7/30/18 Here for annual follow up. Doing well. Satisfied with oxybutynin ER 10mg \par \par 2/06/19 Here for follow up. Doing well. Satisfied with oxybutynin ER 10mg\par \par 8/24/20 Here for f/u. Doing well.  Satisfied with oxybutynin ER 10mg\par \par 6/15/22 Here for f/u. Starting 2 weeks ago developed suprapubic pain, constipation. Symptoms previously well controlled on oxybutynin but she stopped this when prescription ran out. Had left knee replacement 2 months ago, still in wheelchair but able to stand \par \par 11/2/22 Here for f/u. Saw Dr. Lin who recommended pelvic floor PT which she is doing. C/o today of stress incontinence. Leakage with lifting and bending.  [Urinary Incontinence] : urinary incontinence [None] : None

## 2022-11-02 NOTE — REVIEW OF SYSTEMS
[Abdominal Pain] : abdominal pain [Pelvic Pain] : pelvic pain [Negative] : Constitutional [Constipation] : no constipation [Diarrhea] : no diarrhea [Dysuria] : no dysuria

## 2022-11-04 ENCOUNTER — NON-APPOINTMENT (OUTPATIENT)
Age: 47
End: 2022-11-04

## 2022-11-04 ENCOUNTER — APPOINTMENT (OUTPATIENT)
Dept: SURGERY | Facility: CLINIC | Age: 47
End: 2022-11-04

## 2022-11-04 VITALS
SYSTOLIC BLOOD PRESSURE: 106 MMHG | OXYGEN SATURATION: 98 % | DIASTOLIC BLOOD PRESSURE: 70 MMHG | TEMPERATURE: 96.9 F | WEIGHT: 194 LBS | HEART RATE: 62 BPM | HEIGHT: 61 IN | BODY MASS INDEX: 36.63 KG/M2

## 2022-11-04 PROCEDURE — 99214 OFFICE O/P EST MOD 30 MIN: CPT

## 2022-11-04 NOTE — HISTORY OF PRESENT ILLNESS
[de-identified] : Pt is a 47 y/o F with pmhx of RYGB 13 yrs ago, s/p revision for intussusception, fibromyalgia ambulated with walker assisted device, spine problems, morbid obesity BMI 38, who presents today feeling well here for routine f/u. Pt states she is doing well overall since her last visit, has lost 10 lbs since her last appt, BMI 36. States she is tolerating her diet and is following our dietary recommendations. Denies any n,v,c,d reflux. States she is taking her vit daily as directed. Pt states she has followed up with the pelvic specialist she was referred to by her urologist for chronic pelvic pain and has recently started PT for this. States this is not helping her very much and wants to consider seeing a different specialist. Advised pt to contact her urologist to see if she can be referred to a different doctor. Pt to continue PT until she has another route to follow. Pt's exercise ability is very limited due to her use of walker assisted device and she is struggling to lose more wt. Pt was previously given referral for  medical wt loss but states she has been calling the endocrinology office and no one is getting back to her. States she would like to proceed with an EGD TOR as she notes she does not feel any restriction when eating and she thinks this method will help her lose more wt. Will schedule EGD TOR.

## 2022-11-04 NOTE — ASSESSMENT
[FreeTextEntry1] : Pt is a 47 y/o F with pmhx of RYGB 13 yrs ago, s/p revision for intussusception, fibromyalgia ambulated with walker assisted device, spine problems, morbid obesity BMI 38, who presents today feeling well here for routine f/u. Pt states she is doing well overall since her last visit, has lost 10 lbs since her last appt, BMI 36. States she is tolerating her diet and is following our dietary recommendations. Denies any n,v,c,d reflux. States she is taking her vit daily as directed. Pt states she has followed up with the pelvic specialist she was referred to by her urologist for chronic pelvic pain and has recently started PT for this. States this is not helping her very much and wants to consider seeing a different specialist. Advised pt to contact her urologist to see if she can be referred to a different doctor. Pt to continue PT until she has another route to follow. Pt's exercise ability is very limited due to her use of walker assisted device and she is struggling to lose more wt. Pt was previously given referral for  medical wt loss but states she has been calling the endocrinology office and no one is getting back to her. States she would like to proceed with an EGD TOR as she notes she does not feel any restriction when eating and she thinks this method will help her lose more wt. Will schedule EGD TOR.

## 2022-11-09 ENCOUNTER — APPOINTMENT (OUTPATIENT)
Dept: UROLOGY | Facility: CLINIC | Age: 47
End: 2022-11-09

## 2022-11-09 VITALS
TEMPERATURE: 98.2 F | RESPIRATION RATE: 16 BRPM | SYSTOLIC BLOOD PRESSURE: 114 MMHG | OXYGEN SATURATION: 95 % | DIASTOLIC BLOOD PRESSURE: 67 MMHG | HEART RATE: 52 BPM

## 2022-11-09 LAB
BILIRUB UR QL STRIP: NORMAL
CLARITY UR: CLEAR
COLLECTION METHOD: NORMAL
GLUCOSE UR-MCNC: NORMAL
HCG UR QL: 0.2 EU/DL
HGB UR QL STRIP.AUTO: NORMAL
KETONES UR-MCNC: NORMAL
LEUKOCYTE ESTERASE UR QL STRIP: NORMAL
NITRITE UR QL STRIP: NORMAL
PH UR STRIP: 5.5
PROT UR STRIP-MCNC: NORMAL
SP GR UR STRIP: 1.03

## 2022-11-09 PROCEDURE — 99214 OFFICE O/P EST MOD 30 MIN: CPT

## 2022-11-10 NOTE — HISTORY OF PRESENT ILLNESS
[Urinary Incontinence] : urinary incontinence [Urinary Urgency] : urinary urgency [Urinary Frequency] : urinary frequency [None] : None [FreeTextEntry1] : Pt is a 46 yo F  with hx of pelvic pain and urge and stress incontinence, previously improved with Oxybutynin, referred by Dr. Smith - she has also seen Dr. Lin in the past. She met with Dr. Smith on 22 was advised to continue PFPT and follow-up with Dr. Lin in 3 months. \par \par Today Pt reports that she is does not believe PFPT is helping her FLASH. She is experiencing FLASH while laughing/carrying heavy objects. \par \par Pt underwent bariatric surgery 13 years ago, and recently underwent knee surgery in 10/2022 for which she is using a walker. \par \par Udip: (+) trace blood, protein, bilirubin \par \par 22-- Pt is a 46yo female referred by Dr. Mario for 1 year history of pelvic pain and urinary incontinence. Pain has resulted in multiple ER visits in the past year including May and September. U/A was negative at both ER visits. A CT scan was performed in September and was normal. Recent U/A negative, urine culture contaminated. She also complains of urgency, frequency, urge incontinence, stress incontinence. She wears 1 pad per day. \par \par 16 Here for f/u. Doing much better with oxybutynin, no side effects. She is still experiencing some stress incontinence. \par \par 3/6/17 Here for f/u. Feeling well, urinary symptoms and pelvic pain have essentially resolved. \par \par 17 Here for 6 month follow up. Doing well, no complaints. \par \par 18 Here for annual follow up. Doing well. Satisfied with oxybutynin ER 10mg \par \par 19 Here for follow up. Doing well. Satisfied with oxybutynin ER 10mg\par \par 20 Here for f/u. Doing well.  Satisfied with oxybutynin ER 10mg\par \par 6/15/22 Here for f/u. Starting 2 weeks ago developed suprapubic pain, constipation. Symptoms previously well controlled on oxybutynin but she stopped this when prescription ran out. Had left knee replacement 2 months ago, still in wheelchair but able to stand \par \par 22 Here for f/u. Saw Dr. Lin who recommended pelvic floor PT which she is doing. C/o today of stress incontinence. Leakage with lifting and bending.

## 2022-11-10 NOTE — ASSESSMENT
[FreeTextEntry1] : Pt is a 48 yo F  with hx of pelvic pain and urge and stress incontinence, previously improved with Oxybutynin, referred by Dr. Smith - she has also seen Dr. Lin in the past. She met with Dr. Smith on 22 was advised to continue PFPT and follow-up with Dr. Lin in 3 months. \par \par Today Pt reports that she is does not believe PFPT is helping her FLASH. She is experiencing FLASH while laughing/carrying heavy objects. \par \par I advised that the Oxybutynin is likely addressing her UUI, but not her FLASH. We had a preliminary discussion about treatment options for FLASH.\par \par Pt will return for a urodynamics evaluation to assess her FLASH.\par \par Urine was sent for culture. \par \par Patient expressed understanding.\par

## 2022-11-10 NOTE — ADDENDUM
[FreeTextEntry1] : A portion of this note was written by [Meg Arnett] on 11/09/2022  acting as a scribe for Dr. Yap. \par \par I have personally reviewed the chart and agree that the record accurately reflects my personal performance and the history, physical exam, assessment, and plan.\par

## 2022-11-15 LAB — BACTERIA UR CULT: NORMAL

## 2022-11-23 ENCOUNTER — APPOINTMENT (OUTPATIENT)
Dept: ENDOCRINOLOGY | Facility: CLINIC | Age: 47
End: 2022-11-23

## 2022-11-23 VITALS
SYSTOLIC BLOOD PRESSURE: 140 MMHG | BODY MASS INDEX: 36.47 KG/M2 | DIASTOLIC BLOOD PRESSURE: 80 MMHG | HEART RATE: 63 BPM | WEIGHT: 193 LBS

## 2022-11-23 DIAGNOSIS — K91.2 POSTSURGICAL MALABSORPTION, NOT ELSEWHERE CLASSIFIED: ICD-10-CM

## 2022-11-23 PROCEDURE — 99215 OFFICE O/P EST HI 40 MIN: CPT

## 2022-11-23 RX ORDER — TIRZEPATIDE 2.5 MG/.5ML
2.5 INJECTION, SOLUTION SUBCUTANEOUS
Qty: 2 | Refills: 0 | Status: ACTIVE | OUTPATIENT
Start: 2022-11-23

## 2022-11-23 NOTE — HISTORY OF PRESENT ILLNESS
[FreeTextEntry1] : 46 y/o F with pmhx of RYGB 13 yrs ago, s/p revision for intussusception, fibromyalgia, spine problems, morbid obesity\par initial evaluation and management of metabolic issues\par generally feels well and endorses no acute complaints.\par reports cc of worsening weight gain. admits to heavy starch diet, particularly w/ late night binging. states this is due to pain from fibromyalgia. reports sedentary lifestyle due to joint issues. denies past falls or fractures. has gained most weight after RnY. no post prandial symptoms of hypoglycemia. \par \par 11/2022 Here for /fu, generally feels well and endorses no acute complaints. No interval events since LV. Today reports good tolerance and no significant s/e w/ mounjaro s/e. lost 10 lb, insurance denied PA and appeal for medication. \par She otherwise denies any f/c, CP, SOB, palpitations, tremors, depressed mood, anxiety, palpitations, n/v, stool/urinary abn, skin/weight changes, heat/cold intolerance, HAs, breast/nipple changes, polyuria/polydipsia/nocturia or other complaints.\par \par

## 2022-11-23 NOTE — ASSESSMENT
[FreeTextEntry1] : 1) Obesity, Morbid: Class I, complicated by hyperglycemia and dyslipidemia on a statin. High risk of metabolic syndrome and future complications. Discussed options including meds, bariatric surgery and lifestyle modification. RB and alternatives discussed. Questions answered and she verbalized understanding. Refer to nutrition and start hypocaloric, hypocarb diet in addition to exercise regimen. ). as no 5-7% weight loss observed on f/u, will proceed w. incretin analogue initiation. start mounjaro 2.5 mg, titrate as tolerated, samples provided. reviewed r/b/a and s.e, Verbalized understanding and agrees with treatment plan, will contact MD and seek emergency medical care if condition changes.\par \par  [Long Term Vascular Complications] : long term vascular complications of diabetes [Carbohydrate Consistent Diet] : carbohydrate consistent diet [Importance of Diet and Exercise] : importance of diet and exercise to improve glycemic control, achieve weight loss and improve cardiovascular health [Hypoglycemia Management] : hypoglycemia management [Self Monitoring of Blood Glucose] : self monitoring of blood glucose [Weight Loss] : weight loss

## 2022-12-01 ENCOUNTER — APPOINTMENT (OUTPATIENT)
Dept: UROLOGY | Facility: CLINIC | Age: 47
End: 2022-12-01

## 2022-12-01 VITALS
DIASTOLIC BLOOD PRESSURE: 83 MMHG | HEART RATE: 59 BPM | TEMPERATURE: 98.3 F | OXYGEN SATURATION: 98 % | SYSTOLIC BLOOD PRESSURE: 138 MMHG

## 2022-12-01 LAB
BILIRUB UR QL STRIP: NORMAL
CLARITY UR: CLEAR
COLLECTION METHOD: NORMAL
GLUCOSE UR-MCNC: NORMAL
HCG UR QL: 0.2 EU/DL
HGB UR QL STRIP.AUTO: NORMAL
KETONES UR-MCNC: NORMAL
LEUKOCYTE ESTERASE UR QL STRIP: NORMAL
NITRITE UR QL STRIP: NORMAL
PH UR STRIP: 6
PROT UR STRIP-MCNC: NORMAL
SP GR UR STRIP: 1.02

## 2022-12-01 PROCEDURE — 51728 CYSTOMETROGRAM W/VP: CPT

## 2022-12-01 PROCEDURE — 51741 ELECTRO-UROFLOWMETRY FIRST: CPT

## 2022-12-01 PROCEDURE — 51797 INTRAABDOMINAL PRESSURE TEST: CPT

## 2022-12-01 PROCEDURE — 51784 ANAL/URINARY MUSCLE STUDY: CPT

## 2022-12-01 RX ORDER — MIRABEGRON 50 MG/1
50 TABLET, FILM COATED, EXTENDED RELEASE ORAL
Qty: 90 | Refills: 1 | Status: ACTIVE | COMMUNITY
Start: 2022-12-01 | End: 1900-01-01

## 2022-12-01 NOTE — HISTORY OF PRESENT ILLNESS
[Urinary Incontinence] : urinary incontinence [Urinary Urgency] : urinary urgency [Urinary Frequency] : urinary frequency [None] : None [FreeTextEntry1] : Pt is a 48 yo F  with hx of pelvic pain, and urge and stress incontinence, previously improved with Oxybutynin. Pt has seen Dr. Lin and Dr. Smith in the past. She presents today for a urodynamics evaluation to assess her FLASH. \par \par Pt reports that Oxybutynin is no longer working. She also reports that PFPT, which she has attended for 2 months, is not providing any urinary symptom relief. \par \par Udip: negative \par \par 22-- Pt is a 48 yo F  with hx of pelvic pain and urge and stress incontinence, previously improved with Oxybutynin, referred by Dr. Smith - she has also seen Dr. Lin in the past. She met with Dr. Smith on 22 was advised to continue PFPT and follow-up with Dr. Lin in 3 months. \par \par Today Pt reports that she is does not believe PFPT is helping her FLASH. She is experiencing FLASH while laughing/carrying heavy objects. \par \par Pt underwent bariatric surgery 13 years ago, and recently underwent knee surgery in 10/2022 for which she is using a walker. \par \par Udip: (+) trace blood, protein, bilirubin \par \par 22-- Pt is a 48yo female referred by Dr. Mario for 1 year history of pelvic pain and urinary incontinence. Pain has resulted in multiple ER visits in the past year including May and September. U/A was negative at both ER visits. A CT scan was performed in September and was normal. Recent U/A negative, urine culture contaminated. She also complains of urgency, frequency, urge incontinence, stress incontinence. She wears 1 pad per day. \par \par 16 Here for f/u. Doing much better with oxybutynin, no side effects. She is still experiencing some stress incontinence. \par \par 3/6/17 Here for f/u. Feeling well, urinary symptoms and pelvic pain have essentially resolved. \par \par 17 Here for 6 month follow up. Doing well, no complaints. \par \par 18 Here for annual follow up. Doing well. Satisfied with oxybutynin ER 10mg \par \par 19 Here for follow up. Doing well. Satisfied with oxybutynin ER 10mg\par \par 20 Here for f/u. Doing well.  Satisfied with oxybutynin ER 10mg\par \par 6/15/22 Here for f/u. Starting 2 weeks ago developed suprapubic pain, constipation. Symptoms previously well controlled on oxybutynin but she stopped this when prescription ran out. Had left knee replacement 2 months ago, still in wheelchair but able to stand \par \par 22 Here for f/u. Saw Dr. Lin who recommended pelvic floor PT which she is doing. C/o today of stress incontinence. Leakage with lifting and bending.

## 2022-12-01 NOTE — ASSESSMENT
[FreeTextEntry1] : Pt is a 48 yo F  with hx of pelvic pain, and urge and stress incontinence, previously improved with Oxybutynin. Pt has seen Dr. Lin and Dr. Smith in the past. She presents today for a urodynamics evaluation to assess her FLASH. \par \par Pt reports that Oxybutynin is no longer working. She also reports that PFPT, which she has attended for 2 months, is not providing any urinary symptom relief. 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 She reports that she is equally bothered by FLASH and UUI. \par \par We reviewed the treatment options available to address her FLASH, which include the placement of a mid-urethral sling, and the use of a bulking agent. \par \par Given her report that Oxybutynin is no longer working for her UUI, which was evident on her UDS evaluation, Pt will begin a trial of Myrbetriq 50mg. She will follow-up in one month to assess her progress. \par \par Pt will contact our office if the copay for Myrbetriq is too high, and we will submit a prior authorization request. \par \par If she sees no improvement in her urinary symptoms in 1 month, she will be scheduled for a mid-urethral sling placement, and I will inject intravesical botox for her UUI at the time of her sling procedure. \par \par Urine was sent for culture. \par \par Patient expressed understanding.

## 2022-12-01 NOTE — LETTER BODY
[Dear  ___] : Dear  [unfilled], [Consult Letter:] : I had the pleasure of evaluating your patient, [unfilled]. [Please see my note below.] : Please see my note below. [Consult Closing:] : Thank you very much for allowing me to participate in the care of this patient.  If you have any questions, please do not hesitate to contact me. [Sincerely,] : Sincerely, [FreeTextEntry3] : Dr. Pauline Yap\par

## 2022-12-01 NOTE — ADDENDUM
[FreeTextEntry1] : A portion of this note was written by [Meg Arnett] on 12/01/2022  acting as a scribe for Dr. Yap. \par \par I have personally reviewed the chart and agree that the record accurately reflects my personal performance and the history, physical exam, assessment, and plan.\par

## 2022-12-05 LAB — BACTERIA UR CULT: NORMAL

## 2023-01-01 RX ADMIN — LIDOCAINE 2 PATCH: 4 CREAM TOPICAL at 23:00

## 2023-01-03 ENCOUNTER — APPOINTMENT (OUTPATIENT)
Dept: UROLOGY | Facility: CLINIC | Age: 48
End: 2023-01-03
Payer: MEDICAID

## 2023-01-03 VITALS
TEMPERATURE: 98.2 F | SYSTOLIC BLOOD PRESSURE: 127 MMHG | DIASTOLIC BLOOD PRESSURE: 85 MMHG | OXYGEN SATURATION: 96 % | HEART RATE: 69 BPM

## 2023-01-03 DIAGNOSIS — Z01.818 ENCOUNTER FOR OTHER PREPROCEDURAL EXAMINATION: ICD-10-CM

## 2023-01-03 PROCEDURE — 99214 OFFICE O/P EST MOD 30 MIN: CPT

## 2023-01-03 NOTE — ASSESSMENT
[FreeTextEntry1] : Pt is a 46 yo F  with hx of pelvic pain and urge/stress incontinence. Urodynamics evaluation from 22 showed that Pt has PRATIMA. Pt has tried and failed both Oxybutynin and Myrbetriq 50mg - neither medication provided urinary symptom relief.\par \par Pt is interested in pursuing a mid-urethral sling and intravesical botox for her PRATIMA. We discussed both treatment options at length during her visit on 22. \par \par We reviewed both the mid-urethral sling procedure and intravesical botox injections, and discussed the risks associated with both treatments.\par \par I will inject botox into her bladder for her urge incontinence while she is in the OR. \par \par Pt met with our surgical coordinator today to schedule a date for surgery and was provided with the necessary preclearance paperwork she will need to obtain from her PCP. \par \par Urine was sent for culture. \par \par Patient expressed understanding.

## 2023-01-03 NOTE — HISTORY OF PRESENT ILLNESS
[Urinary Incontinence] : urinary incontinence [Urinary Urgency] : urinary urgency [Urinary Frequency] : urinary frequency [None] : None [FreeTextEntry1] : Pt is a 48 yo F  with hx of pelvic pain, urge and stress incontinence, previously improved with Oxybutynin. Urodynamics evaluation from 22 showed that Pt has PRATIMA - she is experiencing both FLASH and UUI even while taking Oxybutynin. Pt was prescribed a trial of Myrbetriq 50mg on 22 - she presents today for a follow-up visit to assess her progress. She reports that she has not seen significant improvement in her urinary symptoms on Myrbetriq, and continues to experience episodes of urinary incontinence. \par \par Pt is interested in pursuing a mid-urethral sling and intravesical botox for her PRATIMA, treatment options that we discussed at length during her visit on 22. \par \par Of note: Pt cannot take Motrin because of gastric bypass surgery. \par \par Udip: (+) trace blood, bilirubin\par PVR: 0 cc (to rule out incomplete bladder emptying)\par \par 22-- Pt is a 48 yo F  with hx of pelvic pain, and urge and stress incontinence, previously improved with Oxybutynin. Pt has seen Dr. Lin and Dr. Smith in the past. She presents today for a urodynamics evaluation to assess her FLASH. \par \par Pt reports that Oxybutynin is no longer working. She also reports that PFPT, which she has attended for 2 months, is not providing any urinary symptom relief. \par \par Udip: negative \par \par 22-- Pt is a 48 yo F  with hx of pelvic pain and urge and stress incontinence, previously improved with Oxybutynin, referred by Dr. Smith - she has also seen Dr. Lin in the past. She met with Dr. Smith on 22 was advised to continue PFPT and follow-up with Dr. Lin in 3 months. \par \par Today Pt reports that she is does not believe PFPT is helping her FLASH. She is experiencing FLASH while laughing/carrying heavy objects. \par \par Pt underwent bariatric surgery 13 years ago, and recently underwent knee surgery in 10/2022 for which she is using a walker. \par \par Udip: (+) trace blood, protein, bilirubin \par \par 22-- Pt is a 48yo female referred by Dr. Mario for 1 year history of pelvic pain and urinary incontinence. Pain has resulted in multiple ER visits in the past year including May and September. U/A was negative at both ER visits. A CT scan was performed in September and was normal. Recent U/A negative, urine culture contaminated. She also complains of urgency, frequency, urge incontinence, stress incontinence. She wears 1 pad per day. \par \par 16 Here for f/u. Doing much better with oxybutynin, no side effects. She is still experiencing some stress incontinence. \par \par 3/6/17 Here for f/u. Feeling well, urinary symptoms and pelvic pain have essentially resolved. \par \par 17 Here for 6 month follow up. Doing well, no complaints. \par \par 18 Here for annual follow up. Doing well. Satisfied with oxybutynin ER 10mg \par \par 19 Here for follow up. Doing well. Satisfied with oxybutynin ER 10mg\par \par 20 Here for f/u. Doing well.  Satisfied with oxybutynin ER 10mg\par \par 6/15/22 Here for f/u. Starting 2 weeks ago developed suprapubic pain, constipation. Symptoms previously well controlled on oxybutynin but she stopped this when prescription ran out. Had left knee replacement 2 months ago, still in wheelchair but able to stand \par \par 22 Here for f/u. Saw Dr. Lin who recommended pelvic floor PT which she is doing. C/o today of stress incontinence. Leakage with lifting and bending.

## 2023-01-03 NOTE — ADDENDUM
[FreeTextEntry1] : A portion of this note was written by [Meg Arnett] on 01/03/2023  acting as a scribe for Dr. Yap. \par \par I have personally reviewed the chart and agree that the record accurately reflects my personal performance and the history, physical exam, assessment, and plan.\par

## 2023-01-04 ENCOUNTER — NON-APPOINTMENT (OUTPATIENT)
Age: 48
End: 2023-01-04

## 2023-01-05 ENCOUNTER — LABORATORY RESULT (OUTPATIENT)
Age: 48
End: 2023-01-05

## 2023-01-05 LAB — BACTERIA UR CULT: NORMAL

## 2023-01-09 ENCOUNTER — OUTPATIENT (OUTPATIENT)
Dept: OUTPATIENT SERVICES | Facility: HOSPITAL | Age: 48
LOS: 1 days | Discharge: HOME | End: 2023-01-09
Payer: MEDICAID

## 2023-01-09 VITALS
RESPIRATION RATE: 18 BRPM | WEIGHT: 190.04 LBS | SYSTOLIC BLOOD PRESSURE: 112 MMHG | OXYGEN SATURATION: 98 % | HEART RATE: 60 BPM | TEMPERATURE: 98 F | HEIGHT: 61 IN | DIASTOLIC BLOOD PRESSURE: 74 MMHG

## 2023-01-09 DIAGNOSIS — Z90.49 ACQUIRED ABSENCE OF OTHER SPECIFIED PARTS OF DIGESTIVE TRACT: Chronic | ICD-10-CM

## 2023-01-09 DIAGNOSIS — Z90.710 ACQUIRED ABSENCE OF BOTH CERVIX AND UTERUS: Chronic | ICD-10-CM

## 2023-01-09 PROCEDURE — 43999 UNLISTED PROCEDURE STOMACH: CPT

## 2023-01-09 NOTE — PRE-ANESTHESIA EVALUATION ADULT - NSANTHPMHFT_GEN_ALL_CORE
Obesity s/p gastric bypass, GERD Obesity s/p gastric bypass, GERD, chronic opioid use for fibromyalgia

## 2023-01-09 NOTE — PATIENT PROFILE ADULT - NSPROMEDSPUMP_GEN_A_NUR
Mail letter:  Your mild anemia is improving. Continue taking an iron supplement and follow-up yearly.     April Cohen MD    
none

## 2023-01-12 ENCOUNTER — TRANSCRIPTION ENCOUNTER (OUTPATIENT)
Age: 48
End: 2023-01-12

## 2023-01-13 ENCOUNTER — TRANSCRIPTION ENCOUNTER (OUTPATIENT)
Age: 48
End: 2023-01-13

## 2023-01-13 VITALS
SYSTOLIC BLOOD PRESSURE: 124 MMHG | HEIGHT: 61 IN | RESPIRATION RATE: 16 BRPM | OXYGEN SATURATION: 98 % | DIASTOLIC BLOOD PRESSURE: 79 MMHG | HEART RATE: 65 BPM | WEIGHT: 182.1 LBS | TEMPERATURE: 98 F

## 2023-01-13 LAB
ALBUMIN SERPL ELPH-MCNC: 4.3 G/DL — SIGNIFICANT CHANGE UP (ref 3.3–5)
ALP SERPL-CCNC: 164 U/L — HIGH (ref 40–120)
ALT FLD-CCNC: 31 U/L — SIGNIFICANT CHANGE UP (ref 10–45)
ANION GAP SERPL CALC-SCNC: 9 MMOL/L — SIGNIFICANT CHANGE UP (ref 5–17)
APPEARANCE UR: CLEAR — SIGNIFICANT CHANGE UP
APTT BLD: 35.2 SEC — SIGNIFICANT CHANGE UP (ref 27.5–35.5)
AST SERPL-CCNC: 22 U/L — SIGNIFICANT CHANGE UP (ref 10–40)
BASOPHILS # BLD AUTO: 0.03 K/UL — SIGNIFICANT CHANGE UP (ref 0–0.2)
BASOPHILS NFR BLD AUTO: 0.4 % — SIGNIFICANT CHANGE UP (ref 0–2)
BILIRUB SERPL-MCNC: 0.4 MG/DL — SIGNIFICANT CHANGE UP (ref 0.2–1.2)
BILIRUB UR-MCNC: NEGATIVE — SIGNIFICANT CHANGE UP
BUN SERPL-MCNC: 10 MG/DL — SIGNIFICANT CHANGE UP (ref 7–23)
CALCIUM SERPL-MCNC: 9.4 MG/DL — SIGNIFICANT CHANGE UP (ref 8.4–10.5)
CHLORIDE SERPL-SCNC: 104 MMOL/L — SIGNIFICANT CHANGE UP (ref 96–108)
CO2 SERPL-SCNC: 27 MMOL/L — SIGNIFICANT CHANGE UP (ref 22–31)
COLOR SPEC: YELLOW — SIGNIFICANT CHANGE UP
CREAT SERPL-MCNC: 0.64 MG/DL — SIGNIFICANT CHANGE UP (ref 0.5–1.3)
DIFF PNL FLD: NEGATIVE — SIGNIFICANT CHANGE UP
EGFR: 109 ML/MIN/1.73M2 — SIGNIFICANT CHANGE UP
EOSINOPHIL # BLD AUTO: 0.08 K/UL — SIGNIFICANT CHANGE UP (ref 0–0.5)
EOSINOPHIL NFR BLD AUTO: 1.2 % — SIGNIFICANT CHANGE UP (ref 0–6)
GLUCOSE SERPL-MCNC: 108 MG/DL — HIGH (ref 70–99)
GLUCOSE UR QL: NEGATIVE — SIGNIFICANT CHANGE UP
HCT VFR BLD CALC: 41.4 % — SIGNIFICANT CHANGE UP (ref 34.5–45)
HGB BLD-MCNC: 13.9 G/DL — SIGNIFICANT CHANGE UP (ref 11.5–15.5)
IMM GRANULOCYTES NFR BLD AUTO: 0.3 % — SIGNIFICANT CHANGE UP (ref 0–0.9)
INR BLD: 1.09 — SIGNIFICANT CHANGE UP (ref 0.88–1.16)
KETONES UR-MCNC: 15 MG/DL
LACTATE SERPL-SCNC: 1 MMOL/L — SIGNIFICANT CHANGE UP (ref 0.5–2)
LEUKOCYTE ESTERASE UR-ACNC: NEGATIVE — SIGNIFICANT CHANGE UP
LIDOCAIN IGE QN: 20 U/L — SIGNIFICANT CHANGE UP (ref 7–60)
LYMPHOCYTES # BLD AUTO: 2.03 K/UL — SIGNIFICANT CHANGE UP (ref 1–3.3)
LYMPHOCYTES # BLD AUTO: 29.8 % — SIGNIFICANT CHANGE UP (ref 13–44)
MCHC RBC-ENTMCNC: 29.3 PG — SIGNIFICANT CHANGE UP (ref 27–34)
MCHC RBC-ENTMCNC: 33.6 GM/DL — SIGNIFICANT CHANGE UP (ref 32–36)
MCV RBC AUTO: 87.2 FL — SIGNIFICANT CHANGE UP (ref 80–100)
MONOCYTES # BLD AUTO: 0.39 K/UL — SIGNIFICANT CHANGE UP (ref 0–0.9)
MONOCYTES NFR BLD AUTO: 5.7 % — SIGNIFICANT CHANGE UP (ref 2–14)
NEUTROPHILS # BLD AUTO: 4.27 K/UL — SIGNIFICANT CHANGE UP (ref 1.8–7.4)
NEUTROPHILS NFR BLD AUTO: 62.6 % — SIGNIFICANT CHANGE UP (ref 43–77)
NITRITE UR-MCNC: NEGATIVE — SIGNIFICANT CHANGE UP
NRBC # BLD: 0 /100 WBCS — SIGNIFICANT CHANGE UP (ref 0–0)
PH UR: 6.5 — SIGNIFICANT CHANGE UP (ref 5–8)
PLATELET # BLD AUTO: 294 K/UL — SIGNIFICANT CHANGE UP (ref 150–400)
POTASSIUM SERPL-MCNC: 5.1 MMOL/L — SIGNIFICANT CHANGE UP (ref 3.5–5.3)
POTASSIUM SERPL-SCNC: 5.1 MMOL/L — SIGNIFICANT CHANGE UP (ref 3.5–5.3)
PROT SERPL-MCNC: 7.1 G/DL — SIGNIFICANT CHANGE UP (ref 6–8.3)
PROT UR-MCNC: NEGATIVE MG/DL — SIGNIFICANT CHANGE UP
PROTHROM AB SERPL-ACNC: 13 SEC — SIGNIFICANT CHANGE UP (ref 10.5–13.4)
RBC # BLD: 4.75 M/UL — SIGNIFICANT CHANGE UP (ref 3.8–5.2)
RBC # FLD: 12.9 % — SIGNIFICANT CHANGE UP (ref 10.3–14.5)
SARS-COV-2 RNA SPEC QL NAA+PROBE: NEGATIVE — SIGNIFICANT CHANGE UP
SODIUM SERPL-SCNC: 140 MMOL/L — SIGNIFICANT CHANGE UP (ref 135–145)
SP GR SPEC: 1.02 — SIGNIFICANT CHANGE UP (ref 1–1.03)
UROBILINOGEN FLD QL: 0.2 E.U./DL — SIGNIFICANT CHANGE UP
WBC # BLD: 6.82 K/UL — SIGNIFICANT CHANGE UP (ref 3.8–10.5)
WBC # FLD AUTO: 6.82 K/UL — SIGNIFICANT CHANGE UP (ref 3.8–10.5)

## 2023-01-13 PROCEDURE — 74177 CT ABD & PELVIS W/CONTRAST: CPT | Mod: 26,MA

## 2023-01-13 PROCEDURE — 99285 EMERGENCY DEPT VISIT HI MDM: CPT

## 2023-01-13 RX ORDER — FOLIC ACID 0.8 MG
1 TABLET ORAL ONCE
Refills: 0 | Status: COMPLETED | OUTPATIENT
Start: 2023-01-13 | End: 2023-01-13

## 2023-01-13 RX ORDER — DIATRIZOATE MEGLUMINE 180 MG/ML
30 INJECTION, SOLUTION INTRAVESICAL ONCE
Refills: 0 | Status: COMPLETED | OUTPATIENT
Start: 2023-01-13 | End: 2023-01-13

## 2023-01-13 RX ORDER — HYDROMORPHONE HYDROCHLORIDE 2 MG/ML
1 INJECTION INTRAMUSCULAR; INTRAVENOUS; SUBCUTANEOUS ONCE
Refills: 0 | Status: DISCONTINUED | OUTPATIENT
Start: 2023-01-13 | End: 2023-01-13

## 2023-01-13 RX ORDER — SODIUM CHLORIDE 9 MG/ML
1000 INJECTION INTRAMUSCULAR; INTRAVENOUS; SUBCUTANEOUS ONCE
Refills: 0 | Status: COMPLETED | OUTPATIENT
Start: 2023-01-13 | End: 2023-01-13

## 2023-01-13 RX ORDER — PANTOPRAZOLE SODIUM 20 MG/1
40 TABLET, DELAYED RELEASE ORAL ONCE
Refills: 0 | Status: COMPLETED | OUTPATIENT
Start: 2023-01-13 | End: 2023-01-13

## 2023-01-13 RX ORDER — HYDROMORPHONE HYDROCHLORIDE 2 MG/ML
0.5 INJECTION INTRAMUSCULAR; INTRAVENOUS; SUBCUTANEOUS ONCE
Refills: 0 | Status: DISCONTINUED | OUTPATIENT
Start: 2023-01-13 | End: 2023-01-13

## 2023-01-13 RX ORDER — ONDANSETRON 8 MG/1
4 TABLET, FILM COATED ORAL ONCE
Refills: 0 | Status: COMPLETED | OUTPATIENT
Start: 2023-01-13 | End: 2023-01-13

## 2023-01-13 RX ORDER — THIAMINE MONONITRATE (VIT B1) 100 MG
50 TABLET ORAL ONCE
Refills: 0 | Status: COMPLETED | OUTPATIENT
Start: 2023-01-13 | End: 2023-01-13

## 2023-01-13 RX ORDER — MORPHINE SULFATE 50 MG/1
4 CAPSULE, EXTENDED RELEASE ORAL ONCE
Refills: 0 | Status: DISCONTINUED | OUTPATIENT
Start: 2023-01-13 | End: 2023-01-13

## 2023-01-13 RX ADMIN — HYDROMORPHONE HYDROCHLORIDE 0.5 MILLIGRAM(S): 2 INJECTION INTRAMUSCULAR; INTRAVENOUS; SUBCUTANEOUS at 21:00

## 2023-01-13 RX ADMIN — HYDROMORPHONE HYDROCHLORIDE 1 MILLIGRAM(S): 2 INJECTION INTRAMUSCULAR; INTRAVENOUS; SUBCUTANEOUS at 16:00

## 2023-01-13 RX ADMIN — Medication 50 MILLIGRAM(S): at 23:55

## 2023-01-13 RX ADMIN — SODIUM CHLORIDE 1000 MILLILITER(S): 9 INJECTION INTRAMUSCULAR; INTRAVENOUS; SUBCUTANEOUS at 15:59

## 2023-01-13 RX ADMIN — PANTOPRAZOLE SODIUM 40 MILLIGRAM(S): 20 TABLET, DELAYED RELEASE ORAL at 15:59

## 2023-01-13 RX ADMIN — HYDROMORPHONE HYDROCHLORIDE 0.5 MILLIGRAM(S): 2 INJECTION INTRAMUSCULAR; INTRAVENOUS; SUBCUTANEOUS at 20:29

## 2023-01-13 RX ADMIN — DIATRIZOATE MEGLUMINE 30 MILLILITER(S): 180 INJECTION, SOLUTION INTRAVESICAL at 15:59

## 2023-01-13 RX ADMIN — Medication 1 MILLIGRAM(S): at 23:55

## 2023-01-13 RX ADMIN — ONDANSETRON 4 MILLIGRAM(S): 8 TABLET, FILM COATED ORAL at 15:59

## 2023-01-13 RX ADMIN — MORPHINE SULFATE 4 MILLIGRAM(S): 50 CAPSULE, EXTENDED RELEASE ORAL at 23:56

## 2023-01-13 NOTE — ED PROVIDER NOTE - CLINICAL SUMMARY MEDICAL DECISION MAKING FREE TEXT BOX
48 y/o F presents to ED with intense epigastric discomfort in setting of recent gastrojejunostomy revision on 1/9/2023. Concern for possible postoperative complication. Consider perforation, abscess, and gastritis. Will obtain labs, CT a/p, and pain control. Dispo pending w/u and reevaluation.

## 2023-01-13 NOTE — ED PROVIDER NOTE - PHYSICAL EXAMINATION
VITAL SIGNS: I have reviewed nursing notes and confirm.  CONSTITUTIONAL: Obese; Well-developed; in no acute distress.  SKIN: Agree with RN documentation regarding decubitus evaluation. Remainder of skin exam is warm and dry, no acute rash.  HEAD: Normocephalic; atraumatic.  EYES: PERRL, EOM intact; conjunctiva and sclera clear.  ENT: No nasal discharge; airway clear.  NECK: Supple; non tender.  CARD: S1, S2 normal; no murmurs, gallops, or rubs. Regular rate and rhythm.  RESP: No wheezes, rales or rhonchi.  ABD: Normal bowel sounds; soft; non-distended; epigastric tenderness with guarding; no hepatosplenomegaly.  EXT: Normal ROM. No clubbing, cyanosis or edema.  LYMPH: No acute cervical adenopathy.  NEURO: Alert, oriented. Grossly unremarkable.  PSYCH: Cooperative, appropriate.

## 2023-01-13 NOTE — ED ADULT NURSE NOTE - OBJECTIVE STATEMENT
patient arrived to ED c/o upper abdominal pain. endorses nausea, denies vomiting, fever, cough, shortness of breath. patient states had a procedure done on monday.

## 2023-01-13 NOTE — ED PROVIDER NOTE - OBJECTIVE STATEMENT
48 y/o F with PMHx fibromyalgia, anemia, intussusception, PSHx hysterectomy, cholecystectomy, gastric bypass, and gastrojejunostomy revision on 1/9/2023 by Dr. Wong presents to ED c/o intensive abdominal pain x2 days with associated nausea but no vomiting. Discomfort is greatest at epigastric region. Also with diarrhea yesterday. Denies black/bloody stools. 46 y/o F with PMHx fibromyalgia, anemia, intussusception, PS-Hx hysterectomy, cholecystectomy, gastric bypass, and gastrojejunostomy revision on 1/9/2023 by Dr. Wong presents to ED c/o intensive abdominal pain x2 days with associated nausea but no vomiting. Discomfort is greatest at epigastric region. Also with diarrhea yesterday. Denies black/bloody stools.

## 2023-01-14 ENCOUNTER — INPATIENT (INPATIENT)
Facility: HOSPITAL | Age: 48
LOS: 1 days | Discharge: HOME | DRG: 392 | End: 2023-01-16
Attending: SURGERY | Admitting: GENERAL ACUTE CARE HOSPITAL
Payer: MEDICAID

## 2023-01-14 DIAGNOSIS — Z90.710 ACQUIRED ABSENCE OF BOTH CERVIX AND UTERUS: Chronic | ICD-10-CM

## 2023-01-14 DIAGNOSIS — Z90.49 ACQUIRED ABSENCE OF OTHER SPECIFIED PARTS OF DIGESTIVE TRACT: Chronic | ICD-10-CM

## 2023-01-14 LAB
ANION GAP SERPL CALC-SCNC: 8 MMOL/L — SIGNIFICANT CHANGE UP (ref 5–17)
BUN SERPL-MCNC: 7 MG/DL — SIGNIFICANT CHANGE UP (ref 7–23)
CALCIUM SERPL-MCNC: 9.2 MG/DL — SIGNIFICANT CHANGE UP (ref 8.4–10.5)
CHLORIDE SERPL-SCNC: 101 MMOL/L — SIGNIFICANT CHANGE UP (ref 96–108)
CO2 SERPL-SCNC: 27 MMOL/L — SIGNIFICANT CHANGE UP (ref 22–31)
CREAT SERPL-MCNC: 0.68 MG/DL — SIGNIFICANT CHANGE UP (ref 0.5–1.3)
EGFR: 107 ML/MIN/1.73M2 — SIGNIFICANT CHANGE UP
GLUCOSE SERPL-MCNC: 100 MG/DL — HIGH (ref 70–99)
HCT VFR BLD CALC: 38.6 % — SIGNIFICANT CHANGE UP (ref 34.5–45)
HGB BLD-MCNC: 13 G/DL — SIGNIFICANT CHANGE UP (ref 11.5–15.5)
MAGNESIUM SERPL-MCNC: 2.2 MG/DL — SIGNIFICANT CHANGE UP (ref 1.6–2.6)
MCHC RBC-ENTMCNC: 30.2 PG — SIGNIFICANT CHANGE UP (ref 27–34)
MCHC RBC-ENTMCNC: 33.7 GM/DL — SIGNIFICANT CHANGE UP (ref 32–36)
MCV RBC AUTO: 89.6 FL — SIGNIFICANT CHANGE UP (ref 80–100)
NRBC # BLD: 0 /100 WBCS — SIGNIFICANT CHANGE UP (ref 0–0)
PHOSPHATE SERPL-MCNC: 3.7 MG/DL — SIGNIFICANT CHANGE UP (ref 2.5–4.5)
PLATELET # BLD AUTO: 298 K/UL — SIGNIFICANT CHANGE UP (ref 150–400)
POTASSIUM SERPL-MCNC: 4.7 MMOL/L — SIGNIFICANT CHANGE UP (ref 3.5–5.3)
POTASSIUM SERPL-SCNC: 4.7 MMOL/L — SIGNIFICANT CHANGE UP (ref 3.5–5.3)
RBC # BLD: 4.31 M/UL — SIGNIFICANT CHANGE UP (ref 3.8–5.2)
RBC # FLD: 12.7 % — SIGNIFICANT CHANGE UP (ref 10.3–14.5)
SODIUM SERPL-SCNC: 136 MMOL/L — SIGNIFICANT CHANGE UP (ref 135–145)
WBC # BLD: 5.69 K/UL — SIGNIFICANT CHANGE UP (ref 3.8–10.5)
WBC # FLD AUTO: 5.69 K/UL — SIGNIFICANT CHANGE UP (ref 3.8–10.5)

## 2023-01-14 RX ORDER — GABAPENTIN 400 MG/1
300 CAPSULE ORAL THREE TIMES A DAY
Refills: 0 | Status: DISCONTINUED | OUTPATIENT
Start: 2023-01-14 | End: 2023-01-16

## 2023-01-14 RX ORDER — FERROUS SULFATE 325(65) MG
325 TABLET ORAL THREE TIMES A DAY
Refills: 0 | Status: DISCONTINUED | OUTPATIENT
Start: 2023-01-14 | End: 2023-01-16

## 2023-01-14 RX ORDER — FAMOTIDINE 10 MG/ML
20 INJECTION INTRAVENOUS
Refills: 0 | Status: DISCONTINUED | OUTPATIENT
Start: 2023-01-14 | End: 2023-01-14

## 2023-01-14 RX ORDER — SODIUM CHLORIDE 9 MG/ML
1000 INJECTION, SOLUTION INTRAVENOUS
Refills: 0 | Status: DISCONTINUED | OUTPATIENT
Start: 2023-01-14 | End: 2023-01-16

## 2023-01-14 RX ORDER — ONDANSETRON 8 MG/1
4 TABLET, FILM COATED ORAL EVERY 6 HOURS
Refills: 0 | Status: DISCONTINUED | OUTPATIENT
Start: 2023-01-14 | End: 2023-01-16

## 2023-01-14 RX ORDER — INFLUENZA VIRUS VACCINE 15; 15; 15; 15 UG/.5ML; UG/.5ML; UG/.5ML; UG/.5ML
0.5 SUSPENSION INTRAMUSCULAR ONCE
Refills: 0 | Status: DISCONTINUED | OUTPATIENT
Start: 2023-01-14 | End: 2023-01-16

## 2023-01-14 RX ORDER — PREGABALIN 225 MG/1
1000 CAPSULE ORAL DAILY
Refills: 0 | Status: DISCONTINUED | OUTPATIENT
Start: 2023-01-14 | End: 2023-01-16

## 2023-01-14 RX ORDER — ACETAMINOPHEN 500 MG
650 TABLET ORAL EVERY 6 HOURS
Refills: 0 | Status: DISCONTINUED | OUTPATIENT
Start: 2023-01-14 | End: 2023-01-16

## 2023-01-14 RX ORDER — HEPARIN SODIUM 5000 [USP'U]/ML
5000 INJECTION INTRAVENOUS; SUBCUTANEOUS EVERY 8 HOURS
Refills: 0 | Status: DISCONTINUED | OUTPATIENT
Start: 2023-01-14 | End: 2023-01-16

## 2023-01-14 RX ORDER — PANTOPRAZOLE SODIUM 20 MG/1
40 TABLET, DELAYED RELEASE ORAL
Refills: 0 | Status: DISCONTINUED | OUTPATIENT
Start: 2023-01-14 | End: 2023-01-16

## 2023-01-14 RX ADMIN — SODIUM CHLORIDE 60 MILLILITER(S): 9 INJECTION, SOLUTION INTRAVENOUS at 06:02

## 2023-01-14 RX ADMIN — Medication 325 MILLIGRAM(S): at 22:40

## 2023-01-14 RX ADMIN — Medication 200 MILLIGRAM(S): at 06:01

## 2023-01-14 RX ADMIN — Medication 200 MILLIGRAM(S): at 14:12

## 2023-01-14 RX ADMIN — GABAPENTIN 300 MILLIGRAM(S): 400 CAPSULE ORAL at 22:40

## 2023-01-14 RX ADMIN — PREGABALIN 1000 MICROGRAM(S): 225 CAPSULE ORAL at 11:05

## 2023-01-14 RX ADMIN — GABAPENTIN 300 MILLIGRAM(S): 400 CAPSULE ORAL at 14:07

## 2023-01-14 RX ADMIN — HEPARIN SODIUM 5000 UNIT(S): 5000 INJECTION INTRAVENOUS; SUBCUTANEOUS at 14:07

## 2023-01-14 RX ADMIN — HEPARIN SODIUM 5000 UNIT(S): 5000 INJECTION INTRAVENOUS; SUBCUTANEOUS at 22:39

## 2023-01-14 RX ADMIN — PANTOPRAZOLE SODIUM 40 MILLIGRAM(S): 20 TABLET, DELAYED RELEASE ORAL at 06:01

## 2023-01-14 RX ADMIN — HEPARIN SODIUM 5000 UNIT(S): 5000 INJECTION INTRAVENOUS; SUBCUTANEOUS at 06:01

## 2023-01-14 RX ADMIN — SODIUM CHLORIDE 60 MILLILITER(S): 9 INJECTION, SOLUTION INTRAVENOUS at 03:01

## 2023-01-14 RX ADMIN — MORPHINE SULFATE 4 MILLIGRAM(S): 50 CAPSULE, EXTENDED RELEASE ORAL at 00:32

## 2023-01-14 RX ADMIN — Medication 325 MILLIGRAM(S): at 06:01

## 2023-01-14 RX ADMIN — GABAPENTIN 300 MILLIGRAM(S): 400 CAPSULE ORAL at 06:01

## 2023-01-14 RX ADMIN — Medication 325 MILLIGRAM(S): at 14:07

## 2023-01-14 NOTE — PROGRESS NOTE ADULT - ASSESSMENT
This is a 47-yo female with morbid obesity S/P gastric bypass (2009) C/B intussuception of JJ anastomosis 2015, overdistention of JJ s/p resection in 2017, and SBO s/p pelvic GENI in 2018 and more recently weight regain now S/P endoscopic gastrojejunostomy revision (1/9/2023) presenting with abdominal pain and PO intolerance for 1 day with normal CT findings.     Plan:   CLD/IVF  Pain/nasuea control  home meds  OOBA/SCD/IS/SQH  AM labs   This is a 47-yo female with morbid obesity S/P gastric bypass (2009) C/B intussuception of JJ anastomosis 2015, overdistention of JJ s/p resection in 2017, and SBO s/p pelvic GENI in 2018 and more recently weight regain now S/P endoscopic gastrojejunostomy revision (1/9/2023) presenting with abdominal pain and PO intolerance for 1 day with normal CT findings.     Plan:   CLD/IVF  f/u diet tolerance  Pain/nasuea control  home meds  OOBA/SCD/IS/SQH  AM labs

## 2023-01-14 NOTE — DIETITIAN INITIAL EVALUATION ADULT - PERTINENT MEDS FT
MEDICATIONS  (STANDING):  cyanocobalamin 1000 MICROGram(s) Oral daily  ferrous    sulfate 325 milliGRAM(s) Oral three times a day  gabapentin 300 milliGRAM(s) Oral three times a day  heparin   Injectable 5000 Unit(s) SubCutaneous every 8 hours  influenza   Vaccine 0.5 milliLiter(s) IntraMuscular once  lactated ringers. 1000 milliLiter(s) (60 mL/Hr) IV Continuous <Continuous>  pantoprazole    Tablet 40 milliGRAM(s) Oral before breakfast  pregabalin 200 milliGRAM(s) Oral three times a day    MEDICATIONS  (PRN):  acetaminophen     Tablet .. 650 milliGRAM(s) Oral every 6 hours PRN Mild Pain (1 - 3), Moderate Pain (4 - 6)  ondansetron Injectable 4 milliGRAM(s) IV Push every 6 hours PRN Nausea and/or Vomiting  oxycodone    5 mG/acetaminophen 325 mG 1 Tablet(s) Oral every 6 hours PRN Severe Pain (7 - 10)

## 2023-01-14 NOTE — PATIENT PROFILE ADULT - FALL HARM RISK - HARM RISK INTERVENTIONS

## 2023-01-14 NOTE — DIETITIAN INITIAL EVALUATION ADULT - OTHER CALCULATIONS
Fluids per team. IBW used to calculate needs due to pt's current body weight exceeding 120% of IBW adjusted for clinical judgement for significant GI hx and recent procedure.

## 2023-01-14 NOTE — H&P ADULT - NSHPPHYSICALEXAM_GEN_ALL_CORE
PHYSICAL EXAMINATION    Gen: Non-toxic-appearing 47-yo female in NAD  Neurologic: AAOx3; moving all extremities equally.   CV: Normal rate, regular rhythm  Pulm: Breathing comfortably  Abd: Soft, non-distended; moderate TTP in epigastrium without R/G.  Incision: Well healed scars.  : No Epña  Skin: No rashes  Extremities: No edema.  Psychiatric: Interacting normally

## 2023-01-14 NOTE — H&P ADULT - ASSESSMENT
This is a 47-yo female with morbid obesity S/P gastric bypass (2009) C/B intussuception of JJ anastomosis 2015, overdistention of JJ s/p resection in 2017, and SBO s/p pelvic GENI in 2018 and more recently weight regain now S/P endoscopic gastrojejunostomy revision (1/9/2023) presenting with abdominal pain and PO intolerance for 1 day with normal CT findings.

## 2023-01-14 NOTE — DIETITIAN INITIAL EVALUATION ADULT - ADD RECOMMEND
-Continue clear liquid diet; advance as medically able  -Encourage good PO intake  -Honor food preferences as able   -Monitor chemistry, GI fxn, and skin integrity

## 2023-01-14 NOTE — PATIENT PROFILE ADULT - DIETITIAN.
"Vital signs:  Temp: 98.2  F (36.8  C) Temp src: Oral BP: 120/72 Pulse: 68   Resp: 16 SpO2: 99 %     Height: 162.6 cm (5' 4\") Weight: 61.1 kg (134 lb 11.2 oz)  Estimated body mass index is 23.12 kg/m  as calculated from the following:    Height as of this encounter: 1.626 m (5' 4\").    Weight as of this encounter: 61.1 kg (134 lb 11.2 oz).          "
dietitian/nutrition services

## 2023-01-14 NOTE — H&P ADULT - HISTORY OF PRESENT ILLNESS
ID: This is a 47-yo female with morbid obesity S/P gastric bypass (2009) C/B intussuception of JJ anastomosis 2015, overdistention of JJ s/p resection in 2017, and SBO s/p pelvic GENI in 2018 and more recently weight regain now S/P endoscopic gastrojejunostomy revision (1/9/2023) presenting with abdominal pain for 1 day.    HPI:   She notes upper abdominal pain with mild nausea today that has precluded oral intake. She felt normal yesterday and had been taking 1 cup of liquids every hour until this morning when upper abdominal pain began. She has been passing gas normally and had a normal BM today. She denies F/C, CP, SOB. She has not had other obvious symptoms. She otherwise felt ok after the endoscopy on 1/9/23 but pain developed today.    - Prior work-up in ED: labs with WBC 6.82, lactaete 1.0. UA negative. CT with no acute findings.  - PMH: MO  - PSH: gastric bypass (2009), small bowel resection (2017), and ExLap with pelvic GENI (2018); endoscopic gastrojejunostomy revision (1/9/2023)            Vital Signs Last 24 Hrs  T(C): 36.7 (13 Jan 2023 20:29), Max: 36.7 (13 Jan 2023 14:31)  T(F): 98.1 (13 Jan 2023 20:29), Max: 98.1 (13 Jan 2023 20:29)  HR: 61 (13 Jan 2023 20:29) (61 - 65)  BP: 109/66 (13 Jan 2023 20:29) (109/66 - 124/79)  BP(mean): --  RR: 16 (13 Jan 2023 20:29) (16 - 16)  SpO2: 98% (13 Jan 2023 20:29) (98% - 98%)    Parameters below as of 13 Jan 2023 20:29  Patient On (Oxygen Delivery Method): room air              RADIOLOGY & ADDITIONAL STUDIES:  CT A/P: No acute pathology.

## 2023-01-14 NOTE — PROGRESS NOTE ADULT - SUBJECTIVE AND OBJECTIVE BOX
INTERVAL HPI/OVERNIGHT EVENTS:  Overnight, patient admitted to surgery for PO intolerance x1 day with normal CT findings. Patient seen and examined by chief resident and team on AM rounds.     SUBJECTIVE:  MEDICATIONS  (STANDING):  cyanocobalamin 1000 MICROGram(s) Oral daily  ferrous    sulfate 325 milliGRAM(s) Oral three times a day  gabapentin 300 milliGRAM(s) Oral three times a day  heparin   Injectable 5000 Unit(s) SubCutaneous every 8 hours  influenza   Vaccine 0.5 milliLiter(s) IntraMuscular once  lactated ringers. 1000 milliLiter(s) (60 mL/Hr) IV Continuous <Continuous>  pantoprazole    Tablet 40 milliGRAM(s) Oral before breakfast  pregabalin 200 milliGRAM(s) Oral three times a day    MEDICATIONS  (PRN):  acetaminophen     Tablet .. 650 milliGRAM(s) Oral every 6 hours PRN Mild Pain (1 - 3), Moderate Pain (4 - 6)  ondansetron Injectable 4 milliGRAM(s) IV Push every 6 hours PRN Nausea and/or Vomiting  oxycodone    5 mG/acetaminophen 325 mG 1 Tablet(s) Oral every 6 hours PRN Severe Pain (7 - 10)      Vital Signs Last 24 Hrs  T(C): 36.6 (2023 05:00), Max: 36.7 (2023 14:31)  T(F): 97.8 (2023 05:00), Max: 98.1 (2023 20:29)  HR: 52 (2023 05:00) (52 - 65)  BP: 126/67 (2023 05:00) (109/66 - 145/81)  BP(mean): 87 (2023 05:00) (87 - 87)  RR: 18 (2023 05:00) (16 - 18)  SpO2: 98% (2023 05:00) (96% - 98%)    Parameters below as of 2023 05:00  Patient On (Oxygen Delivery Method): room air        PHYSICAL EXAM:  Constitutional: A&Ox3  Respiratory: non labored breathing, no respiratory distress  Cardiovascular: NSR, RRR  Gastrointestinal:                 Incision  Extremities: (-) edema      I&O's Detail    2023 07:01  -  2023 06:36  --------------------------------------------------------  IN:    Lactated Ringers: 300 mL  Total IN: 300 mL    OUT:    Voided (mL): 150 mL  Total OUT: 150 mL    Total NET: 150 mL          LABS:                        13.9   6.82  )-----------( 294      ( 2023 16:03 )             41.4         140  |  104  |  10  ----------------------------<  108<H>  5.1   |  27  |  0.64    Ca    9.4      2023 16:03    TPro  7.1  /  Alb  4.3  /  TBili  0.4  /  DBili  x   /  AST  22  /  ALT  31  /  AlkPhos  164<H>  01-13    PT/INR - ( 2023 16:03 )   PT: 13.0 sec;   INR: 1.09          PTT - ( 2023 16:03 )  PTT:35.2 sec  Urinalysis Basic - ( 2023 18:33 )    Color: Yellow / Appearance: Clear / S.020 / pH: x  Gluc: x / Ketone: 15 mg/dL  / Bili: Negative / Urobili: 0.2 E.U./dL   Blood: x / Protein: NEGATIVE mg/dL / Nitrite: NEGATIVE   Leuk Esterase: NEGATIVE / RBC: x / WBC x   Sq Epi: x / Non Sq Epi: x / Bacteria: x        RADIOLOGY & ADDITIONAL STUDIES: INTERVAL HPI/OVERNIGHT EVENTS:  Overnight, patient admitted to surgery for PO intolerance x1 day with VSS, normal CT findings. Patient seen and examined by chief resident and team on AM rounds. Patient appears well. Will f/u if tolerating diet throughout the day. Patient is without pain, -n/-v, -sob/-cp.     SUBJECTIVE:  MEDICATIONS  (STANDING):  cyanocobalamin 1000 MICROGram(s) Oral daily  ferrous    sulfate 325 milliGRAM(s) Oral three times a day  gabapentin 300 milliGRAM(s) Oral three times a day  heparin   Injectable 5000 Unit(s) SubCutaneous every 8 hours  influenza   Vaccine 0.5 milliLiter(s) IntraMuscular once  lactated ringers. 1000 milliLiter(s) (60 mL/Hr) IV Continuous <Continuous>  pantoprazole    Tablet 40 milliGRAM(s) Oral before breakfast  pregabalin 200 milliGRAM(s) Oral three times a day    MEDICATIONS  (PRN):  acetaminophen     Tablet .. 650 milliGRAM(s) Oral every 6 hours PRN Mild Pain (1 - 3), Moderate Pain (4 - 6)  ondansetron Injectable 4 milliGRAM(s) IV Push every 6 hours PRN Nausea and/or Vomiting  oxycodone    5 mG/acetaminophen 325 mG 1 Tablet(s) Oral every 6 hours PRN Severe Pain (7 - 10)      Vital Signs Last 24 Hrs  T(C): 36.6 (2023 05:00), Max: 36.7 (2023 14:31)  T(F): 97.8 (2023 05:00), Max: 98.1 (2023 20:29)  HR: 52 (2023 05:00) (52 - 65)  BP: 126/67 (2023 05:00) (109/66 - 145/81)  BP(mean): 87 (2023 05:00) (87 - 87)  RR: 18 (2023 05:00) (16 - 18)  SpO2: 98% (2023 05:00) (96% - 98%)    Parameters below as of 2023 05:00  Patient On (Oxygen Delivery Method): room air        PHYSICAL EXAM:  Constitutional: A&Ox3  Respiratory: non labored breathing, no respiratory distress  Cardiovascular: NSR, RRR  Gastrointestinal: soft, NTND Abdomen. No rebound or guarding.   Extremities: (-) edema      I&O's Detail    2023 07:01  -  2023 06:36  --------------------------------------------------------  IN:    Lactated Ringers: 300 mL  Total IN: 300 mL    OUT:    Voided (mL): 150 mL  Total OUT: 150 mL    Total NET: 150 mL          LABS:                        13.9   6.82  )-----------( 294      ( 2023 16:03 )             41.4     -    140  |  104  |  10  ----------------------------<  108<H>  5.1   |  27  |  0.64    Ca    9.4      2023 16:03    TPro  7.1  /  Alb  4.3  /  TBili  0.4  /  DBili  x   /  AST  22  /  ALT  31  /  AlkPhos  164<H>  01-13    PT/INR - ( 2023 16:03 )   PT: 13.0 sec;   INR: 1.09          PTT - ( 2023 16:03 )  PTT:35.2 sec  Urinalysis Basic - ( 2023 18:33 )    Color: Yellow / Appearance: Clear / S.020 / pH: x  Gluc: x / Ketone: 15 mg/dL  / Bili: Negative / Urobili: 0.2 E.U./dL   Blood: x / Protein: NEGATIVE mg/dL / Nitrite: NEGATIVE   Leuk Esterase: NEGATIVE / RBC: x / WBC x   Sq Epi: x / Non Sq Epi: x / Bacteria: x        RADIOLOGY & ADDITIONAL STUDIES:

## 2023-01-14 NOTE — H&P ADULT - NSHPLABSRESULTS_GEN_ALL_CORE
LABS:                        13.9   6.82  )-----------( 294      ( 2023 16:03 )             41.4         140  |  104  |  10  ----------------------------<  108<H>  5.1   |  27  |  0.64    Ca    9.4      2023 16:03    TPro  7.1  /  Alb  4.3  /  TBili  0.4  /  DBili  x   /  AST  22  /  ALT  31  /  AlkPhos  164<H>  01-13    PT/INR - ( 2023 16:03 )   PT: 13.0 sec;   INR: 1.09       PTT - ( 2023 16:03 )  PTT:35.2 sec  Urinalysis Basic - ( 2023 18:33 )  Color: Yellow / Appearance: Clear / S.020 / pH: x  Gluc: x / Ketone: 15 mg/dL  / Bili: Negative / Urobili: 0.2 E.U./dL   Blood: x / Protein: NEGATIVE mg/dL / Nitrite: NEGATIVE   Leuk Esterase: NEGATIVE / RBC: x / WBC x   Sq Epi: x / Non Sq Epi: x / Bacteria: x

## 2023-01-14 NOTE — DIETITIAN INITIAL EVALUATION ADULT - OTHER INFO
This is a 47-yo female with morbid obesity S/P gastric bypass (2009) C/B intussuception of JJ anastomosis 2015, overdistention of JJ s/p resection in 2017, and SBO s/p pelvic GENI in 2018 and more recently weight regain now S/P endoscopic gastrojejunostomy revision (1/9/2023) presenting with abdominal pain for 1 day.    Pt assessed at bedside. Rx and labs reviewed. Pt presents with no overt signs of nutritional wasting. Pt with complicated GI history with VGB 2009 and now s/p gastrojejunostomy revision 1/9. Pt reports poor appetite today and abdominal pain to surgical site. Pt states she is trying to eat her clear liquid diet but some pain with eating; nursing managing GI and pain management. Anticipates appetite/intake will improve as pain settles and ostomy site progresses. UBW of 145 lbs; CBW of 182 lbs -trend sequential wts for validity of discrepancy. No c/o NVCD or difficult chew/swallow. NKA to food. RDN will continue to reassess, intervene, and monitor as appropriate.     Pain: 9/10, ostomy site  GI: Abdomen ND/NT, +BS x4, LBM 1/13  Skin: WDI, no edema

## 2023-01-14 NOTE — DIETITIAN INITIAL EVALUATION ADULT - NUTRITIONGOAL OUTCOME1
- Advance diet as medically able  - Pt will meet 75% or more of protein/energy needs via most appropriate route for nutrition.

## 2023-01-15 LAB
ANION GAP SERPL CALC-SCNC: 8 MMOL/L — SIGNIFICANT CHANGE UP (ref 5–17)
APPEARANCE UR: CLEAR — SIGNIFICANT CHANGE UP
BILIRUB UR-MCNC: NEGATIVE — SIGNIFICANT CHANGE UP
BUN SERPL-MCNC: 4 MG/DL — LOW (ref 7–23)
CALCIUM SERPL-MCNC: 9.2 MG/DL — SIGNIFICANT CHANGE UP (ref 8.4–10.5)
CHLORIDE SERPL-SCNC: 104 MMOL/L — SIGNIFICANT CHANGE UP (ref 96–108)
CO2 SERPL-SCNC: 27 MMOL/L — SIGNIFICANT CHANGE UP (ref 22–31)
COLOR SPEC: YELLOW — SIGNIFICANT CHANGE UP
CREAT SERPL-MCNC: 0.61 MG/DL — SIGNIFICANT CHANGE UP (ref 0.5–1.3)
CULTURE RESULTS: NO GROWTH — SIGNIFICANT CHANGE UP
DIFF PNL FLD: NEGATIVE — SIGNIFICANT CHANGE UP
EGFR: 110 ML/MIN/1.73M2 — SIGNIFICANT CHANGE UP
GLUCOSE SERPL-MCNC: 103 MG/DL — HIGH (ref 70–99)
GLUCOSE UR QL: NEGATIVE — SIGNIFICANT CHANGE UP
HCT VFR BLD CALC: 39.6 % — SIGNIFICANT CHANGE UP (ref 34.5–45)
HGB BLD-MCNC: 12.9 G/DL — SIGNIFICANT CHANGE UP (ref 11.5–15.5)
KETONES UR-MCNC: NEGATIVE — SIGNIFICANT CHANGE UP
LEUKOCYTE ESTERASE UR-ACNC: NEGATIVE — SIGNIFICANT CHANGE UP
MAGNESIUM SERPL-MCNC: 2 MG/DL — SIGNIFICANT CHANGE UP (ref 1.6–2.6)
MCHC RBC-ENTMCNC: 29.3 PG — SIGNIFICANT CHANGE UP (ref 27–34)
MCHC RBC-ENTMCNC: 32.6 GM/DL — SIGNIFICANT CHANGE UP (ref 32–36)
MCV RBC AUTO: 90 FL — SIGNIFICANT CHANGE UP (ref 80–100)
NITRITE UR-MCNC: NEGATIVE — SIGNIFICANT CHANGE UP
NRBC # BLD: 0 /100 WBCS — SIGNIFICANT CHANGE UP (ref 0–0)
PH UR: 6 — SIGNIFICANT CHANGE UP (ref 5–8)
PHOSPHATE SERPL-MCNC: 3.3 MG/DL — SIGNIFICANT CHANGE UP (ref 2.5–4.5)
PLATELET # BLD AUTO: 308 K/UL — SIGNIFICANT CHANGE UP (ref 150–400)
POTASSIUM SERPL-MCNC: 4.3 MMOL/L — SIGNIFICANT CHANGE UP (ref 3.5–5.3)
POTASSIUM SERPL-SCNC: 4.3 MMOL/L — SIGNIFICANT CHANGE UP (ref 3.5–5.3)
PROT UR-MCNC: NEGATIVE MG/DL — SIGNIFICANT CHANGE UP
RBC # BLD: 4.4 M/UL — SIGNIFICANT CHANGE UP (ref 3.8–5.2)
RBC # FLD: 12.9 % — SIGNIFICANT CHANGE UP (ref 10.3–14.5)
SODIUM SERPL-SCNC: 139 MMOL/L — SIGNIFICANT CHANGE UP (ref 135–145)
SP GR SPEC: 1.01 — SIGNIFICANT CHANGE UP (ref 1–1.03)
SPECIMEN SOURCE: SIGNIFICANT CHANGE UP
UROBILINOGEN FLD QL: 1 E.U./DL — SIGNIFICANT CHANGE UP
WBC # BLD: 5.24 K/UL — SIGNIFICANT CHANGE UP (ref 3.8–10.5)
WBC # FLD AUTO: 5.24 K/UL — SIGNIFICANT CHANGE UP (ref 3.8–10.5)

## 2023-01-15 RX ORDER — KETOROLAC TROMETHAMINE 30 MG/ML
15 SYRINGE (ML) INJECTION ONCE
Refills: 0 | Status: DISCONTINUED | OUTPATIENT
Start: 2023-01-15 | End: 2023-01-15

## 2023-01-15 RX ORDER — SODIUM CHLORIDE 9 MG/ML
500 INJECTION, SOLUTION INTRAVENOUS ONCE
Refills: 0 | Status: COMPLETED | OUTPATIENT
Start: 2023-01-15 | End: 2023-01-15

## 2023-01-15 RX ADMIN — HEPARIN SODIUM 5000 UNIT(S): 5000 INJECTION INTRAVENOUS; SUBCUTANEOUS at 13:54

## 2023-01-15 RX ADMIN — Medication 650 MILLIGRAM(S): at 18:10

## 2023-01-15 RX ADMIN — Medication 325 MILLIGRAM(S): at 13:54

## 2023-01-15 RX ADMIN — GABAPENTIN 300 MILLIGRAM(S): 400 CAPSULE ORAL at 06:08

## 2023-01-15 RX ADMIN — Medication 200 MILLIGRAM(S): at 13:54

## 2023-01-15 RX ADMIN — Medication 200 MILLIGRAM(S): at 22:10

## 2023-01-15 RX ADMIN — HEPARIN SODIUM 5000 UNIT(S): 5000 INJECTION INTRAVENOUS; SUBCUTANEOUS at 06:08

## 2023-01-15 RX ADMIN — Medication 15 MILLIGRAM(S): at 20:00

## 2023-01-15 RX ADMIN — SODIUM CHLORIDE 60 MILLILITER(S): 9 INJECTION, SOLUTION INTRAVENOUS at 11:39

## 2023-01-15 RX ADMIN — Medication 325 MILLIGRAM(S): at 06:08

## 2023-01-15 RX ADMIN — Medication 200 MILLIGRAM(S): at 06:08

## 2023-01-15 RX ADMIN — HEPARIN SODIUM 5000 UNIT(S): 5000 INJECTION INTRAVENOUS; SUBCUTANEOUS at 22:10

## 2023-01-15 RX ADMIN — PREGABALIN 1000 MICROGRAM(S): 225 CAPSULE ORAL at 11:25

## 2023-01-15 RX ADMIN — GABAPENTIN 300 MILLIGRAM(S): 400 CAPSULE ORAL at 13:54

## 2023-01-15 RX ADMIN — SODIUM CHLORIDE 500 MILLILITER(S): 9 INJECTION, SOLUTION INTRAVENOUS at 14:54

## 2023-01-15 RX ADMIN — GABAPENTIN 300 MILLIGRAM(S): 400 CAPSULE ORAL at 22:10

## 2023-01-15 RX ADMIN — Medication 15 MILLIGRAM(S): at 19:33

## 2023-01-15 RX ADMIN — PANTOPRAZOLE SODIUM 40 MILLIGRAM(S): 20 TABLET, DELAYED RELEASE ORAL at 06:08

## 2023-01-15 RX ADMIN — SODIUM CHLORIDE 120 MILLILITER(S): 9 INJECTION, SOLUTION INTRAVENOUS at 15:58

## 2023-01-15 RX ADMIN — Medication 325 MILLIGRAM(S): at 22:10

## 2023-01-15 RX ADMIN — Medication 650 MILLIGRAM(S): at 17:45

## 2023-01-15 NOTE — PROGRESS NOTE ADULT - SUBJECTIVE AND OBJECTIVE BOX
SUBJECTIVE: Pt seen and examined by chief resident. Pt is doing well, resting comfortably on bed. Denies abdominal pain. Tolerating minimal liquids. Reporting that she feels that the liquids get stuck while going down and some nasuea. No emesis. Passing gas.  No complaints at this time.    Vital Signs Last 24 Hrs  T(C): 36.6 (15 Nico 2023 08:30), Max: 36.9 (15 Nico 2023 05:01)  T(F): 97.9 (15 Nico 2023 08:30), Max: 98.5 (15 Nico 2023 05:01)  HR: 53 (15 Nico 2023 08:30) (53 - 81)  BP: 103/64 (15 Nico 2023 08:30) (103/64 - 132/79)  BP(mean): --  RR: 16 (15 Nico 2023 08:30) (16 - 18)  SpO2: 96% (15 Nico 2023 08:30) (91% - 96%)    Parameters below as of 15 Nico 2023 08:30  Patient On (Oxygen Delivery Method): room air        I&O's Summary    2023 07:01  -  15 Nico 2023 07:00  --------------------------------------------------------  IN: 1500 mL / OUT: 2600 mL / NET: -1100 mL    15 Nico 2023 07:01  -  15 Nico 2023 09:03  --------------------------------------------------------  IN: 0 mL / OUT: 300 mL / NET: -300 mL        Physical Exam:  General Appearance: Appears well, NAD  Pulmonary: Nonlabored breathing, no respiratory distress  Abdomen: Soft, nondisteded, mild epigastric tenderness  Extremities: WWP, SCD's in place     LABS:                        12.9   5.24  )-----------( 308      ( 15 Nico 2023 05:30 )             39.6     -15    139  |  104  |  4<L>  ----------------------------<  103<H>  4.3   |  27  |  0.61    Ca    9.2      15 Nico 2023 05:30  Phos  3.3     01-15  Mg     2.0     01-15    TPro  7.1  /  Alb  4.3  /  TBili  0.4  /  DBili  x   /  AST  22  /  ALT  31  /  AlkPhos  164<H>      PT/INR - ( 2023 16:03 )   PT: 13.0 sec;   INR: 1.09          PTT - ( 2023 16:03 )  PTT:35.2 sec  Urinalysis Basic - ( 2023 18:33 )    Color: Yellow / Appearance: Clear / S.020 / pH: x  Gluc: x / Ketone: 15 mg/dL  / Bili: Negative / Urobili: 0.2 E.U./dL   Blood: x / Protein: NEGATIVE mg/dL / Nitrite: NEGATIVE   Leuk Esterase: NEGATIVE / RBC: x / WBC x   Sq Epi: x / Non Sq Epi: x / Bacteria: x

## 2023-01-15 NOTE — PROGRESS NOTE ADULT - ASSESSMENT
47-yo female with morbid obesity S/P gastric bypass (2009) C/B intussuception of JJ anastomosis 2015, overdistention of JJ s/p resection in 2017, and SBO s/p pelvic GENI in 2018 and more recently weight regain now S/P endoscopic gastrojejunostomy revision (1/9/2023) presenting with abdominal pain and PO intolerance for 1 day with normal CT findings.     CLD+ ensures/IVF  Pain/nasuea control  home meds  OOBA/SCD/IS/SQH  AM labs  encouraged small sips

## 2023-01-16 ENCOUNTER — TRANSCRIPTION ENCOUNTER (OUTPATIENT)
Age: 48
End: 2023-01-16

## 2023-01-16 VITALS
DIASTOLIC BLOOD PRESSURE: 68 MMHG | OXYGEN SATURATION: 96 % | SYSTOLIC BLOOD PRESSURE: 103 MMHG | RESPIRATION RATE: 18 BRPM | HEART RATE: 63 BPM | TEMPERATURE: 98 F

## 2023-01-16 LAB
ANION GAP SERPL CALC-SCNC: 7 MMOL/L — SIGNIFICANT CHANGE UP (ref 5–17)
BUN SERPL-MCNC: 4 MG/DL — LOW (ref 7–23)
CALCIUM SERPL-MCNC: 8.6 MG/DL — SIGNIFICANT CHANGE UP (ref 8.4–10.5)
CHLORIDE SERPL-SCNC: 109 MMOL/L — HIGH (ref 96–108)
CO2 SERPL-SCNC: 25 MMOL/L — SIGNIFICANT CHANGE UP (ref 22–31)
CREAT SERPL-MCNC: 0.53 MG/DL — SIGNIFICANT CHANGE UP (ref 0.5–1.3)
EGFR: 114 ML/MIN/1.73M2 — SIGNIFICANT CHANGE UP
GLUCOSE SERPL-MCNC: 92 MG/DL — SIGNIFICANT CHANGE UP (ref 70–99)
HCT VFR BLD CALC: 34.6 % — SIGNIFICANT CHANGE UP (ref 34.5–45)
HGB BLD-MCNC: 11.6 G/DL — SIGNIFICANT CHANGE UP (ref 11.5–15.5)
MAGNESIUM SERPL-MCNC: 1.9 MG/DL — SIGNIFICANT CHANGE UP (ref 1.6–2.6)
MCHC RBC-ENTMCNC: 29.7 PG — SIGNIFICANT CHANGE UP (ref 27–34)
MCHC RBC-ENTMCNC: 33.5 GM/DL — SIGNIFICANT CHANGE UP (ref 32–36)
MCV RBC AUTO: 88.7 FL — SIGNIFICANT CHANGE UP (ref 80–100)
NRBC # BLD: 0 /100 WBCS — SIGNIFICANT CHANGE UP (ref 0–0)
PHOSPHATE SERPL-MCNC: 2.8 MG/DL — SIGNIFICANT CHANGE UP (ref 2.5–4.5)
PLATELET # BLD AUTO: 264 K/UL — SIGNIFICANT CHANGE UP (ref 150–400)
POTASSIUM SERPL-MCNC: 3.3 MMOL/L — LOW (ref 3.5–5.3)
POTASSIUM SERPL-SCNC: 3.3 MMOL/L — LOW (ref 3.5–5.3)
RBC # BLD: 3.9 M/UL — SIGNIFICANT CHANGE UP (ref 3.8–5.2)
RBC # FLD: 13.4 % — SIGNIFICANT CHANGE UP (ref 10.3–14.5)
SODIUM SERPL-SCNC: 141 MMOL/L — SIGNIFICANT CHANGE UP (ref 135–145)
WBC # BLD: 4.68 K/UL — SIGNIFICANT CHANGE UP (ref 3.8–10.5)
WBC # FLD AUTO: 4.68 K/UL — SIGNIFICANT CHANGE UP (ref 3.8–10.5)

## 2023-01-16 PROCEDURE — 99285 EMERGENCY DEPT VISIT HI MDM: CPT

## 2023-01-16 PROCEDURE — 83605 ASSAY OF LACTIC ACID: CPT

## 2023-01-16 PROCEDURE — 96376 TX/PRO/DX INJ SAME DRUG ADON: CPT

## 2023-01-16 PROCEDURE — 81003 URINALYSIS AUTO W/O SCOPE: CPT

## 2023-01-16 PROCEDURE — 85610 PROTHROMBIN TIME: CPT

## 2023-01-16 PROCEDURE — 80048 BASIC METABOLIC PNL TOTAL CA: CPT

## 2023-01-16 PROCEDURE — 96374 THER/PROPH/DIAG INJ IV PUSH: CPT

## 2023-01-16 PROCEDURE — 87086 URINE CULTURE/COLONY COUNT: CPT

## 2023-01-16 PROCEDURE — 83735 ASSAY OF MAGNESIUM: CPT

## 2023-01-16 PROCEDURE — 83690 ASSAY OF LIPASE: CPT

## 2023-01-16 PROCEDURE — 96375 TX/PRO/DX INJ NEW DRUG ADDON: CPT

## 2023-01-16 PROCEDURE — 87635 SARS-COV-2 COVID-19 AMP PRB: CPT

## 2023-01-16 PROCEDURE — 36415 COLL VENOUS BLD VENIPUNCTURE: CPT

## 2023-01-16 PROCEDURE — 85730 THROMBOPLASTIN TIME PARTIAL: CPT

## 2023-01-16 PROCEDURE — 84100 ASSAY OF PHOSPHORUS: CPT

## 2023-01-16 PROCEDURE — 85025 COMPLETE CBC W/AUTO DIFF WBC: CPT

## 2023-01-16 PROCEDURE — 74177 CT ABD & PELVIS W/CONTRAST: CPT | Mod: MA

## 2023-01-16 PROCEDURE — 80053 COMPREHEN METABOLIC PANEL: CPT

## 2023-01-16 PROCEDURE — 85027 COMPLETE CBC AUTOMATED: CPT

## 2023-01-16 RX ORDER — OXYCODONE AND ACETAMINOPHEN 5; 325 MG/1; MG/1
1 TABLET ORAL
Qty: 0 | Refills: 0 | DISCHARGE

## 2023-01-16 RX ORDER — POTASSIUM CHLORIDE 20 MEQ
40 PACKET (EA) ORAL ONCE
Refills: 0 | Status: COMPLETED | OUTPATIENT
Start: 2023-01-16 | End: 2023-01-16

## 2023-01-16 RX ORDER — POTASSIUM CHLORIDE 20 MEQ
10 PACKET (EA) ORAL
Refills: 0 | Status: COMPLETED | OUTPATIENT
Start: 2023-01-16 | End: 2023-01-16

## 2023-01-16 RX ORDER — MAGNESIUM SULFATE 500 MG/ML
1 VIAL (ML) INJECTION ONCE
Refills: 0 | Status: COMPLETED | OUTPATIENT
Start: 2023-01-16 | End: 2023-01-16

## 2023-01-16 RX ADMIN — Medication 650 MILLIGRAM(S): at 07:08

## 2023-01-16 RX ADMIN — HEPARIN SODIUM 5000 UNIT(S): 5000 INJECTION INTRAVENOUS; SUBCUTANEOUS at 06:07

## 2023-01-16 RX ADMIN — Medication 650 MILLIGRAM(S): at 12:07

## 2023-01-16 RX ADMIN — Medication 325 MILLIGRAM(S): at 13:58

## 2023-01-16 RX ADMIN — PREGABALIN 1000 MICROGRAM(S): 225 CAPSULE ORAL at 11:58

## 2023-01-16 RX ADMIN — Medication 200 MILLIGRAM(S): at 06:09

## 2023-01-16 RX ADMIN — Medication 100 MILLIEQUIVALENT(S): at 13:57

## 2023-01-16 RX ADMIN — SODIUM CHLORIDE 120 MILLILITER(S): 9 INJECTION, SOLUTION INTRAVENOUS at 15:53

## 2023-01-16 RX ADMIN — Medication 40 MILLIEQUIVALENT(S): at 08:49

## 2023-01-16 RX ADMIN — Medication 100 GRAM(S): at 08:49

## 2023-01-16 RX ADMIN — GABAPENTIN 300 MILLIGRAM(S): 400 CAPSULE ORAL at 13:58

## 2023-01-16 RX ADMIN — Medication 650 MILLIGRAM(S): at 06:08

## 2023-01-16 RX ADMIN — Medication 200 MILLIGRAM(S): at 13:58

## 2023-01-16 RX ADMIN — HEPARIN SODIUM 5000 UNIT(S): 5000 INJECTION INTRAVENOUS; SUBCUTANEOUS at 13:58

## 2023-01-16 RX ADMIN — ONDANSETRON 4 MILLIGRAM(S): 8 TABLET, FILM COATED ORAL at 15:57

## 2023-01-16 RX ADMIN — Medication 650 MILLIGRAM(S): at 01:20

## 2023-01-16 RX ADMIN — Medication 650 MILLIGRAM(S): at 12:30

## 2023-01-16 RX ADMIN — PANTOPRAZOLE SODIUM 40 MILLIGRAM(S): 20 TABLET, DELAYED RELEASE ORAL at 06:26

## 2023-01-16 RX ADMIN — GABAPENTIN 300 MILLIGRAM(S): 400 CAPSULE ORAL at 06:08

## 2023-01-16 RX ADMIN — Medication 100 MILLIEQUIVALENT(S): at 11:57

## 2023-01-16 RX ADMIN — SODIUM CHLORIDE 120 MILLILITER(S): 9 INJECTION, SOLUTION INTRAVENOUS at 06:07

## 2023-01-16 RX ADMIN — Medication 100 MILLIEQUIVALENT(S): at 10:05

## 2023-01-16 RX ADMIN — Medication 325 MILLIGRAM(S): at 06:08

## 2023-01-16 RX ADMIN — Medication 650 MILLIGRAM(S): at 00:20

## 2023-01-16 NOTE — PROGRESS NOTE ADULT - ASSESSMENT
47-yo female with morbid obesity S/P gastric bypass (2009) C/B intussuception of JJ anastomosis 2015, overdistention of JJ s/p resection in 2017, and SBO s/p pelvic GENI in 2018 and more recently weight regain now S/P endoscopic gastrojejunostomy revision (1/9/2023) presenting with abdominal pain and PO intolerance for 1 day with normal CT findings. Patient now with improved PO intake and pain controlled.    CLD+ ensures/IVF  Pain/nasuea control  home meds  OOBA/SCD/IS/SQH  AM labs  UGI series today   47-yo female with morbid obesity S/P gastric bypass (2009) C/B intussuception of JJ anastomosis 2015, overdistention of JJ s/p resection in 2017, and SBO s/p pelvic GENI in 2018 and more recently weight regain now S/P endoscopic gastrojejunostomy revision (1/9/2023) presenting with abdominal pain and PO intolerance for 1 day with normal CT findings. Patient now with improved PO intake and pain controlled.    CLD+ ensures protein/IVF  Pain/nasuea control  home meds  OOBA/SCD/IS/SQH  AM labs

## 2023-01-16 NOTE — DISCHARGE NOTE NURSING/CASE MANAGEMENT/SOCIAL WORK - PATIENT PORTAL LINK FT
You can access the FollowMyHealth Patient Portal offered by [facility] by registering at the following website: [email or web address]. By joining [employer]’s FollowMyHealth portal, you will also be able to view your health information using other applications (apps) compatible with our system.

## 2023-01-16 NOTE — DISCHARGE NOTE PROVIDER - NSDCFUADDAPPT_GEN_ALL_CORE_FT
Please follow up with Dr. Wong in his office in 1 week. You can call [phone] to schedule an appointment.

## 2023-01-16 NOTE — DISCHARGE NOTE PROVIDER - NSDCMRMEDTOKEN_GEN_ALL_CORE_FT
cyanocobalamin 2500 mcg sublingual tablet: 1 tab(s) sublingual once a day  ferrous sulfate 325 mg (65 mg elemental iron) oral tablet: 1 tab(s) orally 3 times a day  gabapentin 300 mg oral tablet: 300 milligram(s) orally 3 times a day  lidocaine 5% topical film: Apply topically to affected area once a day   Lyrica: 200 milligram(s) orally 3 times a day  meloxicam 15 mg oral tablet: 1 tab(s) orally once a day   methocarbamol 750 mg oral tablet: 1 tab(s) orally 3 times a day   oxycodone-acetaminophen 7.5 mg-300 mg oral tablet: 1 tab(s) orally every 6 hours  pantoprazole 40 mg oral delayed release tablet: 1 tab(s) orally 2 times a day    cyanocobalamin 2500 mcg sublingual tablet: 1 tab(s) sublingual once a day  ferrous sulfate 325 mg (65 mg elemental iron) oral tablet: 1 tab(s) orally 3 times a day  gabapentin 300 mg oral tablet: 300 milligram(s) orally 3 times a day  lidocaine 5% topical film: Apply topically to affected area once a day   Lyrica: 200 milligram(s) orally 3 times a day  methocarbamol 750 mg oral tablet: 1 tab(s) orally 3 times a day   pantoprazole 40 mg oral delayed release tablet: 1 tab(s) orally 2 times a day

## 2023-01-16 NOTE — DISCHARGE NOTE PROVIDER - HOSPITAL COURSE
Patient is a 47F with PMHx of morbid obesity S/P gastric bypass (2009) complicated by intussusception of JJ anastomosis 2015, overdistention of JJ s/p resection in 2017, and SBO s/p pelvic GENI in 2018 and more recently weight regain now S/P endoscopic gastrojejunostomy revision (1/9/2023) who presented to [facility] on 1/14 with abdominal pain for 1 day. Patient reported on presentation that she had upper abdominal pain with mild nausea starting in the AM that has precluded oral intake. She felt normal day prior and had been taking 1 cup of liquids every hour until morning of presentation when upper abdominal pain began. She has been passing gas normally and having bowel movements. She denies F/C, CP, SOB. She has not had other obvious symptoms. She otherwise felt ok after the endoscopy on 1/9/23 but pain developed 1/13. On presentation, she was afebrile with stable vital signs and normal labs. CT scan was notable to no acute intraabdominal pathology. Her UA revealed slight ketosis, but was otherwise negative. She was admitted to the bariatric service for further monitoring of poor PO intake.  Her hospital course was unremarkable. She was started on a clear liquid diet which she tolerated with mild nausea and pain in epigastrium. She had one episode of emeiss on HD1 post PO intake, but was able to tolerate PO subsequently without emesis. By HD3, patient was tolerating BCLD well without nausea and only mild pain in epigastrium. She was passing flatus and having bowel movements and ambulating without issue. Her lab work remained within normal limits. Protein ensures were added and she tolerated those well without nausea or emesis. Plan was for upper GI series, but due to patient's clinical improvement, decision was made to D/C with patient for outpatient follow up with dr. Wong. On day of discharge, patient was hemodynamically stable and tolerating diet well with minimal pain.

## 2023-01-16 NOTE — DISCHARGE NOTE PROVIDER - CARE PROVIDER_API CALL
Marciano Wong (MD)  Surgery  [address] [id number]  Phone: [phone]  Fax: [phone]  Follow Up Time: 1 week

## 2023-01-16 NOTE — DISCHARGE NOTE PROVIDER - NSDCCPCAREPLAN_GEN_ALL_CORE_FT
PRINCIPAL DISCHARGE DIAGNOSIS  Diagnosis: History of food intolerance  Assessment and Plan of Treatment:

## 2023-01-16 NOTE — DISCHARGE NOTE PROVIDER - CARE PROVIDERS DIRECT ADDRESSES
[email or web address]

## 2023-01-16 NOTE — DISCHARGE NOTE PROVIDER - NSDCFUADDINST_GEN_ALL_CORE_FT
General Discharge Instructions:  Please resume all regular home medications unless specifically advised not to take a particular medication.  Please get plenty of rest, continue to ambulate several times per day, and drink adequate amounts of fluids.   Avoid driving or operating heavy machinery while taking pain medications.  Please follow-up with your surgeon and Primary Care Provider (PCP) as advised.    Follow up with Dr. Wong in 1 week. Call the office at [phone] to schedule your appointment. No heavy lifting (>10lbs) or strenuous exercise. Diet: Bariatric Full Fluids. 60 grams of protein daily.  64 fluid ounces water daily. Drink small sips throughout the day. Continue diet as outlined by paperwork received as a pre-operative patient. You should be urinating at least 3-4x per day. Call the office if you experience increasing abdominal pain, nausea, vomiting, or temperature >100.4F.  NO ASPIRIN AND NSAIDs until approved by Dr. Wong. Avoid alcoholic beverages until cleared by Dr. Wong.    Warning Signs:  Please call your doctor if you experience the following:  *You experience new chest pain, pressure, squeezing or tightness.  *New or worsening cough, shortness of breath, or wheeze.  *If you are vomiting and cannot keep down fluids or your medications.  *You are getting dehydrated due to continued vomiting, diarrhea, or other reasons. Signs of dehydration include dry mouth, rapid heartbeat, or feeling dizzy or faint when standing.  *You see blood or dark/black material when you vomit or have a bowel movement.  *You experience burning when you urinate, have blood in your urine, or experience a discharge.  *Your pain is not improving within 8-12 hours or is not gone within 24 hours. Call or return immediately if your pain is getting worse, changes location, or moves to your chest or back.  *You have shaking chills, or fever greater than 101.5 degrees Fahrenheit or 38 degrees Celsius.  *Any change in your symptoms, or any new symptoms that concern you.

## 2023-01-16 NOTE — PROGRESS NOTE ADULT - SUBJECTIVE AND OBJECTIVE BOX
General Surgery Progress Note    SUBJECTIVE:   Patient seen and examined at bedside by chief during AM rounds. No acute events noted overnight and patient was noted to have stable vital signs. Patient reports she is able to tolerate liquid diet without minimal nausea. States she has tolerated 1 ensure and 1 cup of juice overnight. Denies emesis. States she is passing flatus and having bowel movements. States she is voiding without issue. States she has been out of bed and ambulating. Denies fevers or chills.    Vital Signs Last 24 Hrs  T(C): 36.8 (2023 05:29), Max: 36.8 (15 Nico 2023 20:51)  T(F): 98.2 (2023 05:29), Max: 98.2 (15 Nico 2023 20:51)  HR: 65 (2023 05:29) (53 - 65)  BP: 97/63 (2023 05:29) (95/56 - 107/65)  BP(mean): --  RR: 17 (2023 05:29) (16 - 19)  SpO2: 94% (2023 05:29) (94% - 96%)    Parameters below as of 2023 05:29  Patient On (Oxygen Delivery Method): room air        I&O's Summary    2023 07:01  -  15 Nico 2023 07:00  --------------------------------------------------------  IN: 1500 mL / OUT: 2600 mL / NET: -1100 mL    15 Nico 2023 07:01  -  2023 06:27  --------------------------------------------------------  IN: 2740 mL / OUT: 2950 mL / NET: -210 mL        Physical Exam:  General: Resting comfortably in bed, NAD  HEENT: ATNC  Pulmonary: Nonlabored breathing, no respiratory distress, no accessory muscle use noted  Cardiovascular: NSR  Abdomen: Soft, nondistended, mild tenderness to palpation in epigastrium, no rebound or guarding  Extremities: WWP, SCDs in place, No significant edema appreciated    LABS:                        12.9   5.24  )-----------( 308      ( 15 Nico 2023 05:30 )             39.6     01-15    139  |  104  |  4<L>  ----------------------------<  103<H>  4.3   |  27  |  0.61    Ca    9.2      15 Nico 2023 05:30  Phos  3.3     -15  Mg     2.0     -15        Urinalysis Basic - ( 15 Nico 2023 08:58 )    Color: Yellow / Appearance: Clear / S.010 / pH: x  Gluc: x / Ketone: NEGATIVE  / Bili: Negative / Urobili: 1.0 E.U./dL   Blood: x / Protein: NEGATIVE mg/dL / Nitrite: NEGATIVE   Leuk Esterase: NEGATIVE / RBC: x / WBC x   Sq Epi: x / Non Sq Epi: x / Bacteria: x

## 2023-01-16 NOTE — DISCHARGE NOTE NURSING/CASE MANAGEMENT/SOCIAL WORK - NSDCPEFALRISK_GEN_ALL_CORE
For information on Fall & Injury Prevention, visit: [email or web address] OR  [email or web address] OR  https://www.cdc.gov/steadi/patient.html

## 2023-01-16 NOTE — DISCHARGE NOTE PROVIDER - NSDCFUSCHEDAPPT_GEN_ALL_CORE_FT
Isidro Hill  [facility] Physician [facility]  ENDOCRIN [address]  Scheduled Appointment: 01/25/2023    Pauline Yap  [facility]  UROLOGY [address]  Scheduled Appointment: 01/30/2023    Pauline Yap  [facility]  UROLOGY [address]  Scheduled Appointment: 02/07/2023

## 2023-01-16 NOTE — DISCHARGE NOTE NURSING/CASE MANAGEMENT/SOCIAL WORK - NSDCFUADDAPPT_GEN_ALL_CORE_FT
Please follow up with Dr. Wong in his office in 1 week. You can call [phone] to schedule an appointment.

## 2023-01-18 NOTE — ED ADULT NURSE NOTE - NS ED NURSE DC INFO COMPLEXITY
Simple: Patient demonstrates quick and easy understanding/Verbalized Understanding Finasteride Counseling:  I discussed with the patient the risks of use of finasteride including but not limited to decreased libido, decreased ejaculate volume, gynecomastia, and depression. Women should not handle medication.  All of the patient's questions and concerns were addressed. Finasteride Male Counseling: Finasteride Counseling:  I discussed with the patient the risks of use of finasteride including but not limited to decreased libido, decreased ejaculate volume, gynecomastia, and depression. Women should not handle medication.  All of the patient's questions and concerns were addressed.

## 2023-01-19 ENCOUNTER — APPOINTMENT (OUTPATIENT)
Dept: BARIATRICS | Facility: CLINIC | Age: 48
End: 2023-01-19
Payer: MEDICAID

## 2023-01-19 VITALS
TEMPERATURE: 99 F | OXYGEN SATURATION: 98 % | HEART RATE: 72 BPM | HEIGHT: 61 IN | WEIGHT: 180 LBS | BODY MASS INDEX: 33.99 KG/M2 | DIASTOLIC BLOOD PRESSURE: 77 MMHG | SYSTOLIC BLOOD PRESSURE: 119 MMHG

## 2023-01-19 DIAGNOSIS — Z79.899 OTHER LONG TERM (CURRENT) DRUG THERAPY: ICD-10-CM

## 2023-01-19 DIAGNOSIS — Z98.890 OTHER SPECIFIED POSTPROCEDURAL STATES: ICD-10-CM

## 2023-01-19 DIAGNOSIS — Z93.4 OTHER ARTIFICIAL OPENINGS OF GASTROINTESTINAL TRACT STATUS: ICD-10-CM

## 2023-01-19 DIAGNOSIS — Z90.710 ACQUIRED ABSENCE OF BOTH CERVIX AND UTERUS: ICD-10-CM

## 2023-01-19 DIAGNOSIS — Z20.822 CONTACT WITH AND (SUSPECTED) EXPOSURE TO COVID-19: ICD-10-CM

## 2023-01-19 DIAGNOSIS — R11.2 NAUSEA WITH VOMITING, UNSPECIFIED: ICD-10-CM

## 2023-01-19 DIAGNOSIS — E83.42 HYPOMAGNESEMIA: ICD-10-CM

## 2023-01-19 DIAGNOSIS — R10.13 EPIGASTRIC PAIN: ICD-10-CM

## 2023-01-19 DIAGNOSIS — M79.7 FIBROMYALGIA: ICD-10-CM

## 2023-01-19 DIAGNOSIS — Z98.84 BARIATRIC SURGERY STATUS: ICD-10-CM

## 2023-01-19 DIAGNOSIS — Z90.49 ACQUIRED ABSENCE OF OTHER SPECIFIED PARTS OF DIGESTIVE TRACT: ICD-10-CM

## 2023-01-19 PROCEDURE — 99214 OFFICE O/P EST MOD 30 MIN: CPT

## 2023-01-19 NOTE — HISTORY OF PRESENT ILLNESS
[de-identified] : Pt is a 45 y/o F with pmhx of RYGB 13 yrs ago, s/p revision for intussusception, fibromyalgia ambulated with walker assisted device, spine problems, morbid obesity BMI 38, who presents today feeling well here for routine f/u after EGD TOR on 1/9/23. Pt presented to the ED last week and was admitted for 5 days for abdominal pain and PO intolerance. CT scan wnl, UGI was not performed as pt's clinical status improved. Pt states since her d/c, she is feeling much better and has been tolerating her PO intake. Reports consuming 3 bottles of simon spring 16oz bottles daily, 1 ensure daily, greek yogurt, and blended soups. Denies feeling lightheaded, dizzy, weak. States she has lost 10lbs since the procedure. Reports having regular BMs and denies n,v,c,d, reflux. Pt encouraged to continue increasing PO intake, understands recommendations are 64oz of fluids daily and 60g protein daily. Pt has appt with pelvic floor therapist on Monday for evaluation of chronic pelvic pain, requesting letter to be cleared to begin therapy.

## 2023-01-19 NOTE — ASSESSMENT
[FreeTextEntry1] : Pt is a 47 y/o F with pmhx of RYGB 13 yrs ago, s/p revision for intussusception, fibromyalgia ambulated with walker assisted device, spine problems, morbid obesity BMI 38, who presents today feeling well here for routine f/u after EGD TOR on 1/9/23. Pt presented to the ED last week and was admitted for 5 days for abdominal pain and PO intolerance. CT scan wnl, UGI was not performed as pt's clinical status improved. Pt states since her d/c, she is feeling much better and has been tolerating her PO intake. Reports consuming 3 bottles of simon spring 16oz bottles daily, 1 ensure daily, greek yogurt, and blended soups. Denies feeling lightheaded, dizzy, weak. States she has lost 10lbs since the procedure. Reports having regular BMs and denies n,v,c,d, reflux. Pt encouraged to continue increasing PO intake, understands recommendations are 64oz of fluids daily and 60g protein daily. Pt has appt with pelvic floor therapist on Monday for evaluation of chronic pelvic pain, requesting letter to be cleared to begin therapy.

## 2023-01-19 NOTE — PLAN
[FreeTextEntry1] : continue full liquid diet until next week, progress to purees next week as tolerated\par continue to increase PO intake fluids and protein\par f/u 1mo

## 2023-01-25 ENCOUNTER — APPOINTMENT (OUTPATIENT)
Dept: ENDOCRINOLOGY | Facility: CLINIC | Age: 48
End: 2023-01-25
Payer: MEDICAID

## 2023-01-25 VITALS
DIASTOLIC BLOOD PRESSURE: 68 MMHG | HEART RATE: 68 BPM | BODY MASS INDEX: 34.01 KG/M2 | SYSTOLIC BLOOD PRESSURE: 109 MMHG | WEIGHT: 180 LBS

## 2023-01-25 PROCEDURE — 99214 OFFICE O/P EST MOD 30 MIN: CPT

## 2023-01-27 LAB — SARS-COV-2 N GENE NPH QL NAA+PROBE: NOT DETECTED

## 2023-01-27 NOTE — ASU PATIENT PROFILE, ADULT - NS PREOP UNDERSTANDS INFO
spoke to patient in Kiswahili to be NPO/NO solid foods after 2100 on Sunday night. Allow to drink water till  12Mn. dress comfortable , leave all valuable  at home, Bring ID photo insurance and vaccination cards, escort arranged. address and telephone given to patient/yes

## 2023-01-27 NOTE — ASU PATIENT PROFILE, ADULT - FALL HARM RISK - HARM RISK INTERVENTIONS

## 2023-01-27 NOTE — ASU PATIENT PROFILE, ADULT - NSICDXPASTMEDICALHX_GEN_ALL_CORE_FT
PAST MEDICAL HISTORY:  Anemia     Fibromyalgia     Herniated cervical disc     Intussusception     UTI (urinary tract infection)      Negative/Unknown

## 2023-01-27 NOTE — ASU PATIENT PROFILE, ADULT - FALL HARM RISK - FALL HARM RISK
use  cane for walking assistance , on pain medication for fibromyalgia , and post operative , risk for falls/Other

## 2023-01-29 ENCOUNTER — TRANSCRIPTION ENCOUNTER (OUTPATIENT)
Age: 48
End: 2023-01-29

## 2023-01-30 ENCOUNTER — APPOINTMENT (OUTPATIENT)
Dept: UROLOGY | Facility: AMBULATORY SURGERY CENTER | Age: 48
End: 2023-01-30

## 2023-01-30 ENCOUNTER — TRANSCRIPTION ENCOUNTER (OUTPATIENT)
Age: 48
End: 2023-01-30

## 2023-01-30 ENCOUNTER — OUTPATIENT (OUTPATIENT)
Dept: OUTPATIENT SERVICES | Facility: HOSPITAL | Age: 48
LOS: 1 days | Discharge: HOME | End: 2023-01-30
Payer: MEDICAID

## 2023-01-30 VITALS
HEIGHT: 61 IN | RESPIRATION RATE: 14 BRPM | SYSTOLIC BLOOD PRESSURE: 112 MMHG | WEIGHT: 182.98 LBS | OXYGEN SATURATION: 97 % | HEART RATE: 65 BPM | TEMPERATURE: 98 F | DIASTOLIC BLOOD PRESSURE: 61 MMHG

## 2023-01-30 DIAGNOSIS — Z90.49 ACQUIRED ABSENCE OF OTHER SPECIFIED PARTS OF DIGESTIVE TRACT: Chronic | ICD-10-CM

## 2023-01-30 DIAGNOSIS — Z90.710 ACQUIRED ABSENCE OF BOTH CERVIX AND UTERUS: Chronic | ICD-10-CM

## 2023-01-30 PROCEDURE — 52287 CYSTOSCOPY CHEMODENERVATION: CPT

## 2023-01-30 PROCEDURE — 57288 REPAIR BLADDER DEFECT: CPT

## 2023-01-30 DEVICE — TVT OBTURATOR SYSTEM: Type: IMPLANTABLE DEVICE | Site: PELVIC | Status: FUNCTIONAL

## 2023-01-30 RX ORDER — ACETAMINOPHEN 500 MG
1000 TABLET ORAL ONCE
Refills: 0 | Status: COMPLETED | OUTPATIENT
Start: 2023-01-30 | End: 2023-01-30

## 2023-01-30 RX ORDER — DIAZEPAM 5 MG
2.5 TABLET ORAL ONCE
Refills: 0 | Status: DISCONTINUED | OUTPATIENT
Start: 2023-01-30 | End: 2023-01-30

## 2023-01-30 RX ORDER — GABAPENTIN 400 MG/1
300 CAPSULE ORAL ONCE
Refills: 0 | Status: DISCONTINUED | OUTPATIENT
Start: 2023-01-30 | End: 2023-01-30

## 2023-01-30 RX ORDER — FERROUS SULFATE 325(65) MG
1 TABLET ORAL
Qty: 0 | Refills: 0 | DISCHARGE

## 2023-01-30 RX ORDER — ATORVASTATIN CALCIUM 80 MG/1
1 TABLET, FILM COATED ORAL
Qty: 0 | Refills: 0 | DISCHARGE

## 2023-01-30 RX ORDER — OXYCODONE HYDROCHLORIDE 5 MG/1
1 TABLET ORAL
Qty: 0 | Refills: 0 | DISCHARGE

## 2023-01-30 RX ORDER — GABAPENTIN 400 MG/1
300 CAPSULE ORAL
Qty: 0 | Refills: 0 | DISCHARGE

## 2023-01-30 RX ORDER — ONABOTULINUMTOXINA 100 UNIT
100 VIAL (EA) INJECTION ONCE
Refills: 0 | Status: DISCONTINUED | OUTPATIENT
Start: 2023-01-30 | End: 2023-01-30

## 2023-01-30 RX ORDER — ONDANSETRON 8 MG/1
4 TABLET, FILM COATED ORAL ONCE
Refills: 0 | Status: DISCONTINUED | OUTPATIENT
Start: 2023-01-30 | End: 2023-01-30

## 2023-01-30 RX ORDER — PREGABALIN 225 MG/1
1 CAPSULE ORAL
Qty: 0 | Refills: 0 | DISCHARGE

## 2023-01-30 RX ORDER — SODIUM CHLORIDE 9 MG/ML
1000 INJECTION, SOLUTION INTRAVENOUS
Refills: 0 | Status: DISCONTINUED | OUTPATIENT
Start: 2023-01-30 | End: 2023-01-30

## 2023-01-30 RX ORDER — CEPHALEXIN 500 MG
1 CAPSULE ORAL
Qty: 10 | Refills: 0
Start: 2023-01-30 | End: 2023-02-03

## 2023-01-30 RX ORDER — OXYCODONE HYDROCHLORIDE 5 MG/1
5 TABLET ORAL ONCE
Refills: 0 | Status: DISCONTINUED | OUTPATIENT
Start: 2023-01-30 | End: 2023-01-30

## 2023-01-30 RX ORDER — FENTANYL CITRATE 50 UG/ML
25 INJECTION INTRAVENOUS
Refills: 0 | Status: DISCONTINUED | OUTPATIENT
Start: 2023-01-30 | End: 2023-01-30

## 2023-01-30 RX ORDER — HYDROMORPHONE HYDROCHLORIDE 2 MG/ML
0.5 INJECTION INTRAMUSCULAR; INTRAVENOUS; SUBCUTANEOUS
Refills: 0 | Status: DISCONTINUED | OUTPATIENT
Start: 2023-01-30 | End: 2023-01-30

## 2023-01-30 RX ORDER — OXYCODONE HYDROCHLORIDE 5 MG/1
7.5 TABLET ORAL ONCE
Refills: 0 | Status: DISCONTINUED | OUTPATIENT
Start: 2023-01-30 | End: 2023-01-30

## 2023-01-30 RX ADMIN — Medication 2.5 MILLIGRAM(S): at 14:42

## 2023-01-30 RX ADMIN — FENTANYL CITRATE 25 MICROGRAM(S): 50 INJECTION INTRAVENOUS at 13:34

## 2023-01-30 RX ADMIN — Medication 1000 MILLIGRAM(S): at 15:11

## 2023-01-30 RX ADMIN — SODIUM CHLORIDE 75 MILLILITER(S): 9 INJECTION, SOLUTION INTRAVENOUS at 15:03

## 2023-01-30 RX ADMIN — FENTANYL CITRATE 25 MICROGRAM(S): 50 INJECTION INTRAVENOUS at 13:21

## 2023-01-30 RX ADMIN — OXYCODONE HYDROCHLORIDE 7.5 MILLIGRAM(S): 5 TABLET ORAL at 19:41

## 2023-01-30 RX ADMIN — FENTANYL CITRATE 25 MICROGRAM(S): 50 INJECTION INTRAVENOUS at 13:48

## 2023-01-30 RX ADMIN — HYDROMORPHONE HYDROCHLORIDE 0.5 MILLIGRAM(S): 2 INJECTION INTRAMUSCULAR; INTRAVENOUS; SUBCUTANEOUS at 21:12

## 2023-01-30 RX ADMIN — FENTANYL CITRATE 25 MICROGRAM(S): 50 INJECTION INTRAVENOUS at 14:21

## 2023-01-30 RX ADMIN — HYDROMORPHONE HYDROCHLORIDE 0.5 MILLIGRAM(S): 2 INJECTION INTRAMUSCULAR; INTRAVENOUS; SUBCUTANEOUS at 20:42

## 2023-01-30 RX ADMIN — HYDROMORPHONE HYDROCHLORIDE 0.5 MILLIGRAM(S): 2 INJECTION INTRAMUSCULAR; INTRAVENOUS; SUBCUTANEOUS at 21:57

## 2023-01-30 RX ADMIN — Medication 2.5 MILLIGRAM(S): at 13:48

## 2023-01-30 RX ADMIN — Medication 400 MILLIGRAM(S): at 14:56

## 2023-01-30 RX ADMIN — HYDROMORPHONE HYDROCHLORIDE 0.5 MILLIGRAM(S): 2 INJECTION INTRAMUSCULAR; INTRAVENOUS; SUBCUTANEOUS at 20:31

## 2023-01-30 RX ADMIN — FENTANYL CITRATE 25 MICROGRAM(S): 50 INJECTION INTRAVENOUS at 14:37

## 2023-01-30 RX ADMIN — FENTANYL CITRATE 25 MICROGRAM(S): 50 INJECTION INTRAVENOUS at 14:07

## 2023-01-30 RX ADMIN — OXYCODONE HYDROCHLORIDE 7.5 MILLIGRAM(S): 5 TABLET ORAL at 19:21

## 2023-01-30 NOTE — PROGRESS NOTE ADULT - SUBJECTIVE AND OBJECTIVE BOX
Pt is a s/p bladder sling today who has been experiencing intense pain in both thighs anteriorly and medially, unresponsive to pain meds. Pain has limited her ability to ambulate. Patient has a h/o fibromyalgia and chronic pain syndrome but notably this pain is separate from the chronic symptoms.  Current vital signs are stable. She has been able to take po intake.    I have spoken to Dr. Guillermo of  service who has made Dr. Yap aware of the patient's situation and all agree to transfer patient to [facility] for further care. Transfer service has approved the transfer and we are awaiting report and transport

## 2023-01-30 NOTE — BRIEF OPERATIVE NOTE - NSICDXBRIEFPROCEDURE_GEN_ALL_CORE_FT
PROCEDURES:  Creation of mid urethral sling in female 30-Jan-2023 17:21:59  Erlin Coates  Cystoscopy with injection of Botox into bladder 30-Jan-2023 17:22:22  Erlin Coates

## 2023-01-30 NOTE — ASU DISCHARGE PLAN (ADULT/PEDIATRIC) - ASU DC SPECIAL INSTRUCTIONSFT
TRANSVAGINAL SURGERY    SURGICAL WOUND: There are often lumps and bumps that can be felt in the vagina on either or both sides up to two (2) months or more post operatively. These are of no concern and with time they will soften and disappear.  Any “black and blue” bruising areas will also resolve. There may also be some vaginal bleeding during this time. A liner should be used for the next few weeks, unless bathing, to capture any bleeding. You may apply an ice-pack for 15 minutes out of every hour for the first 24 -36 hours to minimize pain and swelling.    STITCHES: The stitches in the incision will dissolve and fall out by themselves. Sometimes skin stitches may open, allowing a slight gaping of the incision. This is no problem if you keep the area clean.    CATHETER: Some patients are sent home with a Peña catheter, while others go home urinating on their own. A Peña catheter continuously drains the urine from the bladder. If you still have a catheter, the nurses will review instructions and care before you go home. You will follow up with Dr. Yap tomorrow for catheter removal.    PAIN: You may have some intermittent pain for up to six (6) weeks post operatively. Pain does not signify any problem unless associated with fever, chills, or inability to void.  If you experience any fevers or chills please call immediately as this may be signs of an infection. You may take Tylenol (acetaminophen) 650-975mg and/or Motrin (ibuprofen) 400-600mg, both available over the counter, for pain every 6 hours as needed. Do not exceed 4000mg of Tylenol (acetaminophen) daily. You may alternate these medications such that you take one or the other every 3 hours for around the clock pain coverage. For severe pain, take Percocet 5/325mg every 6 hours.    ANTIBIOTICS: You were given a prescription for Cephalexin. Please take and finish as directed.    ANTICOAGULATION: If you are taking any blood thinning medications, please discuss with your urologist prior to restarting these medications unless otherwise specified.    BATHING: You may sponge bath 24 hours after surgery, but minimize water to the surgical incision.    DIET: You may resume your regular diet and regular medication regimen.    ACTIVITY: No heavy lifting or strenuous exercise until you are evaluated at your post-operative appointment. Otherwise, you may return to your usual level of physical activity.    FOLLOW-UP: If you did not already schedule your post-operative appointment, please call your urologist to schedule and follow-up appointment.    CALL YOUR UROLOGIST IF: You have any bleeding that does not stop, inability to void >8 hours, fever over 100.4 F, chills, persistent nausea/vomiting, changes in your incision concerning for infection, or if your pain is not controlled on your discharge pain medications.

## 2023-01-30 NOTE — ASU DISCHARGE PLAN (ADULT/PEDIATRIC) - NS MD DC FALL RISK RISK
For information on Fall & Injury Prevention, visit: [email or web address] OR  [email or web address] OR  https://www.cdc.gov/steadi/patient.html

## 2023-01-31 ENCOUNTER — INPATIENT (INPATIENT)
Facility: HOSPITAL | Age: 48
LOS: 0 days | Discharge: HOME | DRG: 948 | End: 2023-02-01
Attending: UROLOGY | Admitting: UROLOGY
Payer: MEDICAID

## 2023-01-31 VITALS
HEART RATE: 70 BPM | RESPIRATION RATE: 14 BRPM | DIASTOLIC BLOOD PRESSURE: 70 MMHG | OXYGEN SATURATION: 98 % | SYSTOLIC BLOOD PRESSURE: 107 MMHG

## 2023-01-31 VITALS
SYSTOLIC BLOOD PRESSURE: 123 MMHG | TEMPERATURE: 98 F | OXYGEN SATURATION: 97 % | HEART RATE: 66 BPM | RESPIRATION RATE: 18 BRPM | DIASTOLIC BLOOD PRESSURE: 70 MMHG

## 2023-01-31 DIAGNOSIS — R10.9 UNSPECIFIED ABDOMINAL PAIN: ICD-10-CM

## 2023-01-31 DIAGNOSIS — M79.7 FIBROMYALGIA: ICD-10-CM

## 2023-01-31 DIAGNOSIS — Z90.710 ACQUIRED ABSENCE OF BOTH CERVIX AND UTERUS: Chronic | ICD-10-CM

## 2023-01-31 DIAGNOSIS — Z90.49 ACQUIRED ABSENCE OF OTHER SPECIFIED PARTS OF DIGESTIVE TRACT: Chronic | ICD-10-CM

## 2023-01-31 LAB
ANION GAP SERPL CALC-SCNC: 5 MMOL/L — SIGNIFICANT CHANGE UP (ref 5–17)
BUN SERPL-MCNC: 5 MG/DL — LOW (ref 7–23)
CALCIUM SERPL-MCNC: 8.4 MG/DL — SIGNIFICANT CHANGE UP (ref 8.4–10.5)
CHLORIDE SERPL-SCNC: 101 MMOL/L — SIGNIFICANT CHANGE UP (ref 96–108)
CO2 SERPL-SCNC: 29 MMOL/L — SIGNIFICANT CHANGE UP (ref 22–31)
CREAT SERPL-MCNC: 0.63 MG/DL — SIGNIFICANT CHANGE UP (ref 0.5–1.3)
CULTURE RESULTS: NO GROWTH — SIGNIFICANT CHANGE UP
EGFR: 109 ML/MIN/1.73M2 — SIGNIFICANT CHANGE UP
GLUCOSE SERPL-MCNC: 83 MG/DL — SIGNIFICANT CHANGE UP (ref 70–99)
HCT VFR BLD CALC: 34.5 % — SIGNIFICANT CHANGE UP (ref 34.5–45)
HGB BLD-MCNC: 11.1 G/DL — LOW (ref 11.5–15.5)
MCHC RBC-ENTMCNC: 29.8 PG — SIGNIFICANT CHANGE UP (ref 27–34)
MCHC RBC-ENTMCNC: 32.2 GM/DL — SIGNIFICANT CHANGE UP (ref 32–36)
MCV RBC AUTO: 92.5 FL — SIGNIFICANT CHANGE UP (ref 80–100)
NRBC # BLD: 0 /100 WBCS — SIGNIFICANT CHANGE UP (ref 0–0)
PLATELET # BLD AUTO: 299 K/UL — SIGNIFICANT CHANGE UP (ref 150–400)
POTASSIUM SERPL-MCNC: 4.1 MMOL/L — SIGNIFICANT CHANGE UP (ref 3.5–5.3)
POTASSIUM SERPL-SCNC: 4.1 MMOL/L — SIGNIFICANT CHANGE UP (ref 3.5–5.3)
RBC # BLD: 3.73 M/UL — LOW (ref 3.8–5.2)
RBC # FLD: 14.1 % — SIGNIFICANT CHANGE UP (ref 10.3–14.5)
SODIUM SERPL-SCNC: 135 MMOL/L — SIGNIFICANT CHANGE UP (ref 135–145)
SPECIMEN SOURCE: SIGNIFICANT CHANGE UP
WBC # BLD: 7.72 K/UL — SIGNIFICANT CHANGE UP (ref 3.8–10.5)
WBC # FLD AUTO: 7.72 K/UL — SIGNIFICANT CHANGE UP (ref 3.8–10.5)

## 2023-01-31 RX ORDER — OXYCODONE HYDROCHLORIDE 5 MG/1
5 TABLET ORAL EVERY 8 HOURS
Refills: 0 | Status: DISCONTINUED | OUTPATIENT
Start: 2023-01-31 | End: 2023-01-31

## 2023-01-31 RX ORDER — ACETAMINOPHEN 500 MG
1000 TABLET ORAL EVERY 6 HOURS
Refills: 0 | Status: DISCONTINUED | OUTPATIENT
Start: 2023-01-31 | End: 2023-02-01

## 2023-01-31 RX ORDER — OXYCODONE HYDROCHLORIDE 5 MG/1
7.5 TABLET ORAL EVERY 6 HOURS
Refills: 0 | Status: DISCONTINUED | OUTPATIENT
Start: 2023-01-31 | End: 2023-01-31

## 2023-01-31 RX ORDER — DIAZEPAM 5 MG
5 TABLET ORAL ONCE
Refills: 0 | Status: DISCONTINUED | OUTPATIENT
Start: 2023-01-31 | End: 2023-01-31

## 2023-01-31 RX ORDER — SODIUM CHLORIDE 9 MG/ML
500 INJECTION INTRAMUSCULAR; INTRAVENOUS; SUBCUTANEOUS ONCE
Refills: 0 | Status: COMPLETED | OUTPATIENT
Start: 2023-01-31 | End: 2023-01-31

## 2023-01-31 RX ORDER — OXYBUTYNIN CHLORIDE 5 MG
5 TABLET ORAL EVERY 8 HOURS
Refills: 0 | Status: DISCONTINUED | OUTPATIENT
Start: 2023-01-31 | End: 2023-01-31

## 2023-01-31 RX ORDER — CHLORHEXIDINE GLUCONATE 213 G/1000ML
1 SOLUTION TOPICAL DAILY
Refills: 0 | Status: DISCONTINUED | OUTPATIENT
Start: 2023-01-31 | End: 2023-02-01

## 2023-01-31 RX ORDER — HYDROMORPHONE HYDROCHLORIDE 2 MG/ML
2 INJECTION INTRAMUSCULAR; INTRAVENOUS; SUBCUTANEOUS EVERY 6 HOURS
Refills: 0 | Status: DISCONTINUED | OUTPATIENT
Start: 2023-01-31 | End: 2023-02-01

## 2023-01-31 RX ORDER — OXYBUTYNIN CHLORIDE 5 MG
5 TABLET ORAL EVERY 8 HOURS
Refills: 0 | Status: DISCONTINUED | OUTPATIENT
Start: 2023-01-31 | End: 2023-02-01

## 2023-01-31 RX ORDER — LIDOCAINE 4 G/100G
2 CREAM TOPICAL DAILY
Refills: 0 | Status: DISCONTINUED | OUTPATIENT
Start: 2023-01-31 | End: 2023-02-01

## 2023-01-31 RX ORDER — KETOROLAC TROMETHAMINE 30 MG/ML
15 SYRINGE (ML) INJECTION EVERY 8 HOURS
Refills: 0 | Status: DISCONTINUED | OUTPATIENT
Start: 2023-01-31 | End: 2023-01-31

## 2023-01-31 RX ORDER — GABAPENTIN 400 MG/1
300 CAPSULE ORAL EVERY 8 HOURS
Refills: 0 | Status: DISCONTINUED | OUTPATIENT
Start: 2023-01-31 | End: 2023-02-01

## 2023-01-31 RX ORDER — KETOROLAC TROMETHAMINE 30 MG/ML
15 SYRINGE (ML) INJECTION EVERY 6 HOURS
Refills: 0 | Status: DISCONTINUED | OUTPATIENT
Start: 2023-01-31 | End: 2023-02-01

## 2023-01-31 RX ORDER — IBUPROFEN 200 MG
400 TABLET ORAL ONCE
Refills: 0 | Status: DISCONTINUED | OUTPATIENT
Start: 2023-01-31 | End: 2023-01-31

## 2023-01-31 RX ORDER — INFLUENZA VIRUS VACCINE 15; 15; 15; 15 UG/.5ML; UG/.5ML; UG/.5ML; UG/.5ML
0.5 SUSPENSION INTRAMUSCULAR ONCE
Refills: 0 | Status: DISCONTINUED | OUTPATIENT
Start: 2023-01-31 | End: 2023-02-01

## 2023-01-31 RX ORDER — KETOROLAC TROMETHAMINE 30 MG/ML
15 SYRINGE (ML) INJECTION EVERY 6 HOURS
Refills: 0 | Status: DISCONTINUED | OUTPATIENT
Start: 2023-01-31 | End: 2023-01-31

## 2023-01-31 RX ADMIN — Medication 5 MILLIGRAM(S): at 13:36

## 2023-01-31 RX ADMIN — GABAPENTIN 300 MILLIGRAM(S): 400 CAPSULE ORAL at 11:55

## 2023-01-31 RX ADMIN — SODIUM CHLORIDE 500 MILLILITER(S): 9 INJECTION INTRAMUSCULAR; INTRAVENOUS; SUBCUTANEOUS at 07:30

## 2023-01-31 RX ADMIN — Medication 5 MILLIGRAM(S): at 23:18

## 2023-01-31 RX ADMIN — Medication 5 MILLIGRAM(S): at 02:23

## 2023-01-31 RX ADMIN — Medication 15 MILLIGRAM(S): at 02:23

## 2023-01-31 RX ADMIN — Medication 15 MILLIGRAM(S): at 02:38

## 2023-01-31 RX ADMIN — SODIUM CHLORIDE 1000 MILLILITER(S): 9 INJECTION INTRAMUSCULAR; INTRAVENOUS; SUBCUTANEOUS at 08:30

## 2023-01-31 RX ADMIN — OXYCODONE HYDROCHLORIDE 5 MILLIGRAM(S): 5 TABLET ORAL at 02:23

## 2023-01-31 RX ADMIN — Medication 200 MILLIGRAM(S): at 07:20

## 2023-01-31 RX ADMIN — Medication 15 MILLIGRAM(S): at 17:35

## 2023-01-31 RX ADMIN — Medication 5 MILLIGRAM(S): at 03:58

## 2023-01-31 RX ADMIN — GABAPENTIN 300 MILLIGRAM(S): 400 CAPSULE ORAL at 02:24

## 2023-01-31 RX ADMIN — Medication 1000 MILLIGRAM(S): at 11:55

## 2023-01-31 RX ADMIN — OXYCODONE HYDROCHLORIDE 5 MILLIGRAM(S): 5 TABLET ORAL at 03:23

## 2023-01-31 RX ADMIN — Medication 1000 MILLIGRAM(S): at 23:18

## 2023-01-31 RX ADMIN — GABAPENTIN 300 MILLIGRAM(S): 400 CAPSULE ORAL at 18:51

## 2023-01-31 RX ADMIN — Medication 200 MILLIGRAM(S): at 18:51

## 2023-01-31 RX ADMIN — CHLORHEXIDINE GLUCONATE 1 APPLICATION(S): 213 SOLUTION TOPICAL at 13:36

## 2023-01-31 RX ADMIN — LIDOCAINE 2 PATCH: 4 CREAM TOPICAL at 18:59

## 2023-01-31 RX ADMIN — Medication 1000 MILLIGRAM(S): at 17:20

## 2023-01-31 RX ADMIN — Medication 15 MILLIGRAM(S): at 17:20

## 2023-01-31 RX ADMIN — LIDOCAINE 2 PATCH: 4 CREAM TOPICAL at 11:55

## 2023-01-31 NOTE — H&P ADULT - ASSESSMENT
48 yo F  with hx of pelvic pain, urge and stress incontinence, previously improved with Oxybutynin, fibromyalgia, history of spine issues transferred from PACU at [facility] for uncontrolled pain and inability to walk s/p mid urethral sling and intravesicular botox. Patient denies systemic symptoms geiger in place draining clear yellow urine. VSS.

## 2023-01-31 NOTE — ASSESSMENT
[Long Term Vascular Complications] : long term vascular complications of diabetes [Carbohydrate Consistent Diet] : carbohydrate consistent diet [Importance of Diet and Exercise] : importance of diet and exercise to improve glycemic control, achieve weight loss and improve cardiovascular health [Hypoglycemia Management] : hypoglycemia management [Self Monitoring of Blood Glucose] : self monitoring of blood glucose [Weight Loss] : weight loss [FreeTextEntry1] : 1) Obesity, Morbid: Class I, complicated by hyperglycemia and dyslipidemia on a statin. High risk of metabolic syndrome and future complications. Discussed options including meds, bariatric surgery and lifestyle modification. RB and alternatives discussed. Questions answered and she verbalized understanding. Refer to nutrition and start hypocaloric, hypocarb diet in addition to exercise regimen. ). as no 5-7% weight loss observed on f/u, will proceed w. incretin analogue initiation. cont mounjaro/wegovy w/ samples if available, titrate as tolerated, samples provided. reviewed r/b/a and s.e, Verbalized understanding and agrees with treatment plan, will contact MD and seek emergency medical care if condition changes.\par \par

## 2023-01-31 NOTE — CONSULT NOTE ADULT - SUBJECTIVE AND OBJECTIVE BOX
Pain Management Consult Note - [facility] [phone]    Chief Complaint: pelvis pain     HPI: Patient seen and examined today. This is a 48 y/o female PMH of fibromyalgia, UTI POD 1 s/p mid urethral sling and intravesicular botox with reports of severe pain to the pelvis that radiates down the bilateral thighs. Patient reports pain is worse down the right thigh, described as sharp and constant. Patient denies numbness or tingling to the lower extremities. At home, patient reports taking Lyrica 200 mg PO BID, Gabapentin 300 mg PO TID, and Percocet 7/5-325 mg po BID prescribed by her outpatient PM provider. Patient is istop positive for Lyrica 200 mg PO BID, Percocet 7.5-325 mg PO bid last filled 1/2023 prescribed by Evert Isidro. Patient reports her pain has not been controlled since her surgery. Patient with soft BPs this AM (90s / 60s). Patient reports Oxycodone PO has not been helpful at relieving her pain. Patient denies side effects from current pain regimen.     Pain is _X__ sharp ____dull ___burning ___achy ___ Intensity: ____ mild _X__mod __X_severe     Location ___X_surgical site ____cervical _____lumbar _X___abd ____upper ext____lower ext    Worse with __X__activity _X___movement _____physical therapy___ Rest    Improved with _X___medication __X__rest ____physical therapy    ROS: Const:  __N_febrile   Eyes:___ENT:___CV: _N__chest pain  Resp: __N__sob  GI:__N_nausea _N__vomiting __Y_abd pain ___npo ___clears __full diet __bm  :___ Musk: Y__pain ___spasm  Skin:___ Neuro:  _N__sedation__N_confusion__N_ numbness ___weakness __N_paresth  Psych:_N_anxiety  Endo:___ Heme:___Allergy:___NKDA______, __Y_all others reviewed and negative    PAST MEDICAL & SURGICAL HISTORY:  Anemia  Herniated cervical disc  UTI (urinary tract infection  Intussusception  Fibromyalgia  Status post bariatric surgery  Gastric bypass status for obesity  H/O abdominal hysterectomy  History of cholecystectomy    SH: N___Tobacco   __N_Alcohol                          FH:FAMILY HISTORY: NO PERTINENT MEDICAL HISTORY NOTED IN FIRST DEGREE RELATIVES      acetaminophen     Tablet .. 1000 milliGRAM(s) Oral every 6 hours  chlorhexidine 2% Cloths 1 Application(s) Topical daily  gabapentin 300 milliGRAM(s) Oral every 8 hours  influenza   Vaccine 0.5 milliLiter(s) IntraMuscular once  ketorolac   Injectable 15 milliGRAM(s) IV Push every 6 hours PRN  lidocaine   4% Patch 2 Patch Transdermal daily  oxybutynin 5 milliGRAM(s) Oral every 8 hours  oxyCODONE    IR 5 milliGRAM(s) Oral every 8 hours  pregabalin 200 milliGRAM(s) Oral every 12 hours      T(C): 36.7 (01-31-23 @ 08:21), Max: 37 (01-30-23 @ 15:31)  HR: 71 (01-31-23 @ 09:02) (50 - 71)  BP: 99/61 (01-31-23 @ 09:02) (81/47 - 134/67)  RR: 16 (01-31-23 @ 08:21) (12 - 18)  SpO2: 95% (01-31-23 @ 08:21) (95% - 100%)  Wt(kg): --    T(C): 36.7 (01-31-23 @ 08:21), Max: 37 (01-30-23 @ 15:31)  HR: 71 (01-31-23 @ 09:02) (50 - 71)  BP: 99/61 (01-31-23 @ 09:02) (81/47 - 134/67)  RR: 16 (01-31-23 @ 08:21) (12 - 18)  SpO2: 95% (01-31-23 @ 08:21) (95% - 100%)  Wt(kg): --    T(C): 36.7 (01-31-23 @ 08:21), Max: 37 (01-30-23 @ 15:31)  HR: 71 (01-31-23 @ 09:02) (50 - 71)  BP: 99/61 (01-31-23 @ 09:02) (81/47 - 134/67)  RR: 16 (01-31-23 @ 08:21) (12 - 18)  SpO2: 95% (01-31-23 @ 08:21) (95% - 100%)  Wt(kg): --    PHYSICAL EXAM:  Gen Appearance: __X_no acute distress _X__appropriate        Neuro: ___SILT feet____ EOM Intact Psych: AAOX_3_, __X_mood/affect appropriate        Eyes: _X__conjunctiva WNL  ____X_ Pupils equal and round        ENT: _X__ears and nose atraumatic_X__ Hearing grossly intact        Neck: _X__trachea midline, no visible masses ___thyroid without palpable mass    Resp: __X_Nml WOB____No tactile fremitus ___clear to auscultation    Cardio: _X__extremities free from edema ____pedal pulses palpable    GI/Abdomen: ___soft _____ Nontender____X__Nondistended_____HSM    Lymphatic: ___no palpable nodes in neck  ___no palpable nodes calves and feet    Skin/Wound: ___Incision, __X_Dressing c/d/i,   ____surrounding tissues soft,  ___drain/chest tube present____    Muscular: EHL ___/5  Gastrocnemius___/5    __X_absent clubbing/cyanosis      ASSESSMENT: This is a 47y old Female with a history of fibromyalgia, UTI POD 1 s/p mid urethral sling and intravesicular botox with reports of severe pain to the pelvis that radiates down the bilateral thighs.     Recommended Treatment PLAN:                 Pain Management Consult Note - [facility] [phone]    Chief Complaint: pelvis pain     HPI: Patient seen and examined today. This is a 48 y/o female PMH of fibromyalgia, UTI POD 1 s/p mid urethral sling and intravesicular botox with reports of severe pain to the pelvis that radiates down the bilateral thighs. Patient reports pain is worse down the right thigh, described as sharp and constant. Patient denies numbness or tingling to the lower extremities. At home, patient reports taking Lyrica 200 mg PO BID, Gabapentin 300 mg PO TID, and Percocet 7/5-325 mg po BID prescribed by her outpatient PM provider. Patient is istop positive for Lyrica 200 mg PO BID, Percocet 7.5-325 mg PO bid last filled 1/2023 prescribed by Evert Isidro. Patient reports her pain has not been controlled since her surgery. Patient with soft BPs this AM (90s / 60s). Patient reports Oxycodone PO has not been helpful at relieving her pain. Patient denies side effects from current pain regimen.     Pain is _X__ sharp ____dull ___burning ___achy ___ Intensity: ____ mild _X__mod __X_severe     Location ___X_surgical site ____cervical _____lumbar _X___abd ____upper ext____lower ext    Worse with __X__activity _X___movement _____physical therapy___ Rest    Improved with _X___medication __X__rest ____physical therapy    ROS: Const:  __N_febrile   Eyes:___ENT:___CV: _N__chest pain  Resp: __N__sob  GI:__N_nausea _N__vomiting __Y_abd pain ___npo ___clears __full diet __bm  :___ Musk: Y__pain ___spasm  Skin:___ Neuro:  _N__sedation__N_confusion__N_ numbness ___weakness __N_paresth  Psych:_N_anxiety  Endo:___ Heme:___Allergy:___NKDA______, __Y_all others reviewed and negative    PAST MEDICAL & SURGICAL HISTORY:  Anemia  Herniated cervical disc  UTI (urinary tract infection  Intussusception  Fibromyalgia  Status post bariatric surgery  Gastric bypass status for obesity  H/O abdominal hysterectomy  History of cholecystectomy    SH: N___Tobacco   __N_Alcohol                          FH:FAMILY HISTORY: NO PERTINENT MEDICAL HISTORY NOTED IN FIRST DEGREE RELATIVES      acetaminophen     Tablet .. 1000 milliGRAM(s) Oral every 6 hours  chlorhexidine 2% Cloths 1 Application(s) Topical daily  gabapentin 300 milliGRAM(s) Oral every 8 hours  influenza   Vaccine 0.5 milliLiter(s) IntraMuscular once  ketorolac   Injectable 15 milliGRAM(s) IV Push every 6 hours PRN  lidocaine   4% Patch 2 Patch Transdermal daily  oxybutynin 5 milliGRAM(s) Oral every 8 hours  oxyCODONE    IR 5 milliGRAM(s) Oral every 8 hours  pregabalin 200 milliGRAM(s) Oral every 12 hours      T(C): 36.7 (01-31-23 @ 08:21), Max: 37 (01-30-23 @ 15:31)  HR: 71 (01-31-23 @ 09:02) (50 - 71)  BP: 99/61 (01-31-23 @ 09:02) (81/47 - 134/67)  RR: 16 (01-31-23 @ 08:21) (12 - 18)  SpO2: 95% (01-31-23 @ 08:21) (95% - 100%)  Wt(kg): --    T(C): 36.7 (01-31-23 @ 08:21), Max: 37 (01-30-23 @ 15:31)  HR: 71 (01-31-23 @ 09:02) (50 - 71)  BP: 99/61 (01-31-23 @ 09:02) (81/47 - 134/67)  RR: 16 (01-31-23 @ 08:21) (12 - 18)  SpO2: 95% (01-31-23 @ 08:21) (95% - 100%)  Wt(kg): --    T(C): 36.7 (01-31-23 @ 08:21), Max: 37 (01-30-23 @ 15:31)  HR: 71 (01-31-23 @ 09:02) (50 - 71)  BP: 99/61 (01-31-23 @ 09:02) (81/47 - 134/67)  RR: 16 (01-31-23 @ 08:21) (12 - 18)  SpO2: 95% (01-31-23 @ 08:21) (95% - 100%)  Wt(kg): --    PHYSICAL EXAM:  Gen Appearance: __X_no acute distress _X__appropriate        Neuro: ___SILT feet____ EOM Intact Psych: AAOX_3_, __X_mood/affect appropriate        Eyes: _X__conjunctiva WNL  ____X_ Pupils equal and round        ENT: _X__ears and nose atraumatic_X__ Hearing grossly intact        Neck: _X__trachea midline, no visible masses ___thyroid without palpable mass    Resp: __X_Nml WOB____No tactile fremitus ___clear to auscultation    Cardio: _X__extremities free from edema ____pedal pulses palpable    GI/Abdomen: ___soft _____ Nontender____X__Nondistended_____HSM    Lymphatic: ___no palpable nodes in neck  ___no palpable nodes calves and feet    Skin/Wound: ___Incision, __X_Dressing c/d/i,   ____surrounding tissues soft,  ___drain/chest tube present____    Muscular: EHL ___/5  Gastrocnemius___/5    __X_absent clubbing/cyanosis      ASSESSMENT: This is a 47y old Female with a history of fibromyalgia, UTI POD 1 s/p mid urethral sling and intravesicular botox with reports of severe pain to the pelvis that radiates down the bilateral thighs.     Recommended Treatment PLAN:  1. Consider discontinuing Oxycodone 5 mg PO every 8 hours  2. Consider starting Dilaudid 2 mg PO q6h PRN severe pain once blood pressure improved/optimized   3. Consider continuing Tylenol 1000 mg PO every 6 hours  4. Consider continuing home-dose Lyrica   HOLD ALL OPIOIDS FOR SEDATION, RR<10, O2SAT <93%, SBP <96  Plan discussed with Dr. Diaz, pt seen and examined by him on PM rounds

## 2023-01-31 NOTE — H&P ADULT - HISTORY OF PRESENT ILLNESS
46 yo F  with hx of pelvic pain, urge and stress incontinence, previously improved with Oxybutynin, fibromyalgia, history of spine issues transferred from PACU at [facility] for uncontrolled pain and inability to walk s/p mid urethral sling and intravesicular botox. Patient states since the surgery her pain has not been controlled despite being given Fentanyl, Oxycodone, valium, tylenol and dilaudid. Patient states her pain is mostly in her right thigh. The pain travels from her toes to her pelvis bilaterally. She states when she tries to move her head up she experiences a shooting pain down her leg. She denies fevers, chills, nausea or vomiting. States she take 7.5mg of oxycodone twice a day for her pain "in her whole body" secondary to fibromyalgia.

## 2023-01-31 NOTE — H&P ADULT - NSHPPHYSICALEXAM_GEN_ALL_CORE
general: alert and oriented. no acute distress   abdomen: soft, non-tender, non-distended    : geiger in place draining clear, yellow urine   extremities: no swelling, erythema, equal ROM

## 2023-01-31 NOTE — PATIENT PROFILE ADULT - FALL HARM RISK - HARM RISK INTERVENTIONS

## 2023-01-31 NOTE — HISTORY OF PRESENT ILLNESS
[FreeTextEntry1] : 46 y/o F with pmhx of RYGB 13 yrs ago, s/p revision for intussusception, fibromyalgia, spine problems, morbid obesity\par initial evaluation and management of metabolic issues\par generally feels well and endorses no acute complaints.\par reports cc of worsening weight gain. admits to heavy starch diet, particularly w/ late night binging. states this is due to pain from fibromyalgia. reports sedentary lifestyle due to joint issues. denies past falls or fractures. has gained most weight after RnY. no post prandial symptoms of hypoglycemia. \par \par 1/2023 Here for /fu, generally feels well and endorses no acute complaints. No interval events since LV. Today reports good tolerance and no significant s/e w/ mounjaro s/e. lost 10 lb, insurance denied PA and appeal for medication. \par She otherwise denies any f/c, CP, SOB, palpitations, tremors, depressed mood, anxiety, palpitations, n/v, stool/urinary abn, skin/weight changes, heat/cold intolerance, HAs, breast/nipple changes, polyuria/polydipsia/nocturia or other complaints.\par \par

## 2023-01-31 NOTE — H&P ADULT - PROBLEM SELECTOR PLAN 1
- patient direct admit from [facility]   - pain control   - diet: regular   - OOB/IS   - SCDs   - monitor urine output from geiger

## 2023-02-01 ENCOUNTER — TRANSCRIPTION ENCOUNTER (OUTPATIENT)
Age: 48
End: 2023-02-01

## 2023-02-01 VITALS
OXYGEN SATURATION: 96 % | DIASTOLIC BLOOD PRESSURE: 68 MMHG | SYSTOLIC BLOOD PRESSURE: 110 MMHG | TEMPERATURE: 98 F | RESPIRATION RATE: 17 BRPM | HEART RATE: 63 BPM

## 2023-02-01 LAB
ANION GAP SERPL CALC-SCNC: 8 MMOL/L — SIGNIFICANT CHANGE UP (ref 5–17)
BUN SERPL-MCNC: 8 MG/DL — SIGNIFICANT CHANGE UP (ref 7–23)
CALCIUM SERPL-MCNC: 9 MG/DL — SIGNIFICANT CHANGE UP (ref 8.4–10.5)
CHLORIDE SERPL-SCNC: 105 MMOL/L — SIGNIFICANT CHANGE UP (ref 96–108)
CO2 SERPL-SCNC: 25 MMOL/L — SIGNIFICANT CHANGE UP (ref 22–31)
CREAT SERPL-MCNC: 0.49 MG/DL — LOW (ref 0.5–1.3)
EGFR: 116 ML/MIN/1.73M2 — SIGNIFICANT CHANGE UP
GLUCOSE SERPL-MCNC: 123 MG/DL — HIGH (ref 70–99)
HCT VFR BLD CALC: 36.3 % — SIGNIFICANT CHANGE UP (ref 34.5–45)
HGB BLD-MCNC: 11.8 G/DL — SIGNIFICANT CHANGE UP (ref 11.5–15.5)
MAGNESIUM SERPL-MCNC: 1.9 MG/DL — SIGNIFICANT CHANGE UP (ref 1.6–2.6)
MCHC RBC-ENTMCNC: 30 PG — SIGNIFICANT CHANGE UP (ref 27–34)
MCHC RBC-ENTMCNC: 32.5 GM/DL — SIGNIFICANT CHANGE UP (ref 32–36)
MCV RBC AUTO: 92.4 FL — SIGNIFICANT CHANGE UP (ref 80–100)
NRBC # BLD: 0 /100 WBCS — SIGNIFICANT CHANGE UP (ref 0–0)
PHOSPHATE SERPL-MCNC: 2.4 MG/DL — LOW (ref 2.5–4.5)
PLATELET # BLD AUTO: 309 K/UL — SIGNIFICANT CHANGE UP (ref 150–400)
POTASSIUM SERPL-MCNC: 3.9 MMOL/L — SIGNIFICANT CHANGE UP (ref 3.5–5.3)
POTASSIUM SERPL-SCNC: 3.9 MMOL/L — SIGNIFICANT CHANGE UP (ref 3.5–5.3)
RBC # BLD: 3.93 M/UL — SIGNIFICANT CHANGE UP (ref 3.8–5.2)
RBC # FLD: 14.2 % — SIGNIFICANT CHANGE UP (ref 10.3–14.5)
SODIUM SERPL-SCNC: 138 MMOL/L — SIGNIFICANT CHANGE UP (ref 135–145)
WBC # BLD: 5.13 K/UL — SIGNIFICANT CHANGE UP (ref 3.8–10.5)
WBC # FLD AUTO: 5.13 K/UL — SIGNIFICANT CHANGE UP (ref 3.8–10.5)

## 2023-02-01 PROCEDURE — 84100 ASSAY OF PHOSPHORUS: CPT

## 2023-02-01 PROCEDURE — 83735 ASSAY OF MAGNESIUM: CPT

## 2023-02-01 PROCEDURE — 36415 COLL VENOUS BLD VENIPUNCTURE: CPT

## 2023-02-01 PROCEDURE — 80048 BASIC METABOLIC PNL TOTAL CA: CPT

## 2023-02-01 PROCEDURE — 85027 COMPLETE CBC AUTOMATED: CPT

## 2023-02-01 RX ORDER — SODIUM,POTASSIUM PHOSPHATES 278-250MG
1 POWDER IN PACKET (EA) ORAL ONCE
Refills: 0 | Status: COMPLETED | OUTPATIENT
Start: 2023-02-01 | End: 2023-02-01

## 2023-02-01 RX ADMIN — LIDOCAINE 2 PATCH: 4 CREAM TOPICAL at 18:15

## 2023-02-01 RX ADMIN — Medication 200 MILLIGRAM(S): at 06:56

## 2023-02-01 RX ADMIN — Medication 1000 MILLIGRAM(S): at 06:56

## 2023-02-01 RX ADMIN — GABAPENTIN 300 MILLIGRAM(S): 400 CAPSULE ORAL at 02:29

## 2023-02-01 RX ADMIN — HYDROMORPHONE HYDROCHLORIDE 2 MILLIGRAM(S): 2 INJECTION INTRAMUSCULAR; INTRAVENOUS; SUBCUTANEOUS at 12:14

## 2023-02-01 RX ADMIN — GABAPENTIN 300 MILLIGRAM(S): 400 CAPSULE ORAL at 17:41

## 2023-02-01 RX ADMIN — Medication 15 MILLIGRAM(S): at 17:40

## 2023-02-01 RX ADMIN — CHLORHEXIDINE GLUCONATE 1 APPLICATION(S): 213 SOLUTION TOPICAL at 11:15

## 2023-02-01 RX ADMIN — LIDOCAINE 2 PATCH: 4 CREAM TOPICAL at 11:15

## 2023-02-01 RX ADMIN — Medication 1000 MILLIGRAM(S): at 17:40

## 2023-02-01 RX ADMIN — GABAPENTIN 300 MILLIGRAM(S): 400 CAPSULE ORAL at 11:14

## 2023-02-01 RX ADMIN — Medication 5 MILLIGRAM(S): at 14:25

## 2023-02-01 RX ADMIN — Medication 1000 MILLIGRAM(S): at 11:14

## 2023-02-01 RX ADMIN — Medication 5 MILLIGRAM(S): at 06:56

## 2023-02-01 RX ADMIN — Medication 1 PACKET(S): at 14:26

## 2023-02-01 RX ADMIN — Medication 15 MILLIGRAM(S): at 18:00

## 2023-02-01 RX ADMIN — Medication 200 MILLIGRAM(S): at 17:41

## 2023-02-01 RX ADMIN — HYDROMORPHONE HYDROCHLORIDE 2 MILLIGRAM(S): 2 INJECTION INTRAMUSCULAR; INTRAVENOUS; SUBCUTANEOUS at 11:14

## 2023-02-01 NOTE — DISCHARGE NOTE PROVIDER - NSDCCPCAREPLAN_GEN_ALL_CORE_FT
PRINCIPAL DISCHARGE DIAGNOSIS  Diagnosis: Postoperative abdominal pain  Assessment and Plan of Treatment:

## 2023-02-01 NOTE — DISCHARGE NOTE PROVIDER - NSDCMRMEDTOKEN_GEN_ALL_CORE_FT
cephalexin 500 mg oral tablet: 1 tab(s) orally every 12 hours   cyanocobalamin 2500 mcg sublingual tablet: 1 tab(s) sublingual once a day  ferrous sulfate 325 mg (65 mg elemental iron) oral tablet: 1 tab(s) orally 3 times a day  gabapentin 300 mg oral tablet: 300 milligram(s) orally 3 times a day  lidocaine 5% topical film: Apply topically to affected area once a day   Lipitor 20 mg oral tablet: 1 tab(s) orally once a day  Lyrica: 200 milligram(s) orally 3 times a day  methocarbamol 750 mg oral tablet: 1 tab(s) orally 3 times a day   oxyCODONE 7.5 mg oral tablet: 1 tab(s) orally every 6 hours  pantoprazole 40 mg oral delayed release tablet: 1 tab(s) orally 2 times a day

## 2023-02-01 NOTE — PROGRESS NOTE ADULT - ASSESSMENT
ASSESSMENT: 47yFemale s/p midurethral sling and intravesical botox, admitted for uncontrolled pain.  Pain improved and patient VSS, HDS, and afebrile.     PLAN:  Diet: Reg  Pain control  Monitor Urine Output  DVT ppx: SCD  OOB/IS

## 2023-02-01 NOTE — DISCHARGE NOTE PROVIDER - NSDCFUADDINST_GEN_ALL_CORE_FT
1. Please continue your home pain regimen and follow up with your primary care provider.  2. Please follow up with Dr. Yap on 2/7/2023.

## 2023-02-01 NOTE — DISCHARGE NOTE NURSING/CASE MANAGEMENT/SOCIAL WORK - PATIENT PORTAL LINK FT
You can access the FollowMyHealth Patient Portal offered by [facility] by registering at the following website: [email or web address]. By joining [employer]’s FollowMyHealth portal, you will also be able to view your health information using other applications (apps) compatible with our system.

## 2023-02-01 NOTE — DISCHARGE NOTE PROVIDER - CARE PROVIDER_API CALL
Pauline Yap)  Urology  [address] [place] [id number]  Phone: [phone]  Fax: [phone]  Follow Up Time:

## 2023-02-01 NOTE — DISCHARGE NOTE PROVIDER - HOSPITAL COURSE
47F  with history of pelvic pain, urge and stress incontinence (previously improved with oxybutynin), fibromyalgia, spinal issues transferred to [facility] on 21 from PACU at [facility] for uncontrolled pain and inability to walk s/p mid urethral sling and intravesicular Botox. Patient states since the surgery her pain has not been controlled despite multimodal pain control with Fentanyl, Oxycodone, Valium, Tylenol and Dilaudid. Of note, patient takes oxycodone 7.5mg BID for "whole body" pain that she attributes to fibromyalgia. Pain management was consulted and recommendations were appreciated. The remainder of the patient's post-operative course was uncomplicated. Diet was advanced as tolerated and pain was well controlled on medication. On day of discharge, patient had stable vital signs, was tolerating diet, voiding without assistance. She has met benchmarks for discharge on her home pain medication regimen with plans to follow up with her outpatient pain management care team.

## 2023-02-01 NOTE — DISCHARGE NOTE PROVIDER - NSDCFUSCHEDAPPT_GEN_ALL_CORE_FT
Pauline Yap  [facility] Physician [facility]  UROLOGY [address]  Scheduled Appointment: 02/07/2023    Marciano Wong  [facility] Physician [facility]  BARIATRIC WREN [address]  Scheduled Appointment: 02/16/2023    Isidro Hill  [facility] Physician [facility]  ENDOCRIN [address]  Scheduled Appointment: 03/24/2023

## 2023-02-01 NOTE — DISCHARGE NOTE NURSING/CASE MANAGEMENT/SOCIAL WORK - NSDCPEFALRISK_GEN_ALL_CORE
For information on Fall & Injury Prevention, visit: [email or web address] OR  [email or web address] OR  https://www.cdc.gov/steadi/patient.html

## 2023-02-03 RX ORDER — CEPHALEXIN 500 MG/1
500 CAPSULE ORAL
Qty: 15 | Refills: 0 | Status: ACTIVE | COMMUNITY
Start: 2023-02-03 | End: 1900-01-01

## 2023-02-06 DIAGNOSIS — D64.9 ANEMIA, UNSPECIFIED: ICD-10-CM

## 2023-02-06 DIAGNOSIS — R10.9 UNSPECIFIED ABDOMINAL PAIN: ICD-10-CM

## 2023-02-06 DIAGNOSIS — Z79.899 OTHER LONG TERM (CURRENT) DRUG THERAPY: ICD-10-CM

## 2023-02-06 DIAGNOSIS — M79.7 FIBROMYALGIA: ICD-10-CM

## 2023-02-06 DIAGNOSIS — Z90.710 ACQUIRED ABSENCE OF BOTH CERVIX AND UTERUS: ICD-10-CM

## 2023-02-06 DIAGNOSIS — Z87.440 PERSONAL HISTORY OF URINARY (TRACT) INFECTIONS: ICD-10-CM

## 2023-02-06 DIAGNOSIS — G89.18 OTHER ACUTE POSTPROCEDURAL PAIN: ICD-10-CM

## 2023-02-06 DIAGNOSIS — N39.46 MIXED INCONTINENCE: ICD-10-CM

## 2023-02-06 DIAGNOSIS — Z98.84 BARIATRIC SURGERY STATUS: ICD-10-CM

## 2023-02-06 DIAGNOSIS — R52 PAIN, UNSPECIFIED: ICD-10-CM

## 2023-02-07 ENCOUNTER — APPOINTMENT (OUTPATIENT)
Dept: UROLOGY | Facility: CLINIC | Age: 48
End: 2023-02-07

## 2023-02-14 ENCOUNTER — APPOINTMENT (OUTPATIENT)
Dept: UROLOGY | Facility: CLINIC | Age: 48
End: 2023-02-14
Payer: MEDICAID

## 2023-02-14 VITALS
HEART RATE: 70 BPM | TEMPERATURE: 98.3 F | OXYGEN SATURATION: 97 % | DIASTOLIC BLOOD PRESSURE: 73 MMHG | SYSTOLIC BLOOD PRESSURE: 113 MMHG

## 2023-02-14 PROCEDURE — 51798 US URINE CAPACITY MEASURE: CPT

## 2023-02-14 PROCEDURE — 99213 OFFICE O/P EST LOW 20 MIN: CPT

## 2023-02-14 NOTE — DIETITIAN INITIAL EVALUATION ADULT. - NS AS NUTRI INTERV MEALS SNACK
Group Psychotherapy (PhD/LCSW)    Location: [facility]    Clinical status of patient: Intensive Outpatient Program (IOP)    Date: 2/14/2023    Group Focus: Psychodynamic Group Psychotherapy    Length of service: 66831 - 45-50 minutes    Number of patients in attendance: 7    Referred by: Addictive Behavior Unit Treatment Team    Target symptoms: Substance Abuse    Patient's response to treatment: Active Listening and Self-disclosure    Progress toward goals: Progressing adequately    Interval History: Pt reports continued sobriety. Discussed principles of motivation and how she can act in ways that increase it.    Diagnosis: Cannabis Use Disorder; Alcohol Use Disorder in remission    Plan: Continue treatment on ABU   Advance diet to clears >> clear liquids >> low fiber/General/healthful diet

## 2023-02-15 NOTE — PHYSICAL EXAM
[General Appearance - Well Developed] : well developed [Normal Appearance] : normal appearance [Well Groomed] : well groomed [Bowel Sounds] : normal bowel sounds [Abdomen Soft] : soft [Abdomen Tenderness] : non-tender [Urethral Meatus] : the meatus of the urethra showed no abnormalities [External Female Genitalia] : normal external genitalia [FreeTextEntry1] : Mid-urethra incision well healed. Sutures in place and no mesh visible or palpable. No vaginal discharge or atrophy appreciated on exam.

## 2023-02-15 NOTE — ASSESSMENT
[FreeTextEntry1] : Pt is a 46 yo F  with hx of pelvic pain and urge/stress incontinence. Urodynamics evaluation from 22 showed that Pt has PRATIMA that has failed medication therapy now 2 weeks post op from mid-urethral sling and botox injection. \par \par Pt is healing well from her surgery and not retaining urine. We will send a candida swab to rule out yeast infection given that she has some mild vaginal itchiness. We will also send a UA and culture. We will follow up in 1 month if lab results are all normal. Pt demonstrated understanding of our plan. \par \par

## 2023-02-15 NOTE — HISTORY OF PRESENT ILLNESS
[FreeTextEntry1] : Pt is a 46 yo F  with hx of pelvic pain, urge and stress incontinence.  She failed anticholinergics and beta agonist therapy and after Urodynamic testing opted to undergo a mid-urethral sling and intravesical botox injection. She is here for follow-up.  Pt reports doing well, denies any fever, chills nausea, vomiting. Reports some mild hip pain but is controlled. Pt denies any irritative or obstructive urinary symptoms. Reports that she is emptying well. Pt reports some mild vaginal itching for the past few days. Denied any vaginal discharge or malodorous urine. PVR 0. \par \par

## 2023-02-16 ENCOUNTER — APPOINTMENT (OUTPATIENT)
Dept: BARIATRICS | Facility: CLINIC | Age: 48
End: 2023-02-16
Payer: MEDICAID

## 2023-02-16 VITALS
TEMPERATURE: 97.3 F | DIASTOLIC BLOOD PRESSURE: 81 MMHG | HEIGHT: 61 IN | WEIGHT: 182 LBS | BODY MASS INDEX: 34.36 KG/M2 | OXYGEN SATURATION: 97 % | SYSTOLIC BLOOD PRESSURE: 136 MMHG | HEART RATE: 69 BPM

## 2023-02-16 DIAGNOSIS — R32 UNSPECIFIED URINARY INCONTINENCE: ICD-10-CM

## 2023-02-16 DIAGNOSIS — M79.7 FIBROMYALGIA: ICD-10-CM

## 2023-02-16 PROCEDURE — 99214 OFFICE O/P EST MOD 30 MIN: CPT

## 2023-02-16 RX ORDER — PANTOPRAZOLE 40 MG/1
40 TABLET, DELAYED RELEASE ORAL
Qty: 42 | Refills: 0 | Status: ACTIVE | COMMUNITY
Start: 2023-02-16 | End: 1900-01-01

## 2023-02-16 NOTE — ASSESSMENT
[FreeTextEntry1] : Pt is a 47 y/o F with pmhx of RYGB 13 yrs ago, s/p revision for intussusception, fibromyalgia using cane, spine problems, morbid obesity BMI 38, who presents today feeling well here for routine f/u after EGD TOR on 1/9/23. Pt has gained 1 lb since her last visit but states she was in the hospital for 3 days after having a procedure with urology for urinary incontinence. States she thinks her wt loss is stalled from being in a hospital for 3 days and limited activity and altered diet. Pt also s/p L knee sx about a yr ago and is still recovering so her exercise is limited. Pt in a knee brace and using a cane, advanced from using a wheelchair from her last visit. Pt states she is consuming small portions of food at a time and is focusing on consuming high protein foods. Admits to needing to increase her dietary fiber, currently suffering from constipation. Pt to start incorporating metamucil daily and increase her daily fruits and vegetables. Reports consuming adequate daily fluids. Pt has an upcoming GI appt for colonoscopy and evaluation of fatty liver which she has surveillanced, states she completed recent US with them to review. Pt states she is going to start increasing her exercise to aid her wt loss efforts now that she is ambulating easier. Reports taking her vit daily as directed. Tee solorzano,ralph,d, abd pn. Reports new onset acid reflux, reviewed antireflux diet and recommended eating q2-3 hrs. Will start pt on protonix x 6 weeks. No other concerns at this time.

## 2023-02-16 NOTE — HISTORY OF PRESENT ILLNESS
[de-identified] : Pt is a 45 y/o F with pmhx of RYGB 13 yrs ago, s/p revision for intussusception, fibromyalgia using cane, spine problems, morbid obesity BMI 38, who presents today feeling well here for routine f/u after EGD TOR on 1/9/23. Pt has gained 1 lb since her last visit but states she was in the hospital for 3 days after having a procedure with urology for urinary incontinence. States she thinks her wt loss is stalled from being in a hospital for 3 days and limited activity and altered diet. Pt also s/p L knee sx about a yr ago and is still recovering so her exercise is limited. Pt in a knee brace and using a cane, advanced from using a wheelchair from her last visit. Pt states she is consuming small portions of food at a time and is focusing on consuming high protein foods. Admits to needing to increase her dietary fiber, currently suffering from constipation. Pt to start incorporating metamucil daily and increase her daily fruits and vegetables. Reports consuming adequate daily fluids. Pt has an upcoming GI appt for colonoscopy and evaluation of fatty liver which she has surveillanced, states she completed recent US with them to review. Pt states she is going to start increasing her exercise to aid her wt loss efforts now that she is ambulating easier. Reports taking her vit daily as directed. Tee solorzano,ralph,d, abd pn. Reports new onset acid reflux, reviewed antireflux diet and recommended eating q2-3 hrs. Will start pt on protonix x 6 weeks. No other concerns at this time.

## 2023-02-16 NOTE — PLAN
[FreeTextEntry1] : labs\par nutrition\par diet compliance\par increase exercise as tolerated\par continue to work on wt loss following Nico TOR\par f/u GI \par protonix x 6 weeks\par f/u 2 mo to reassess wt

## 2023-02-24 ENCOUNTER — NON-APPOINTMENT (OUTPATIENT)
Age: 48
End: 2023-02-24

## 2023-02-24 LAB
BACTERIA GENITAL AEROBE CULT: NORMAL
BACTERIA UR CULT: NORMAL

## 2023-02-27 LAB
APPEARANCE: CLEAR
BACTERIA: NEGATIVE
BILIRUBIN URINE: NEGATIVE
BLOOD URINE: NEGATIVE
CALCIUM OXALATE CRYSTALS: NEGATIVE
COLOR: YELLOW
GLUCOSE QUALITATIVE U: NEGATIVE
GRANULAR CASTS: 0 /LPF
HYALINE CASTS: 0 /LPF
KETONES URINE: NEGATIVE
LEUKOCYTE ESTERASE URINE: NEGATIVE
MICROSCOPIC-UA: NORMAL
NITRITE URINE: NEGATIVE
PH URINE: 6.5
PROTEIN URINE: NORMAL
RED BLOOD CELLS URINE: 1 /HPF
SPECIFIC GRAVITY URINE: 1.02
SQUAMOUS EPITHELIAL CELLS: 3 /HPF
TRIPLE PHOSPHATE CRYSTALS: NEGATIVE
URIC ACID CRYSTALS: NEGATIVE
URINE COMMENTS: NORMAL
UROBILINOGEN URINE: NORMAL
WHITE BLOOD CELLS URINE: 3 /HPF

## 2023-03-06 LAB
25(OH)D3 SERPL-MCNC: 25.6 NG/ML
A-TOCOPHEROL VIT E SERPL-MCNC: 10.9 MG/L
ALBUMIN SERPL ELPH-MCNC: 4.3 G/DL
ALP BLD-CCNC: 129 U/L
ALT SERPL-CCNC: 27 U/L
ANION GAP SERPL CALC-SCNC: 14 MMOL/L
APTT BLD: 31.3 SEC
AST SERPL-CCNC: 20 U/L
BASOPHILS # BLD AUTO: 0.03 K/UL
BASOPHILS NFR BLD AUTO: 0.6 %
BETA+GAMMA TOCOPHEROL SERPL-MCNC: 0.4 MG/L
BILIRUB SERPL-MCNC: 0.4 MG/DL
BUN SERPL-MCNC: 15 MG/DL
CA-I SERPL-SCNC: 5.1 MG/DL
CALCIUM SERPL-MCNC: 9.6 MG/DL
CALCIUM SERPL-MCNC: 9.6 MG/DL
CHLORIDE SERPL-SCNC: 104 MMOL/L
CHOLEST SERPL-MCNC: 223 MG/DL
CO2 SERPL-SCNC: 20 MMOL/L
CREAT SERPL-MCNC: 0.54 MG/DL
EGFR: 114 ML/MIN/1.73M2
EOSINOPHIL # BLD AUTO: 0.09 K/UL
EOSINOPHIL NFR BLD AUTO: 1.9 %
ESTIMATED AVERAGE GLUCOSE: 108 MG/DL
FOLATE SERPL-MCNC: >20 NG/ML
GLUCOSE SERPL-MCNC: 94 MG/DL
HBA1C MFR BLD HPLC: 5.4 %
HCT VFR BLD CALC: 39.4 %
HDLC SERPL-MCNC: 56 MG/DL
HGB BLD-MCNC: 13 G/DL
IMM GRANULOCYTES NFR BLD AUTO: 0.4 %
INR PPP: 0.97 RATIO
IRON SATN MFR SERPL: 32 %
IRON SERPL-MCNC: 123 UG/DL
LDLC SERPL CALC-MCNC: 130 MG/DL
LYMPHOCYTES # BLD AUTO: 1.62 K/UL
LYMPHOCYTES NFR BLD AUTO: 33.5 %
MAN DIFF?: NORMAL
MCHC RBC-ENTMCNC: 30 PG
MCHC RBC-ENTMCNC: 33 GM/DL
MCV RBC AUTO: 91 FL
MONOCYTES # BLD AUTO: 0.35 K/UL
MONOCYTES NFR BLD AUTO: 7.2 %
NEUTROPHILS # BLD AUTO: 2.72 K/UL
NEUTROPHILS NFR BLD AUTO: 56.4 %
NONHDLC SERPL-MCNC: 167 MG/DL
PARATHYROID HORMONE INTACT: 50 PG/ML
PLATELET # BLD AUTO: 380 K/UL
POTASSIUM SERPL-SCNC: 4.4 MMOL/L
PREALB SERPL NEPH-MCNC: 25 MG/DL
PROT SERPL-MCNC: 6.6 G/DL
PT BLD: 11.3 SEC
RBC # BLD: 4.33 M/UL
RBC # FLD: 14.5 %
SODIUM SERPL-SCNC: 138 MMOL/L
TIBC SERPL-MCNC: 381 UG/DL
TRIGL SERPL-MCNC: 182 MG/DL
TSH SERPL-ACNC: 0.75 UIU/ML
UIBC SERPL-MCNC: 258 UG/DL
VIT A SERPL-MCNC: 49.1 UG/DL
VIT B1 SERPL-MCNC: 172.2 NMOL/L
VIT B12 SERPL-MCNC: >2000 PG/ML
WBC # FLD AUTO: 4.83 K/UL
ZINC SERPL-MCNC: 68 UG/DL

## 2023-03-06 RX ORDER — ERGOCALCIFEROL 1.25 MG/1
1.25 MG CAPSULE, LIQUID FILLED ORAL
Qty: 4 | Refills: 0 | Status: ACTIVE | COMMUNITY
Start: 2023-03-06 | End: 1900-01-01

## 2023-03-14 ENCOUNTER — APPOINTMENT (OUTPATIENT)
Dept: UROLOGY | Facility: CLINIC | Age: 48
End: 2023-03-14
Payer: MEDICAID

## 2023-03-14 VITALS
TEMPERATURE: 98 F | HEART RATE: 65 BPM | SYSTOLIC BLOOD PRESSURE: 128 MMHG | OXYGEN SATURATION: 99 % | DIASTOLIC BLOOD PRESSURE: 80 MMHG

## 2023-03-14 PROCEDURE — 81003 URINALYSIS AUTO W/O SCOPE: CPT | Mod: QW

## 2023-03-14 PROCEDURE — 51798 US URINE CAPACITY MEASURE: CPT

## 2023-03-14 PROCEDURE — 99214 OFFICE O/P EST MOD 30 MIN: CPT | Mod: 24

## 2023-03-14 NOTE — HISTORY OF PRESENT ILLNESS
[FreeTextEntry1] : Ms. NIKA SANTOS comes in today for her urologic follow-up. \par \par Ms. Santos has a longstanding history of pelvic pain in the setting of chronic pain syndrome (including fibromyalgia) and underwent a urodynamics evaluation in 2022 which identified mixed urinary incontinence. She was given oxybutynin and Myrbetriq (separately) which failed to offer subjective improvement and she opted to proceed with a mid-urethral sling and injections of intravesical Botox. Her primary complaint today is urge incontinence. She reports minimal to no stress incontinence. In addition, she reports right leg pain. \par \par Ms. Santos is on standing oxycodone for her chronic pain. She is consulting a pain management physician for this, and they have agreed to continue the narcotics until she loses weight and is able to exercise with regularity. \par \par Sono (performed to assess bladder emptying): Nil PVR\par \par She is  2, para 2.  \par \par

## 2023-03-14 NOTE — ASSESSMENT
[FreeTextEntry1] : I discussed the findings and options with Ms. NIKA SANTOS in detail.\par \par Her recurrent urge incontinence is likely due to the four month interval since her last Botox injection. I have asked her to return for another Urodynamics evaluation to assess her urge urinary incontinence.   She also states she needs to "push" to empty her bladder, so I would like to assess this as well.  In addition, she may benefit from pelvic floor physical therapy for her chronic pelvic pain, and I have provided her with the appropriate referral.  \par \par Future interventions, should they apply, will be discussed following her urodynamics evaluation. \par \par Ms. Santos demonstrated her understanding of the assessment and plan.

## 2023-03-14 NOTE — LETTER BODY
[Dear  ___] : Dear  [unfilled], [Consult Letter:] : I had the pleasure of evaluating your patient, [unfilled]. [Please see my note below.] : Please see my note below. [Sincerely,] : Sincerely, [Consult Closing:] : Thank you very much for allowing me to participate in the care of this patient.  If you have any questions, please do not hesitate to contact me. [FreeTextEntry3] : Dr. Pauline Yap

## 2023-03-14 NOTE — ADDENDUM
[FreeTextEntry1] : A portion of this note was written by [Erlin Davis] on 03/14/2023 acting as a scribe for Dr. Yap. \par \par I have personally reviewed the chart and agree that the record accurately reflects my personal performance of the history, physical exam, assessment, and plan.

## 2023-03-24 ENCOUNTER — APPOINTMENT (OUTPATIENT)
Dept: ENDOCRINOLOGY | Facility: CLINIC | Age: 48
End: 2023-03-24
Payer: MEDICAID

## 2023-03-24 VITALS
DIASTOLIC BLOOD PRESSURE: 77 MMHG | WEIGHT: 182 LBS | BODY MASS INDEX: 34.36 KG/M2 | HEART RATE: 85 BPM | HEIGHT: 61 IN | SYSTOLIC BLOOD PRESSURE: 118 MMHG

## 2023-03-24 PROCEDURE — 99213 OFFICE O/P EST LOW 20 MIN: CPT

## 2023-03-30 NOTE — HISTORY OF PRESENT ILLNESS
[FreeTextEntry1] : 48 y/o F with pmhx of RYGB 13 yrs ago, s/p revision for intussusception, fibromyalgia, spine problems, morbid obesity\par initial evaluation and management of metabolic issues\par generally feels well and endorses no acute complaints.\par reports cc of worsening weight gain. admits to heavy starch diet, particularly w/ late night binging. states this is due to pain from fibromyalgia. reports sedentary lifestyle due to joint issues. denies past falls or fractures. has gained most weight after RnY. no post prandial symptoms of hypoglycemia. \par \par 3/2023 Here for /fu, generally feels well and endorses no acute complaints. No interval events since LV. Today reports good tolerance and no significant s/e w/ mounjaro s/e. lost 10 lb, insurance denied PA and appeal for medication. \par She otherwise denies any f/c, CP, SOB, palpitations, tremors, depressed mood, anxiety, palpitations, n/v, stool/urinary abn, skin/weight changes, heat/cold intolerance, HAs, breast/nipple changes, polyuria/polydipsia/nocturia or other complaints.\par \par

## 2023-04-05 LAB
BACTERIA UR CULT: NORMAL
BILIRUB UR QL STRIP: NORMAL
CLARITY UR: CLEAR
COLLECTION METHOD: NORMAL
GLUCOSE UR-MCNC: NORMAL
HCG UR QL: 0.2 EU/DL
HGB UR QL STRIP.AUTO: NORMAL
KETONES UR-MCNC: NORMAL
LEUKOCYTE ESTERASE UR QL STRIP: NORMAL
NITRITE UR QL STRIP: NORMAL
PH UR STRIP: 5
PROT UR STRIP-MCNC: NORMAL
SP GR UR STRIP: 1.03

## 2023-04-06 ENCOUNTER — APPOINTMENT (OUTPATIENT)
Dept: UROLOGY | Facility: CLINIC | Age: 48
End: 2023-04-06
Payer: MEDICAID

## 2023-04-06 VITALS
HEART RATE: 58 BPM | DIASTOLIC BLOOD PRESSURE: 71 MMHG | OXYGEN SATURATION: 97 % | SYSTOLIC BLOOD PRESSURE: 108 MMHG | TEMPERATURE: 98.1 F

## 2023-04-06 PROCEDURE — 51797 INTRAABDOMINAL PRESSURE TEST: CPT

## 2023-04-06 PROCEDURE — 81003 URINALYSIS AUTO W/O SCOPE: CPT | Mod: QW

## 2023-04-06 PROCEDURE — 51728 CYSTOMETROGRAM W/VP: CPT | Mod: 58

## 2023-04-06 PROCEDURE — 51784 ANAL/URINARY MUSCLE STUDY: CPT

## 2023-04-06 PROCEDURE — 51741 ELECTRO-UROFLOWMETRY FIRST: CPT

## 2023-04-06 NOTE — ASSESSMENT
[FreeTextEntry1] : Pt is a 46 yo F who presents today for urodynamics. \par \par On UDS she has evidence of valsalva voiding and dyssynergia.  She has pain with bladder filling.  I have suggested a 6-8 week course of PFPT and she will return to see me. \par \par She has mild FLASH which is minimallly bothersome. \par \par Urine sent for UC.

## 2023-04-06 NOTE — HISTORY OF PRESENT ILLNESS
[FreeTextEntry1] : Patient is a 48 yo F who presents today for urodynamics. \par \par 3/14/23--Ms. NIKA SANTOS comes in today for her urologic follow-up. \par \par Ms. Santos has a longstanding history of pelvic pain in the setting of chronic pain syndrome (including fibromyalgia) and underwent a urodynamics evaluation in 2022 which identified mixed urinary incontinence. She was given oxybutynin and Myrbetriq (separately) which failed to offer subjective improvement and she opted to proceed with a mid-urethral sling and injections of intravesical Botox. Her primary complaint today is urge incontinence. She reports minimal to no stress incontinence. In addition, she reports right leg pain. \par \par Ms. Santos is on standing oxycodone for her chronic pain. She is consulting a pain management physician for this, and they have agreed to continue the narcotics until she loses weight and is able to exercise with regularity. \par \par Sono (performed to assess bladder emptying): Nil PVR\par \par She is  2, para 2. \par \par \par \par \par

## 2023-04-13 ENCOUNTER — APPOINTMENT (OUTPATIENT)
Dept: BARIATRICS | Facility: CLINIC | Age: 48
End: 2023-04-13

## 2023-06-01 NOTE — PROGRESS NOTE ADULT - ASSESSMENT
C/o dental pain to L upper jaw. States he needs to get his teeth pulled but his insurance doesn't cover dental work anymore. Denies n/v/d/f. Mild swelling noted. No notable abscess.     41yo woman with PMH anemia, herniated cervical disc, cholecystectomy, RYGB (2009), intussusception of jejunojejunal anastomosis (2015), and subsequent resection of dilated jejunojejunal anastomosis (Feb 2017) with small bowel anastomosis, recently admitted in June for pelvis laproscopy with lysis of adhesions, who presented with acute onset low back pain leading to inability to ambulate. Currently stable but still with severe pain (slightly improved), MRI r/o cauda equina, will not get cervical or thoracic MRI yet because exam does not this pathology. Will continue to monitor pain control and adjust medications appropriately to improve pain relief. 43yo woman with PMH anemia, herniated cervical disc, cholecystectomy, RYGB (2009), intussusception of jejunojejunal anastomosis (2015), and subsequent resection of dilated jejunojejunal anastomosis (Feb 2017) with small bowel anastomosis, recently admitted in June for pelvis laproscopy with lysis of adhesions, who presented with acute onset low back pain leading to inability to ambulate. Currently stable but with some improved pain on movement of pt's legs. MRI r/o cauda equina, will not get cervical or thoracic MRI yet because exam does not this pathology. Will continue to monitor pain control and adjust medications appropriately to improve pain relief. May elect to do spinal epidural for this patient.

## 2023-06-02 ENCOUNTER — EMERGENCY (EMERGENCY)
Facility: HOSPITAL | Age: 48
LOS: 1 days | Discharge: HOME | End: 2023-06-02
Admitting: STUDENT IN AN ORGANIZED HEALTH CARE EDUCATION/TRAINING PROGRAM
Payer: MEDICAID

## 2023-06-02 ENCOUNTER — APPOINTMENT (OUTPATIENT)
Dept: ENDOCRINOLOGY | Facility: CLINIC | Age: 48
End: 2023-06-02
Payer: MEDICAID

## 2023-06-02 VITALS
WEIGHT: 176 LBS | SYSTOLIC BLOOD PRESSURE: 100 MMHG | HEART RATE: 74 BPM | BODY MASS INDEX: 33.26 KG/M2 | DIASTOLIC BLOOD PRESSURE: 63 MMHG

## 2023-06-02 VITALS
SYSTOLIC BLOOD PRESSURE: 130 MMHG | DIASTOLIC BLOOD PRESSURE: 81 MMHG | RESPIRATION RATE: 18 BRPM | OXYGEN SATURATION: 96 % | HEIGHT: 61 IN | HEART RATE: 74 BPM | TEMPERATURE: 98 F | WEIGHT: 175.05 LBS

## 2023-06-02 DIAGNOSIS — M79.671 PAIN IN RIGHT FOOT: ICD-10-CM

## 2023-06-02 DIAGNOSIS — E78.5 HYPERLIPIDEMIA, UNSPECIFIED: ICD-10-CM

## 2023-06-02 DIAGNOSIS — M79.7 FIBROMYALGIA: ICD-10-CM

## 2023-06-02 DIAGNOSIS — Z98.84 BARIATRIC SURGERY STATUS: ICD-10-CM

## 2023-06-02 DIAGNOSIS — Z90.710 ACQUIRED ABSENCE OF BOTH CERVIX AND UTERUS: ICD-10-CM

## 2023-06-02 DIAGNOSIS — Z86.2 PERSONAL HISTORY OF DISEASES OF THE BLOOD AND BLOOD-FORMING ORGANS AND CERTAIN DISORDERS INVOLVING THE IMMUNE MECHANISM: ICD-10-CM

## 2023-06-02 DIAGNOSIS — Z87.440 PERSONAL HISTORY OF URINARY (TRACT) INFECTIONS: ICD-10-CM

## 2023-06-02 DIAGNOSIS — Z90.49 ACQUIRED ABSENCE OF OTHER SPECIFIED PARTS OF DIGESTIVE TRACT: ICD-10-CM

## 2023-06-02 DIAGNOSIS — Z90.710 ACQUIRED ABSENCE OF BOTH CERVIX AND UTERUS: Chronic | ICD-10-CM

## 2023-06-02 DIAGNOSIS — Z90.49 ACQUIRED ABSENCE OF OTHER SPECIFIED PARTS OF DIGESTIVE TRACT: Chronic | ICD-10-CM

## 2023-06-02 PROCEDURE — 73630 X-RAY EXAM OF FOOT: CPT

## 2023-06-02 PROCEDURE — 99284 EMERGENCY DEPT VISIT MOD MDM: CPT

## 2023-06-02 PROCEDURE — 73630 X-RAY EXAM OF FOOT: CPT | Mod: 26,RT

## 2023-06-02 PROCEDURE — 73650 X-RAY EXAM OF HEEL: CPT

## 2023-06-02 PROCEDURE — 73650 X-RAY EXAM OF HEEL: CPT | Mod: 26,59,RT

## 2023-06-02 PROCEDURE — 99284 EMERGENCY DEPT VISIT MOD MDM: CPT | Mod: 25

## 2023-06-02 PROCEDURE — 99215 OFFICE O/P EST HI 40 MIN: CPT

## 2023-06-02 NOTE — ED ADULT NURSE NOTE - NSFALLUNIVINTERV_ED_ALL_ED
Bed/Stretcher in lowest position, wheels locked, appropriate side rails in place/Call bell, personal items and telephone in reach/Instruct patient to call for assistance before getting out of bed/chair/stretcher/Non-slip footwear applied when patient is off stretcher/[place] to call system/Physically safe environment - no spills, clutter or unnecessary equipment/Purposeful proactive rounding/Room/bathroom lighting operational, light cord in reach

## 2023-06-02 NOTE — ED PROVIDER NOTE - NSFOLLOWUPINSTRUCTIONS_ED_ALL_ED_FT
Take tylenol 650mg or motrin 400-800mg as needed every 4-6 hours for pain.   REST- Rest your hurting/injured joint or extremity to decrease pain and swelling for 24-48 hours    ICE- Apply ice to area of pain to decreased inflammation and pain, put towel/barrier between ice and skin. You can keep ice on for 20 minutes at a time 4-8 times daily   COMPRESSION- Wear ace wrap or brace for support to reduce swelling.  Make sure not to wrap too tight, loosen if skin feeling numb/tingling or skin turns blue   ELEVATION- Elevate hurting/injured area 6 or more inches about level of heart to decrease swelling/inflammation.  Use pillow under joint to elevate area    You can follow up in podiatry clinic on [address] Call to arrange appointment [phone]    Plantar Fasciitis    Plantar fasciitis is a painful foot condition that affects the heel. It occurs when the band of tissue that connects the toes to the heel bone (plantar fascia) becomes irritated. This can happen as the result of exercising too much or doing other repetitive activities (overuse injury).    Plantar fasciitis can cause mild irritation to severe pain that makes it difficult to walk or move. The pain is usually worse in the morning after sleeping, or after sitting or lying down for a period of time. Pain may also be worse after long periods of walking or standing.    What are the causes?  This condition may be caused by:  Standing for long periods of time.  Wearing shoes that do not have good arch support.  Doing activities that put stress on joints (high-impact activities). This includes ballet and exercise that makes your heart beat faster (aerobic exercise), such as running.  Being overweight.  An abnormal way of walking (gait).  Tight muscles in the back of your lower leg (calf).  High arches in your feet or flat feet.  Starting a new athletic activity.  What are the signs or symptoms?  The main symptom of this condition is heel pain. Pain may get worse after the following:  Taking the first steps after a time of rest, especially in the morning after awakening, or after you have been sitting or lying down for a while.  Long periods of standing still.  Pain may decrease after 30–45 minutes of activity, such as gentle walking.    How is this diagnosed?  This condition may be diagnosed based on your medical history, a physical exam, and your symptoms. Your health care provider will check for:  A tender area on the bottom of your foot.  A high arch in your foot or flat feet.  Pain when you move your foot.  Difficulty moving your foot.  You may have imaging tests to confirm the diagnosis, such as:  X-rays.  Ultrasound.  MRI.  How is this treated?  Treatment for plantar fasciitis depends on how severe your condition is. Treatment may include:  Rest, ice, pressure (compression), and raising (elevating) the affected foot. This is called RICE therapy. Your health care provider may recommend RICE therapy along with over-the-counter pain medicines to manage your pain.  Exercises to stretch your calves and your plantar fascia.  A splint that holds your foot in a stretched, upward position while you sleep (night splint).  Physical therapy to relieve symptoms and prevent problems in the future.  Injections of steroid medicine (cortisone) to relieve pain and inflammation.  Stimulating your plantar fascia with electrical impulses (extracorporeal shock wave therapy). This is usually the last treatment option before surgery.  Surgery, if other treatments have not worked after 12 months.  Follow these instructions at home:  Managing pain, stiffness, and swelling      If directed, put ice on the painful area. To do this:  Put ice in a plastic bag, or use a frozen bottle of water.  Place a towel between your skin and the bag or bottle.  Roll the bottom of your foot over the bag or bottle.  Do this for 20 minutes, 2–3 times a day.  Wear athletic shoes that have air-sole or gel-sole cushions, or try soft shoe inserts that are designed for plantar fasciitis.  Elevate your foot above the level of your heart while you are sitting or lying down.  Activity    Avoid activities that cause pain. Ask your health care provider what activities are safe for you.  Do physical therapy exercises and stretches as told by your health care provider.  Try activities and forms of exercise that are easier on your joints (low impact). Examples include swimming, water aerobics, and biking.  General instructions    Take over-the-counter and prescription medicines only as told by your health care provider.  Wear a night splint while sleeping, if told by your health care provider. Loosen the splint if your toes tingle, become numb, or turn cold and blue.  Maintain a healthy weight, or work with your health care provider to lose weight as needed.  Keep all follow-up visits. This is important.  Contact a health care provider if you have:  Symptoms that do not go away with home treatment.  Pain that gets worse.  Pain that affects your ability to move or do daily activities.  Summary  Plantar fasciitis is a painful foot condition that affects the heel. It occurs when the band of tissue that connects the toes to the heel bone (plantar fascia) becomes irritated.  Heel pain is the main symptom of this condition. It may get worse after exercising too much or standing still for a long time.  Treatment varies, but it usually starts with rest, ice, pressure (compression), and raising (elevating) the affected foot. This is called RICE therapy. Over-the-counter medicines can also be used to manage pain.  This information is not intended to replace advice given to you by your health care provider. Make sure you discuss any questions you have with your health care provider.

## 2023-06-02 NOTE — ED PROVIDER NOTE - PHYSICAL EXAMINATION
Gen: well appearing, no acute distress  Skin: warm/dry, no rash noted  Resp: breathing comfortably, speaking in full sentences, no dyspnea  RLE: + ttp over plantar fascia/calcaneus, no skin changes or edema, no calf ttp, FROM all joints, DP/PT pulse 2+, SILT, neg SLR   Neuro: alert/oriented, ambulatory w/ limp

## 2023-06-02 NOTE — ED ADULT NURSE NOTE - OBJECTIVE STATEMENT
Pt to ED for R heel pain x 1 wk. No injury/fall. No deformity, swelling or bruising to site evident. Pt ambulatory with pain. VSS. A&ox4. Denies pertinent PMH.

## 2023-06-02 NOTE — ED PROVIDER NOTE - PATIENT PORTAL LINK FT
You can access the FollowMyHealth Patient Portal offered by [facility] by registering at the following website: [email or web address]. By joining [facility]’s FollowMyHealth portal, you will also be able to view your health information using other applications (apps) compatible with our system.

## 2023-06-02 NOTE — ED PROVIDER NOTE - OBJECTIVE STATEMENT
47 F pmh fibromyalgia on lyrica and prn percocet p/w R heel pain x 2 weeks.   pt reports Dull aching pain in right heel worse with ambulation, pain intermittently radiates up back of leg to hip w/ ambulation.  taking tylenol w/o relief,  not taking her Percocet.  Denies fever, chills, skin changes, calf pain/swelling, numbness, weakness, foot drop, direct injuries.

## 2023-06-02 NOTE — ED PROVIDER NOTE - CLINICAL SUMMARY MEDICAL DECISION MAKING FREE TEXT BOX
47 F pmh fibromyalgia on lyrica and prn percocet p/w R heel pain x 2 weeks.  no trauma.  on exam RLE: + ttp over plantar fascia/calcaneus, no skin changes or edema, no calf ttp, FROM all joints, DP/PT pulse 2+, SILT, neg SLR.  suspect plantar fascitis,  xray foot/calcaneus no fx or dislocation.  educated on supprtive care.  f/u with podiatry prn.  discussed strict return parameters

## 2023-06-08 PROBLEM — E78.5 DYSLIPIDEMIA: Status: ACTIVE | Noted: 2022-09-23

## 2023-06-08 PROBLEM — Z98.84 HISTORY OF ROUX-EN-Y GASTRIC BYPASS: Status: ACTIVE | Noted: 2021-01-19

## 2023-06-08 NOTE — PACU DISCHARGE NOTE - COMMENTS
D/C TO ULTRASOUND THEN FLOOR AWAKE AND ALERT IN STABLE WITH VSS
PTA pt was independent with functional mobility and ADLs. Pt lives in a  with his wife 2-3 steps to enter 1 flight inside

## 2023-06-08 NOTE — HISTORY OF PRESENT ILLNESS
[FreeTextEntry1] : 46 y/o F with pmhx of RYGB 13 yrs ago, s/p revision for intussusception, fibromyalgia, spine problems, morbid obesity\par initial evaluation and management of metabolic issues\par generally feels well and endorses no acute complaints.\par reports cc of worsening weight gain. admits to heavy starch diet, particularly w/ late night binging. states this is due to pain from fibromyalgia. reports sedentary lifestyle due to joint issues. denies past falls or fractures. has gained most weight after RnY. no post prandial symptoms of hypoglycemia. \par \par 6/2023 Here for /fu, generally feels well and endorses no acute complaints. No interval events since LV. Today reports good tolerance and no significant s/e w/ mounjaro s/e. lost 10 lb, insurance denied PA and appeal for medication. \par She otherwise denies any f/c, CP, SOB, palpitations, tremors, depressed mood, anxiety, palpitations, n/v, stool/urinary abn, skin/weight changes, heat/cold intolerance, HAs, breast/nipple changes, polyuria/polydipsia/nocturia or other complaints.\par \par

## 2023-06-20 ENCOUNTER — APPOINTMENT (OUTPATIENT)
Dept: INTERNAL MEDICINE | Facility: CLINIC | Age: 48
End: 2023-06-20

## 2023-06-26 ENCOUNTER — APPOINTMENT (OUTPATIENT)
Dept: UROLOGY | Facility: CLINIC | Age: 48
End: 2023-06-26
Payer: MEDICAID

## 2023-06-26 VITALS
DIASTOLIC BLOOD PRESSURE: 73 MMHG | TEMPERATURE: 98.2 F | HEART RATE: 80 BPM | OXYGEN SATURATION: 99 % | SYSTOLIC BLOOD PRESSURE: 120 MMHG

## 2023-06-26 LAB
BACTERIA UR CULT: NORMAL
BILIRUB UR QL STRIP: NORMAL
BILIRUB UR QL STRIP: NORMAL
CLARITY UR: CLEAR
CLARITY UR: CLEAR
COLLECTION METHOD: NORMAL
COLLECTION METHOD: NORMAL
GLUCOSE UR-MCNC: NORMAL
GLUCOSE UR-MCNC: NORMAL
HCG UR QL: 0.2 EU/DL
HCG UR QL: 2 EU/DL
HGB UR QL STRIP.AUTO: NORMAL
HGB UR QL STRIP.AUTO: NORMAL
KETONES UR-MCNC: NORMAL
KETONES UR-MCNC: NORMAL
LEUKOCYTE ESTERASE UR QL STRIP: NORMAL
LEUKOCYTE ESTERASE UR QL STRIP: NORMAL
NITRITE UR QL STRIP: NORMAL
NITRITE UR QL STRIP: NORMAL
PH UR STRIP: 5.5
PH UR STRIP: 5.5
PROT UR STRIP-MCNC: NORMAL
PROT UR STRIP-MCNC: NORMAL
SP GR UR STRIP: 1.02
SP GR UR STRIP: 1.02

## 2023-06-26 PROCEDURE — 99214 OFFICE O/P EST MOD 30 MIN: CPT

## 2023-06-26 PROCEDURE — 51798 US URINE CAPACITY MEASURE: CPT

## 2023-06-26 RX ORDER — ESTRADIOL 0.1 MG/G
0.1 CREAM VAGINAL
Qty: 1 | Refills: 5 | Status: ACTIVE | COMMUNITY
Start: 2023-06-26 | End: 1900-01-01

## 2023-06-26 NOTE — ASSESSMENT
[FreeTextEntry1] : I discussed the findings and options with Ms. NIKA REA in detail.\par \par We discussed options including intravesical Botox in great detail, from which I think she will benefit. We discussed risks (incomplete bladder emptying, urinary retention, UTI), benefits, and alternatives. Arrangements were made for her to return next week for this, and a urine culture was sent today in anticipation of that procedure.\par \par Moreover, we agreed that she will start applying Estrogen cream x2-3 a week to address her vaginal atrophy. She will also continue attending PFPT sessions for her urinary symptoms.\par \par Urine was sent for culture.\par \par Pt will return in office for intravesical botox injection.\par \par Patient expressed understanding.\par

## 2023-06-26 NOTE — HISTORY OF PRESENT ILLNESS
Patient name: Nickolas Cazares______________________________     Address: [address]  [facility] [address]______.______________________     Phone number:_[phone] (home)  _______________________________      YOB: 1942_____________________    Medical record number: [id number]   --------------------------------------------------------------------------------------------------------------------------------  Authorization for Release of Highly Confidential Health Information    Evaluation, Diagnosis, Testing and/or Treatment for Alcohol and/or Drug Abuse (federally assisted programs), HIV or AIDS and Mental Health  I hereby authorize the mutual exchange of such health information regarding the above-named person:    From:  Person/Organization: [facility]iders______      Initials [facility] [address]  [place] [facility]  [address] [address] [phone]     To: (Recipient)  Person/Organization NICKOLAS CAZARES - SELF          Purpose or Need for Information:Personal use    Disclosure will include (check all that apply):     ___ Face Sheet                   ___ History & Physical            ___  Laboratory/Diagnostic Test Results     ___ School Information        ___  Discharge Summary         ___ Behavior Health/Psychological Consult      ___  Medication Records      ___  ER Record Report     ___  Psychological Evaluation/Test Results     ___ Psychiatric Evaluation      ___ HIV Test Results      ___ Substance Abuse Treatment Record      ___ Psychosocial Assessment        ___ Summary of Treatment Records and Contact Dates    _X_ Other: Copy of medical records forms and letters                                                    Records for the period (dates) From _________________  To _________________    I have a right to inspect and copy the health information to be released,  and if I do not sign this Authorization, the organization above will not release my health information.  The above named person/organization will not refuse to treat me based on whether I agree to allow my health information to be used and disclosed to others.                          I also understand that this Authorization is subject to revocation/withdrawal by me at any time in writing to the medical record  at this site of care except to the extent that action has already been taken to release this information.  This authorization shall remain valid unless revoked.      Expiration Date - This release is valid for one (1) year from the signed date unless I fill in an earlier date.    _____/_____/_____    ________________________________________________                    ____________________  Signature of Patient                                                                                                     Date     And/Or    _________________________________________________                 ____________________                   Signature of Parent/Legal Guardian/Personal Representative                                Date     _________________________________________________  Relationship to Patient (see details below)    __________________________________________________   _____________________  Witness                  (Over 18 years of age)           Date      Re-disclosure Prohibited: Notice is hereby given to the patient or legal representative signing this Authorization that substance abuse information has been disclosed from records where confidentiality is protected by federal law.  Federal Regulations (42 CRF Part 2) prohibit the recipient from making any further disclosure of this information except with specific written consent of the patient.  Notice is hereby given to the recipient that [place] law prohibits the disclosure of any health information regarding HIV and mental health  [FreeTextEntry1] : Ms. NIKA SANTOS comes in today for her Urologic follow up. Pt is a 48 yo F with longstanding hx of pelvic pain and mixed urinary incontinence.   She underwent a sling and botox injection in 2023.  She did well for 2-3 months but then her symptoms returned.  \par \par On her last visit, UDS (23) indicate evidence of valsalva voiding and dyssynergia. She has pain with bladder filling--mild FLASH which is minimally bothersome. Pt was advised to proceed with a 6-8 week course of PFPT which she says was painful given her vaginal dryness and she stopped going. \par \par Moreover, she has failed Myrbetriq, Oxybutynin in the past. \par \par No menstrual periods.\par \par Notably, she is also doing PT for her knee (surgery 1 yr ago)\par \par Udip: 2023 ---small bilirubin, trace ketone\par PVR: 1cc (done to rule out incomplete bladder emptying)\par \par \par 23--- Patient is a 48 yo F who presents today for urodynamics. \par \par 3/14/23--Ms. NIKA SANTOS comes in today for her urologic follow-up. \par \par Ms. Santos has a longstanding history of pelvic pain in the setting of chronic pain syndrome (including fibromyalgia) and underwent a urodynamics evaluation in 2022 which identified mixed urinary incontinence. She was given oxybutynin and Myrbetriq (separately) which failed to offer subjective improvement and she opted to proceed with a mid-urethral sling and injections of intravesical Botox. Her primary complaint today is urge incontinence. She reports minimal to no stress incontinence. In addition, she reports right leg pain. \par \par Ms. Santos is on standing oxycodone for her chronic pain. She is consulting a pain management physician for this, and they have agreed to continue the narcotics until she loses weight and is able to exercise with regularity. \par \par Sono (performed to assess bladder emptying): Nil PVR\par \par She is  2, para 2. \par \par \par \par \par  without further authorization.    Who May Authorize Release  Mental Health:  1. A patient, 12 years old or older.  2. The parent of guardian of patient under 12 years old.  3. The parent or guardian of a patient who is at least 12 years old but under 18 years old, if the patient is informed and does not object or if the therapist find that there is no compelling reason for denying the access,  A parent or guardian who is denied access may petition the court for access to the record.  In addition, notwithstanding the above, a parent or guardian of a patient who is at least 12 years old but under 18 years old may request and receive the following information: current physical and mental condition, diagnosis, treatment needs, services provided and services needed, including medication, if any.  4. The guardian of a patient who is 18 years old or older.  5. An  or  representing a minor age 12 or older in a judicial or administrative proceeding, as long as the  or guardian has a court order allowing access to the patient's mental health record.  6. An agent holding a patients power of  for health care or property when the power of  authorizes the access to the patient's records.      Substance Abuse/Treatment and HIV and/or AIDS:  1. Minor (if minor consented to treatment).  2. Parent   3. Guardian   4. Agent under Power of  for Health Care  5. Health Care Surrogate

## 2023-06-26 NOTE — ADDENDUM
[FreeTextEntry1] : A portion of this note was written by [Jamaal Mosqueda] on 06/26/2023 acting as a scribe for Dr. Yap. \par \par I have personally reviewed the chart and agree that the record accurately reflects my personal performance of the history, physical exam, assessment, and plan.\par

## 2023-06-29 LAB — BACTERIA UR CULT: NORMAL

## 2023-07-07 NOTE — ED PROVIDER NOTE - CARE PLAN
Spoke with patient,  Advised patient to call insurance and discuss pharmacy requirements with them.  Patient verbalized understanding.   Principal Discharge DX:	Elbow pain

## 2023-07-13 ENCOUNTER — APPOINTMENT (OUTPATIENT)
Dept: UROLOGY | Facility: CLINIC | Age: 48
End: 2023-07-13
Payer: MEDICAID

## 2023-07-13 VITALS
SYSTOLIC BLOOD PRESSURE: 115 MMHG | DIASTOLIC BLOOD PRESSURE: 74 MMHG | OXYGEN SATURATION: 97 % | HEART RATE: 76 BPM | TEMPERATURE: 98.3 F

## 2023-07-13 PROCEDURE — 81003 URINALYSIS AUTO W/O SCOPE: CPT | Mod: QW

## 2023-07-13 PROCEDURE — 52000 CYSTOURETHROSCOPY: CPT

## 2023-07-14 LAB
BILIRUB UR QL STRIP: NORMAL
CLARITY UR: CLEAR
COLLECTION METHOD: NORMAL
GLUCOSE UR-MCNC: NORMAL
HCG UR QL: 0.2 EU/DL
HGB UR QL STRIP.AUTO: NORMAL
KETONES UR-MCNC: NORMAL
LEUKOCYTE ESTERASE UR QL STRIP: NORMAL
NITRITE UR QL STRIP: NORMAL
PH UR STRIP: 5.5
PROT UR STRIP-MCNC: NORMAL
SP GR UR STRIP: >=1.03

## 2023-07-14 NOTE — ADDENDUM
[FreeTextEntry1] : A portion of this note was written by [Jamaal Mosqueda] on 07/13/2023 acting as a scribe for Dr. Yap. \par \par I have personally reviewed the chart and agree that the record accurately reflects my personal performance of the history, physical exam, assessment, and plan.\par \par

## 2023-07-14 NOTE — HISTORY OF PRESENT ILLNESS
[FreeTextEntry1] : Ms. NIKA SANTOS comes in today for her Urologic follow up. Pt is a 48 yo F with longstanding hx of pelvic pain and mixed urinary incontinence. She underwent a sling and botox injection in 2023. She presents today for intravesical botox injection. \par \par \par 23--- Ms. NIKA SANTOS comes in today for her Urologic follow up. Pt is a 48 yo F with longstanding hx of pelvic pain and mixed urinary incontinence.   She underwent a sling and botox injection in 2023.  She did well for 2-3 months but then her symptoms returned.  \par \par On her last visit, UDS (23) indicate evidence of valsalva voiding and dyssynergia. She has pain with bladder filling--mild FLASH which is minimally bothersome. Pt was advised to proceed with a 6-8 week course of PFPT which she says was painful given her vaginal dryness and she stopped going. \par \par Moreover, she has failed Myrbetriq, Oxybutynin in the past. \par \par No menstrual periods.\par \par Notably, she is also doing PT for her knee (surgery 1 yr ago)\par \par Udip: 2023 ---small bilirubin, trace ketone\par PVR: 1cc (done to rule out incomplete bladder emptying)\par \par \par 23--- Patient is a 48 yo F who presents today for urodynamics. \par \par 3/14/23--Ms. NIKA SANTOS comes in today for her urologic follow-up. \par \par Ms. Santos has a longstanding history of pelvic pain in the setting of chronic pain syndrome (including fibromyalgia) and underwent a urodynamics evaluation in 2022 which identified mixed urinary incontinence. She was given oxybutynin and Myrbetriq (separately) which failed to offer subjective improvement and she opted to proceed with a mid-urethral sling and injections of intravesical Botox. Her primary complaint today is urge incontinence. She reports minimal to no stress incontinence. In addition, she reports right leg pain. \par \par Ms. Santos is on standing oxycodone for her chronic pain. She is consulting a pain management physician for this, and they have agreed to continue the narcotics until she loses weight and is able to exercise with regularity. \par \par Sono (performed to assess bladder emptying): Nil PVR\par \par She is  2, para 2. \par \par \par \par \par

## 2023-07-14 NOTE — ASSESSMENT
[FreeTextEntry1] : I discussed the findings and options with Ms. NIKA REA in detail.\par \par I injected 100 units of botox into her bladder, sparing the trigone. \par \par Pt had mild discomfort after the procedure, she will take Pyridium as needed.\par \par She will return in 7-10 days for a post void check.  Urine was sent for culture. \par \par Patient expressed understanding.\par

## 2023-07-17 LAB — BACTERIA UR CULT: NORMAL

## 2023-07-18 ENCOUNTER — APPOINTMENT (OUTPATIENT)
Dept: INTERNAL MEDICINE | Facility: CLINIC | Age: 48
End: 2023-07-18

## 2023-07-25 ENCOUNTER — APPOINTMENT (OUTPATIENT)
Dept: UROLOGY | Facility: CLINIC | Age: 48
End: 2023-07-25
Payer: MEDICAID

## 2023-07-25 VITALS
DIASTOLIC BLOOD PRESSURE: 81 MMHG | OXYGEN SATURATION: 98 % | HEART RATE: 67 BPM | SYSTOLIC BLOOD PRESSURE: 118 MMHG | TEMPERATURE: 98.1 F

## 2023-07-25 PROCEDURE — 51798 US URINE CAPACITY MEASURE: CPT

## 2023-07-25 PROCEDURE — 99213 OFFICE O/P EST LOW 20 MIN: CPT | Mod: 25

## 2023-07-27 ENCOUNTER — APPOINTMENT (OUTPATIENT)
Dept: PLASTIC SURGERY | Facility: CLINIC | Age: 48
End: 2023-07-27
Payer: MEDICAID

## 2023-07-27 PROCEDURE — 99213 OFFICE O/P EST LOW 20 MIN: CPT

## 2023-07-27 NOTE — HISTORY OF PRESENT ILLNESS
[FreeTextEntry1] : Ms. NIKA SANTOS comes in today for her Urologic follow up. Pt is a 48 yo F with longstanding hx of pelvic pain and mixed urinary incontinence. She returns today for a post-void residual evaluation s/p botox 23.\par \par Pt reports gradual improvement s/p botox 23. \par PVR: 26cc (done to rule out incomplete bladder emptying) \par \par \par Ms. NIKA SANTOS comes in today for her Urologic follow up. Pt is a 48 yo F with longstanding hx of pelvic pain and mixed urinary incontinence. She underwent a sling and botox injection in 2023. She presents today for intravesical botox injection. \par \par \par 23--- Ms. NIKA SANTOS comes in today for her Urologic follow up. Pt is a 48 yo F with longstanding hx of pelvic pain and mixed urinary incontinence.   She underwent a sling and botox injection in 2023.  She did well for 2-3 months but then her symptoms returned.  \par \par On her last visit, UDS (23) indicate evidence of valsalva voiding and dyssynergia. She has pain with bladder filling--mild FLASH which is minimally bothersome. Pt was advised to proceed with a 6-8 week course of PFPT which she says was painful given her vaginal dryness and she stopped going. \par \par Moreover, she has failed Myrbetriq, Oxybutynin in the past. \par \par No menstrual periods.\par \par Notably, she is also doing PT for her knee (surgery 1 yr ago)\par \par Udip: 2023 ---small bilirubin, trace ketone\par PVR: 1cc (done to rule out incomplete bladder emptying)\par \par \par 23--- Patient is a 48 yo F who presents today for urodynamics. \par \par 3/14/23--Ms. NIKA SANTOS comes in today for her urologic follow-up. \par \par Ms. Santos has a longstanding history of pelvic pain in the setting of chronic pain syndrome (including fibromyalgia) and underwent a urodynamics evaluation in 2022 which identified mixed urinary incontinence. She was given oxybutynin and Myrbetriq (separately) which failed to offer subjective improvement and she opted to proceed with a mid-urethral sling and injections of intravesical Botox. Her primary complaint today is urge incontinence. She reports minimal to no stress incontinence. In addition, she reports right leg pain. \par \par Ms. Santos is on standing oxycodone for her chronic pain. She is consulting a pain management physician for this, and they have agreed to continue the narcotics until she loses weight and is able to exercise with regularity. \par \par Sono (performed to assess bladder emptying): Nil PVR\par \par She is  2, para 2. \par \par \par \par \par

## 2023-07-27 NOTE — ASSESSMENT
[FreeTextEntry1] : I discussed the findings and options with Ms. NIKA REA in detail. \par \par Ms. REA reports gradual improvement s/p botox 7/13/23 . Today's PVR is 26cc (done to rule out incomplete bladder emptying).\par \par She will return to see me in 3 months. \par \par All of her questions were answered. Patient expressed understanding. \par

## 2023-07-28 ENCOUNTER — APPOINTMENT (OUTPATIENT)
Dept: SURGERY | Facility: CLINIC | Age: 48
End: 2023-07-28

## 2023-08-03 NOTE — HISTORY OF PRESENT ILLNESS
[FreeTextEntry1] : Problem - multiple mass, Left shoulder, left and right ankles. Present for 4 or more years.  Patient has been seen by Dermatology. No previous treatments. No itching, bleeding, or drainage. No Imaging/Biopsy. Slowly growing, no change in color. No Infection or inflammation. Pain and discomfort. Family hx of Lipoma. No family hx of skin cancer. h/o gastric bypass surgery

## 2023-08-08 ENCOUNTER — APPOINTMENT (OUTPATIENT)
Dept: INTERNAL MEDICINE | Facility: CLINIC | Age: 48
End: 2023-08-08

## 2023-08-17 ENCOUNTER — APPOINTMENT (OUTPATIENT)
Dept: INTERNAL MEDICINE | Facility: CLINIC | Age: 48
End: 2023-08-17

## 2023-08-25 ENCOUNTER — APPOINTMENT (OUTPATIENT)
Dept: ENDOCRINOLOGY | Facility: CLINIC | Age: 48
End: 2023-08-25

## 2023-08-29 ENCOUNTER — APPOINTMENT (OUTPATIENT)
Dept: PLASTIC SURGERY | Facility: CLINIC | Age: 48
End: 2023-08-29
Payer: MEDICAID

## 2023-08-29 ENCOUNTER — LABORATORY RESULT (OUTPATIENT)
Age: 48
End: 2023-08-29

## 2023-08-29 PROCEDURE — 24071 EXC ARM/ELBOW LES SC 3 CM/>: CPT

## 2023-08-29 PROCEDURE — 13121 CMPLX RPR S/A/L 2.6-7.5 CM: CPT | Mod: 58,59

## 2023-08-29 NOTE — PROCEDURE
[FreeTextEntry6] : Preop dx: lipoma, left arm.  Postopdx: same Procedure: excision 3.1cm lipoma left arm and complex closure of arm, 3.1cm Anesthesia: local 1% w/ epi Specimens: to path Procedure: 	IC obtained. Lesion demarcated.  Lido 1% w/ epi injected.  15 blade used to incise skin.  Lesion encountered in the subcutaneous  plane and dissected circumferentially. Removed in its entirety, 3.1cm.  Skin edges widely undermined and closed in layers with monocryl for a 3.1cm complex closure. Mastisol and steristrips placed.  No complications.  Specimen sent to path

## 2023-09-01 ENCOUNTER — APPOINTMENT (OUTPATIENT)
Dept: ENDOCRINOLOGY | Facility: CLINIC | Age: 48
End: 2023-09-01
Payer: MEDICAID

## 2023-09-01 VITALS
DIASTOLIC BLOOD PRESSURE: 68 MMHG | HEART RATE: 64 BPM | SYSTOLIC BLOOD PRESSURE: 105 MMHG | BODY MASS INDEX: 32.31 KG/M2 | WEIGHT: 171 LBS

## 2023-09-01 DIAGNOSIS — E88.81 METABOLIC SYNDROME: ICD-10-CM

## 2023-09-01 DIAGNOSIS — R73.9 HYPERGLYCEMIA, UNSPECIFIED: ICD-10-CM

## 2023-09-01 DIAGNOSIS — E66.9 OBESITY, UNSPECIFIED: ICD-10-CM

## 2023-09-01 PROCEDURE — 99213 OFFICE O/P EST LOW 20 MIN: CPT | Mod: 25

## 2023-09-05 ENCOUNTER — LABORATORY RESULT (OUTPATIENT)
Age: 48
End: 2023-09-05

## 2023-09-05 ENCOUNTER — APPOINTMENT (OUTPATIENT)
Dept: PLASTIC SURGERY | Facility: CLINIC | Age: 48
End: 2023-09-05
Payer: MEDICAID

## 2023-09-05 PROCEDURE — 13120 CMPLX RPR S/A/L 1.1-2.5 CM: CPT | Mod: 59,79

## 2023-09-05 PROCEDURE — 27632 EXC LEG/ANKLE LES SC 3 CM/>: CPT | Mod: 79,RT

## 2023-09-05 NOTE — PROCEDURE
[FreeTextEntry6] : Preop dx: lipoma, right ankle Postopdx: same Procedure: excision 1.1cm lipoma ankle and complex closure of ankle, 1.1cm Anesthesia: local 1% w/ epi Specimens: to path Procedure: 	IC obtained. Lesion demarcated.  Lido 1% w/ epi injected.  15 blade used to incise skin.  Lesion encountered in the subfascial plane and dissected circumferentially. Removed in its entirety, 1.1cm.  Skin edges widely undermined and closed in layers with monocryl for a 1.1cm complex closure. Mastisol and steristrips placed.  No complications.  Specimen sent to path

## 2023-09-08 LAB
ALBUMIN SERPL ELPH-MCNC: 4.2 G/DL
ALP BLD-CCNC: 134 U/L
ALT SERPL-CCNC: 49 U/L
ANION GAP SERPL CALC-SCNC: 11 MMOL/L
AST SERPL-CCNC: 27 U/L
BILIRUB SERPL-MCNC: 0.3 MG/DL
BUN SERPL-MCNC: 13 MG/DL
CALCIUM SERPL-MCNC: 9.5 MG/DL
CHLORIDE SERPL-SCNC: 105 MMOL/L
CHOLEST SERPL-MCNC: 203 MG/DL
CO2 SERPL-SCNC: 24 MMOL/L
CREAT SERPL-MCNC: 0.71 MG/DL
EGFR: 105 ML/MIN/1.73M2
ESTIMATED AVERAGE GLUCOSE: 108 MG/DL
FOLATE SERPL-MCNC: 12.9 NG/ML
GLUCOSE SERPL-MCNC: 89 MG/DL
HBA1C MFR BLD HPLC: 5.4 %
HCT VFR BLD CALC: 44.1 %
HDLC SERPL-MCNC: 57 MG/DL
HGB BLD-MCNC: 13.7 G/DL
LDLC SERPL CALC-MCNC: 120 MG/DL
MCHC RBC-ENTMCNC: 30.1 PG
MCHC RBC-ENTMCNC: 31.1 GM/DL
MCV RBC AUTO: 96.9 FL
NONHDLC SERPL-MCNC: 146 MG/DL
PLATELET # BLD AUTO: 275 K/UL
POTASSIUM SERPL-SCNC: 4.3 MMOL/L
PROT SERPL-MCNC: 6.2 G/DL
RBC # BLD: 4.55 M/UL
RBC # FLD: 13.6 %
SODIUM SERPL-SCNC: 140 MMOL/L
T4 FREE SERPL-MCNC: 1 NG/DL
TRIGL SERPL-MCNC: 149 MG/DL
TSH SERPL-ACNC: 1.03 UIU/ML
VIT B12 SERPL-MCNC: 517 PG/ML
WBC # FLD AUTO: 4.59 K/UL

## 2023-09-12 ENCOUNTER — APPOINTMENT (OUTPATIENT)
Dept: INTERNAL MEDICINE | Facility: CLINIC | Age: 48
End: 2023-09-12

## 2023-09-14 ENCOUNTER — APPOINTMENT (OUTPATIENT)
Dept: PLASTIC SURGERY | Facility: CLINIC | Age: 48
End: 2023-09-14
Payer: MEDICAID

## 2023-09-14 PROCEDURE — 99024 POSTOP FOLLOW-UP VISIT: CPT

## 2023-09-25 NOTE — ED PROVIDER NOTE - CONSTITUTIONAL APPEARANCE HYGIENE, MLM
[facility] ENCOUNTER          PHYSICIAN ASSISTANT NOTE       Date of evaluation: 9/25/2023    Chief Complaint     Abdominal Pain (Pt arrives via EMS from [facility] c/o abd pain. Pt had KUB on 9/23 that was normal. Pt refused fluids at facility.)      History of Present Illness     Landry Martinez is a 57 y.o. male with a history of coronary artery disease, CVA, CHF, CKD, diabetes, hypertension, hyperlipidemia who presents to the emergency department with chief complaint of abdominal pain and diarrhea. The patient notes that on Friday he had started having diarrhea. He notes that he is having 3-4 episodes of watery diarrhea per day since then. Today he developed mid abdominal pain for which she describes as a smashing pain and is intermittent. This does not radiate anywhere. He also endorses nausea but no emesis. He has been able to tolerate p.o. intake. He denies any fever, chills, dysuria, hematuria, hematochezia or melena. Denies any chest pain or shortness of breath. Denies any rhinorrhea, congestion, myalgias. States that he currently lives at [address] [place] [address] and also noted that he was frustrated with the fact that they keep \"pumping me full of pills\". Review of Systems     Review of Systems   Constitutional:  Negative for chills, fatigue and fever. HENT:  Negative for congestion, rhinorrhea and sore throat. Eyes:  Negative for pain, discharge and itching. Respiratory:  Negative for cough, shortness of breath and wheezing. Cardiovascular:  Negative for chest pain, palpitations and leg swelling. Gastrointestinal:  Positive for abdominal pain, diarrhea and nausea. Negative for constipation and vomiting. Genitourinary:  Negative for dysuria, flank pain and hematuria. Musculoskeletal:  Negative for back pain, myalgias and neck pain. Skin:  Negative for rash.    Neurological:  Negative for dizziness, syncope, weakness and well appearing

## 2023-09-26 ENCOUNTER — LABORATORY RESULT (OUTPATIENT)
Age: 48
End: 2023-09-26

## 2023-09-26 ENCOUNTER — APPOINTMENT (OUTPATIENT)
Dept: PLASTIC SURGERY | Facility: CLINIC | Age: 48
End: 2023-09-26
Payer: MEDICAID

## 2023-09-26 DIAGNOSIS — D17.9 BENIGN LIPOMATOUS NEOPLASM, UNSPECIFIED: ICD-10-CM

## 2023-09-26 PROCEDURE — 28043 EXC FOOT/TOE TUM SC < 1.5 CM: CPT | Mod: 79

## 2023-09-26 PROCEDURE — 13131 CMPLX RPR F/C/C/M/N/AX/G/H/F: CPT | Mod: 79

## 2023-09-26 NOTE — DIETITIAN INITIAL EVALUATION ADULT - PERTINENT LABORATORY DATA
01-14    136  |  101  |  7   ----------------------------<  100<H>  4.7   |  27  |  0.68    Ca    9.2      14 Jan 2023 06:53  Phos  3.7     01-14  Mg     2.2     01-14    TPro  7.1  /  Alb  4.3  /  TBili  0.4  /  DBili  x   /  AST  22  /  ALT  31  /  AlkPhos  164<H>  01-13   Consent (Nose)/Introductory Paragraph: The rationale for Mohs was explained to the patient and consent was obtained. The risks, benefits and alternatives to therapy were discussed in detail. Specifically, the risks of nasal deformity, changes in the flow of air through the nose, infection, scarring, bleeding, prolonged wound healing, incomplete removal, allergy to anesthesia, nerve injury and recurrence were addressed. Prior to the procedure, the treatment site was clearly identified and confirmed by the patient. All components of Universal Protocol/PAUSE Rule completed.

## 2023-10-04 ENCOUNTER — APPOINTMENT (OUTPATIENT)
Dept: PLASTIC SURGERY | Facility: CLINIC | Age: 48
End: 2023-10-04
Payer: MEDICAID

## 2023-10-04 DIAGNOSIS — L03.818 CELLULITIS OF OTHER SITES: ICD-10-CM

## 2023-10-04 PROCEDURE — 99024 POSTOP FOLLOW-UP VISIT: CPT

## 2023-10-04 RX ORDER — MUPIROCIN 20 MG/G
2 OINTMENT TOPICAL 3 TIMES DAILY
Qty: 1 | Refills: 0 | Status: ACTIVE | COMMUNITY
Start: 2023-10-04 | End: 1900-01-01

## 2023-10-18 ENCOUNTER — APPOINTMENT (OUTPATIENT)
Dept: PLASTIC SURGERY | Facility: CLINIC | Age: 48
End: 2023-10-18
Payer: MEDICAID

## 2023-10-18 PROCEDURE — 99024 POSTOP FOLLOW-UP VISIT: CPT

## 2023-10-24 ENCOUNTER — APPOINTMENT (OUTPATIENT)
Dept: UROLOGY | Facility: CLINIC | Age: 48
End: 2023-10-24
Payer: MEDICAID

## 2023-10-24 VITALS
OXYGEN SATURATION: 99 % | SYSTOLIC BLOOD PRESSURE: 113 MMHG | HEART RATE: 65 BPM | RESPIRATION RATE: 17 BRPM | TEMPERATURE: 97.8 F | DIASTOLIC BLOOD PRESSURE: 80 MMHG

## 2023-10-24 PROCEDURE — 99214 OFFICE O/P EST MOD 30 MIN: CPT | Mod: 25

## 2023-10-24 NOTE — ED PROVIDER NOTE - RELIEVING FACTORS
Dx initially 09/2021 stage IIIC. Received multiple rounds of chemo/radiation. 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 Cancer upstaged to stage IV. Currently on daily dexamethasone, Lovenox for [facility] as well as a history of DVT. Currently receiving combination Avastin and Pembrolizumab at the infusion center. Last chemo 10/18/2023. Avastatin is a new medication for the patient. 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 Unlikely to benefit from ASEPT catheter    Plan:  Continue with home dose dexamethasone and Lovenox for DVT   Extensive conversation with patient and wife regarding code status. Patient and wife at this time would like to remain full code.   Palliative care consult  Recommend follow up outpatient with oncology after discharge none

## 2023-10-25 LAB
BILIRUB UR QL STRIP: NORMAL
CLARITY UR: CLEAR
COLLECTION METHOD: NORMAL
GLUCOSE UR-MCNC: NORMAL
HCG UR QL: 1 EU/DL
HGB UR QL STRIP.AUTO: NORMAL
KETONES UR-MCNC: NORMAL
LEUKOCYTE ESTERASE UR QL STRIP: NORMAL
NITRITE UR QL STRIP: NORMAL
PH UR STRIP: 5.5
PROT UR STRIP-MCNC: NORMAL
SP GR UR STRIP: 1.02

## 2023-10-26 ENCOUNTER — APPOINTMENT (OUTPATIENT)
Dept: ENDOCRINOLOGY | Facility: CLINIC | Age: 48
End: 2023-10-26

## 2023-10-30 LAB — BACTERIA UR CULT: NORMAL

## 2023-11-01 ENCOUNTER — APPOINTMENT (OUTPATIENT)
Dept: PLASTIC SURGERY | Facility: CLINIC | Age: 48
End: 2023-11-01

## 2023-11-01 NOTE — ED ADULT NURSE NOTE - AREA
"  Problem: Adult Inpatient Plan of Care  Goal: Optimal Comfort and Wellbeing  Outcome: Progressing   Goal Outcome Evaluation Ongoing    Problem: Adult Behavioral Health Plan of Care  Goal: Adheres to Safety Considerations for Self and Others  Outcome: Progressing     Problem: Suicidal Behavior  Goal: Suicidal Behavior is Absent or Managed  Outcome: Progressing     Problem: Adult Inpatient Plan of Care  Goal: Optimal Comfort and Wellbeing  Outcome: Progressing    Observed to have slept well for approx  6 hrs on all Q 15\" rounds overnight.  Up for snack x's 1 , cepacol lozenges for c/o sore throat x's 2 .  Safe, supportive, therapeutic environment maintained.     " upper

## 2023-11-07 NOTE — PHYSICAL EXAM
Hba1c 8.7  Recheck at next appt  continue healthy diet and exercise  Continue current meds for now  Consider increase Jardiance   [General Appearance - Well Developed] : well developed [Normal Appearance] : normal appearance [Well Groomed] : well groomed [Bowel Sounds] : normal bowel sounds [Abdomen Soft] : soft [Abdomen Tenderness] : non-tender [Urethral Meatus] : the meatus of the urethra showed no abnormalities [External Female Genitalia] : normal external genitalia [FreeTextEntry1] : Mid-urethra incision well healed. Sutures in place and no mesh visible or palpable. No vaginal discharge or atrophy appreciated on exam.

## 2023-11-08 ENCOUNTER — APPOINTMENT (OUTPATIENT)
Dept: PLASTIC SURGERY | Facility: CLINIC | Age: 48
End: 2023-11-08
Payer: MEDICAID

## 2023-11-08 DIAGNOSIS — D17.24 BENIGN LIPOMATOUS NEOPLASM OF SKIN AND SUBCUTANEOUS TISSUE OF LEFT LEG: ICD-10-CM

## 2023-11-08 PROCEDURE — 99024 POSTOP FOLLOW-UP VISIT: CPT

## 2023-11-10 PROBLEM — D17.24 LIPOMA OF LEFT LOWER EXTREMITY: Status: ACTIVE | Noted: 2023-10-04

## 2023-11-14 ENCOUNTER — APPOINTMENT (OUTPATIENT)
Dept: INTERNAL MEDICINE | Facility: CLINIC | Age: 48
End: 2023-11-14

## 2023-11-16 ENCOUNTER — APPOINTMENT (OUTPATIENT)
Dept: UROLOGY | Facility: CLINIC | Age: 48
End: 2023-11-16
Payer: MEDICAID

## 2023-11-16 VITALS
SYSTOLIC BLOOD PRESSURE: 137 MMHG | DIASTOLIC BLOOD PRESSURE: 88 MMHG | TEMPERATURE: 98.5 F | HEART RATE: 71 BPM | OXYGEN SATURATION: 97 %

## 2023-11-16 LAB
BILIRUB UR QL STRIP: NORMAL
CLARITY UR: CLEAR
COLLECTION METHOD: NORMAL
GLUCOSE UR-MCNC: NORMAL
HCG UR QL: 0.2 EU/DL
HGB UR QL STRIP.AUTO: NORMAL
KETONES UR-MCNC: NORMAL
LEUKOCYTE ESTERASE UR QL STRIP: NORMAL
NITRITE UR QL STRIP: NORMAL
PH UR STRIP: 6.5
PROT UR STRIP-MCNC: NORMAL
SP GR UR STRIP: 1.01

## 2023-11-16 PROCEDURE — 81003 URINALYSIS AUTO W/O SCOPE: CPT | Mod: QW

## 2023-11-16 PROCEDURE — 52000 CYSTOURETHROSCOPY: CPT

## 2023-11-16 RX ORDER — ONABOTULINUMTOXINA 100 [USP'U]/1
100 INJECTION, POWDER, LYOPHILIZED, FOR SOLUTION INTRADERMAL; INTRAMUSCULAR
Qty: 1 | Refills: 0 | Status: COMPLETED | OUTPATIENT
Start: 2023-11-16

## 2023-11-16 RX ADMIN — ONABOTULINUMTOXINA 0 UNIT: 100 INJECTION, POWDER, LYOPHILIZED, FOR SOLUTION INTRADERMAL; INTRAMUSCULAR at 00:00

## 2023-11-20 LAB — BACTERIA UR CULT: NORMAL

## 2023-11-27 ENCOUNTER — APPOINTMENT (OUTPATIENT)
Dept: UROLOGY | Facility: CLINIC | Age: 48
End: 2023-11-27
Payer: MEDICAID

## 2023-11-27 PROCEDURE — 81003 URINALYSIS AUTO W/O SCOPE: CPT | Mod: QW

## 2023-11-27 PROCEDURE — 99213 OFFICE O/P EST LOW 20 MIN: CPT

## 2023-11-29 NOTE — ASU DISCHARGE PLAN (ADULT/PEDIATRIC) - SIGNS AND SYMPTOMS OF INFECTION: FEVER, REDNESS, SWELLING, FOUL SMELLING DISCHARGE
----- Message from Barry Alvarez sent at 11/28/2023 12:52 PM EST -----  Subject: Referral Request    Reason for referral request? pt had an max to have chest x ray but pt has   been having abdominal issues and wanted to see about getting a referral pt   thought that is why he was going to the hospital today, pt has lost weight   and has issues eating, pt feels he can only eat fruits and no solid food   he get so sick for months now. Provider patient wants to be referred to(if known):     Provider Phone Number(if known): Additional Information for Provider?  please call with a referral for a GI   to where   ---------------------------------------------------------------------------  --------------  Tamra [place]    [phone]; OK to leave message on voicemail  ---------------------------------------------------------------------------  --------------
----- Message from Blake Monge sent at 11/28/2023 12:55 PM EST -----  Subject: Message to Provider    QUESTIONS  Information for Provider? pt has seen pcp about this matter before but it   is not getting better pt left hand he is not able to make fist or a tight   fist he has no  not even able to turn the door knob. pt wanted to know   the next steps in this care plan an who he needs to see.   ---------------------------------------------------------------------------  --------------  [address]  [phone]; OK to leave message on voicemail  ---------------------------------------------------------------------------  --------------  SCRIPT ANSWERS  Relationship to Patient?  Self
Called and attempted to leave message on machine but could not reach pt nor leave a message
Please advise
Please advise
Please follow up with the hand surgeon. A consultation has been sent to Dr. Charissa Jhaveri.
Statement Selected

## 2023-12-15 ENCOUNTER — APPOINTMENT (OUTPATIENT)
Dept: ENDOCRINOLOGY | Facility: CLINIC | Age: 48
End: 2023-12-15

## 2023-12-19 ENCOUNTER — APPOINTMENT (OUTPATIENT)
Dept: INTERNAL MEDICINE | Facility: CLINIC | Age: 48
End: 2023-12-19

## 2024-01-26 NOTE — ED PROVIDER NOTE - PSYCHIATRIC NEGATIVE STATEMENT, MLM
You have been evaluated in the emergency department for possible low hemoglobin.  Your hemoglobin improved from this morning.  Your urine however did show bacteria so we are going to treat you for urinary tract infection.  Please take your antibiotics as prescribed.    Please follow-up with your primary care physician. I have enclosed your imaging and lab results.  Bring these to your next doctor's appointment for further testing and evaluation as needed.        Return to the emergency department if the experience fevers of 100.4 or greater, worsening or uncontrolled pain, vomiting, flank pain, feeling lightheaded or faint, or any concerning symptoms to you.  
no known mental health issues.

## 2024-02-16 NOTE — ED PROVIDER NOTE - WR INTERPRETATION DATE TIME  1
[FreeTextEntry1] : 48F p/w L hip OA and labrum tear  MRI L hip, reviewed diclofenac for pain as needed return 2-3 mo  The patient was advised of the diagnosis. The natural history of the pathology was explained in full to the patient in layman's terms. All questions were answered. The risks and benefits of surgical and non-surgical treatment alternatives were explained in full to the patient.  
02-Jun-2023 17:30

## 2024-04-07 ENCOUNTER — EMERGENCY (EMERGENCY)
Facility: HOSPITAL | Age: 49
LOS: 1 days | Discharge: HOME | End: 2024-04-07
Attending: EMERGENCY MEDICINE | Admitting: EMERGENCY MEDICINE
Payer: MEDICAID

## 2024-04-07 VITALS
DIASTOLIC BLOOD PRESSURE: 88 MMHG | SYSTOLIC BLOOD PRESSURE: 156 MMHG | HEIGHT: 61 IN | TEMPERATURE: 98 F | RESPIRATION RATE: 16 BRPM | WEIGHT: 196.21 LBS | OXYGEN SATURATION: 98 % | HEART RATE: 83 BPM

## 2024-04-07 DIAGNOSIS — Z90.49 ACQUIRED ABSENCE OF OTHER SPECIFIED PARTS OF DIGESTIVE TRACT: Chronic | ICD-10-CM

## 2024-04-07 DIAGNOSIS — Z90.710 ACQUIRED ABSENCE OF BOTH CERVIX AND UTERUS: Chronic | ICD-10-CM

## 2024-04-07 PROCEDURE — 99283 EMERGENCY DEPT VISIT LOW MDM: CPT

## 2024-04-07 PROCEDURE — 99284 EMERGENCY DEPT VISIT MOD MDM: CPT | Mod: 25

## 2024-04-07 PROCEDURE — 64400 NJX AA&/STRD TRIGEMINAL NRV: CPT | Mod: LT

## 2024-04-07 RX ORDER — BUPIVACAINE HCL/EPINEPHRINE/PF 0.5-1:200K
10 VIAL (ML) INJECTION ONCE
Refills: 0 | Status: COMPLETED | OUTPATIENT
Start: 2024-04-07 | End: 2024-04-07

## 2024-04-07 RX ORDER — TETRACAINE/BENZOCAINE/BUTAMBEN 2%-14%-2%
1 OINTMENT (GRAM) TOPICAL ONCE
Refills: 0 | Status: COMPLETED | OUTPATIENT
Start: 2024-04-07 | End: 2024-04-07

## 2024-04-07 RX ORDER — BUPIVACAINE HCL/PF 7.5 MG/ML
10 VIAL (ML) INJECTION ONCE
Refills: 0 | Status: DISCONTINUED | OUTPATIENT
Start: 2024-04-07 | End: 2024-04-07

## 2024-04-07 RX ORDER — IBUPROFEN 200 MG
1 TABLET ORAL
Qty: 9 | Refills: 0
Start: 2024-04-07 | End: 2024-04-09

## 2024-04-07 RX ADMIN — Medication 100 MILLIGRAM(S): at 02:05

## 2024-04-07 RX ADMIN — Medication 1 SPRAY(S): at 02:11

## 2024-04-07 RX ADMIN — Medication 10 MILLILITER(S): at 02:07

## 2024-04-07 NOTE — ED ADULT NURSE NOTE - OBJECTIVE STATEMENT
Pt complaints of toothache (#20 2nd bicuspid) 10/10 pain w/ facial swelling for 2 days.  Denies any other associated discomfort at this time.   Pt is alert and oriented, A&O4, steady gait noted.

## 2024-04-07 NOTE — ED PROVIDER NOTE - CLINICAL SUMMARY MEDICAL DECISION MAKING FREE TEXT BOX
48-year-old female with severe tooth decay likely in need of root canal and tooth extraction no signs of Rei's angina on exam we will plan for periapical block Percocet doxycycline close return precautions and follow-up with dental     periapical block performed to tooth #20 with bupivacaine 1-200,000 with epinephrine and roughly 3ml injected to site with relief

## 2024-04-07 NOTE — ED ADULT NURSE REASSESSMENT NOTE - NS ED NURSE REASSESS COMMENT FT1
Pt denies any discomfort at this time. DC instruction given by MD Dumont and paper and explained to pt. States understand discharge instruction. Pt is alert and oriented, A&O4, steady gait noted.

## 2024-04-07 NOTE — ED ADULT TRIAGE NOTE - RESPIRATORY RATE (BREATHS/MIN)
Attempted to reach patient for: Routine follow up.   Outcome: Faby was unable to talk when I called and asked that I call her later this week.   Next Follow Up: Next few days- discuss discharge.    16

## 2024-04-07 NOTE — ED ADULT NURSE NOTE - NS TRANSFER PATIENT BELONGINGS
Class I (easy) - visualization of the soft palate, fauces, uvula, and both anterior and posterior pillars
Clothing

## 2024-04-07 NOTE — ED PROVIDER NOTE - NSFOLLOWUPCLINICS_GEN_ALL_ED_FT
[facility]  Dental  [address] [place] [id number]  Phone: [phone]  Fax:

## 2024-04-07 NOTE — ED PROVIDER NOTE - PHYSICAL EXAMINATION
VITAL SIGNS: I have reviewed nursing notes and confirm.  CONSTITUTIONAL: Well appearing, in no acute distress.   SKIN:  warm and dry, no acute rash.   HEAD:  normocephalic, atraumatic.  EYES: EOM intact; conjunctiva and sclera clear.  ENT: No nasal discharge; airway clear. Poor dentition tooth #20 with extensive carry erosion receding gumline no periapical abscess normal voice no stridor no neck swelling no brawny neck edema no tongue elevation  NECK: Supple.  CARD: S1, S2, Regular rate and rhythm.   RESP:  Clear to auscultation b/l, no wheezes, rales or rhonchi.  ABD: Normal bowel sounds; soft; non-distended; non-tender; no guarding/ rebound.  EXT: Normal ROM. No peripheral edema. Pulses intact and equal b/l.  NEURO: Alert, oriented, grossly unremarkable  PSYCH: Cooperative, mood and affect appropriate.

## 2024-04-07 NOTE — ED ADULT TRIAGE NOTE - CHIEF COMPLAINT QUOTE
Pt presents to ER c/o constant tooth (#20 2nd bicuspid) "10/10" pain with facial swelling for ~2 days without any other associated symptoms at this time.

## 2024-04-07 NOTE — ED PROVIDER NOTE - OBJECTIVE STATEMENT
48-year-old female with fibromyalgia hypertension here today with left-sided jaw pain swelling and noted tooth #20.  Supposed to have that tooth taken out has poor dentition.  States that this all started a few days ago and has been worsening.  Denies difficulty swallowing or opening her mouth.  No fever chills cough.  No neck swelling.

## 2024-04-07 NOTE — ED PROVIDER NOTE - PROGRESS NOTE DETAILS
Performed periapical block with 2.5 cc of bupivacaine with epinephrine injected tooth #20 patient with relief of pain given return precautions advised for dental follow-up will DC with Doxy

## 2024-04-07 NOTE — ED PROVIDER NOTE - NSFOLLOWUPINSTRUCTIONS_ED_ALL_ED_FT
Please follow-up closely with your dentist you likely have a cavity and root infection and tooth #20 requiring an extraction as well as root canal you received a periapical nerve block in the emergency department as well as a dose of doxycycline.  Should you develop any difficulty opening your jaw swallowing changes to your voice redness or swelling of your neck please seek emergent medical attention otherwise take antibiotics as prescribed follow-up with your dentist and take 600 mg of ibuprofen along with 975 mg of Tylenol every 8 hours as needed for pain control for a maximum of 3 days

## 2024-04-07 NOTE — ED PROVIDER NOTE - PATIENT PORTAL LINK FT
You can access the FollowMyHealth Patient Portal offered by [facility] by registering at the following website: [email or web address]. By joining [facility]’s FollowMyHealth portal, you will also be able to view your health information using other applications (apps) compatible with our system.

## 2024-04-07 NOTE — ED ADULT NURSE NOTE - NSFALLUNIVINTERV_ED_ALL_ED
Bed/Stretcher in lowest position, wheels locked, appropriate side rails in place/Call bell, personal items and telephone in reach/Instruct patient to call for assistance before getting out of bed/chair/stretcher/Non-slip footwear applied when patient is off stretcher/[place] to call system/Physically safe environment - no spills, clutter or unnecessary equipment/Purposeful proactive rounding/Room/bathroom lighting operational, light cord in reach

## 2024-04-09 DIAGNOSIS — M79.7 FIBROMYALGIA: ICD-10-CM

## 2024-04-09 DIAGNOSIS — K08.89 OTHER SPECIFIED DISORDERS OF TEETH AND SUPPORTING STRUCTURES: ICD-10-CM

## 2024-04-09 DIAGNOSIS — I10 ESSENTIAL (PRIMARY) HYPERTENSION: ICD-10-CM

## 2024-04-09 DIAGNOSIS — R68.84 JAW PAIN: ICD-10-CM

## 2024-04-12 ENCOUNTER — EMERGENCY (EMERGENCY)
Facility: HOSPITAL | Age: 49
LOS: 1 days | Discharge: HOME | End: 2024-04-12
Attending: STUDENT IN AN ORGANIZED HEALTH CARE EDUCATION/TRAINING PROGRAM | Admitting: STUDENT IN AN ORGANIZED HEALTH CARE EDUCATION/TRAINING PROGRAM
Payer: MEDICAID

## 2024-04-12 VITALS
SYSTOLIC BLOOD PRESSURE: 124 MMHG | TEMPERATURE: 99 F | RESPIRATION RATE: 18 BRPM | OXYGEN SATURATION: 98 % | HEART RATE: 74 BPM | DIASTOLIC BLOOD PRESSURE: 73 MMHG

## 2024-04-12 VITALS
HEIGHT: 61 IN | SYSTOLIC BLOOD PRESSURE: 148 MMHG | DIASTOLIC BLOOD PRESSURE: 86 MMHG | HEART RATE: 78 BPM | OXYGEN SATURATION: 98 % | TEMPERATURE: 98 F | RESPIRATION RATE: 18 BRPM

## 2024-04-12 DIAGNOSIS — Z90.710 ACQUIRED ABSENCE OF BOTH CERVIX AND UTERUS: Chronic | ICD-10-CM

## 2024-04-12 DIAGNOSIS — Z90.49 ACQUIRED ABSENCE OF OTHER SPECIFIED PARTS OF DIGESTIVE TRACT: Chronic | ICD-10-CM

## 2024-04-12 PROCEDURE — 99284 EMERGENCY DEPT VISIT MOD MDM: CPT

## 2024-04-12 PROCEDURE — 93971 EXTREMITY STUDY: CPT

## 2024-04-12 PROCEDURE — 73564 X-RAY EXAM KNEE 4 OR MORE: CPT

## 2024-04-12 PROCEDURE — 93971 EXTREMITY STUDY: CPT | Mod: 26,LT

## 2024-04-12 PROCEDURE — 99284 EMERGENCY DEPT VISIT MOD MDM: CPT | Mod: 25

## 2024-04-12 PROCEDURE — 73564 X-RAY EXAM KNEE 4 OR MORE: CPT | Mod: 26,LT

## 2024-04-12 RX ORDER — IBUPROFEN 200 MG
600 TABLET ORAL ONCE
Refills: 0 | Status: COMPLETED | OUTPATIENT
Start: 2024-04-12 | End: 2024-04-12

## 2024-04-12 RX ADMIN — Medication 600 MILLIGRAM(S): at 21:24

## 2024-04-12 NOTE — ED PROVIDER NOTE - NS ED ROS FT
Constitutional: No fever or chills  Eyes: No discharge or drainage  Ears, Nose, Mouth, Throat: No nasal discharge, no sore throat  Cardiovascular: No chest pain, no palpitations  Respiratory: No shortness of breath, no cough  Gastrointestinal: No nausea or vomiting, no abdominal pain, no diarrhea or constipation  Musculoskeletal: +joint pain, no swelling  Skin: No rashes or lesions  Neurological: No numbness, weakness, tingling, no headache

## 2024-04-12 NOTE — ED PROVIDER NOTE - CLINICAL SUMMARY MEDICAL DECISION MAKING FREE TEXT BOX
47 yo f presenting with l knee pain, ho knee replacement in jan 2024. will obtain xr, duplex for ro dvt, motrin for pain, reassess.

## 2024-04-12 NOTE — ED PROVIDER NOTE - PATIENT PORTAL LINK FT
You can access the FollowMyHealth Patient Portal offered by [facility] by registering at the following website: [email or web address]. By joining [facility]’s FollowMyHealth portal, you will also be able to view your health information using other applications (apps) compatible with our system.

## 2024-04-12 NOTE — ED ADULT NURSE NOTE - CHIEF COMPLAINT
nm  Normal study.    Normal perfusion study with normal distribution in all coronal, short, and    horizontal axis.    The observed defect is consistent with diaphragmatic attenuation.    Normal LV function. LVEF is > 70 %.      Left message The patient is a 48y Female complaining of

## 2024-04-12 NOTE — ED PROVIDER NOTE - OBJECTIVE STATEMENT
48-year-old female with history of left knee replacement in January 2024 presenting with left knee pain, swelling, calf pain over the last several days.  No fever or chills, no chest pain or shortness of breath, no nausea or vomiting.  No recent travel, no history of DVT or PE.  No lightheadedness or dizziness.  ROS as above.

## 2024-04-12 NOTE — ED ADULT NURSE NOTE - OBJECTIVE STATEMENT
48y female c/ L knee pain, swelling x 1 week. states she had L knee replacement in January. denies f/c, CP, SOB, dizziness, n/v. No acute distress noted at this time. ambulating w/ steady gait. 48y female c/ L knee pain, swelling x 1 week. states she had L knee replacement in January. denies f/c, CP, SOB, dizziness, n/v. No acute distress noted at this time. ambulating steadily with walker

## 2024-04-12 NOTE — ED ADULT NURSE NOTE - NSFALLRISKINTERV_ED_ALL_ED

## 2024-04-12 NOTE — ED PROVIDER NOTE - NSFOLLOWUPINSTRUCTIONS_ED_ALL_ED_FT
You presented to the Emergency Department for knee pain. You had an XRAY done, as well as an ultrasound. Your XRAY did not show an acute fracture and the ultrasound did not show a blood clot. Please take tylenol or motrin as needed. Apply ice to the area. Elevate your knee. Return for any worsening symptoms. Otherwise, you can follow up with your orthopedist as needed.     I hope you feel better soon!    Sincerely,  Alfie De La Garza MD

## 2024-04-16 DIAGNOSIS — M25.562 PAIN IN LEFT KNEE: ICD-10-CM

## 2024-04-16 DIAGNOSIS — M25.462 EFFUSION, LEFT KNEE: ICD-10-CM

## 2024-04-16 DIAGNOSIS — Z96.652 PRESENCE OF LEFT ARTIFICIAL KNEE JOINT: ICD-10-CM

## 2024-08-13 ENCOUNTER — NON-APPOINTMENT (OUTPATIENT)
Age: 49
End: 2024-08-13

## 2024-08-14 ENCOUNTER — APPOINTMENT (OUTPATIENT)
Dept: UROLOGY | Facility: CLINIC | Age: 49
End: 2024-08-14
Payer: MEDICAID

## 2024-08-14 VITALS — OXYGEN SATURATION: 98 % | TEMPERATURE: 98.1 F | HEART RATE: 70 BPM

## 2024-08-14 VITALS — SYSTOLIC BLOOD PRESSURE: 121 MMHG | DIASTOLIC BLOOD PRESSURE: 79 MMHG

## 2024-08-14 LAB
BILIRUB UR QL STRIP: NORMAL
CLARITY UR: CLEAR
COLLECTION METHOD: NORMAL
GLUCOSE UR-MCNC: NORMAL
HCG UR QL: 0.2 EU/DL
HGB UR QL STRIP.AUTO: NORMAL
KETONES UR-MCNC: 15
LEUKOCYTE ESTERASE UR QL STRIP: NORMAL
NITRITE UR QL STRIP: NORMAL
PH UR STRIP: 6
PROT UR STRIP-MCNC: NORMAL
SP GR UR STRIP: >=1.03

## 2024-08-14 PROCEDURE — 99214 OFFICE O/P EST MOD 30 MIN: CPT | Mod: 25

## 2024-08-19 LAB — BACTERIA UR CULT: NORMAL

## 2024-08-19 NOTE — HISTORY OF PRESENT ILLNESS
[FreeTextEntry1] : 2024 -- 48 F with longstanding hx of pelvic pain and mixed urinary incontinence.   Reports return of urinary symptoms. She also endorses fecal urgency and fecal incontinence- x1-2 times a day. Denies FLASH and sensation of prolapse.   note- presents with a cane due to chronic lower back pain (receives injections to manage).    2023 --- Ms. NIKA SANTOS comes in today for her Urologic follow up. Pt is a 47 yo F with longstanding hx of pelvic pain and mixed urinary incontinence. Pt endorses improvement s/p botox 23. Today she reports less urgency and has good control. She denies dysuria and has no new urinary complaints.   Udip: 2023 --- negative PVR: 0cc (done to rule out incomplete bladder emptying)   2023 --  Ms. NIKA SANTOS comes in today for her Urologic follow up. Pt is a 46 yo F with longstanding hx of pelvic pain and mixed urinary incontinence. She presents today for intravesical botox injection.  UC: 10/24/23-- negative, no growth   10/24/2023 -- Ms. NIKA SANTOS comes in today for her Urologic follow up. Pt is a 46 yo F with longstanding hx of pelvic pain and mixed urinary incontinence, s/p botox 23.  She had improvement for several months after Botox.  Her urgency has returned and she would like to schedule another injection.    She also wishes to continue PFPT as it helps with managing her pelvic symptoms (pressure).   Udip: 10/24/2023 --- negative   23 -- Ms. NIKA SANTOS comes in today for her Urologic follow up. Pt is a 46 yo F with longstanding hx of pelvic pain and mixed urinary incontinence. She returns today for a post-void residual evaluation s/p botox 23.  Pt reports gradual improvement s/p botox 23.  PVR: 26cc (done to rule out incomplete bladder emptying)    Ms. NIKA SANTOS comes in today for her Urologic follow up. Pt is a 46 yo F with longstanding hx of pelvic pain and mixed urinary incontinence. She underwent a sling and botox injection in 2023. She presents today for intravesical botox injection.    23--- Ms. NIKA SANTOS comes in today for her Urologic follow up. Pt is a 46 yo F with longstanding hx of pelvic pain and mixed urinary incontinence.   She underwent a sling and botox injection in 2023.  She did well for 2-3 months but then her symptoms returned.    On her last visit, UDS (23) indicate evidence of valsalva voiding and dyssynergia. She has pain with bladder filling--mild FLASH which is minimally bothersome. Pt was advised to proceed with a 6-8 week course of PFPT which she says was painful given her vaginal dryness and she stopped going.   Moreover, she has failed Myrbetriq, Oxybutynin in the past.   No menstrual periods.  Notably, she is also doing PT for her knee (surgery 1 yr ago)  Udip: 2023 ---small bilirubin, trace ketone PVR: 1cc (done to rule out incomplete bladder emptying)   23--- Patient is a 46 yo F who presents today for urodynamics.   3/14/23--Ms. NIKA SANTOS comes in today for her urologic follow-up.   Ms. Santos has a longstanding history of pelvic pain in the setting of chronic pain syndrome (including fibromyalgia) and underwent a urodynamics evaluation in 2022 which identified mixed urinary incontinence. She was given oxybutynin and Myrbetriq (separately) which failed to offer subjective improvement and she opted to proceed with a mid-urethral sling and injections of intravesical Botox. Her primary complaint today is urge incontinence. She reports minimal to no stress incontinence. In addition, she reports right leg pain.   Ms. Santos is on standing oxycodone for her chronic pain. She is consulting a pain management physician for this, and they have agreed to continue the narcotics until she loses weight and is able to exercise with regularity.   Sono (performed to assess bladder emptying): Nil PVR  She is  2, para 2.

## 2024-08-19 NOTE — ADDENDUM
[FreeTextEntry1] : A portion of this note was written by [Jamaal Mosqueda] on 08/14/2024 acting as a scribe for Dr. Yap.   I have personally reviewed the chart and agree that the record accurately reflects my personal performance of the history, physical exam, assessment, and plan.

## 2024-08-19 NOTE — HISTORY OF PRESENT ILLNESS
[FreeTextEntry1] : 2024 -- 48 F with longstanding hx of pelvic pain and mixed urinary incontinence.   Reports return of urinary symptoms. She also endorses fecal urgency and fecal incontinence- x1-2 times a day. Denies FLASH and sensation of prolapse.   note- presents with a cane due to chronic lower back pain (receives injections to manage).    2023 --- Ms. NIKA SANTOS comes in today for her Urologic follow up. Pt is a 49 yo F with longstanding hx of pelvic pain and mixed urinary incontinence. Pt endorses improvement s/p botox 23. Today she reports less urgency and has good control. She denies dysuria and has no new urinary complaints.   Udip: 2023 --- negative PVR: 0cc (done to rule out incomplete bladder emptying)   2023 --  Ms. NIKA SANTOS comes in today for her Urologic follow up. Pt is a 46 yo F with longstanding hx of pelvic pain and mixed urinary incontinence. She presents today for intravesical botox injection.  UC: 10/24/23-- negative, no growth   10/24/2023 -- Ms. NIKA SANTOS comes in today for her Urologic follow up. Pt is a 46 yo F with longstanding hx of pelvic pain and mixed urinary incontinence, s/p botox 23.  She had improvement for several months after Botox.  Her urgency has returned and she would like to schedule another injection.    She also wishes to continue PFPT as it helps with managing her pelvic symptoms (pressure).   Udip: 10/24/2023 --- negative   23 -- Ms. NIKA SANTOS comes in today for her Urologic follow up. Pt is a 46 yo F with longstanding hx of pelvic pain and mixed urinary incontinence. She returns today for a post-void residual evaluation s/p botox 23.  Pt reports gradual improvement s/p botox 23.  PVR: 26cc (done to rule out incomplete bladder emptying)    Ms. NIKA SANTOS comes in today for her Urologic follow up. Pt is a 46 yo F with longstanding hx of pelvic pain and mixed urinary incontinence. She underwent a sling and botox injection in 2023. She presents today for intravesical botox injection.    23--- Ms. NIKA SANTOS comes in today for her Urologic follow up. Pt is a 46 yo F with longstanding hx of pelvic pain and mixed urinary incontinence.   She underwent a sling and botox injection in 2023.  She did well for 2-3 months but then her symptoms returned.    On her last visit, UDS (23) indicate evidence of valsalva voiding and dyssynergia. She has pain with bladder filling--mild FLASH which is minimally bothersome. Pt was advised to proceed with a 6-8 week course of PFPT which she says was painful given her vaginal dryness and she stopped going.   Moreover, she has failed Myrbetriq, Oxybutynin in the past.   No menstrual periods.  Notably, she is also doing PT for her knee (surgery 1 yr ago)  Udip: 2023 ---small bilirubin, trace ketone PVR: 1cc (done to rule out incomplete bladder emptying)   23--- Patient is a 46 yo F who presents today for urodynamics.   3/14/23--Ms. NIKA SANTOS comes in today for her urologic follow-up.   Ms. Santos has a longstanding history of pelvic pain in the setting of chronic pain syndrome (including fibromyalgia) and underwent a urodynamics evaluation in 2022 which identified mixed urinary incontinence. She was given oxybutynin and Myrbetriq (separately) which failed to offer subjective improvement and she opted to proceed with a mid-urethral sling and injections of intravesical Botox. Her primary complaint today is urge incontinence. She reports minimal to no stress incontinence. In addition, she reports right leg pain.   Ms. Santos is on standing oxycodone for her chronic pain. She is consulting a pain management physician for this, and they have agreed to continue the narcotics until she loses weight and is able to exercise with regularity.   Sono (performed to assess bladder emptying): Nil PVR  She is  2, para 2.

## 2024-08-19 NOTE — ASSESSMENT
[FreeTextEntry1] : I discussed the findings and options with Ms. NIKA REA in detail.   Discussed possible SNM as an option to manage both urinary and fecal incontinence.  I have described the procedure to her in great detail along with its associated risks and benefits.   - Urine was sent for culture to r/o infection. - Patient referred to Dr. Marrero (colorectal) for fecal urgency and fecal incontinence.   Return in 1 month for f/u (after her appt with colorectal). Patient expressed understanding.   The total amount of time I have personally spent preparing for this visit, reviewing the patient's test results, obtaining external history, ordering tests/medications, documenting clinical information, communicating with and counseling the patient/family and/or caregiver(s), reviewing old records, and spent face to face with the patient explaining the above was 35 minutes.

## 2024-09-05 ENCOUNTER — NON-APPOINTMENT (OUTPATIENT)
Age: 49
End: 2024-09-05

## 2024-09-05 ENCOUNTER — APPOINTMENT (OUTPATIENT)
Dept: COLORECTAL SURGERY | Facility: CLINIC | Age: 49
End: 2024-09-05
Payer: MEDICAID

## 2024-09-05 VITALS
WEIGHT: 200 LBS | TEMPERATURE: 98.5 F | BODY MASS INDEX: 37.76 KG/M2 | SYSTOLIC BLOOD PRESSURE: 119 MMHG | HEIGHT: 61 IN | HEART RATE: 62 BPM | DIASTOLIC BLOOD PRESSURE: 79 MMHG

## 2024-09-05 DIAGNOSIS — F32.A ANXIETY DISORDER, UNSPECIFIED: ICD-10-CM

## 2024-09-05 DIAGNOSIS — D64.9 ANEMIA, UNSPECIFIED: ICD-10-CM

## 2024-09-05 DIAGNOSIS — F41.9 ANXIETY DISORDER, UNSPECIFIED: ICD-10-CM

## 2024-09-05 DIAGNOSIS — E78.5 HYPERLIPIDEMIA, UNSPECIFIED: ICD-10-CM

## 2024-09-05 DIAGNOSIS — R15.9 FULL INCONTINENCE OF FECES: ICD-10-CM

## 2024-09-05 PROCEDURE — 46600 DIAGNOSTIC ANOSCOPY SPX: CPT

## 2024-09-05 PROCEDURE — 99203 OFFICE O/P NEW LOW 30 MIN: CPT | Mod: 25

## 2024-09-05 NOTE — HISTORY OF PRESENT ILLNESS
[FreeTextEntry1] : 49yo [place]ym[place] Speaking  female presents for initial evaluation.   Reason for visit: Fecal incontinence Referred by: UROL Dr. Yap   PMHx: cervical/ lumbar herniated disc, anemia, obesity, chronic stress incontinence, fibromyalgia, HLD, SBO PSHx: Gastric bypass with lap surgery for lysis of adhesion, revision of anastomosis for intussusception, Cholecystectomy, Knee replacement, mid-urethral sling w/ botox Medications: Lyrica, Oxycodone 7.5mg, Cymbalta, Naproxen, Omeprazole, takes other medications but cannot remember.   Allergies: NKDA  Pregnancies: , NSVDX2  Family Hx: Denies CRC/IBD, +Breast Ca (mother) Colonoscopy: Reports did it about 3 years ago, does not remember provider, or when she has to repeat. But states that it was normal.  As per chart review, patient has a longstanding history of pelvic pain due to fibromyalgia with mixed urinary/fecal incontinence. Is currently being followed by Urologist Dr. Pauline Yap. Has had mid-urethral sling and intravesical botox injection in 2023 with  which helped symptom improvement. Most recently presented at her urologist 2024 with complaints of return of symptoms. Urologist discussed possible sacral neuromodulation to manage both urinary and fecal incontinence. Urine was sent for culture to r/o infection. Patient referred to Dr. Marrero for fecal incontinence. Patient to return to urologist in one month after seeing CRS.   Hx of Chronic back pain, last MRI of Lumbar Spine 2021 noted IMPRESSION: Interval progression of degenerative disc disease at L5/S1 with disc bulge abutting the right S1 nerve root and correlation with the patient's clinical symptoms is recommended.  Stable small central disc herniation at L4/5.  Patient reports for the past 3-4 months have fecal seepage, fecal urgency and incontinence. Patient reports she will wake up with fecal seepage, or when urinating w/o urge to have a BM when wiping will note fecal soilage. Also reports when she does get urge to have a BM, reports urgency and at times soils herself.   Patient reports hx of lower back pain for about 12 years. Currently being managed with Pain Management and Physiatry. Reports gets injections every 6 months. Reports symptoms of fecal/urinary urgency when doing Physical Therapy but will try and go and nothing comes out.   Patient denies any recent spinal interventions in the last 6 months around time these symptoms started. Denies any recent falls.    Reports BM are inconsistent, at times has multiple loose, can be watery BMs. Other times will have one solid BM and then second movement will be softer. Reports other times may feel incomplete evacuation and will have the urge but nothing comes out. Patient is on oxycodone daily bur denies being on stool softener or fiber supplements.   States she did pelvic floor PT in past, about 1 year ago, which helped with urinary symptoms. She states she was not having fecal incontinence symptoms at that time.

## 2024-09-05 NOTE — ASSESSMENT
[FreeTextEntry1] : Exam findings and diagnosis were discussed at length with patient. Discussed fecal incontinence, underlying etiologies and possible causes Discussed the role of fiber supplementation to bulk stool.  Fiber goal 25-30g/day, recommend psyllium supplement. Educational material regarding fiber provided Recommend and discussed the role of pelvic physical therapy and biofeedback. Contact information for several providing centers given to patient. If no improvement, agree with UROL recommendation for consideration for sacral nerve stimulator, given h/o urinary and fecal incontinence. Recommend f/u with UROL if patient is interested in pursuing SNS placement. All questions were answered, patient expressed understanding, and is agreeable to this plan.

## 2024-09-05 NOTE — PHYSICAL EXAM
[FreeTextEntry1] : Medical assistant present for duration of physical examination   General no acute distress, alert and oriented Psych calm, pleasant demeanor, responding appropriately to questions Nonlabored breathing Ambulating with assistance of cane Skin normal color and pigment, no visible lesions or rashes    Anorectal Exam: Inspection no erythema, induration or fluctuance, no skin excoriation, no fissure, small external hemorrhoids, well healed episiotomy scar right sided extending to 1/3 of perineum on vaginal side, not extending towards anus GÓMEZ nontender, no masses palpated, no blood on gloved finger, able to generate squeeze on command   Procedure: Anoscopy   Pre procedure Diagnosis: fecal incontinence Post procedure Diagnosis: fecal incontinence Anesthesia: none Estimated blood loss: none Specimen: none Complications: none   Consent obtained. Anoscopy was performed by passing a lighted anoscope with lubricant jelly into the anal canal and the entire anal mucosal surface was inspected. Findings included no fissure, internal hemorrhoids, no visible masses or lesions in anal canal, soft stool in anal canal   Patient tolerated examination and procedure well.

## 2024-09-05 NOTE — HISTORY OF PRESENT ILLNESS
[FreeTextEntry1] : 49yo [place] Speaking  female presents for initial evaluation.   Reason for visit: Fecal incontinence Referred by: UROL Dr. Yap   PMHx: cervical/ lumbar herniated disc, anemia, obesity, chronic stress incontinence, fibromyalgia, HLD, SBO PSHx: Gastric bypass with lap surgery for lysis of adhesion, revision of anastomosis for intussusception, Cholecystectomy, Knee replacement, mid-urethral sling w/ botox Medications: Lyrica, Oxycodone 7.5mg, Cymbalta, Naproxen, Omeprazole, takes other medications but cannot remember.   Allergies: NKDA  Pregnancies: , NSVDX2  Family Hx: Denies CRC/IBD, +Breast Ca (mother) Colonoscopy: Reports did it about 3 years ago, does not remember provider, or when she has to repeat. But states that it was normal.  As per chart review, patient has a longstanding history of pelvic pain due to fibromyalgia with mixed urinary/fecal incontinence. Is currently being followed by Urologist Dr. Pauline Yap. Has had mid-urethral sling and intravesical botox injection in 2023 with  which helped symptom improvement. Most recently presented at her urologist 2024 with complaints of return of symptoms. Urologist discussed possible sacral neuromodulation to manage both urinary and fecal incontinence. Urine was sent for culture to r/o infection. Patient referred to Dr. Marrero for fecal incontinence. Patient to return to urologist in one month after seeing CRS.   Hx of Chronic back pain, last MRI of Lumbar Spine 2021 noted IMPRESSION: Interval progression of degenerative disc disease at L5/S1 with disc bulge abutting the right S1 nerve root and correlation with the patient's clinical symptoms is recommended.  Stable small central disc herniation at L4/5.  Patient reports for the past 3-4 months have fecal seepage, fecal urgency and incontinence. Patient reports she will wake up with fecal seepage, or when urinating w/o urge to have a BM when wiping will note fecal soilage. Also reports when she does get urge to have a BM, reports urgency and at times soils herself.   Patient reports hx of lower back pain for about 12 years. Currently being managed with Pain Management and Physiatry. Reports gets injections every 6 months. Reports symptoms of fecal/urinary urgency when doing Physical Therapy but will try and go and nothing comes out.   Patient denies any recent spinal interventions in the last 6 months around time these symptoms started. Denies any recent falls.    Reports BM are inconsistent, at times has multiple loose, can be watery BMs. Other times will have one solid BM and then second movement will be softer. Reports other times may feel incomplete evacuation and will have the urge but nothing comes out. Patient is on oxycodone daily bur denies being on stool softener or fiber supplements.   States she did pelvic floor PT in past, about 1 year ago, which helped with urinary symptoms. She states she was not having fecal incontinence symptoms at that time.

## 2024-09-16 NOTE — END OF VISIT
Lexi Matias has been scheduled for the following IR Procedure: IR Procedure List for Pre-Call: port removal      Left voicemail with details.     The procedure has been planned with Intravenous Conscious Sedation (IVCS).  Patient has been informed that per [facility] Policy, patient will be unable to drive for 24 hours and will need a ride home.    The procedure has been scheduled for 9/17/24 at 1400.      The patient was told to arrive at 1200 and told where to check in.    The patient was instructed to not eat or drink  for 6 hours prior to procedure.  NPO starting at 8am.    The patient may take all other regular medications the morning of the procedure with sips of water.    ANTICOAGULANTS  The patient is taking the following anticoagulant(s): Eliquis and Aspirin  The patient was instructed to hold for eliquis for 48 hours. Aspirin no hold.    POST PROCEDURE   The patient is aware they need a ride home because they are receiving sedation.   [Time Spent: ___ minutes] : I have spent [unfilled] minutes of time on the encounter.

## 2024-09-25 ENCOUNTER — APPOINTMENT (OUTPATIENT)
Dept: UROLOGY | Facility: CLINIC | Age: 49
End: 2024-09-25
Payer: MEDICAID

## 2024-09-25 PROCEDURE — 99214 OFFICE O/P EST MOD 30 MIN: CPT

## 2024-09-26 NOTE — ASSESSMENT
[FreeTextEntry1] : I discussed the findings and options with Ms. NIKA REA in detail.   We again reviewed options to her OAB including SNM moving forward. She understands that she needs to undergo PNE prior to SNM. I have described the procedure in great detail. Risks/benefits reviewed. SNM/PNE brochure was provided for her further review.  - continue pelvic physical therapy as per Dr. Marrero  Return for PNE in December 2024 if she still has leakage.  Patient expressed understanding.    The total amount of time I have personally spent preparing for this visit, reviewing the patient's test results, obtaining external history, ordering tests/medications, documenting clinical information, communicating with and counseling the patient/family and/or caregiver(s), reviewing old records, and spent face to face with the patient explaining the above was 35 minutes.

## 2024-09-26 NOTE — HISTORY OF PRESENT ILLNESS
[FreeTextEntry1] : 2024 -- 48 F with longstanding hx of pelvic pain and mixed urinary incontinence.    Pt still has fecal incontinence, and she just started weekly pelvic physical therapy as per recommendation of Dr. Marrero.     2024 -- 48 F with longstanding hx of pelvic pain and mixed urinary incontinence. Reports return of urinary symptoms. She also endorses fecal urgency and fecal incontinence- x1-2 times a day. Denies FLASH and sensation of prolapse.  note- presents with a cane due to chronic lower back pain (receives injections to manage).   2023 --- Ms. NIKA SANTOS comes in today for her Urologic follow up. Pt is a 47 yo F with longstanding hx of pelvic pain and mixed urinary incontinence. Pt endorses improvement s/p botox 23. Today she reports less urgency and has good control. She denies dysuria and has no new urinary complaints.  Udip: 2023 --- negative PVR: 0cc (done to rule out incomplete bladder emptying)  2023 -- Ms. NIKA SANTOS comes in today for her Urologic follow up. Pt is a 48 yo F with longstanding hx of pelvic pain and mixed urinary incontinence. She presents today for intravesical botox injection.  UC: 10/24/23-- negative, no growth  10/24/2023 -- Ms. NIKA SANTOS comes in today for her Urologic follow up. Pt is a 48 yo F with longstanding hx of pelvic pain and mixed urinary incontinence, s/p botox 23. She had improvement for several months after Botox. Her urgency has returned and she would like to schedule another injection.  She also wishes to continue PFPT as it helps with managing her pelvic symptoms (pressure).  Udip: 10/24/2023 --- negative   23 -- Ms. NIKA SANTOS comes in today for her Urologic follow up. Pt is a 48 yo F with longstanding hx of pelvic pain and mixed urinary incontinence. She returns today for a post-void residual evaluation s/p botox 23.  Pt reports gradual improvement s/p botox 23. PVR: 26cc (done to rule out incomplete bladder emptying)   Ms. NIKA SANTOS comes in today for her Urologic follow up. Pt is a 48 yo F with longstanding hx of pelvic pain and mixed urinary incontinence. She underwent a sling and botox injection in 2023. She presents today for intravesical botox injection.   23--- Ms. NIKA SANTOS comes in today for her Urologic follow up. Pt is a 48 yo F with longstanding hx of pelvic pain and mixed urinary incontinence. She underwent a sling and botox injection in 2023. She did well for 2-3 months but then her symptoms returned.  On her last visit, UDS (23) indicate evidence of valsalva voiding and dyssynergia. She has pain with bladder filling--mild FLASH which is minimally bothersome. Pt was advised to proceed with a 6-8 week course of PFPT which she says was painful given her vaginal dryness and she stopped going.  Moreover, she has failed Myrbetriq, Oxybutynin in the past.  No menstrual periods.  Notably, she is also doing PT for her knee (surgery 1 yr ago)  Udip: 2023 ---small bilirubin, trace ketone PVR: 1cc (done to rule out incomplete bladder emptying)   23--- Patient is a 48 yo F who presents today for urodynamics.  3/14/23--Ms. NIKA SANTOS comes in today for her urologic follow-up.  Ms. Santos has a longstanding history of pelvic pain in the setting of chronic pain syndrome (including fibromyalgia) and underwent a urodynamics evaluation in 2022 which identified mixed urinary incontinence. She was given oxybutynin and Myrbetriq (separately) which failed to offer subjective improvement and she opted to proceed with a mid-urethral sling and injections of intravesical Botox. Her primary complaint today is urge incontinence. She reports minimal to no stress incontinence. In addition, she reports right leg pain.  Ms. Santos is on standing oxycodone for her chronic pain. She is consulting a pain management physician for this, and they have agreed to continue the narcotics until she loses weight and is able to exercise with regularity.  Sono (performed to assess bladder emptying): Nil PVR  She is  2, para 2.

## 2024-09-26 NOTE — ADDENDUM
[FreeTextEntry1] : A portion of this note was written by [Jamaal Mosqueda] on 09/25/2024 acting as a scribe for Dr. Yap.   I have personally reviewed the chart and agree that the record accurately reflects my personal performance of the history, physical exam, assessment, and plan.

## 2024-09-26 NOTE — HISTORY OF PRESENT ILLNESS
[FreeTextEntry1] : 2024 -- 48 F with longstanding hx of pelvic pain and mixed urinary incontinence.    Pt still has fecal incontinence, and she just started weekly pelvic physical therapy as per recommendation of Dr. Marrero.     2024 -- 48 F with longstanding hx of pelvic pain and mixed urinary incontinence. Reports return of urinary symptoms. She also endorses fecal urgency and fecal incontinence- x1-2 times a day. Denies FLASH and sensation of prolapse.  note- presents with a cane due to chronic lower back pain (receives injections to manage).   2023 --- Ms. NIKA SANTOS comes in today for her Urologic follow up. Pt is a 49 yo F with longstanding hx of pelvic pain and mixed urinary incontinence. Pt endorses improvement s/p botox 23. Today she reports less urgency and has good control. She denies dysuria and has no new urinary complaints.  Udip: 2023 --- negative PVR: 0cc (done to rule out incomplete bladder emptying)  2023 -- Ms. NIKA SANTOS comes in today for her Urologic follow up. Pt is a 48 yo F with longstanding hx of pelvic pain and mixed urinary incontinence. She presents today for intravesical botox injection.  UC: 10/24/23-- negative, no growth  10/24/2023 -- Ms. NIKA SANTOS comes in today for her Urologic follow up. Pt is a 48 yo F with longstanding hx of pelvic pain and mixed urinary incontinence, s/p botox 23. She had improvement for several months after Botox. Her urgency has returned and she would like to schedule another injection.  She also wishes to continue PFPT as it helps with managing her pelvic symptoms (pressure).  Udip: 10/24/2023 --- negative   23 -- Ms. NIKA SANTOS comes in today for her Urologic follow up. Pt is a 48 yo F with longstanding hx of pelvic pain and mixed urinary incontinence. She returns today for a post-void residual evaluation s/p botox 23.  Pt reports gradual improvement s/p botox 23. PVR: 26cc (done to rule out incomplete bladder emptying)   Ms. NIKA SANTOS comes in today for her Urologic follow up. Pt is a 48 yo F with longstanding hx of pelvic pain and mixed urinary incontinence. She underwent a sling and botox injection in 2023. She presents today for intravesical botox injection.   23--- Ms. NIKA SANTOS comes in today for her Urologic follow up. Pt is a 48 yo F with longstanding hx of pelvic pain and mixed urinary incontinence. She underwent a sling and botox injection in 2023. She did well for 2-3 months but then her symptoms returned.  On her last visit, UDS (23) indicate evidence of valsalva voiding and dyssynergia. She has pain with bladder filling--mild FLASH which is minimally bothersome. Pt was advised to proceed with a 6-8 week course of PFPT which she says was painful given her vaginal dryness and she stopped going.  Moreover, she has failed Myrbetriq, Oxybutynin in the past.  No menstrual periods.  Notably, she is also doing PT for her knee (surgery 1 yr ago)  Udip: 2023 ---small bilirubin, trace ketone PVR: 1cc (done to rule out incomplete bladder emptying)   23--- Patient is a 48 yo F who presents today for urodynamics.  3/14/23--Ms. NIKA SANTOS comes in today for her urologic follow-up.  Ms. Santos has a longstanding history of pelvic pain in the setting of chronic pain syndrome (including fibromyalgia) and underwent a urodynamics evaluation in 2022 which identified mixed urinary incontinence. She was given oxybutynin and Myrbetriq (separately) which failed to offer subjective improvement and she opted to proceed with a mid-urethral sling and injections of intravesical Botox. Her primary complaint today is urge incontinence. She reports minimal to no stress incontinence. In addition, she reports right leg pain.  Ms. Santos is on standing oxycodone for her chronic pain. She is consulting a pain management physician for this, and they have agreed to continue the narcotics until she loses weight and is able to exercise with regularity.  Sono (performed to assess bladder emptying): Nil PVR  She is  2, para 2.

## 2024-10-24 NOTE — ED ADULT NURSE NOTE - PAIN: BODY LOCATION
4 dissolvable stitches were placed in her left eyebrow.  The stitches should dissolve in 5 to 7 days.  Please keep the wound clean with soap and water.  Also found to have a pneumonia on the right side of your lung.  Take the antibiotics as prescribed.  Turn to the emergency department for any worsening symptoms including pus draining from your wound, confusion, loss of consciousness.  
Right:/elbow

## 2024-10-28 NOTE — ED ADULT NURSE NOTE - STOOL PATTERN
Problem: PAIN - ADULT  Goal: Verbalizes/displays adequate comfort level or baseline comfort level  Description: Interventions:  - Encourage patient to monitor pain and request assistance  - Assess pain using appropriate pain scale  - Administer analgesics based on type and severity of pain and evaluate response  - Implement non-pharmacological measures as appropriate and evaluate response  - Consider cultural and social influences on pain and pain management  - Notify physician/advanced practitioner if interventions unsuccessful or patient reports new pain  Outcome: Progressing     Problem: INFECTION - ADULT  Goal: Absence or prevention of progression during hospitalization  Description: INTERVENTIONS:  - Assess and monitor for signs and symptoms of infection  - Monitor lab/diagnostic results  - Monitor all insertion sites, i.e. indwelling lines, tubes, and drains  - Monitor endotracheal if appropriate and nasal secretions for changes in amount and color  - Carson appropriate cooling/warming therapies per order  - Administer medications as ordered  - Instruct and encourage patient and family to use good hand hygiene technique  - Identify and instruct in appropriate isolation precautions for identified infection/condition  Outcome: Progressing      normal patient pattern

## 2024-12-04 NOTE — ED ADULT NURSE NOTE - CHIEF COMPLAINT
Dr Teran, see patient request below.     LOV 4/25/24  NOV 12/19/24   Last A1C 4/25/24   The patient is a 47y Female complaining of foot pain/injury.

## 2024-12-12 ENCOUNTER — APPOINTMENT (OUTPATIENT)
Dept: UROLOGY | Facility: CLINIC | Age: 49
End: 2024-12-12
Payer: MEDICAID

## 2024-12-12 VITALS
HEART RATE: 61 BPM | OXYGEN SATURATION: 96 % | SYSTOLIC BLOOD PRESSURE: 122 MMHG | TEMPERATURE: 97.9 F | DIASTOLIC BLOOD PRESSURE: 79 MMHG

## 2024-12-12 PROCEDURE — 64561 IMPLANT NEUROELECTRODES: CPT | Mod: 50

## 2024-12-17 ENCOUNTER — APPOINTMENT (OUTPATIENT)
Dept: UROLOGY | Facility: CLINIC | Age: 49
End: 2024-12-17
Payer: MEDICAID

## 2024-12-17 PROCEDURE — 99024 POSTOP FOLLOW-UP VISIT: CPT

## 2025-01-03 NOTE — ASU PATIENT PROFILE, ADULT - FALL HARM RISK - HARM RISK INTERVENTIONS

## 2025-01-03 NOTE — ASU PATIENT PROFILE, ADULT - NS PRO ABUSE SCREEN AFRAID ANYONE YN
HPI     Blurred Vision     Additional comments: Patient Tasha Cornejo is a 43 year old female.           Comments    Pt here for MRX check. Pt states that she has blurry VA at near in   computer/near PAL glasses. Pt states that she is a first time PAL wearer,   states that her computer VA is clear directly in fromt of her but   periphery is blurry. Pt states that PAL glasses were made at [facility].    DLS: 11/15/2024 with Dr. Jarquin    Meds:    POHx:  1. Disease ruled out after examination  2. Ocular migraine with status migrainosus  3. Presbyopia  4. Hyperopia, bilateral  5. Accommodation spasm, bilateral            Last edited by Selina Delgado on 12/27/2024  1:04 PM.            Assessment /Plan     For exam results, see Encounter Report.    Presbyopia      Rx change, remake ou                    no

## 2025-01-03 NOTE — ASU PATIENT PROFILE, ADULT - NSICDXPASTSURGICALHX_GEN_ALL_CORE_FT
Noted, stable on 3 L nasal cannula.  Continue supplemental oxygen.  Pt does have oxygen at home.       PAST SURGICAL HISTORY:  Elective surgery bladder sling    H/O abdominal hysterectomy     History of cholecystectomy     S/P total knee replacement left    Status post bariatric surgery Gastric bypass status for obesity     PAST SURGICAL HISTORY:  Elective surgery bladder sling    H/O abdominal hysterectomy     History of cholecystectomy     S/P total knee replacement left    Status post bariatric surgery Gastric bypass status for obesity  12 years

## 2025-01-03 NOTE — ASU PATIENT PROFILE, ADULT - NS PREOP UNDERSTANDS INFO
No solid food/dairy/candy/gum after 11:30pm Sunday; water allowed before 04:30am Monday; patient reminded to come with photo ID/insurance/credit card; dress in comfortable clothes; no jewelries/contact lens/valuable; no smoking/alcohol drinking/recreational drug use Sunday; escort to have photo ID; address and callback number was given/yes

## 2025-01-06 ENCOUNTER — TRANSCRIPTION ENCOUNTER (OUTPATIENT)
Age: 50
End: 2025-01-06

## 2025-01-06 ENCOUNTER — OUTPATIENT (OUTPATIENT)
Dept: OUTPATIENT SERVICES | Facility: HOSPITAL | Age: 50
LOS: 1 days | Discharge: HOME | End: 2025-01-06

## 2025-01-06 ENCOUNTER — APPOINTMENT (OUTPATIENT)
Dept: UROLOGY | Facility: AMBULATORY SURGERY CENTER | Age: 50
End: 2025-01-06

## 2025-01-06 VITALS
HEART RATE: 68 BPM | WEIGHT: 225.75 LBS | OXYGEN SATURATION: 96 % | RESPIRATION RATE: 16 BRPM | TEMPERATURE: 98 F | DIASTOLIC BLOOD PRESSURE: 64 MMHG | HEIGHT: 61 IN | SYSTOLIC BLOOD PRESSURE: 121 MMHG

## 2025-01-06 VITALS
RESPIRATION RATE: 15 BRPM | DIASTOLIC BLOOD PRESSURE: 68 MMHG | HEART RATE: 80 BPM | OXYGEN SATURATION: 97 % | SYSTOLIC BLOOD PRESSURE: 121 MMHG

## 2025-01-06 DIAGNOSIS — Z90.710 ACQUIRED ABSENCE OF BOTH CERVIX AND UTERUS: Chronic | ICD-10-CM

## 2025-01-06 DIAGNOSIS — Z41.9 ENCOUNTER FOR PROCEDURE FOR PURPOSES OTHER THAN REMEDYING HEALTH STATE, UNSPECIFIED: Chronic | ICD-10-CM

## 2025-01-06 DIAGNOSIS — Z96.659 PRESENCE OF UNSPECIFIED ARTIFICIAL KNEE JOINT: Chronic | ICD-10-CM

## 2025-01-06 DIAGNOSIS — Z90.49 ACQUIRED ABSENCE OF OTHER SPECIFIED PARTS OF DIGESTIVE TRACT: Chronic | ICD-10-CM

## 2025-01-06 PROCEDURE — 64590 INS/RPL PRPH SAC/GSTR NPG/R: CPT

## 2025-01-06 PROCEDURE — 64561 IMPLANT NEUROELECTRODES: CPT | Mod: 50

## 2025-01-06 DEVICE — KIT TINED LEAD INSUL: Type: IMPLANTABLE DEVICE | Status: FUNCTIONAL

## 2025-01-06 DEVICE — CONTROL REMOTE AXONICS DISP NON STRL: Type: IMPLANTABLE DEVICE | Status: FUNCTIONAL

## 2025-01-06 DEVICE — STIM NEURO RECHARGEABLE R20: Type: IMPLANTABLE DEVICE | Status: FUNCTIONAL

## 2025-01-06 DEVICE — KIT IMP STIMULATOR LEAD STRL: Type: IMPLANTABLE DEVICE | Status: FUNCTIONAL

## 2025-01-06 DEVICE — SYS CHARGER NEUROSTIM: Type: IMPLANTABLE DEVICE | Status: FUNCTIONAL

## 2025-01-06 RX ORDER — OXYCODONE AND ACETAMINOPHEN 5; 325 MG/1; MG/1
5-325 TABLET ORAL
Qty: 3 | Refills: 0 | Status: ACTIVE | COMMUNITY
Start: 2025-01-06 | End: 1900-01-01

## 2025-01-06 RX ORDER — HYDROMORPHONE HCL 4 MG
0.5 TABLET ORAL
Refills: 0 | Status: DISCONTINUED | OUTPATIENT
Start: 2025-01-06 | End: 2025-01-06

## 2025-01-06 RX ORDER — PREGABALIN 100 MG/1
1 CAPSULE ORAL
Refills: 0 | DISCHARGE

## 2025-01-06 RX ORDER — OXYCODONE AND ACETAMINOPHEN 5; 325 MG/1; MG/1
1 TABLET ORAL
Qty: 3 | Refills: 0
Start: 2025-01-06 | End: 2025-01-07

## 2025-01-06 RX ORDER — OXYCODONE HCL 15 MG
1 TABLET ORAL
Refills: 0 | DISCHARGE

## 2025-01-06 RX ORDER — SODIUM CHLORIDE 9 MG/ML
1000 INJECTION, SOLUTION INTRAVENOUS
Refills: 0 | Status: DISCONTINUED | OUTPATIENT
Start: 2025-01-06 | End: 2025-01-06

## 2025-01-06 RX ORDER — CHLORHEXIDINE GLUCONATE 1.2 MG/ML
1 RINSE ORAL ONCE
Refills: 0 | Status: COMPLETED | OUTPATIENT
Start: 2025-01-06 | End: 2025-01-06

## 2025-01-06 RX ORDER — OMEPRAZOLE MAGNESIUM 20 MG/1
1 CAPSULE, DELAYED RELEASE ORAL
Refills: 0 | DISCHARGE

## 2025-01-06 RX ORDER — CEPHALEXIN 500 MG/1
500 TABLET ORAL 3 TIMES DAILY
Qty: 15 | Refills: 0 | Status: ACTIVE | COMMUNITY
Start: 2025-01-06 | End: 1900-01-01

## 2025-01-06 RX ORDER — NAPROXEN 500 MG
1 TABLET, DELAYED RELEASE (ENTERIC COATED) ORAL
Refills: 0 | DISCHARGE

## 2025-01-06 RX ORDER — DULOXETINE HYDROCHLORIDE 30 MG/1
1 CAPSULE, DELAYED RELEASE ORAL
Refills: 0 | DISCHARGE

## 2025-01-06 RX ORDER — VENLAFAXINE HYDROCHLORIDE 75 MG/1
1 CAPSULE, EXTENDED RELEASE ORAL
Refills: 0 | DISCHARGE

## 2025-01-06 RX ORDER — ARIPIPRAZOLE 10 MG/1
1 TABLET ORAL
Refills: 0 | DISCHARGE

## 2025-01-06 RX ORDER — ACETAMINOPHEN 80 MG/.8ML
1000 SOLUTION/ DROPS ORAL ONCE
Refills: 0 | Status: COMPLETED | OUTPATIENT
Start: 2025-01-06 | End: 2025-01-06

## 2025-01-06 RX ADMIN — ACETAMINOPHEN 1000 MILLIGRAM(S): 80 SOLUTION/ DROPS ORAL at 06:17

## 2025-01-06 RX ADMIN — Medication 0.5 MILLIGRAM(S): at 10:58

## 2025-01-06 RX ADMIN — CHLORHEXIDINE GLUCONATE 1 APPLICATION(S): 1.2 RINSE ORAL at 06:00

## 2025-01-06 NOTE — ASU DISCHARGE PLAN (ADULT/PEDIATRIC) - FINANCIAL ASSISTANCE
[facility] provides services at a reduced cost to those who are determined to be eligible through [facility]’s financial assistance program. Information regarding [facility]’s financial assistance program can be found by going to [email or web address] or by calling [phone].

## 2025-01-06 NOTE — ASU DISCHARGE PLAN (ADULT/PEDIATRIC) - ASU DC SPECIAL INSTRUCTIONSFT
You may experience mild pain at the site of the procedure. You can shower, but please keep area dry afterwards and apply new bandage if needed. You may take over-the-counter medication such as extra strength Tylenol for pain, but do not exceed 4,000 mg of Tylenol daily. You have been sent 3 pills of Perocet which may be used for severe pain. If you require more than this, please call the office regarding your symptoms and for further instruction. You have been sent Keflex to your pharmacy. Please take this antibiotic for the next 5 days as directed.     - f/u in 2 weeks with Dr. Yap  - f/u with [employer] representative in 1 week  - Please call the office if you experience any of the following: fevers, chills, nausea, vomiting, inability to urinate, increasing pain or purulence from the surgical site.

## 2025-01-06 NOTE — BRIEF OPERATIVE NOTE - OPERATION/FINDINGS
right sided leads localized to S3 and battery implanted on R lateral superior buttock. Closed with vicryl (2-0) and monocryl (4-0).

## 2025-01-06 NOTE — BRIEF OPERATIVE NOTE - NSICDXBRIEFPROCEDURE_GEN_ALL_CORE_FT
PROCEDURES:  Incision, for implantation, electrodes, neurostimulator, sacral nerve 06-Jan-2025 09:13:31  Jennifer Calderón

## 2025-01-06 NOTE — ASU DISCHARGE PLAN (ADULT/PEDIATRIC) - NS MD DC FALL RISK RISK
For information on Fall & Injury Prevention, visit: [email or web address] OR  [email or web address] OR  https://www.cdc.gov/steadi/patient.html

## 2025-01-06 NOTE — ASU PREOP CHECKLIST - DENTURES
no
Consent: The patient's consent was obtained including but not limited to risks of crusting, scabbing, blistering, scarring, darker or lighter pigmentary change, recurrence, incomplete removal and infection.
Medical Necessity Information: It is in your best interest to select a reason for this procedure from the list below. All of these items fulfill various CMS LCD requirements except the new and changing color options.
Add 52 Modifier (Optional): no
Medical Necessity Clause: This procedure was medically necessary because the lesions that were treated were:
Detail Level: Simple
Include Z78.9 (Other Specified Conditions Influencing Health Status) As An Associated Diagnosis?: Yes
Post-Care Instructions: I reviewed with the patient in detail post-care instructions. Patient is to wear sunprotection, and avoid picking at any of the treated lesions. Pt may apply Vaseline to crusted or scabbing areas.
Number Of Freeze-Thaw Cycles: 1 freeze-thaw cycle

## 2025-01-06 NOTE — ASU DISCHARGE PLAN (ADULT/PEDIATRIC) - CARE PROVIDER_API CALL
Paulnie Yap  Urology  [address] [address]  Phone: [phone]  Fax: [phone]  Follow Up Time: 2 weeks

## 2025-01-10 NOTE — ASU PATIENT PROFILE, ADULT - PATIENT KNOW
Nursing report ED to floor  Jacob Miller  70 y.o.  male    HPI :  HPI  Stated Reason for Visit: dizzy    Chief Complaint  Chief Complaint   Patient presents with    Dizziness       Admitting doctor:   Yusef Schroeder MD    Admitting diagnosis:   The primary encounter diagnosis was Syncope and collapse. Diagnoses of Acute renal insufficiency and Leukocytosis, unspecified type were also pertinent to this visit.    Code status:   Current Code Status       Date Active Code Status Order ID Comments User Context       1/10/2025 1303 CPR (Attempt to Resuscitate) [id number]  Cindy Bhat PA-C ED        Question Answer    Code Status (Patient has no pulse and is not breathing) CPR (Attempt to Resuscitate)    Medical Interventions (Patient has pulse or is breathing) Full Support    Level Of Support Discussed With Patient                    Allergies:   Patient has no known allergies.    Isolation:   No active isolations    Intake and Output  No intake or output data in the 24 hours ending 01/10/25 1311    Weight:   There were no vitals filed for this visit.    Most recent vitals:   Vitals:    01/10/25 0927   BP: 116/74   Pulse: 66   Resp: 18   Temp: 99.3 °F (37.4 °C)   SpO2: 95%       Active LDAs/IV Access:   Lines, Drains & Airways       Active LDAs       Name Placement date Placement time Site Days    Peripheral IV 01/10/25 0928 Anterior;Distal;Right;Upper Arm 01/10/25  0928  Arm  less than 1                    Labs (abnormal labs have a star):   Labs Reviewed   COMPREHENSIVE METABOLIC PANEL - Abnormal; Notable for the following components:       Result Value    Glucose 152 (*)     Creatinine 1.37 (*)     eGFR 55.1 (*)     All other components within normal limits    Narrative:     GFR Categories in Chronic Kidney Disease (CKD)      GFR Category          GFR (mL/min/1.73)    Interpretation  G1                     90 or greater         Normal or high (1)  G2                      60-89                Mild decrease  (1)  G3a                   45-59                Mild to moderate decrease  G3b                   30-44                Moderate to severe decrease  G4                    15-29                Severe decrease  G5                    14 or less           Kidney failure          (1)In the absence of evidence of kidney disease, neither GFR category G1 or G2 fulfill the criteria for CKD.    eGFR calculation 2021 CKD-EPI creatinine equation, which does not include race as a factor   PROTIME-INR - Abnormal; Notable for the following components:    Protime 14.5 (*)     All other components within normal limits   URINALYSIS W/ MICROSCOPIC IF INDICATED (NO CULTURE) - Abnormal; Notable for the following components:    Color, UA Dark Yellow (*)     Blood, UA Large (3+) (*)     Protein, UA Trace (*)     Leuk Esterase, UA Small (1+) (*)     Nitrite, UA Positive (*)     All other components within normal limits   CBC WITH AUTO DIFFERENTIAL - Abnormal; Notable for the following components:    WBC 14.73 (*)     RDW 15.8 (*)     All other components within normal limits   MANUAL DIFFERENTIAL - Abnormal; Notable for the following components:    Lymphocyte % 10.0 (*)     Monocyte % 19.0 (*)     Neutrophils Absolute 10.31 (*)     Monocytes Absolute 2.80 (*)     All other components within normal limits   URINALYSIS, MICROSCOPIC ONLY - Abnormal; Notable for the following components:    RBC, UA 3-5 (*)     WBC, UA 6-10 (*)     All other components within normal limits   APTT - Normal   TROPONIN - Normal    Narrative:     High Sensitive Troponin T Reference Range:  <14.0 ng/L- Negative Female for AMI  <22.0 ng/L- Negative Male for AMI  >=14 - Abnormal Female indicating possible myocardial injury.  >=22 - Abnormal Male indicating possible myocardial injury.   Clinicians would have to utilize clinical acumen, EKG, Troponin, and serial changes to determine if it is an Acute Myocardial Infarction or myocardial injury due to an underlying chronic  condition.        LACTIC ACID, PLASMA - Normal   HIGH SENSITIVITIY TROPONIN T 1HR - Normal    Narrative:     High Sensitive Troponin T Reference Range:  <14.0 ng/L- Negative Female for AMI  <22.0 ng/L- Negative Male for AMI  >=14 - Abnormal Female indicating possible myocardial injury.  >=22 - Abnormal Male indicating possible myocardial injury.   Clinicians would have to utilize clinical acumen, EKG, Troponin, and serial changes to determine if it is an Acute Myocardial Infarction or myocardial injury due to an underlying chronic condition.        CBC AND DIFFERENTIAL    Narrative:     The following orders were created for panel order CBC & Differential.  Procedure                               Abnormality         Status                     ---------                               -----------         ------                     CBC Auto Differential[[id number]]        Abnormal            Final result                 Please view results for these tests on the individual orders.       EKG:   ECG 12 Lead Syncope   Preliminary Result   HEART RATE=68  bpm   RR Wupkpcgy=894  ms   VA Yoxoeosh=197  ms   P Horizontal Axis=16  deg   P Front Axis=21  deg   QRSD Interval=99  ms   QT Dxubxmom=509  ms   COvG=328  ms   QRS Axis=6  deg   T Wave Axis=-3  deg   - ABNORMAL ECG -   Sinus rhythm   Consider left atrial enlargement   RSR' in V1 or V2, right VCD or RVH   Left ventricular hypertrophy   Minimal ST elevation, anterolateral leads   Date and Time of Study:2025-01-10 10:09:53          Meds given in ED:   Medications   sodium chloride 0.9 % flush 10 mL (has no administration in time range)   sodium chloride 0.9 % flush 10 mL (has no administration in time range)   sodium chloride 0.9 % flush 10 mL (has no administration in time range)   sodium chloride 0.9 % infusion 40 mL (has no administration in time range)   nitroglycerin (NITROSTAT) SL tablet 0.4 mg (has no administration in time range)   Potassium Replacement - Follow Nurse /  BPA Driven Protocol (has no administration in time range)   Magnesium Standard Dose Replacement - Follow Nurse / BPA Driven Protocol (has no administration in time range)   Phosphorus Replacement - Follow Nurse / BPA Driven Protocol (has no administration in time range)   Calcium Replacement - Follow Nurse / BPA Driven Protocol (has no administration in time range)   acetaminophen (TYLENOL) tablet 650 mg (has no administration in time range)     Or   acetaminophen (TYLENOL) 160 MG/5ML oral solution 650 mg (has no administration in time range)     Or   acetaminophen (TYLENOL) suppository 650 mg (has no administration in time range)   sennosides-docusate (PERICOLACE) 8.6-50 MG per tablet 2 tablet (has no administration in time range)     And   polyethylene glycol (MIRALAX) packet 17 g (has no administration in time range)     And   bisacodyl (DULCOLAX) EC tablet 5 mg (has no administration in time range)     And   bisacodyl (DULCOLAX) suppository 10 mg (has no administration in time range)   sodium chloride 0.9 % bolus 1,000 mL (0 mL Intravenous Stopped 1/10/25 1310)       Imaging results:  No radiology results for the last day    Ambulatory status:   - ad mike    Social issues:   Social History     Socioeconomic History    Marital status:    Tobacco Use    Smoking status: Never     Passive exposure: Never    Smokeless tobacco: Never    Tobacco comments:     Daily caffeine use   Vaping Use    Vaping status: Never Used   Substance and Sexual Activity    Alcohol use: Yes     Comment: occ    Drug use: No    Sexual activity: Defer       Peripheral Neurovascular  Peripheral Neurovascular (Adult)  Peripheral Neurovascular WDL: WDL    Neuro Cognitive  Neuro Cognitive (Adult)  Cognitive/Neuro/Behavioral WDL: WDL    Learning  Learning Assessment  Learning Readiness and Ability: no barriers identified    Respiratory  Respiratory  Airway WDL: WDL  Respiratory WDL  Respiratory WDL: WDL    Abdominal Pain       Pain  Assessments  Pain (Adult)  (0-10) Pain Rating: Rest: 0    NIH Stroke Scale       Lee Pete RN  01/10/25 13:11 EST    yes

## 2025-01-22 ENCOUNTER — APPOINTMENT (OUTPATIENT)
Dept: UROLOGY | Facility: CLINIC | Age: 50
End: 2025-01-22
Payer: MEDICAID

## 2025-01-22 ENCOUNTER — NON-APPOINTMENT (OUTPATIENT)
Age: 50
End: 2025-01-22

## 2025-01-22 VITALS
OXYGEN SATURATION: 96 % | TEMPERATURE: 97.8 F | SYSTOLIC BLOOD PRESSURE: 119 MMHG | HEART RATE: 69 BPM | DIASTOLIC BLOOD PRESSURE: 80 MMHG

## 2025-01-22 PROCEDURE — 99212 OFFICE O/P EST SF 10 MIN: CPT | Mod: 25

## 2025-02-25 NOTE — ASU PATIENT PROFILE, ADULT - SITE
"Subjective:   Patient ID: Una Can is a 27 y.o. female  Chief complaint:   Chief Complaint   Patient presents with    Annual Exam       HPI  Here for annual     HM:  Covid   Gyn utd     Exercising 4/week - treadmill/weights/pilates   Cooking more at home   Kcal counting     White coat syndrome without HTN:  Elevated bp readings at office visits but not at home and checked often   Bp log reviewed: 110-120s/60-70s with occ 80s and at goal   At [facility]cv:   uc appt for HTN concners   Elevated bp when at gyn and had intermittently elevated readings at times  Dx by gyn   Ocps stopped   Has been monitoring bp at work however more stressful environment at those times   Has home bp cuff and will start using   Reviewed correct bp monitoring technique  Dad dx with htn in early 40s  No htn in mom   Weight gain of 15 pounds over past 3 years   Exercising: dancing x 1d/week, walking brisk - 3 days per week  Diet: eating out about 1-2 times per week   No ha or cp with this or blurry vision   Noticed at gyn appt and upon repeat   Monday: 139/80 at work, 121/62 at home  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 I waited 15 min and took it again and got 129/79  Thursday: 144/88 at work, 131/81 at home  Friday: 126/85 at work   - erick were automatic bp checks   - 2 reading was manual at work - one wnl and other was high but usnure of which reading this was     Review of Systems    Objective:  Vitals:    02/25/25 0728   BP: 130/80   BP Location: Left arm   Patient Position: Sitting   Pulse: 88   SpO2: 98%   Weight: 63.8 kg (140 lb 10.5 oz)   Height: 5' 2" (1.575 m)       Body mass index is 25.73 kg/m².    Physical Exam  Vitals reviewed.   Constitutional:       Appearance: Normal appearance. She is well-developed.   HENT:      Head: Normocephalic and atraumatic.      Right Ear: Tympanic membrane, ear canal and external ear normal.      Left Ear: Tympanic " membrane, ear canal and external ear normal.      Nose: Nose normal. No congestion.      Mouth/Throat:      Mouth: Mucous membranes are moist.      Pharynx: Oropharynx is clear. No oropharyngeal exudate.   Eyes:      Extraocular Movements: Extraocular movements intact.      Conjunctiva/sclera: Conjunctivae normal.   Neck:      Thyroid: No thyromegaly.   Cardiovascular:      Rate and Rhythm: Normal rate and regular rhythm.      Pulses: Normal pulses.      Heart sounds: Normal heart sounds.   Pulmonary:      Effort: Pulmonary effort is normal.      Breath sounds: Normal breath sounds.   Abdominal:      General: Bowel sounds are normal.      Palpations: Abdomen is soft.   Musculoskeletal:         General: No swelling or tenderness.      Cervical back: Normal range of motion and neck supple.   Lymphadenopathy:      Cervical: No cervical adenopathy.   Skin:     General: Skin is warm and dry.      Capillary Refill: Capillary refill takes less than 2 seconds.      Nails: There is no clubbing.   Neurological:      General: No focal deficit present.      Mental Status: She is alert and oriented to person, place, and time. Mental status is at baseline.      Gait: Gait normal.   Psychiatric:         Speech: Speech normal.         Behavior: Behavior normal.         Thought Content: Thought content normal.       Assessment:  1. Annual physical exam    2. White coat syndrome without diagnosis of hypertension          Plan:  1. Annual physical exam  -     CBC Auto Differential; Future; Expected date: 02/25/2025  -     Comprehensive Metabolic Panel; Future; Expected date: 02/25/2025    2. White coat syndrome without diagnosis of hypertension    Recommend daily sunscreen, cardiovascular exercise min 30 min 5 days per week. Seatbelts routinely.  Reviewed recommended health maintenance and recommended vaccines   Check/reviewed appropriate labs   Start prenatal when planning to start trying for pregnancy  Labs this [facility]  Maintenance   Topic Date Due    COVID-19 Vaccine (4 - 2024-25 season) 09/01/2024    Pap Smear  08/30/2025    TETANUS VACCINE  07/31/2029    RSV Vaccine (Age 60+ and Pregnant patients) (1 - 1-dose 75+ series) 08/30/2071    Hepatitis C Screening  Completed    Influenza Vaccine  Completed    HIV Screening  Completed    Lipid Panel  Completed    Pneumococcal Vaccines (Age 0-49)  Aged Out            Bladder

## 2025-03-10 NOTE — H&P ADULT. - RESPIRATORY AND THORAX
It is recommended for you to participate in outpatient physical and occupational therapy. A prescription has been provided to you. Please contact your insurance company to find an in-network therapy practice.   
negative

## 2025-04-03 ENCOUNTER — APPOINTMENT (OUTPATIENT)
Dept: BARIATRICS | Facility: CLINIC | Age: 50
End: 2025-04-03
Payer: MEDICAID

## 2025-04-03 VITALS
OXYGEN SATURATION: 97 % | SYSTOLIC BLOOD PRESSURE: 110 MMHG | HEIGHT: 61 IN | BODY MASS INDEX: 43.8 KG/M2 | WEIGHT: 232 LBS | TEMPERATURE: 98.1 F | HEART RATE: 74 BPM | DIASTOLIC BLOOD PRESSURE: 72 MMHG

## 2025-04-03 DIAGNOSIS — Z00.00 ENCOUNTER FOR GENERAL ADULT MEDICAL EXAMINATION W/OUT ABNORMAL FINDINGS: ICD-10-CM

## 2025-04-03 DIAGNOSIS — Z98.84 BARIATRIC SURGERY STATUS: ICD-10-CM

## 2025-04-03 PROCEDURE — 99214 OFFICE O/P EST MOD 30 MIN: CPT

## 2025-04-15 ENCOUNTER — OUTPATIENT (OUTPATIENT)
Dept: OUTPATIENT SERVICES | Facility: HOSPITAL | Age: 50
LOS: 1 days | End: 2025-04-15
Payer: MEDICAID

## 2025-04-15 ENCOUNTER — APPOINTMENT (OUTPATIENT)
Dept: CT IMAGING | Facility: HOSPITAL | Age: 50
End: 2025-04-15

## 2025-04-15 DIAGNOSIS — Z90.710 ACQUIRED ABSENCE OF BOTH CERVIX AND UTERUS: Chronic | ICD-10-CM

## 2025-04-15 DIAGNOSIS — Z90.49 ACQUIRED ABSENCE OF OTHER SPECIFIED PARTS OF DIGESTIVE TRACT: Chronic | ICD-10-CM

## 2025-04-15 DIAGNOSIS — Z41.9 ENCOUNTER FOR PROCEDURE FOR PURPOSES OTHER THAN REMEDYING HEALTH STATE, UNSPECIFIED: Chronic | ICD-10-CM

## 2025-04-15 DIAGNOSIS — Z96.659 PRESENCE OF UNSPECIFIED ARTIFICIAL KNEE JOINT: Chronic | ICD-10-CM

## 2025-04-15 PROCEDURE — 82565 ASSAY OF CREATININE: CPT

## 2025-04-15 PROCEDURE — 74177 CT ABD & PELVIS W/CONTRAST: CPT

## 2025-04-15 PROCEDURE — 74177 CT ABD & PELVIS W/CONTRAST: CPT | Mod: 26

## 2025-04-18 ENCOUNTER — NON-APPOINTMENT (OUTPATIENT)
Age: 50
End: 2025-04-18

## 2025-04-20 NOTE — ASU DISCHARGE PLAN (ADULT/PEDIATRIC) - PAIN MANAGEMENT
Patient is calm and cooperative upon approach. Patient is visible on unit. Patient denies SI/HI/AH/VH. Patient is compliant with meds and meals.    Prescription given to patient/guardian/Prescription called to pharmacy

## 2025-04-24 ENCOUNTER — RESULT REVIEW (OUTPATIENT)
Age: 50
End: 2025-04-24

## 2025-04-24 ENCOUNTER — OUTPATIENT (OUTPATIENT)
Dept: OUTPATIENT SERVICES | Facility: HOSPITAL | Age: 50
LOS: 1 days | End: 2025-04-24
Payer: MEDICAID

## 2025-04-24 ENCOUNTER — APPOINTMENT (OUTPATIENT)
Dept: BARIATRICS | Facility: CLINIC | Age: 50
End: 2025-04-24
Payer: MEDICAID

## 2025-04-24 VITALS
WEIGHT: 232 LBS | HEART RATE: 80 BPM | OXYGEN SATURATION: 97 % | SYSTOLIC BLOOD PRESSURE: 127 MMHG | DIASTOLIC BLOOD PRESSURE: 86 MMHG | HEIGHT: 61 IN | BODY MASS INDEX: 43.8 KG/M2 | TEMPERATURE: 98.3 F

## 2025-04-24 DIAGNOSIS — Z00.00 ENCOUNTER FOR GENERAL ADULT MEDICAL EXAMINATION W/OUT ABNORMAL FINDINGS: ICD-10-CM

## 2025-04-24 DIAGNOSIS — Z90.49 ACQUIRED ABSENCE OF OTHER SPECIFIED PARTS OF DIGESTIVE TRACT: Chronic | ICD-10-CM

## 2025-04-24 DIAGNOSIS — Z41.9 ENCOUNTER FOR PROCEDURE FOR PURPOSES OTHER THAN REMEDYING HEALTH STATE, UNSPECIFIED: Chronic | ICD-10-CM

## 2025-04-24 DIAGNOSIS — Z96.659 PRESENCE OF UNSPECIFIED ARTIFICIAL KNEE JOINT: Chronic | ICD-10-CM

## 2025-04-24 DIAGNOSIS — Z98.84 BARIATRIC SURGERY STATUS: ICD-10-CM

## 2025-04-24 DIAGNOSIS — Z90.710 ACQUIRED ABSENCE OF BOTH CERVIX AND UTERUS: Chronic | ICD-10-CM

## 2025-04-24 PROCEDURE — 99214 OFFICE O/P EST MOD 30 MIN: CPT

## 2025-04-24 PROCEDURE — 71046 X-RAY EXAM CHEST 2 VIEWS: CPT | Mod: 26

## 2025-04-24 PROCEDURE — 71046 X-RAY EXAM CHEST 2 VIEWS: CPT

## 2025-04-25 LAB
25(OH)D3 SERPL-MCNC: 15.4 NG/ML
ALBUMIN SERPL ELPH-MCNC: 4.2 G/DL
ALP BLD-CCNC: 206 U/L
ALT SERPL-CCNC: 25 U/L
ANION GAP SERPL CALC-SCNC: 16 MMOL/L
APTT BLD: 31.5 SEC
AST SERPL-CCNC: 23 U/L
BASOPHILS # BLD AUTO: 0.03 K/UL
BASOPHILS NFR BLD AUTO: 0.5 %
BILIRUB SERPL-MCNC: 0.2 MG/DL
BUN SERPL-MCNC: 16 MG/DL
CALCIUM SERPL-MCNC: 9.2 MG/DL
CALCIUM SERPL-MCNC: 9.2 MG/DL
CHLORIDE SERPL-SCNC: 108 MMOL/L
CHOLEST SERPL-MCNC: 224 MG/DL
CO2 SERPL-SCNC: 20 MMOL/L
CREAT SERPL-MCNC: 0.6 MG/DL
EGFRCR SERPLBLD CKD-EPI 2021: 109 ML/MIN/1.73M2
EOSINOPHIL # BLD AUTO: 0.12 K/UL
EOSINOPHIL NFR BLD AUTO: 2.2 %
ESTIMATED AVERAGE GLUCOSE: 111 MG/DL
FOLATE SERPL-MCNC: 14.7 NG/ML
GLUCOSE SERPL-MCNC: 120 MG/DL
HBA1C MFR BLD HPLC: 5.5 %
HCT VFR BLD CALC: 38.7 %
HDLC SERPL-MCNC: 55 MG/DL
HGB BLD-MCNC: 12.4 G/DL
IMM GRANULOCYTES NFR BLD AUTO: 0.2 %
INR PPP: 0.9 RATIO
IRON SATN MFR SERPL: 21 %
IRON SERPL-MCNC: 81 UG/DL
LDLC SERPL-MCNC: 129 MG/DL
LYMPHOCYTES # BLD AUTO: 2 K/UL
LYMPHOCYTES NFR BLD AUTO: 36.5 %
MAN DIFF?: NORMAL
MCHC RBC-ENTMCNC: 29.5 PG
MCHC RBC-ENTMCNC: 32 G/DL
MCV RBC AUTO: 91.9 FL
MONOCYTES # BLD AUTO: 0.37 K/UL
MONOCYTES NFR BLD AUTO: 6.8 %
NEUTROPHILS # BLD AUTO: 2.95 K/UL
NEUTROPHILS NFR BLD AUTO: 53.8 %
NONHDLC SERPL-MCNC: 169 MG/DL
PARATHYROID HORMONE INTACT: 69 PG/ML
PLATELET # BLD AUTO: 360 K/UL
POTASSIUM SERPL-SCNC: 4.4 MMOL/L
PREALB SERPL NEPH-MCNC: 24 MG/DL
PROT SERPL-MCNC: 6.6 G/DL
PT BLD: 10.7 SEC
RBC # BLD: 4.21 M/UL
RBC # FLD: 13.2 %
SODIUM SERPL-SCNC: 143 MMOL/L
TIBC SERPL-MCNC: 378 UG/DL
TRIGL SERPL-MCNC: 227 MG/DL
TSH SERPL-ACNC: 0.7 UIU/ML
UIBC SERPL-MCNC: 297 UG/DL
VIT B12 SERPL-MCNC: 1012 PG/ML
WBC # FLD AUTO: 5.48 K/UL

## 2025-04-26 LAB — CA-I SERPL-SCNC: 5.1 MG/DL

## 2025-04-28 LAB — ZINC SERPL-MCNC: 72 UG/DL

## 2025-04-29 ENCOUNTER — APPOINTMENT (OUTPATIENT)
Dept: BARIATRICS | Facility: CLINIC | Age: 50
End: 2025-04-29

## 2025-04-29 LAB
A-TOCOPHEROL VIT E SERPL-MCNC: 8.2 MG/L
BETA+GAMMA TOCOPHEROL SERPL-MCNC: 1.4 MG/L
VIT A SERPL-MCNC: 45 UG/DL
VIT B1 SERPL-MCNC: 141.1 NMOL/L

## 2025-05-06 ENCOUNTER — APPOINTMENT (OUTPATIENT)
Dept: ULTRASOUND IMAGING | Facility: HOSPITAL | Age: 50
End: 2025-05-06

## 2025-05-06 ENCOUNTER — OUTPATIENT (OUTPATIENT)
Dept: OUTPATIENT SERVICES | Facility: HOSPITAL | Age: 50
LOS: 1 days | End: 2025-05-06
Payer: MEDICAID

## 2025-05-06 ENCOUNTER — APPOINTMENT (OUTPATIENT)
Dept: BARIATRICS | Facility: CLINIC | Age: 50
End: 2025-05-06

## 2025-05-06 VITALS
HEIGHT: 61 IN | DIASTOLIC BLOOD PRESSURE: 96 MMHG | SYSTOLIC BLOOD PRESSURE: 142 MMHG | RESPIRATION RATE: 19 BRPM | OXYGEN SATURATION: 96 % | TEMPERATURE: 97 F | HEART RATE: 73 BPM | WEIGHT: 238.32 LBS

## 2025-05-06 DIAGNOSIS — Z96.659 PRESENCE OF UNSPECIFIED ARTIFICIAL KNEE JOINT: Chronic | ICD-10-CM

## 2025-05-06 DIAGNOSIS — R74.8 ABNORMAL LEVELS OF OTHER SERUM ENZYMES: ICD-10-CM

## 2025-05-06 DIAGNOSIS — Z41.9 ENCOUNTER FOR PROCEDURE FOR PURPOSES OTHER THAN REMEDYING HEALTH STATE, UNSPECIFIED: Chronic | ICD-10-CM

## 2025-05-06 PROCEDURE — 76700 US EXAM ABDOM COMPLETE: CPT | Mod: 26

## 2025-05-06 RX ORDER — ERGOCALCIFEROL 1.25 MG/1
1.25 MG CAPSULE, LIQUID FILLED ORAL
Qty: 12 | Refills: 0 | Status: ACTIVE | COMMUNITY
Start: 2025-05-06 | End: 1900-01-01

## 2025-05-06 NOTE — PATIENT PROFILE ADULT - NSPROEXTENSIONSOFSELF_GEN_A_NUR
Patient: Kathryn Mojica  : 1974    Encounter Date: 10/29/2024    PROGRESS NOTE    Subjective  Chief complaint: Kathryn Mojica is a 50 y.o. female patient being seen and evaluated for monthly general medical care and follow-up    HPI:  Patient presents for general medical care and f/u.  Patient seen and examined at bedside.  No issues per nursing.  Patient has no acute complaints.        Objective  Vital signs: 128/65, 96.8, 91, 18, 92.0, 98%    Physical Exam  Constitutional:       General: She is not in acute distress.  Eyes:      Extraocular Movements: Extraocular movements intact.   Pulmonary:      Effort: Pulmonary effort is normal.   Musculoskeletal:      Cervical back: Neck supple.   Neurological:      Mental Status: She is alert.   Psychiatric:         Mood and Affect: Mood normal.         Behavior: Behavior is cooperative.         Assessment/Plan  Problem List Items Addressed This Visit       Benign essential hypertension     Continue lisinopril         CHF (congestive heart failure)     Continue current medications          Gastroesophageal reflux disease     Food choices   Remain upright 30 mins after eating          Paraplegia - Primary     Supportive care           Medications, treatments, and labs reviewed  Continue medications and treatments as listed in EMR    Scribe Attestation  IAundrea Scribe   attest that this documentation has been prepared under the direction and in the presence of LARISA Robison.    Provider Attestation - Scribe documentation  All medical record entries made by the Scribe were at my direction and personally dictated by me. I have reviewed the chart and agree that the record accurately reflects my personal performance of the history, physical exam, discussion and plan.    LARISA Robison      Electronically Signed By: LARISA Robison   24  8:34 PM   dentures/eyeglasses

## 2025-05-06 NOTE — PATIENT PROFILE ADULT - STATED REASON FOR ADMISSION
robotic assisted laparoscopic gastric bypass revision , with possible lysis adhesions, possible open

## 2025-05-06 NOTE — PRE-OP CHECKLIST - NOTHING BY MOUTH SINCE
-Patient has a history of COPD   -Monitor oxygen saturation, keep O2 above 90%  -Continue as need breathing treatment  -supplemental oxygen as need    -PCO2 (34.9), PO2 (70), O2 (93)    06-May-2025

## 2025-05-07 ENCOUNTER — INPATIENT (INPATIENT)
Facility: HOSPITAL | Age: 50
LOS: 1 days | Discharge: HOME | End: 2025-05-09
Attending: GENERAL ACUTE CARE HOSPITAL | Admitting: GENERAL ACUTE CARE HOSPITAL
Payer: MEDICAID

## 2025-05-07 ENCOUNTER — RESULT REVIEW (OUTPATIENT)
Age: 50
End: 2025-05-07

## 2025-05-07 ENCOUNTER — APPOINTMENT (OUTPATIENT)
Dept: BARIATRICS | Facility: HOSPITAL | Age: 50
End: 2025-05-07

## 2025-05-07 ENCOUNTER — TRANSCRIPTION ENCOUNTER (OUTPATIENT)
Age: 50
End: 2025-05-07

## 2025-05-07 DIAGNOSIS — Z90.710 ACQUIRED ABSENCE OF BOTH CERVIX AND UTERUS: Chronic | ICD-10-CM

## 2025-05-07 DIAGNOSIS — Z90.49 ACQUIRED ABSENCE OF OTHER SPECIFIED PARTS OF DIGESTIVE TRACT: Chronic | ICD-10-CM

## 2025-05-07 DIAGNOSIS — Z96.659 PRESENCE OF UNSPECIFIED ARTIFICIAL KNEE JOINT: Chronic | ICD-10-CM

## 2025-05-07 DIAGNOSIS — Z41.9 ENCOUNTER FOR PROCEDURE FOR PURPOSES OTHER THAN REMEDYING HEALTH STATE, UNSPECIFIED: Chronic | ICD-10-CM

## 2025-05-07 LAB
BLD GP AB SCN SERPL QL: NEGATIVE — SIGNIFICANT CHANGE UP
HCT VFR BLD CALC: 32.1 % — LOW (ref 34.5–45)
HGB BLD-MCNC: 10.5 G/DL — LOW (ref 11.5–15.5)
MCHC RBC-ENTMCNC: 29.9 PG — SIGNIFICANT CHANGE UP (ref 27–34)
MCHC RBC-ENTMCNC: 32.7 G/DL — SIGNIFICANT CHANGE UP (ref 32–36)
MCV RBC AUTO: 91.5 FL — SIGNIFICANT CHANGE UP (ref 80–100)
NRBC BLD AUTO-RTO: 0 /100 WBCS — SIGNIFICANT CHANGE UP (ref 0–0)
PLATELET # BLD AUTO: 298 K/UL — SIGNIFICANT CHANGE UP (ref 150–400)
RBC # BLD: 3.51 M/UL — LOW (ref 3.8–5.2)
RBC # FLD: 13.1 % — SIGNIFICANT CHANGE UP (ref 10.3–14.5)
RH IG SCN BLD-IMP: POSITIVE — SIGNIFICANT CHANGE UP
WBC # BLD: 14.24 K/UL — HIGH (ref 3.8–10.5)
WBC # FLD AUTO: 14.24 K/UL — HIGH (ref 3.8–10.5)

## 2025-05-07 PROCEDURE — 43848 REVJ OPEN GSTR RSTCV PX: CPT | Mod: 22

## 2025-05-07 PROCEDURE — 88307 TISSUE EXAM BY PATHOLOGIST: CPT | Mod: 26

## 2025-05-07 DEVICE — APPLIER CLIP LIGAMAX 5MM ENDOSCOPIC: Type: IMPLANTABLE DEVICE | Status: FUNCTIONAL

## 2025-05-07 DEVICE — XI STAPLER SUREFORM RELOAD 60 BLUE: Type: IMPLANTABLE DEVICE | Status: FUNCTIONAL

## 2025-05-07 DEVICE — XI STAPLER SUREFORM RELOAD 60 GREEN: Type: IMPLANTABLE DEVICE | Status: FUNCTIONAL

## 2025-05-07 DEVICE — XI STAPLER SUREFORM RELOAD 60 WHITE: Type: IMPLANTABLE DEVICE | Status: FUNCTIONAL

## 2025-05-07 DEVICE — MESH PHASIX ST 7X10CM: Type: IMPLANTABLE DEVICE | Status: FUNCTIONAL

## 2025-05-07 RX ORDER — ONDANSETRON HCL/PF 4 MG/2 ML
4 VIAL (ML) INJECTION EVERY 6 HOURS
Refills: 0 | Status: DISCONTINUED | OUTPATIENT
Start: 2025-05-07 | End: 2025-05-09

## 2025-05-07 RX ORDER — BUPIVACAINE 13.3 MG/ML
20 INJECTION, SUSPENSION, LIPOSOMAL INFILTRATION ONCE
Refills: 0 | Status: DISCONTINUED | OUTPATIENT
Start: 2025-05-07 | End: 2025-05-09

## 2025-05-07 RX ORDER — DULOXETINE 20 MG/1
30 CAPSULE, DELAYED RELEASE ORAL DAILY
Refills: 0 | Status: DISCONTINUED | OUTPATIENT
Start: 2025-05-07 | End: 2025-05-09

## 2025-05-07 RX ORDER — ACETAMINOPHEN 500 MG/5ML
1000 LIQUID (ML) ORAL ONCE
Refills: 0 | Status: COMPLETED | OUTPATIENT
Start: 2025-05-07 | End: 2025-05-07

## 2025-05-07 RX ORDER — SODIUM CHLORIDE 9 G/1000ML
1000 INJECTION, SOLUTION INTRAVENOUS
Refills: 0 | Status: DISCONTINUED | OUTPATIENT
Start: 2025-05-07 | End: 2025-05-09

## 2025-05-07 RX ORDER — OXYCODONE HYDROCHLORIDE 30 MG/1
5 TABLET ORAL EVERY 6 HOURS
Refills: 0 | Status: DISCONTINUED | OUTPATIENT
Start: 2025-05-07 | End: 2025-05-08

## 2025-05-07 RX ORDER — SCOPOLAMINE 1 MG/3D
1 PATCH, EXTENDED RELEASE TRANSDERMAL ONCE
Refills: 0 | Status: COMPLETED | OUTPATIENT
Start: 2025-05-07 | End: 2025-05-07

## 2025-05-07 RX ORDER — ENOXAPARIN SODIUM 100 MG/ML
40 INJECTION SUBCUTANEOUS ONCE
Refills: 0 | Status: COMPLETED | OUTPATIENT
Start: 2025-05-07 | End: 2025-05-07

## 2025-05-07 RX ORDER — APREPITANT 40 MG/1
80 CAPSULE ORAL ONCE
Refills: 0 | Status: COMPLETED | OUTPATIENT
Start: 2025-05-07 | End: 2025-05-07

## 2025-05-07 RX ORDER — ARIPIPRAZOLE 2 MG/1
10 TABLET ORAL DAILY
Refills: 0 | Status: DISCONTINUED | OUTPATIENT
Start: 2025-05-07 | End: 2025-05-09

## 2025-05-07 RX ORDER — NAPROXEN SODIUM 275 MG
1 TABLET ORAL
Refills: 0 | DISCHARGE

## 2025-05-07 RX ORDER — TRANEXAMIC ACID 1000 MG/10
1000 AMPUL (ML) INTRAVENOUS ONCE
Refills: 0 | Status: COMPLETED | OUTPATIENT
Start: 2025-05-07 | End: 2025-05-07

## 2025-05-07 RX ORDER — VENLAFAXINE HYDROCHLORIDE 37.5 MG/1
75 CAPSULE, EXTENDED RELEASE ORAL DAILY
Refills: 0 | Status: DISCONTINUED | OUTPATIENT
Start: 2025-05-07 | End: 2025-05-09

## 2025-05-07 RX ORDER — ACETAMINOPHEN 500 MG/5ML
1000 LIQUID (ML) ORAL EVERY 6 HOURS
Refills: 0 | Status: DISCONTINUED | OUTPATIENT
Start: 2025-05-07 | End: 2025-05-09

## 2025-05-07 RX ORDER — OXYCODONE HYDROCHLORIDE 30 MG/1
2.5 TABLET ORAL EVERY 6 HOURS
Refills: 0 | Status: DISCONTINUED | OUTPATIENT
Start: 2025-05-07 | End: 2025-05-08

## 2025-05-07 RX ORDER — PREGABALIN 75 MG/1
200 CAPSULE ORAL THREE TIMES A DAY
Refills: 0 | Status: DISCONTINUED | OUTPATIENT
Start: 2025-05-07 | End: 2025-05-09

## 2025-05-07 RX ORDER — HYDROMORPHONE/SOD CHLOR,ISO/PF 2 MG/10 ML
0.5 SYRINGE (ML) INJECTION
Refills: 0 | Status: DISCONTINUED | OUTPATIENT
Start: 2025-05-07 | End: 2025-05-07

## 2025-05-07 RX ADMIN — Medication 105 MILLIGRAM(S): at 21:17

## 2025-05-07 RX ADMIN — PREGABALIN 200 MILLIGRAM(S): 75 CAPSULE ORAL at 14:21

## 2025-05-07 RX ADMIN — ENOXAPARIN SODIUM 40 MILLIGRAM(S): 100 INJECTION SUBCUTANEOUS at 07:24

## 2025-05-07 RX ADMIN — DULOXETINE 30 MILLIGRAM(S): 20 CAPSULE, DELAYED RELEASE ORAL at 19:33

## 2025-05-07 RX ADMIN — PREGABALIN 200 MILLIGRAM(S): 75 CAPSULE ORAL at 21:19

## 2025-05-07 RX ADMIN — SODIUM CHLORIDE 140 MILLILITER(S): 9 INJECTION, SOLUTION INTRAVENOUS at 14:01

## 2025-05-07 RX ADMIN — Medication 0.5 MILLIGRAM(S): at 14:59

## 2025-05-07 RX ADMIN — OXYCODONE HYDROCHLORIDE 2.5 MILLIGRAM(S): 30 TABLET ORAL at 20:34

## 2025-05-07 RX ADMIN — Medication 0.5 MILLIGRAM(S): at 14:01

## 2025-05-07 RX ADMIN — Medication 1000 MILLIGRAM(S): at 17:42

## 2025-05-07 RX ADMIN — APREPITANT 80 MILLIGRAM(S): 40 CAPSULE ORAL at 07:25

## 2025-05-07 RX ADMIN — SCOPOLAMINE 1 PATCH: 1 PATCH, EXTENDED RELEASE TRANSDERMAL at 18:10

## 2025-05-07 RX ADMIN — ARIPIPRAZOLE 10 MILLIGRAM(S): 2 TABLET ORAL at 22:27

## 2025-05-07 RX ADMIN — Medication 1000 MILLIGRAM(S): at 07:24

## 2025-05-07 RX ADMIN — Medication 0.5 MILLIGRAM(S): at 17:35

## 2025-05-07 RX ADMIN — Medication 1000 MILLIGRAM(S): at 23:50

## 2025-05-07 RX ADMIN — OXYCODONE HYDROCHLORIDE 5 MILLIGRAM(S): 30 TABLET ORAL at 14:21

## 2025-05-07 RX ADMIN — Medication 0.5 MILLIGRAM(S): at 18:00

## 2025-05-07 RX ADMIN — OXYCODONE HYDROCHLORIDE 2.5 MILLIGRAM(S): 30 TABLET ORAL at 19:34

## 2025-05-07 RX ADMIN — SCOPOLAMINE 1 PATCH: 1 PATCH, EXTENDED RELEASE TRANSDERMAL at 07:24

## 2025-05-07 RX ADMIN — Medication 0.5 MILLIGRAM(S): at 14:21

## 2025-05-07 RX ADMIN — Medication 40 MILLIGRAM(S): at 19:33

## 2025-05-07 RX ADMIN — OXYCODONE HYDROCHLORIDE 5 MILLIGRAM(S): 30 TABLET ORAL at 14:59

## 2025-05-07 RX ADMIN — VENLAFAXINE HYDROCHLORIDE 75 MILLIGRAM(S): 37.5 CAPSULE, EXTENDED RELEASE ORAL at 22:27

## 2025-05-07 RX ADMIN — OXYCODONE HYDROCHLORIDE 5 MILLIGRAM(S): 30 TABLET ORAL at 21:19

## 2025-05-07 RX ADMIN — Medication 220 MILLIGRAM(S): at 22:26

## 2025-05-07 NOTE — H&P ADULT - HISTORY OF PRESENT ILLNESS
49F with PMHx of fibromyalgia, anesmia, morbid obesity and pshx of cholecysectomy, hysterectomy, gastric bypass (2009) complicated by intussusception of JJ anastomosis 2015, overdistention of JJ s/p resection in (2017), and SBO s/p pelvic GENI in (2018) EGD TOR (1/9/2023) presenting for revision of gastric bypass. CT abd-pelvis revealed focally distended loop of small bowel in the left mid abdomen at the level of the small bowel anastomosis. Distal to the suture line, the small bowel is decompressed.     home meds: omeprazole 20mg daily, oxycodone 7.5mg TID PRN,lyrica 200mg TID, venlafaxine 75mg ER daily  49F with PMHx of fibromyalgia, anesmia, morbid obesity and pshx of cholecysectomy, hysterectomy, gastric bypass (2009) complicated by intussusception of JJ anastomosis 2015, overdistention of JJ s/p resection in (2017), and SBO s/p pelvic GENI in (2018) EGD TOR (1/9/2023) presenting for revision of gastric bypass. CT abd-pelvis revealed focally distended loop of small bowel in the left mid abdomen at the level of the small bowel anastomosis. Distal to the suture line, the small bowel is decompressed.     home meds: omeprazole 20mg daily, oxycodone 7.5mg TID PRN,lyrica 200mg TID, venlafaxine 75mg ER daily, naproxen 500mg BID, Abilify 10mg daily, Cymbalta 30mg daily,

## 2025-05-07 NOTE — PROGRESS NOTE ADULT - SUBJECTIVE AND OBJECTIVE BOX
Procedure: RA lap revision of gastric bypass  Surgeon: Dr. Wong    S: Pt seen and examined at bedside. Patient is lying comfortably in bed with no complaints. Tolerating diet, pain well controlled with current regimen. Patient denies fever, nausea, vomiting, chest pain, and shortness of breath. Patient is not yet passing gas or having bowel movements. Patient has not voided yet.     O:  T(C): 36.4 (05-07-25 @ 12:40), Max: 36.4 (05-07-25 @ 12:40)  T(F): 97.5 (05-07-25 @ 12:40), Max: 97.5 (05-07-25 @ 12:40)  HR: 74 (05-07-25 @ 13:40) (72 - 77)  BP: 115/56 (05-07-25 @ 13:40) (100/55 - 119/59)  RR: 17 (05-07-25 @ 13:40) (16 - 18)  SpO2: 95% (05-07-25 @ 13:40) (91% - 97%)  Wt(kg): --            Physical Exam  General: Patient is doing well and lying in bed comfortably  Constitutional: alert and oriented   Pulm: Nonlabored breathing, no respiratory distress  CV: Regular rate and rhythm, normal sinus rhythm  Abd:  soft, TTP at incision sites, nondistended. No rebound, no guarding.             Incisions: clean, dry, intact and healing well, no erythema/induration/edema  Extremities: warm, well perfused, no edema  Drains:

## 2025-05-07 NOTE — BRIEF OPERATIVE NOTE - NSICDXBRIEFPREOP_GEN_ALL_CORE_FT
PRE-OP DIAGNOSIS:  Abdominal pain 07-May-2025 13:07:01  Callie Back  Morbid obesity 07-May-2025 13:07:06  Callie Back

## 2025-05-07 NOTE — PACU DISCHARGE NOTE - NAUSEA/VOMITING:
None
None
Star Wedge Flap Text: The defect edges were debeveled with a #15 scalpel blade.  Given the location of the defect, shape of the defect and the proximity to free margins a star wedge flap was deemed most appropriate.  Using a sterile surgical marker, an appropriate rotation flap was drawn incorporating the defect and placing the expected incisions within the relaxed skin tension lines where possible. The area thus outlined was incised deep to adipose tissue with a #15 scalpel blade.  The skin margins were undermined to an appropriate distance in all directions utilizing iris scissors.

## 2025-05-07 NOTE — PRE-ANESTHESIA EVALUATION ADULT - NSANTHLABRESULTSFT_GEN_ALL_CORE
Most recent lab results reviewed prior to procedure and within acceptable limits. Most recent lab results reviewed prior to procedure and within acceptable limits.  Hgb 12/Hct 38

## 2025-05-07 NOTE — BRIEF OPERATIVE NOTE - OPERATION/FINDINGS
Revision of patrick-en-Y gastric bypass with lengthening of biliopancreatic limb and creation of new jejunojejunostomy.   Veress insufflation, robot ports placed. Robot docked. Original patrick limb measured 50 cm, BP limb measured 60cm, and common channel measured 650cm. Jejunojejunostomy identified and noted to be dilated. Original jejunojejunostomy was excised using robot sureform white load stapler. Bleeding from mesentry controlled using electrocautery. A new jejunojejunostomy was created extending the BP limb to 150cm. Limb orientation and mesenteric integrity were confirmed. Mesentaric defects closed with nonabsorbable v-loc suture. Fascia closed with 0 vicryl on endoclose. EGD performed

## 2025-05-07 NOTE — BRIEF OPERATIVE NOTE - NSICDXBRIEFPOSTOP_GEN_ALL_CORE_FT
POST-OP DIAGNOSIS:  Abdominal pain 07-May-2025 13:07:16  Callie Back  Morbid obesity 07-May-2025 13:07:23  Callie Back   no

## 2025-05-07 NOTE — H&P ADULT - NSICDXPASTSURGICALHX_GEN_ALL_CORE_FT
PAST SURGICAL HISTORY:  Elective surgery bladder sling    H/O abdominal hysterectomy     History of cholecystectomy     S/P total knee replacement left    Status post bariatric surgery Gastric bypass status for obesity  12 years

## 2025-05-07 NOTE — H&P ADULT - ASSESSMENT
49F with PMHx of fibromyalgia, anesmia, morbid obesity and pshx of cholecysectomy, hysterectomy, gastric bypass (2009) complicated by intussusception of JJ anastomosis 2015, overdistention of JJ s/p resection in (2017), and SBO s/p pelvic GENI in (2018) EGD TOR (1/9/2023) presenting for revision of gastric bypass. CT abd-pelvis revealed focally distended loop of small bowel in the left mid abdomen at the level of the small bowel anastomosis. Distal to the suture line, the small bowel is decompressed.       Proceed to OR  Admit to Dr. Wong postop

## 2025-05-08 LAB
ANION GAP SERPL CALC-SCNC: 14 MMOL/L — SIGNIFICANT CHANGE UP (ref 5–17)
BASOPHILS # BLD AUTO: 0.01 K/UL — SIGNIFICANT CHANGE UP (ref 0–0.2)
BASOPHILS NFR BLD AUTO: 0.1 % — SIGNIFICANT CHANGE UP (ref 0–2)
BUN SERPL-MCNC: 10 MG/DL — SIGNIFICANT CHANGE UP (ref 7–23)
CALCIUM SERPL-MCNC: 8.8 MG/DL — SIGNIFICANT CHANGE UP (ref 8.4–10.5)
CHLORIDE SERPL-SCNC: 103 MMOL/L — SIGNIFICANT CHANGE UP (ref 96–108)
CO2 SERPL-SCNC: 22 MMOL/L — SIGNIFICANT CHANGE UP (ref 22–31)
CREAT SERPL-MCNC: 0.51 MG/DL — SIGNIFICANT CHANGE UP (ref 0.5–1.3)
EGFR: 114 ML/MIN/1.73M2 — SIGNIFICANT CHANGE UP
EGFR: 114 ML/MIN/1.73M2 — SIGNIFICANT CHANGE UP
EOSINOPHIL # BLD AUTO: 0 K/UL — SIGNIFICANT CHANGE UP (ref 0–0.5)
EOSINOPHIL NFR BLD AUTO: 0 % — SIGNIFICANT CHANGE UP (ref 0–6)
GLUCOSE SERPL-MCNC: 135 MG/DL — HIGH (ref 70–99)
HCT VFR BLD CALC: 29.8 % — LOW (ref 34.5–45)
HGB BLD-MCNC: 9.7 G/DL — LOW (ref 11.5–15.5)
IMM GRANULOCYTES NFR BLD AUTO: 0.3 % — SIGNIFICANT CHANGE UP (ref 0–0.9)
LYMPHOCYTES # BLD AUTO: 1.04 K/UL — SIGNIFICANT CHANGE UP (ref 1–3.3)
LYMPHOCYTES # BLD AUTO: 7.6 % — LOW (ref 13–44)
MAGNESIUM SERPL-MCNC: 1.9 MG/DL — SIGNIFICANT CHANGE UP (ref 1.6–2.6)
MCHC RBC-ENTMCNC: 30.2 PG — SIGNIFICANT CHANGE UP (ref 27–34)
MCHC RBC-ENTMCNC: 32.6 G/DL — SIGNIFICANT CHANGE UP (ref 32–36)
MCV RBC AUTO: 92.8 FL — SIGNIFICANT CHANGE UP (ref 80–100)
MONOCYTES # BLD AUTO: 0.85 K/UL — SIGNIFICANT CHANGE UP (ref 0–0.9)
MONOCYTES NFR BLD AUTO: 6.2 % — SIGNIFICANT CHANGE UP (ref 2–14)
NEUTROPHILS # BLD AUTO: 11.71 K/UL — HIGH (ref 1.8–7.4)
NEUTROPHILS NFR BLD AUTO: 85.8 % — HIGH (ref 43–77)
NRBC BLD AUTO-RTO: 0 /100 WBCS — SIGNIFICANT CHANGE UP (ref 0–0)
PHOSPHATE SERPL-MCNC: 2.9 MG/DL — SIGNIFICANT CHANGE UP (ref 2.5–4.5)
PLATELET # BLD AUTO: 308 K/UL — SIGNIFICANT CHANGE UP (ref 150–400)
POTASSIUM SERPL-MCNC: 4 MMOL/L — SIGNIFICANT CHANGE UP (ref 3.5–5.3)
POTASSIUM SERPL-SCNC: 4 MMOL/L — SIGNIFICANT CHANGE UP (ref 3.5–5.3)
RBC # BLD: 3.21 M/UL — LOW (ref 3.8–5.2)
RBC # FLD: 13.2 % — SIGNIFICANT CHANGE UP (ref 10.3–14.5)
SODIUM SERPL-SCNC: 139 MMOL/L — SIGNIFICANT CHANGE UP (ref 135–145)
WBC # BLD: 13.65 K/UL — HIGH (ref 3.8–10.5)
WBC # FLD AUTO: 13.65 K/UL — HIGH (ref 3.8–10.5)

## 2025-05-08 PROCEDURE — 43848 REVJ OPEN GSTR RSTCV PX: CPT | Mod: AS,22

## 2025-05-08 RX ORDER — HYDROMORPHONE/SOD CHLOR,ISO/PF 2 MG/10 ML
0.5 SYRINGE (ML) INJECTION ONCE
Refills: 0 | Status: DISCONTINUED | OUTPATIENT
Start: 2025-05-08 | End: 2025-05-08

## 2025-05-08 RX ORDER — SOD PHOS DI, MONO/K PHOS MONO 250 MG
1 TABLET ORAL ONCE
Refills: 0 | Status: DISCONTINUED | OUTPATIENT
Start: 2025-05-08 | End: 2025-05-08

## 2025-05-08 RX ORDER — OMEPRAZOLE 20 MG/1
1 CAPSULE, DELAYED RELEASE ORAL
Qty: 30 | Refills: 0
Start: 2025-05-08 | End: 2025-06-06

## 2025-05-08 RX ORDER — OXYCODONE HYDROCHLORIDE 30 MG/1
5 TABLET ORAL ONCE
Refills: 0 | Status: DISCONTINUED | OUTPATIENT
Start: 2025-05-08 | End: 2025-05-08

## 2025-05-08 RX ORDER — MAGNESIUM SULFATE 500 MG/ML
1 SYRINGE (ML) INJECTION ONCE
Refills: 0 | Status: COMPLETED | OUTPATIENT
Start: 2025-05-08 | End: 2025-05-08

## 2025-05-08 RX ORDER — APIXABAN 2.5 MG/1
1 TABLET, FILM COATED ORAL
Qty: 42 | Refills: 0
Start: 2025-05-08 | End: 2025-05-28

## 2025-05-08 RX ORDER — OXYCODONE HYDROCHLORIDE 30 MG/1
5 TABLET ORAL EVERY 6 HOURS
Refills: 0 | Status: DISCONTINUED | OUTPATIENT
Start: 2025-05-08 | End: 2025-05-09

## 2025-05-08 RX ORDER — POTASSIUM PHOSPHATE, MONOBASIC POTASSIUM PHOSPHATE, DIBASIC INJECTION, 236; 224 MG/ML; MG/ML
15 SOLUTION, CONCENTRATE INTRAVENOUS ONCE
Refills: 0 | Status: COMPLETED | OUTPATIENT
Start: 2025-05-08 | End: 2025-05-08

## 2025-05-08 RX ORDER — ENOXAPARIN SODIUM 100 MG/ML
40 INJECTION SUBCUTANEOUS EVERY 12 HOURS
Refills: 0 | Status: DISCONTINUED | OUTPATIENT
Start: 2025-05-08 | End: 2025-05-09

## 2025-05-08 RX ORDER — ACETAMINOPHEN 500 MG/5ML
20.3 LIQUID (ML) ORAL
Qty: 324.8 | Refills: 0
Start: 2025-05-08 | End: 2025-05-11

## 2025-05-08 RX ORDER — OXYCODONE HYDROCHLORIDE 30 MG/1
10 TABLET ORAL EVERY 6 HOURS
Refills: 0 | Status: DISCONTINUED | OUTPATIENT
Start: 2025-05-08 | End: 2025-05-09

## 2025-05-08 RX ORDER — ONDANSETRON HCL/PF 4 MG/2 ML
1 VIAL (ML) INJECTION
Qty: 28 | Refills: 0
Start: 2025-05-08 | End: 2025-05-14

## 2025-05-08 RX ORDER — LIDOCAINE HYDROCHLORIDE 20 MG/ML
1 JELLY TOPICAL ONCE
Refills: 0 | Status: COMPLETED | OUTPATIENT
Start: 2025-05-08 | End: 2025-05-08

## 2025-05-08 RX ADMIN — OXYCODONE HYDROCHLORIDE 10 MILLIGRAM(S): 30 TABLET ORAL at 02:19

## 2025-05-08 RX ADMIN — OXYCODONE HYDROCHLORIDE 5 MILLIGRAM(S): 30 TABLET ORAL at 00:26

## 2025-05-08 RX ADMIN — Medication 0.5 MILLIGRAM(S): at 10:14

## 2025-05-08 RX ADMIN — LIDOCAINE HYDROCHLORIDE 1 PATCH: 20 JELLY TOPICAL at 06:19

## 2025-05-08 RX ADMIN — LIDOCAINE HYDROCHLORIDE 1 PATCH: 20 JELLY TOPICAL at 12:01

## 2025-05-08 RX ADMIN — SCOPOLAMINE 1 PATCH: 1 PATCH, EXTENDED RELEASE TRANSDERMAL at 06:19

## 2025-05-08 RX ADMIN — OXYCODONE HYDROCHLORIDE 10 MILLIGRAM(S): 30 TABLET ORAL at 19:28

## 2025-05-08 RX ADMIN — Medication 105 MILLIGRAM(S): at 21:54

## 2025-05-08 RX ADMIN — DULOXETINE 30 MILLIGRAM(S): 20 CAPSULE, DELAYED RELEASE ORAL at 11:53

## 2025-05-08 RX ADMIN — OXYCODONE HYDROCHLORIDE 5 MILLIGRAM(S): 30 TABLET ORAL at 12:47

## 2025-05-08 RX ADMIN — OXYCODONE HYDROCHLORIDE 10 MILLIGRAM(S): 30 TABLET ORAL at 22:36

## 2025-05-08 RX ADMIN — SCOPOLAMINE 1 PATCH: 1 PATCH, EXTENDED RELEASE TRANSDERMAL at 19:02

## 2025-05-08 RX ADMIN — ENOXAPARIN SODIUM 40 MILLIGRAM(S): 100 INJECTION SUBCUTANEOUS at 10:13

## 2025-05-08 RX ADMIN — Medication 1000 MILLIGRAM(S): at 16:15

## 2025-05-08 RX ADMIN — ARIPIPRAZOLE 10 MILLIGRAM(S): 2 TABLET ORAL at 11:54

## 2025-05-08 RX ADMIN — VENLAFAXINE HYDROCHLORIDE 75 MILLIGRAM(S): 37.5 CAPSULE, EXTENDED RELEASE ORAL at 11:53

## 2025-05-08 RX ADMIN — Medication 1000 MILLIGRAM(S): at 23:58

## 2025-05-08 RX ADMIN — Medication 0.5 MILLIGRAM(S): at 10:45

## 2025-05-08 RX ADMIN — POTASSIUM PHOSPHATE, MONOBASIC POTASSIUM PHOSPHATE, DIBASIC INJECTION, 62.5 MILLIMOLE(S): 236; 224 SOLUTION, CONCENTRATE INTRAVENOUS at 10:14

## 2025-05-08 RX ADMIN — Medication 1000 MILLIGRAM(S): at 06:19

## 2025-05-08 RX ADMIN — LIDOCAINE HYDROCHLORIDE 1 PATCH: 20 JELLY TOPICAL at 00:40

## 2025-05-08 RX ADMIN — PREGABALIN 200 MILLIGRAM(S): 75 CAPSULE ORAL at 14:06

## 2025-05-08 RX ADMIN — OXYCODONE HYDROCHLORIDE 5 MILLIGRAM(S): 30 TABLET ORAL at 05:06

## 2025-05-08 RX ADMIN — Medication 40 MILLIGRAM(S): at 11:53

## 2025-05-08 RX ADMIN — ENOXAPARIN SODIUM 40 MILLIGRAM(S): 100 INJECTION SUBCUTANEOUS at 21:55

## 2025-05-08 RX ADMIN — OXYCODONE HYDROCHLORIDE 10 MILLIGRAM(S): 30 TABLET ORAL at 00:39

## 2025-05-08 RX ADMIN — OXYCODONE HYDROCHLORIDE 5 MILLIGRAM(S): 30 TABLET ORAL at 06:20

## 2025-05-08 RX ADMIN — PREGABALIN 200 MILLIGRAM(S): 75 CAPSULE ORAL at 21:55

## 2025-05-08 RX ADMIN — OXYCODONE HYDROCHLORIDE 10 MILLIGRAM(S): 30 TABLET ORAL at 08:00

## 2025-05-08 RX ADMIN — Medication 1000 MILLIGRAM(S): at 05:06

## 2025-05-08 RX ADMIN — Medication 1000 MILLIGRAM(S): at 17:44

## 2025-05-08 RX ADMIN — Medication 100 GRAM(S): at 08:47

## 2025-05-08 RX ADMIN — Medication 1000 MILLIGRAM(S): at 11:54

## 2025-05-08 RX ADMIN — Medication 1000 MILLIGRAM(S): at 00:46

## 2025-05-08 RX ADMIN — OXYCODONE HYDROCHLORIDE 10 MILLIGRAM(S): 30 TABLET ORAL at 07:14

## 2025-05-08 RX ADMIN — PREGABALIN 200 MILLIGRAM(S): 75 CAPSULE ORAL at 05:06

## 2025-05-08 RX ADMIN — SODIUM CHLORIDE 70 MILLILITER(S): 9 INJECTION, SOLUTION INTRAVENOUS at 21:54

## 2025-05-08 RX ADMIN — Medication 1000 MILLIGRAM(S): at 12:20

## 2025-05-08 RX ADMIN — OXYCODONE HYDROCHLORIDE 5 MILLIGRAM(S): 30 TABLET ORAL at 12:56

## 2025-05-08 NOTE — DIETITIAN INITIAL EVALUATION ADULT - PERTINENT MEDS FT
MEDICATIONS  (STANDING):  acetaminophen   Oral Liquid .. 1000 milliGRAM(s) Oral every 6 hours  ARIPiprazole 10 milliGRAM(s) Oral daily  BUpivacaine liposome 1.3% Injectable 20 milliLiter(s) Local Injection once  DULoxetine 30 milliGRAM(s) Oral daily  enoxaparin Injectable 40 milliGRAM(s) SubCutaneous every 12 hours  lactated ringers. 1000 milliLiter(s) (70 mL/Hr) IV Continuous <Continuous>  pantoprazole  Injectable 40 milliGRAM(s) IV Push daily  pregabalin 200 milliGRAM(s) Oral three times a day  thiamine IVPB 500 milliGRAM(s) IV Intermittent every 24 hours  venlafaxine XR. 75 milliGRAM(s) Oral daily    MEDICATIONS  (PRN):  ondansetron Injectable 4 milliGRAM(s) IV Push every 6 hours PRN Nausea and/or Vomiting  oxyCODONE    Solution 10 milliGRAM(s) Oral every 6 hours PRN Severe Pain (7 - 10)  oxyCODONE    Solution 5 milliGRAM(s) Oral every 6 hours PRN Moderate Pain (4 - 6)

## 2025-05-08 NOTE — DIETITIAN INITIAL EVALUATION ADULT - PERSON TAUGHT/METHOD
Discussed volumes of various cup sizes on tray table and encouraged aiming for 4 oz/hr as tolerated. Pt is prepared with protein shakes, with plan to get vitamins after discharge. RD provided in-depth education on diet advancement and specific nutrient needs status post RYGB. Pt receptive and verbalized understanding. Written nutrition education handouts provided./verbal instruction/written material/patient instructed

## 2025-05-08 NOTE — DIETITIAN INITIAL EVALUATION ADULT - HEIGHT FOR BMI (FEET)
Isotretinoin Counseling: Patient should get monthly blood tests, not donate blood, not drive at night if vision affected, not share medication, and not undergo elective surgery for 6 months after tx completed. Side effects reviewed, pt to contact office should one occur. Use Enhanced Medication Counseling?: No Spironolactone Counseling: Patient advised regarding risks of diarrhea, abdominal pain, hyperkalemia, birth defects (for female patients), liver toxicity and renal toxicity. The patient may need blood work to monitor liver and kidney function and potassium levels while on therapy. The patient verbalized understanding of the proper use and possible adverse effects of spironolactone.  All of the patient's questions and concerns were addressed. 5 Azithromycin Pregnancy And Lactation Text: This medication is considered safe during pregnancy and is also secreted in breast milk. Dapsone Pregnancy And Lactation Text: This medication is Pregnancy Category C and is not considered safe during pregnancy or breast feeding. Bactrim Counseling:  I discussed with the patient the risks of sulfa antibiotics including but not limited to GI upset, allergic reaction, drug rash, diarrhea, dizziness, photosensitivity, and yeast infections.  Rarely, more serious reactions can occur including but not limited to aplastic anemia, agranulocytosis, methemoglobinemia, blood dyscrasias, liver or kidney failure, lung infiltrates or desquamative/blistering drug rashes. Detail Level: Simple Isotretinoin Pregnancy And Lactation Text: This medication is Pregnancy Category X and is considered extremely dangerous during pregnancy. It is unknown if it is excreted in breast milk. Topical Retinoid Pregnancy And Lactation Text: This medication is Pregnancy Category C. It is unknown if this medication is excreted in breast milk. Dapsone Counseling: I discussed with the patient the risks of dapsone including but not limited to hemolytic anemia, agranulocytosis, rashes, methemoglobinemia, kidney failure, peripheral neuropathy, headaches, GI upset, and liver toxicity.  Patients who start dapsone require monitoring including baseline LFTs and weekly CBCs for the first month, then every month thereafter.  The patient verbalized understanding of the proper use and possible adverse effects of dapsone.  All of the patient's questions and concerns were addressed. Benzoyl Peroxide Pregnancy And Lactation Text: This medication is Pregnancy Category C. It is unknown if benzoyl peroxide is excreted in breast milk. Topical Sulfur Applications Pregnancy And Lactation Text: This medication is Pregnancy Category C and has an unknown safety profile during pregnancy. It is unknown if this topical medication is excreted in breast milk. Tetracycline Pregnancy And Lactation Text: This medication is Pregnancy Category D and not consider safe during pregnancy. It is also excreted in breast milk. Erythromycin Counseling:  I discussed with the patient the risks of erythromycin including but not limited to GI upset, allergic reaction, drug rash, diarrhea, increase in liver enzymes, and yeast infections. Topical Clindamycin Pregnancy And Lactation Text: This medication is Pregnancy Category B and is considered safe during pregnancy. It is unknown if it is excreted in breast milk. High Dose Vitamin A Counseling: Side effects reviewed, pt to contact office should one occur. Doxycycline Counseling:  Patient counseled regarding possible photosensitivity and increased risk for sunburn.  Patient instructed to avoid sunlight, if possible.  When exposed to sunlight, patients should wear protective clothing, sunglasses, and sunscreen.  The patient was instructed to call the office immediately if the following severe adverse effects occur:  hearing changes, easy bruising/bleeding, severe headache, or vision changes.  The patient verbalized understanding of the proper use and possible adverse effects of doxycycline.  All of the patient's questions and concerns were addressed. Topical Retinoid counseling:  Patient advised to apply a pea-sized amount only at bedtime and wait 30 minutes after washing their face before applying.  If too drying, patient may add a non-comedogenic moisturizer. The patient verbalized understanding of the proper use and possible adverse effects of retinoids.  All of the patient's questions and concerns were addressed. High Dose Vitamin A Pregnancy And Lactation Text: High dose vitamin A therapy is contraindicated during pregnancy and breast feeding. Benzoyl Peroxide Counseling: Patient counseled that medicine may cause skin irritation and bleach clothing.  In the event of skin irritation, the patient was advised to reduce the amount of the drug applied or use it less frequently.   The patient verbalized understanding of the proper use and possible adverse effects of benzoyl peroxide.  All of the patient's questions and concerns were addressed. Tazorac Pregnancy And Lactation Text: This medication is not safe during pregnancy. It is unknown if this medication is excreted in breast milk. Doxycycline Pregnancy And Lactation Text: This medication is Pregnancy Category D and not consider safe during pregnancy. It is also excreted in breast milk but is considered safe for shorter treatment courses. Spironolactone Pregnancy And Lactation Text: This medication can cause feminization of the male fetus and should be avoided during pregnancy. The active metabolite is also found in breast milk. Birth Control Pills Counseling: Birth Control Pill Counseling: I discussed with the patient the potential side effects of OCPs including but not limited to increased risk of stroke, heart attack, thrombophlebitis, deep venous thrombosis, hepatic adenomas, breast changes, GI upset, headaches, and depression.  The patient verbalized understanding of the proper use and possible adverse effects of OCPs. All of the patient's questions and concerns were addressed. Azithromycin Counseling:  I discussed with the patient the risks of azithromycin including but not limited to GI upset, allergic reaction, drug rash, diarrhea, and yeast infections. Erythromycin Pregnancy And Lactation Text: This medication is Pregnancy Category B and is considered safe during pregnancy. It is also excreted in breast milk. Topical Sulfur Applications Counseling: Topical Sulfur Counseling: Patient counseled that this medication may cause skin irritation or allergic reactions.  In the event of skin irritation, the patient was advised to reduce the amount of the drug applied or use it less frequently.   The patient verbalized understanding of the proper use and possible adverse effects of topical sulfur application.  All of the patient's questions and concerns were addressed. Minocycline Counseling: Patient advised regarding possible photosensitivity and discoloration of the teeth, skin, lips, tongue and gums.  Patient instructed to avoid sunlight, if possible.  When exposed to sunlight, patients should wear protective clothing, sunglasses, and sunscreen.  The patient was instructed to call the office immediately if the following severe adverse effects occur:  hearing changes, easy bruising/bleeding, severe headache, or vision changes.  The patient verbalized understanding of the proper use and possible adverse effects of minocycline.  All of the patient's questions and concerns were addressed. Topical Clindamycin Counseling: Patient counseled that this medication may cause skin irritation or allergic reactions.  In the event of skin irritation, the patient was advised to reduce the amount of the drug applied or use it less frequently.   The patient verbalized understanding of the proper use and possible adverse effects of clindamycin.  All of the patient's questions and concerns were addressed. Bactrim Pregnancy And Lactation Text: This medication is Pregnancy Category D and is known to cause fetal risk.  It is also excreted in breast milk. Tazorac Counseling:  Patient advised that medication is irritating and drying.  Patient may need to apply sparingly and wash off after an hour before eventually leaving it on overnight.  The patient verbalized understanding of the proper use and possible adverse effects of tazorac.  All of the patient's questions and concerns were addressed. Tetracycline Counseling: Patient counseled regarding possible photosensitivity and increased risk for sunburn.  Patient instructed to avoid sunlight, if possible.  When exposed to sunlight, patients should wear protective clothing, sunglasses, and sunscreen.  The patient was instructed to call the office immediately if the following severe adverse effects occur:  hearing changes, easy bruising/bleeding, severe headache, or vision changes.  The patient verbalized understanding of the proper use and possible adverse effects of tetracycline.  All of the patient's questions and concerns were addressed. Patient understands to avoid pregnancy while on therapy due to potential birth defects. Birth Control Pills Pregnancy And Lactation Text: This medication should be avoided if pregnant and for the first 30 days post-partum.

## 2025-05-08 NOTE — DIETITIAN INITIAL EVALUATION ADULT - ETIOLOGY
need for educational review on the diet advancement process and specific nutrient needs status post RYGB

## 2025-05-08 NOTE — DISCHARGE NOTE PROVIDER - NSDCFUSCHEDAPPT_GEN_ALL_CORE_FT
Marciano Wong  [facility] Physician Formerly [facility]  BARIATRIC WREN [address]  Scheduled Appointment: 05/29/2025    Pauline Yap  [facility] Physician Formerly [facility]  UROLOGY [address]  Scheduled Appointment: 07/23/2025

## 2025-05-08 NOTE — H&P ADULT - PROBLEM SELECTOR PROBLEM 5
- increase ferrous sulfate to daily and then 3 nights a week.  - will leave synthroid at current dose and recheck in 1 month     - recheck iron and complete blood count in 3 months.    Health Maintenance Recommendations  Exercise   I generally recommend that people of all ages try to get 150 minutes of physical activity per week and it doesn’t matter how this totals up, in other words 30 minutes 5 days per week is as good as 50 minutes 3 days a week and so on.    The level of activity should be such that it is able to get your heart rate up to 100 or more, for example a brisk walk should achieve this rate.   Dietary Recommendations  In terms of diet, I generally recommend trying to eat a healthy well balanced diet full of fruits and vegetables. Avoid carbonated drinks and fruit juices and limit your alcohol use.   Avoid processed foods wherever possible (anything that comes in a can or a box) which can be achieved by sticking to the outside walls of the grocery store where generally you will find fresh fruits/vegetables, meats, dairy, and frozen foods.    Try to avoid starches in the diet where possible and minimize bread, rice, potatoes, and pasta in the diet.  Specifically try to avoid gluten, which even in people that don’t have a [place] allergy, causes havoc in the small intestine and alters absorption of nutrients which can in turn lead to obesity.   Sleep  Try to achieve a regular sleep schedule, waking and laying down at the same time each night.  Most people need 7 hours per night plus or minus 2 hours.    You will know that you’re getting enough because you will wake feeling refreshed and not need to sleep in to catch up on weekends.   Skin Care  Make sure that you don’t neglect your skin.    Play it safe in the sun. Use a sunblock on all of your exposed skin.   The sunblock should be broad spectrum and water resistant.    I do recommend an SPF 30 or higher sun screen any time that you plan to be in the sun 
Transition of care performed with sharing of clinical summary

## 2025-05-08 NOTE — DISCHARGE NOTE PROVIDER - NSDCMRMEDTOKEN_GEN_ALL_CORE_FT
Abilify 10 mg oral tablet: 1 tab(s) orally once a day  Cymbalta 30 mg oral delayed release capsule: 1 cap(s) orally once a day  Lyrica: 200 milligram(s) orally 3 times a day  naproxen 500 mg oral delayed release tablet: 1 tab(s) orally 2 times a day  omeprazole 20 mg oral delayed release tablet: 1 tab(s) orally once a day  oxyCODONE 7.5 mg oral tablet: 1 tab(s) orally 3 times a day  pantoprazole 40 mg oral delayed release tablet: 1 tab(s) orally 2 times a day   venlafaxine 75 mg oral capsule, extended release: 1 cap(s) orally once a day   Abilify 10 mg oral tablet: 1 tab(s) orally once a day  acetaminophen 650 mg/20.3 mL oral liquid: 20.3 milliliter(s) orally every 6 hours  Cymbalta 30 mg oral delayed release capsule: 1 cap(s) orally once a day  Eliquis 2.5 mg oral tablet: 1 tab(s) orally 2 times a day Please start this medication on post op day 3 (5/10/25)  Levsin SL 0.125 mg sublingual tablet: 1 tab(s) sublingually every 6 hours  Lyrica: 200 milligram(s) orally 3 times a day  naproxen 500 mg oral delayed release tablet: 1 tab(s) orally 2 times a day  omeprazole 20 mg oral delayed release tablet: 1 tab(s) orally once a day  omeprazole 40 mg oral delayed release capsule: 1 cap(s) orally once a day Please open capsule and empty contents into water  ondansetron 4 mg oral tablet, disintegratin tab(s) orally every 6 hours as needed for  nausea  oxyCODONE 7.5 mg oral tablet: 1 tab(s) orally 3 times a day  pantoprazole 40 mg oral delayed release tablet: 1 tab(s) orally 2 times a day   venlafaxine 75 mg oral capsule, extended release: 1 cap(s) orally once a day   Abilify 10 mg oral tablet: 1 tab(s) orally once a day  acetaminophen 650 mg/20.3 mL oral liquid: 20.3 milliliter(s) orally every 6 hours  Cymbalta 30 mg oral delayed release capsule: 1 cap(s) orally once a day  Eliquis 2.5 mg oral tablet: 1 tab(s) orally 2 times a day Please start this medication on post op day 3 (5/10/25)  Levsin SL 0.125 mg sublingual tablet: 1 tab(s) sublingually every 6 hours  Lyrica: 200 milligram(s) orally 3 times a day  naproxen 500 mg oral delayed release tablet: 1 tab(s) orally 2 times a day  omeprazole 40 mg oral delayed release capsule: 1 cap(s) orally once a day Please open capsule and empty contents into water  ondansetron 4 mg oral tablet, disintegratin tab(s) orally every 6 hours as needed for  nausea  oxyCODONE 5 mg/5 mL oral solution: 5 milliliter(s) orally every 6 hours as needed for  severe pain MDD: 20 mL  venlafaxine 75 mg oral capsule, extended release: 1 cap(s) orally once a day

## 2025-05-08 NOTE — DIETITIAN INITIAL EVALUATION ADULT - OTHER INFO
49F with PMHx of fibromyalgia, anesmia, morbid obesity and pshx of cholecysectomy, hysterectomy, gastric bypass (2009) complicated by intussusception of JJ anastomosis 2015, overdistention of JJ s/p resection in (2017), and SBO status post pelvic GENI in (2018) EGD TOR (1/9/2023) now status post RA lap revision of gastric bypass (5/7).    Pt seen on 9WO for assessment, pt resting in bed. On Bariatric Clear Liquids (BARICLLIQ) diet, tolerating PO. Pt had sips of water out of the clear, 30cc cups. Nausea well controlled, notes pain at surgical site - MD notified per pt, pain regimen ordered. Discussed volumes of various cup sizes on tray table and encouraged aiming for 4 oz/hr as tolerated. Prepared with protein shakes, with plan to get vitamins after discharge. RD provided in-depth education on diet advancement and specific nutrient needs status-post X. Pt reports allergies to apple, peach, pear, and cherry - RD updated chart. No dietary restrictions at home. Skin: surgical incisions noted. Labs reviewed: electrolytes WNL, BUN/Cr WNL. Pt's wt on admission 238 pounds / 108.1 kilograms, pt's ideal body weight 105 pounds / 47.7 kilograms; ideal body weight utilized for nutrient calculations. RD observed pt with no overt signs of muscle or fat wasting. Based on ASPEN guidelines, pt does not meet criteria for malnutrition at this time. RD to remain available.

## 2025-05-08 NOTE — DISCHARGE NOTE PROVIDER - HOSPITAL COURSE
49F with PMHx of fibromyalgia, anesmia, morbid obesity and pshx of cholecysectomy, hysterectomy, gastric bypass (2009) complicated by intussusception of JJ anastomosis 2015, overdistention of JJ s/p resection in (2017), and SBO s/p pelvic GENI in (2018) EGD TOR (1/9/2023), presenting for bariatric surgery. Now s/p RA lap revision of gastric bypass (5/7). Patient's post-operative course was uncomplicated. Diet was advanced as tolerated and pain was well controlled on medication. On day of discharge, pt deemed stable and ready to return home with plan to follow up as an outpatient.     **incomplete   49F with PMHx of fibromyalgia, anesmia, morbid obesity and pshx of cholecysectomy, hysterectomy, gastric bypass (2009) complicated by intussusception of JJ anastomosis 2015, overdistention of JJ s/p resection in (2017), and SBO s/p pelvic GENI in (2018) EGD TOR (1/9/2023), presenting for bariatric surgery. Now s/p RA lap revision of gastric bypass (5/7). Patient's post-operative course was uncomplicated. Diet was advanced as tolerated and pain was well controlled on medication. On day of discharge, pt deemed stable and ready to return home with plan to follow up as an outpatient.

## 2025-05-08 NOTE — DISCHARGE NOTE PROVIDER - DETAILS OF MALNUTRITION DIAGNOSIS/DIAGNOSES
Received request via: Pharmacy    Was the patient seen in the last year in this department? No  6/8/21  Does the patient have an active prescription (recently filled or refills available) for medication(s) requested? No  
This patient has been assessed with a concern for Malnutrition and was treated during this hospitalization for the following Nutrition diagnosis/diagnoses:     -  05/08/2025: Morbid obesity (BMI > 40)

## 2025-05-08 NOTE — DISCHARGE NOTE PROVIDER - NSDCFUADDINST_GEN_ALL_CORE_FT
May shower, NO baths or swimming. Keep incision sites clean & dry.  Do not remove Steri- strips; they will fall off after a few showers.   No heavy lifting  >10 pounds or strenuous exercise.   Diet: Bariatric Full Fluids. 60 grams protein daily. 64 fluid ounces water daily. Drink small sips throughout the day not to fall behind. Follow all instructions provided by Dr. Wong's office.  Call MD if increase in abdominal pain, nausea, vomiting, fever (temperature <101.4F), or chills  Follow up in 2 weeks in Dr. Wong's office. Call number listed to schedule appointment.    Resume home medications. If lightheaded/dizzy hold medications. Follow up with your PCP/internist in 3-4 weeks to review medications.  Follow up with your PCP/internist.  Please take Tylenol 650mg every 6 hours as needed for pain.  Please take zofran 4mg every 6 hours as needed for nausea and vomiting.  Please take levsin (hyoscyamine) 0.125mg sublingual four times per day to assist with cramping.  Please take Omeprazole 40mg every day for 30 days.  Please begin taking Eliquis 2.5mg every 12 hours starting on 12/22/22 for 21 days. May shower, NO baths or swimming. Keep incision sites clean & dry.  No heavy lifting  >10 pounds or strenuous exercise.   Diet: Bariatric Full Fluids. 60 grams protein daily. 64 fluid ounces water daily. Drink small sips throughout the day not to fall behind. Follow all instructions provided by Dr. Wong's office.  Call MD if increase in abdominal pain, nausea, vomiting, fever (temperature <101.4F), or chills  Follow up in 2 weeks in Dr. Wong's office. Call number listed to schedule appointment.    Resume home medications. If lightheaded/dizzy hold medications. Follow up with your PCP/internist in 3-4 weeks to review medications.  Follow up with your PCP/internist.  Please take Tylenol liquid 650mg every 6 hours as needed for pain.  Please take zofran 4mg every 6 hours as needed for nausea and vomiting.  Please take levsin (hyoscyamine) 0.125mg sublingual four times per day to assist with cramping.  Please take Omeprazole 40mg every day for 30 days. Please open capsule and empty contents into water.   Please begin taking Eliquis 2.5mg every 12 hours starting on 5/10/25 for 21 days. May shower, NO baths or swimming. Keep incision sites clean & dry.  No heavy lifting  >10 pounds or strenuous exercise.   Diet: Bariatric Full Fluids. 60 grams protein daily. 64 fluid ounces water daily. Drink small sips throughout the day not to fall behind. Follow all instructions provided by Dr. Wong's office.  Call MD if increase in abdominal pain, nausea, vomiting, fever (temperature <101.4F), or chills  Follow up in 2 weeks in Dr. Wong's office. Call number listed to schedule appointment.    Resume home medications. If lightheaded/dizzy hold medications. Follow up with your PCP/internist in 3-4 weeks to review medications.  Follow up with your PCP/internist.  Please take Tylenol liquid 650mg every 6 hours as needed for pain.  Please take Oxycodone liquid 5 mg/5 mL, 5 mL every 6 hours as needed for severe pain. Do not exceed 20 mL over 24 hours.   Please take zofran 4mg every 6 hours as needed for nausea and vomiting.  Please take levsin (hyoscyamine) 0.125mg sublingual four times per day to assist with cramping.  Please take Omeprazole 40mg every day for 30 days. Please open capsule and empty contents into water.   Please begin taking Eliquis 2.5mg every 12 hours starting on 5/10/25 for 21 days.

## 2025-05-08 NOTE — DIETITIAN INITIAL EVALUATION ADULT - ADD RECOMMEND
1. Continue Bariatric Clear Liquid diet.  2. When medically feasible, recommend advance to Bariatric Full Liquid Diet.   3. Encourage adequate hydration with goal of 4oz/hr and/or 64 oz/day.   4. Monitor BMP, BG, and electrolytes, replete prn.

## 2025-05-08 NOTE — DIETITIAN INITIAL EVALUATION ADULT - OTHER CALCULATIONS
Above energy needs calculated for wt maintenance.  Weeks 1-2 estimated needs: 476-571kcal/day (10-12kcal/kg), 57-71g pro/day (1.2-1.5g/kg), >/=64oz clear fluids.

## 2025-05-08 NOTE — PROGRESS NOTE ADULT - SUBJECTIVE AND OBJECTIVE BOX
INTERVAL HPI/OVERNIGHT EVENTS: 6PM Hgb 10.5 (12.4). pTOV. Req 4L NC. Drinking 4 cups per hour. Oxys increased given chronic home use. Lidocaine patch.    STATUS POST:  RA lap revision of gastric bypass      POST OPERATIVE DAY #: 1    SUBJECTIVE: Patient seen at the bedside by the team today. Patient lying in bed resting comfortably in NAD. Patient reports abdominal pain. Shes tolerating liquids. -N/-V.           Vital Signs Last 24 Hrs  T(C): 37.3 (08 May 2025 04:27), Max: 37.3 (08 May 2025 04:27)  T(F): 99.1 (08 May 2025 04:27), Max: 99.1 (08 May 2025 04:27)  HR: 64 (08 May 2025 05:02) (64 - 84)  BP: 116/59 (08 May 2025 05:02) (100/55 - 142/96)  BP(mean): 81 (08 May 2025 05:02) (72 - 88)  RR: 18 (08 May 2025 05:02) (13 - 20)  SpO2: 94% (08 May 2025 05:02) (91% - 97%)    Parameters below as of 08 May 2025 05:02  Patient On (Oxygen Delivery Method): nasal cannula  O2 Flow (L/min): 4    I&O's Detail    07 May 2025 07:01  -  08 May 2025 07:00  --------------------------------------------------------  IN:    IV PiggyBack: 100 mL    IV PiggyBack: 100 mL    Lactated Ringers: 2660 mL    Oral Fluid: 760 mL  Total IN: 3620 mL    OUT:    Voided (mL): 2000 mL  Total OUT: 2000 mL    Total NET: 1620 mL          General: NAD, resting comfortably in bed  C/V: NSR  Pulm: Nonlabored breathing, no respiratory distress  Abd: soft, TTP around incisions, mildly distended, no rebound or guarding, ecchymosis around incision sites, incisions C/D/I  Extrem: WWP, no edema, SCDs in place      LABS:                        10.5   14.24 )-----------( 298      ( 07 May 2025 18:36 )             32.1                 RADIOLOGY & ADDITIONAL STUDIES:   INTERVAL HPI/OVERNIGHT EVENTS: 6PM Hgb 10.5 (12.4). pTOV. Req 4L NC. Drinking 4 cups per hour. Oxys increased given chronic home use. Lidocaine patch.    STATUS POST:  RA lap revision of gastric bypass      POST OPERATIVE DAY #: 1    SUBJECTIVE: Patient seen at the bedside by the team today. Patient sitting in chair resting comfortably in NAD. Patient reports abdominal pain. Shes tolerating liquids. -N/-V.           Vital Signs Last 24 Hrs  T(C): 37.3 (08 May 2025 04:27), Max: 37.3 (08 May 2025 04:27)  T(F): 99.1 (08 May 2025 04:27), Max: 99.1 (08 May 2025 04:27)  HR: 64 (08 May 2025 05:02) (64 - 84)  BP: 116/59 (08 May 2025 05:02) (100/55 - 142/96)  BP(mean): 81 (08 May 2025 05:02) (72 - 88)  RR: 18 (08 May 2025 05:02) (13 - 20)  SpO2: 94% (08 May 2025 05:02) (91% - 97%)    Parameters below as of 08 May 2025 05:02  Patient On (Oxygen Delivery Method): nasal cannula  O2 Flow (L/min): 4    I&O's Detail    07 May 2025 07:01  -  08 May 2025 07:00  --------------------------------------------------------  IN:    IV PiggyBack: 100 mL    IV PiggyBack: 100 mL    Lactated Ringers: 2660 mL    Oral Fluid: 760 mL  Total IN: 3620 mL    OUT:    Voided (mL): 2000 mL  Total OUT: 2000 mL    Total NET: 1620 mL          General: NAD, resting comfortably in bed  C/V: NSR  Pulm: Nonlabored breathing, no respiratory distress  Abd: soft, TTP around incisions, mildly distended, no rebound or guarding, ecchymosis around incision sites, incisions C/D/I  Extrem: WWP, no edema, SCDs in place      LABS:                        10.5   14.24 )-----------( 298      ( 07 May 2025 18:36 )             32.1                 RADIOLOGY & ADDITIONAL STUDIES:

## 2025-05-08 NOTE — DIETITIAN INITIAL EVALUATION ADULT - PERTINENT LABORATORY DATA
05-08    139  |  103  |  10  ----------------------------<  135[H]  4.0   |  22  |  0.51    Ca    8.8      08 May 2025 06:50  Phos  2.9     05-08  Mg     1.9     05-08

## 2025-05-08 NOTE — DISCHARGE NOTE PROVIDER - CARE PROVIDER_API CALL
Marciano Wong  Surgery  [address] [place] [id number]  Phone: [phone]  Fax: [phone]  Follow Up Time:

## 2025-05-09 ENCOUNTER — TRANSCRIPTION ENCOUNTER (OUTPATIENT)
Age: 50
End: 2025-05-09

## 2025-05-09 VITALS — TEMPERATURE: 98 F

## 2025-05-09 LAB
ANION GAP SERPL CALC-SCNC: 11 MMOL/L — SIGNIFICANT CHANGE UP (ref 5–17)
BUN SERPL-MCNC: 8 MG/DL — SIGNIFICANT CHANGE UP (ref 7–23)
CALCIUM SERPL-MCNC: 8.7 MG/DL — SIGNIFICANT CHANGE UP (ref 8.4–10.5)
CHLORIDE SERPL-SCNC: 108 MMOL/L — SIGNIFICANT CHANGE UP (ref 96–108)
CO2 SERPL-SCNC: 25 MMOL/L — SIGNIFICANT CHANGE UP (ref 22–31)
CREAT SERPL-MCNC: 0.55 MG/DL — SIGNIFICANT CHANGE UP (ref 0.5–1.3)
EGFR: 112 ML/MIN/1.73M2 — SIGNIFICANT CHANGE UP
EGFR: 112 ML/MIN/1.73M2 — SIGNIFICANT CHANGE UP
GLUCOSE SERPL-MCNC: 124 MG/DL — HIGH (ref 70–99)
HCT VFR BLD CALC: 27 % — LOW (ref 34.5–45)
HGB BLD-MCNC: 8.6 G/DL — LOW (ref 11.5–15.5)
MAGNESIUM SERPL-MCNC: 2 MG/DL — SIGNIFICANT CHANGE UP (ref 1.6–2.6)
MCHC RBC-ENTMCNC: 29.7 PG — SIGNIFICANT CHANGE UP (ref 27–34)
MCHC RBC-ENTMCNC: 31.9 G/DL — LOW (ref 32–36)
MCV RBC AUTO: 93.1 FL — SIGNIFICANT CHANGE UP (ref 80–100)
NRBC BLD AUTO-RTO: 0 /100 WBCS — SIGNIFICANT CHANGE UP (ref 0–0)
PHOSPHATE SERPL-MCNC: 2.7 MG/DL — SIGNIFICANT CHANGE UP (ref 2.5–4.5)
PLATELET # BLD AUTO: 255 K/UL — SIGNIFICANT CHANGE UP (ref 150–400)
POTASSIUM SERPL-MCNC: 3.7 MMOL/L — SIGNIFICANT CHANGE UP (ref 3.5–5.3)
POTASSIUM SERPL-SCNC: 3.7 MMOL/L — SIGNIFICANT CHANGE UP (ref 3.5–5.3)
RBC # BLD: 2.9 M/UL — LOW (ref 3.8–5.2)
RBC # FLD: 13.4 % — SIGNIFICANT CHANGE UP (ref 10.3–14.5)
SODIUM SERPL-SCNC: 144 MMOL/L — SIGNIFICANT CHANGE UP (ref 135–145)
WBC # BLD: 10.24 K/UL — SIGNIFICANT CHANGE UP (ref 3.8–10.5)
WBC # FLD AUTO: 10.24 K/UL — SIGNIFICANT CHANGE UP (ref 3.8–10.5)

## 2025-05-09 PROCEDURE — 88307 TISSUE EXAM BY PATHOLOGIST: CPT

## 2025-05-09 PROCEDURE — 84100 ASSAY OF PHOSPHORUS: CPT

## 2025-05-09 PROCEDURE — 76700 US EXAM ABDOM COMPLETE: CPT

## 2025-05-09 PROCEDURE — 86923 COMPATIBILITY TEST ELECTRIC: CPT

## 2025-05-09 PROCEDURE — 86900 BLOOD TYPING SEROLOGIC ABO: CPT

## 2025-05-09 PROCEDURE — C1889: CPT

## 2025-05-09 PROCEDURE — 86850 RBC ANTIBODY SCREEN: CPT

## 2025-05-09 PROCEDURE — 85025 COMPLETE CBC W/AUTO DIFF WBC: CPT

## 2025-05-09 PROCEDURE — 86901 BLOOD TYPING SEROLOGIC RH(D): CPT

## 2025-05-09 PROCEDURE — C9399: CPT

## 2025-05-09 PROCEDURE — 36415 COLL VENOUS BLD VENIPUNCTURE: CPT

## 2025-05-09 PROCEDURE — 85027 COMPLETE CBC AUTOMATED: CPT

## 2025-05-09 PROCEDURE — 83735 ASSAY OF MAGNESIUM: CPT

## 2025-05-09 PROCEDURE — 80048 BASIC METABOLIC PNL TOTAL CA: CPT

## 2025-05-09 PROCEDURE — S2900: CPT

## 2025-05-09 RX ORDER — NALOXONE HYDROCHLORIDE 0.4 MG/ML
1 INJECTION, SOLUTION INTRAMUSCULAR; INTRAVENOUS; SUBCUTANEOUS
Qty: 1 | Refills: 0
Start: 2025-05-09

## 2025-05-09 RX ORDER — POTASSIUM PHOSPHATE, MONOBASIC POTASSIUM PHOSPHATE, DIBASIC INJECTION, 236; 224 MG/ML; MG/ML
15 SOLUTION, CONCENTRATE INTRAVENOUS ONCE
Refills: 0 | Status: COMPLETED | OUTPATIENT
Start: 2025-05-09 | End: 2025-05-09

## 2025-05-09 RX ORDER — OXYCODONE HYDROCHLORIDE 30 MG/1
5 TABLET ORAL
Qty: 60 | Refills: 0
Start: 2025-05-09 | End: 2025-05-11

## 2025-05-09 RX ADMIN — Medication 1000 MILLIGRAM(S): at 05:37

## 2025-05-09 RX ADMIN — Medication 100 MILLIEQUIVALENT(S): at 07:36

## 2025-05-09 RX ADMIN — OXYCODONE HYDROCHLORIDE 5 MILLIGRAM(S): 30 TABLET ORAL at 13:29

## 2025-05-09 RX ADMIN — PREGABALIN 200 MILLIGRAM(S): 75 CAPSULE ORAL at 13:30

## 2025-05-09 RX ADMIN — POTASSIUM PHOSPHATE, MONOBASIC POTASSIUM PHOSPHATE, DIBASIC INJECTION, 62.5 MILLIMOLE(S): 236; 224 SOLUTION, CONCENTRATE INTRAVENOUS at 11:24

## 2025-05-09 RX ADMIN — Medication 1000 MILLIGRAM(S): at 13:00

## 2025-05-09 RX ADMIN — Medication 1000 MILLIGRAM(S): at 06:10

## 2025-05-09 RX ADMIN — Medication 1000 MILLIGRAM(S): at 00:19

## 2025-05-09 RX ADMIN — ENOXAPARIN SODIUM 40 MILLIGRAM(S): 100 INJECTION SUBCUTANEOUS at 10:11

## 2025-05-09 RX ADMIN — Medication 100 MILLIEQUIVALENT(S): at 08:51

## 2025-05-09 RX ADMIN — Medication 1000 MILLIGRAM(S): at 11:50

## 2025-05-09 RX ADMIN — PREGABALIN 200 MILLIGRAM(S): 75 CAPSULE ORAL at 05:37

## 2025-05-09 RX ADMIN — OXYCODONE HYDROCHLORIDE 10 MILLIGRAM(S): 30 TABLET ORAL at 11:30

## 2025-05-09 RX ADMIN — SCOPOLAMINE 1 PATCH: 1 PATCH, EXTENDED RELEASE TRANSDERMAL at 06:10

## 2025-05-09 RX ADMIN — VENLAFAXINE HYDROCHLORIDE 75 MILLIGRAM(S): 37.5 CAPSULE, EXTENDED RELEASE ORAL at 11:50

## 2025-05-09 RX ADMIN — ARIPIPRAZOLE 10 MILLIGRAM(S): 2 TABLET ORAL at 11:50

## 2025-05-09 RX ADMIN — OXYCODONE HYDROCHLORIDE 5 MILLIGRAM(S): 30 TABLET ORAL at 14:50

## 2025-05-09 RX ADMIN — OXYCODONE HYDROCHLORIDE 10 MILLIGRAM(S): 30 TABLET ORAL at 10:10

## 2025-05-09 RX ADMIN — DULOXETINE 30 MILLIGRAM(S): 20 CAPSULE, DELAYED RELEASE ORAL at 11:50

## 2025-05-09 RX ADMIN — Medication 100 MILLIEQUIVALENT(S): at 10:10

## 2025-05-09 RX ADMIN — Medication 40 MILLIGRAM(S): at 11:50

## 2025-05-09 NOTE — DISCHARGE NOTE NURSING/CASE MANAGEMENT/SOCIAL WORK - FINANCIAL ASSISTANCE
[facility] provides services at a reduced cost to those who are determined to be eligible through [facility]’s financial assistance program. Information regarding [facility]’s financial assistance program can be found by going to [email or web address] or by calling [phone].

## 2025-05-09 NOTE — PROGRESS NOTE ADULT - NUTRITIONAL ASSESSMENT
This patient has been assessed with a concern for Malnutrition and has been determined to have a diagnosis/diagnoses of Morbid obesity (BMI > 40).    This patient is being managed with:   Diet Clear Liquid-  Bariatric Clear Liquid (BARICLLIQ)  Entered: May  7 2025  1:18PM

## 2025-05-09 NOTE — PROGRESS NOTE ADULT - ASSESSMENT
49F with PMHx of fibromyalgia, anesmia, morbid obesity and pshx of cholecysectomy, hysterectomy, gastric bypass (2009) complicated by intussusception of JJ anastomosis 2015, overdistention of JJ s/p resection in (2017), and SBO s/p pelvic GENI in (2018) EGD TOR (1/9/2023) now s/p RA lap revision of gastric bypass    BCLD/IVF  pain/nausea control  OOBA/SCD/IS  AM labs  thiamine  TXA
49F with PMHx of fibromyalgia, anesmia, morbid obesity and pshx of cholecysectomy, hysterectomy, gastric bypass (2009) complicated by intussusception of JJ anastomosis 2015, overdistention of JJ s/p resection in (2017), and SBO s/p pelvic GENI in (2018) EGD TOR (1/9/2023) now s/p RA lap revision of gastric bypass (5/7).    BCLD/IVF  pain/nausea control  OOBA/SCD/IS  AM labs  thiamine  TXA  Nutrition consult  Home meds as appropriate  AM labs
49F with PMHx of fibromyalgia, anesmia, morbid obesity and pshx of cholecysectomy, hysterectomy, gastric bypass (2009) complicated by intussusception of JJ anastomosis 2015, overdistention of JJ s/p resection in (2017), and SBO s/p pelvic GENI in (2018) EGD TOR (1/9/2023) now s/p RA lap revision of gastric bypass (5/7).    BCLD/IVF  pain/nausea control  OOBA/SCD/IS  AM labs  Lovenox  thiamine  TXA  Nutrition consult  Home meds as appropriate  AM labs

## 2025-05-09 NOTE — DISCHARGE NOTE NURSING/CASE MANAGEMENT/SOCIAL WORK - PATIENT PORTAL LINK FT
You can access the FollowMyHealth Patient Portal offered by [facility] by registering at the following website: [email or web address]. By joining [facility]’s FollowMyHealth portal, you will also be able to view your health information using other applications (apps) compatible with our system.

## 2025-05-09 NOTE — PROGRESS NOTE ADULT - SUBJECTIVE AND OBJECTIVE BOX
INTERVAL HPI/OVERNIGHT EVENTS: mayi liquids. -n/v. weaned to 1L NC.     STATUS POST:  RA lap revision of gastric bypass     POST OPERATIVE DAY #: 2    SUBJECTIVE:      enoxaparin Injectable 40 milliGRAM(s) SubCutaneous every 12 hours      Vital Signs Last 24 Hrs  T(C): 36.6 (09 May 2025 04:40), Max: 36.8 (08 May 2025 08:45)  T(F): 97.9 (09 May 2025 04:40), Max: 98.2 (08 May 2025 08:45)  HR: 56 (09 May 2025 04:00) (56 - 68)  BP: 103/57 (09 May 2025 04:00) (103/57 - 115/59)  BP(mean): 77 (09 May 2025 04:00) (76 - 83)  RR: 16 (09 May 2025 04:00) (16 - 18)  SpO2: 94% (09 May 2025 04:00) (91% - 94%)    Parameters below as of 09 May 2025 04:00  Patient On (Oxygen Delivery Method): nasal cannula  O2 Flow (L/min): 1    I&O's Detail    07 May 2025 07:01  -  08 May 2025 07:00  --------------------------------------------------------  IN:    IV PiggyBack: 100 mL    IV PiggyBack: 100 mL    Lactated Ringers: 2660 mL    Oral Fluid: 760 mL  Total IN: 3620 mL    OUT:    Voided (mL): 2000 mL  Total OUT: 2000 mL    Total NET: 1620 mL      08 May 2025 07:01  -  09 May 2025 06:28  --------------------------------------------------------  IN:    IV PiggyBack: 100 mL    Lactated Ringers: 1477 mL    Oral Fluid: 350 mL  Total IN: 1927 mL    OUT:    Voided (mL): 2800 mL  Total OUT: 2800 mL    Total NET: -873 mL          General: NAD, resting comfortably in bed  C/V: NSR  Pulm: Nonlabored breathing, no respiratory distress  Abd: soft, NT/ND.  Extrem: WWP, no edema, SCDs in place  Drains:  Casey:      LABS:                        8.6    10.24 )-----------( 255      ( 09 May 2025 06:07 )             27.0     05-08    139  |  103  |  10  ----------------------------<  135[H]  4.0   |  22  |  0.51    Ca    8.8      08 May 2025 06:50  Phos  2.9     05-08  Mg     1.9     05-08        Urinalysis Basic - ( 08 May 2025 06:50 )    Color: x / Appearance: x / SG: x / pH: x  Gluc: 135 mg/dL / Ketone: x  / Bili: x / Urobili: x   Blood: x / Protein: x / Nitrite: x   Leuk Esterase: x / RBC: x / WBC x   Sq Epi: x / Non Sq Epi: x / Bacteria: x        RADIOLOGY & ADDITIONAL STUDIES:   INTERVAL HPI/OVERNIGHT EVENTS: mayi liquids. -n/v. weaned to 1L NC.     STATUS POST:  RA lap revision of gastric bypass     POST OPERATIVE DAY #: 2    SUBJECTIVE: Patient seen at the bedside by the team today. Patient lying in bed resting comfortably in NAD. Reports abdominal pain. -SOB/-CP. Was out of bed to chair yesterday and doing IS.       enoxaparin Injectable 40 milliGRAM(s) SubCutaneous every 12 hours      Vital Signs Last 24 Hrs  T(C): 36.6 (09 May 2025 04:40), Max: 36.8 (08 May 2025 08:45)  T(F): 97.9 (09 May 2025 04:40), Max: 98.2 (08 May 2025 08:45)  HR: 56 (09 May 2025 04:00) (56 - 68)  BP: 103/57 (09 May 2025 04:00) (103/57 - 115/59)  BP(mean): 77 (09 May 2025 04:00) (76 - 83)  RR: 16 (09 May 2025 04:00) (16 - 18)  SpO2: 94% (09 May 2025 04:00) (91% - 94%)    Parameters below as of 09 May 2025 04:00  Patient On (Oxygen Delivery Method): nasal cannula  O2 Flow (L/min): 1    I&O's Detail    07 May 2025 07:01  -  08 May 2025 07:00  --------------------------------------------------------  IN:    IV PiggyBack: 100 mL    IV PiggyBack: 100 mL    Lactated Ringers: 2660 mL    Oral Fluid: 760 mL  Total IN: 3620 mL    OUT:    Voided (mL): 2000 mL  Total OUT: 2000 mL    Total NET: 1620 mL      08 May 2025 07:01  -  09 May 2025 06:28  --------------------------------------------------------  IN:    IV PiggyBack: 100 mL    Lactated Ringers: 1477 mL    Oral Fluid: 350 mL  Total IN: 1927 mL    OUT:    Voided (mL): 2800 mL  Total OUT: 2800 mL    Total NET: -873 mL          General: NAD, resting comfortably in bed  C/V: NSR  Pulm: Nonlabored breathing, no respiratory distress  Abd: soft, mildly TTP around incision sites, mildly distended, no rebound or guarding  Extrem: WWP, no edema, SCDs in place  Drains:  Peña:      LABS:                        8.6    10.24 )-----------( 255      ( 09 May 2025 06:07 )             27.0     05-08    139  |  103  |  10  ----------------------------<  135[H]  4.0   |  22  |  0.51    Ca    8.8      08 May 2025 06:50  Phos  2.9     05-08  Mg     1.9     05-08        Urinalysis Basic - ( 08 May 2025 06:50 )    Color: x / Appearance: x / SG: x / pH: x  Gluc: 135 mg/dL / Ketone: x  / Bili: x / Urobili: x   Blood: x / Protein: x / Nitrite: x   Leuk Esterase: x / RBC: x / WBC x   Sq Epi: x / Non Sq Epi: x / Bacteria: x        RADIOLOGY & ADDITIONAL STUDIES:

## 2025-05-12 ENCOUNTER — NON-APPOINTMENT (OUTPATIENT)
Age: 50
End: 2025-05-12

## 2025-05-17 DIAGNOSIS — K91.89 OTHER POSTPROCEDURAL COMPLICATIONS AND DISORDERS OF DIGESTIVE SYSTEM: ICD-10-CM
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
## 2025-05-17 DIAGNOSIS — E66.01 MORBID (SEVERE) OBESITY DUE TO EXCESS CALORIES: ICD-10-CM

## 2025-05-17 DIAGNOSIS — M79.7 FIBROMYALGIA: ICD-10-CM

## 2025-05-17 DIAGNOSIS — D64.9 ANEMIA, UNSPECIFIED: ICD-10-CM

## 2025-05-21 ENCOUNTER — APPOINTMENT (OUTPATIENT)
Dept: BARIATRICS | Facility: CLINIC | Age: 50
End: 2025-05-21
Payer: MEDICAID

## 2025-05-21 VITALS
DIASTOLIC BLOOD PRESSURE: 72 MMHG | WEIGHT: 224 LBS | HEART RATE: 71 BPM | TEMPERATURE: 98.1 F | SYSTOLIC BLOOD PRESSURE: 110 MMHG | BODY MASS INDEX: 42.29 KG/M2 | HEIGHT: 61 IN | OXYGEN SATURATION: 100 %

## 2025-05-21 DIAGNOSIS — Z98.84 BARIATRIC SURGERY STATUS: ICD-10-CM

## 2025-05-21 PROCEDURE — 99024 POSTOP FOLLOW-UP VISIT: CPT

## 2025-06-04 ENCOUNTER — APPOINTMENT (OUTPATIENT)
Dept: BARIATRICS | Facility: CLINIC | Age: 50
End: 2025-06-04

## 2025-06-05 ENCOUNTER — APPOINTMENT (OUTPATIENT)
Dept: BARIATRICS | Facility: CLINIC | Age: 50
End: 2025-06-05
Payer: MEDICAID

## 2025-06-05 VITALS
BODY MASS INDEX: 40.97 KG/M2 | HEART RATE: 70 BPM | SYSTOLIC BLOOD PRESSURE: 113 MMHG | OXYGEN SATURATION: 98 % | DIASTOLIC BLOOD PRESSURE: 76 MMHG | WEIGHT: 217 LBS | HEIGHT: 61 IN | TEMPERATURE: 97.9 F

## 2025-06-05 DIAGNOSIS — Z98.84 BARIATRIC SURGERY STATUS: ICD-10-CM

## 2025-06-05 PROCEDURE — 99024 POSTOP FOLLOW-UP VISIT: CPT

## 2025-07-03 ENCOUNTER — APPOINTMENT (OUTPATIENT)
Dept: BARIATRICS | Facility: CLINIC | Age: 50
End: 2025-07-03

## 2025-07-24 ENCOUNTER — APPOINTMENT (OUTPATIENT)
Dept: UROLOGY | Facility: CLINIC | Age: 50
End: 2025-07-24
Payer: MEDICAID

## 2025-07-24 VITALS
OXYGEN SATURATION: 94 % | TEMPERATURE: 97.8 F | SYSTOLIC BLOOD PRESSURE: 109 MMHG | HEART RATE: 64 BPM | DIASTOLIC BLOOD PRESSURE: 69 MMHG

## 2025-07-24 PROCEDURE — 99214 OFFICE O/P EST MOD 30 MIN: CPT

## (undated) DEVICE — DRAPE CAMERA ENDOMATE

## (undated) DEVICE — FOLEY TRAY 16FR 5CC LF UMETER CLOSED

## (undated) DEVICE — SUT VICRYL 0 27" CT-2 UNDYED

## (undated) DEVICE — FOLEY CATH 2-WAY 14FR 5CC SILICONE ELASTOMER LATEX

## (undated) DEVICE — TUBING HYBRID CO2

## (undated) DEVICE — SOL IRR BAG NS 0.9% 3000ML

## (undated) DEVICE — ORGANIZER MIO MEDICAL DEVICE DISP

## (undated) DEVICE — SUT VICRYL 2-0 27" SH UNDYED

## (undated) DEVICE — SUT MONOCRYL 4-0 27" PS-2 UNDYED

## (undated) DEVICE — DRAINAGE BAG URINARY 2L

## (undated) DEVICE — GOWN IMPERV XL

## (undated) DEVICE — DRSG PAD SANITARY OB

## (undated) DEVICE — SYR LUER LOK 20CC

## (undated) DEVICE — WARMING BLANKET UPPER ADULT

## (undated) DEVICE — SUT STRATAFIX SPIRAL PDS PLUS 2-0 10X10CM SH VIOLET

## (undated) DEVICE — PACK GENERAL LAPAROSCOPY

## (undated) DEVICE — PACK GENERAL MINOR

## (undated) DEVICE — PACK GYN WDC

## (undated) DEVICE — VENODYNE/SCD SLEEVE CALF MEDIUM

## (undated) DEVICE — TROCAR COVIDIEN VERSAONE FIXATION CANNULA 5MM

## (undated) DEVICE — XI SCISSOR TIP COVER

## (undated) DEVICE — XI VESSEL SEALER

## (undated) DEVICE — XI SEAL UNIVERSIAL 5-12MM

## (undated) DEVICE — SUT PDO 2-0 1/2 CIRCLE 26MM NDL 15CM

## (undated) DEVICE — PROBE FIAPC DIA 2.3MM/7FR LNTH 220CM/7.2FT

## (undated) DEVICE — XI DRAPE COLUMN

## (undated) DEVICE — SUT ETHIBOND 2-0 36" SH

## (undated) DEVICE — SUT VICRYL PLUS 2-0 27" CT-2 UNDYED

## (undated) DEVICE — SYR ASEPTO

## (undated) DEVICE — XI STAPLER SUREFORM 60

## (undated) DEVICE — DRAPE C ARM 41X74"

## (undated) DEVICE — TUBING ERBE CO2 OLYMPUS CONNECTOR

## (undated) DEVICE — GLV 6.5 PROTEXIS (WHITE)

## (undated) DEVICE — SUT VICRYL 3-0 8X18IN CR SH V-20 UD

## (undated) DEVICE — NDL HYPO REGULAR BEVEL 25G X 1.5" (BLUE)

## (undated) DEVICE — SUCTION YANKAUER BULBOUS TIP W VENT

## (undated) DEVICE — VAGINAL PACKING 2"

## (undated) DEVICE — ELCTR GROUNDING PAD ADULT COVIDIEN

## (undated) DEVICE — LONE STAR RETRACTOR RING 32.5CM X 18.3CM DISP

## (undated) DEVICE — DRSG MASTISOL

## (undated) DEVICE — TUBING SUCTION NONCONDUCTIVE 6MM X 12FT

## (undated) DEVICE — DRSG TEGADERM 4 X 4.75"

## (undated) DEVICE — DRAPE TOWEL BLUE 17" X 24"

## (undated) DEVICE — SUT VICRYL 0 27" CT

## (undated) DEVICE — PREP CHLORAPREP HI-LITE ORANGE 3ML

## (undated) DEVICE — TUBING SET GRAVITY 2 SPIKE

## (undated) DEVICE — DRAPE PROBE COVER 5" X 96"

## (undated) DEVICE — DRAPE C ARM C-ARMOUR

## (undated) DEVICE — NDL COUNTER DBL BLADEGUARD

## (undated) DEVICE — INSUFFLATION NDL ETHICON ENDOPATH PNEUMOPARITONEUM 150MM

## (undated) DEVICE — KIT ENDO PROCEDURE CUST W/VLV

## (undated) DEVICE — TUBING RANGER FLUID IRRIGATION SET DISP

## (undated) DEVICE — FRAZIER SUCTION TIP 8FR

## (undated) DEVICE — SOL IRR POUR H2O 500ML

## (undated) DEVICE — TROCAR COVIDIEN VERSAPORT BLADELESS OPTICAL 12MM LONG

## (undated) DEVICE — Device

## (undated) DEVICE — BLADE SCALPEL SAFETYLOCK #15

## (undated) DEVICE — TROCAR COVIDIEN VERSAPORT BLADELESS OPTICAL 12MM STANDARD

## (undated) DEVICE — GLV 7.5 PROTEXIS (WHITE)

## (undated) DEVICE — XI ENDOWRIST 12 - 8 MM CANNULA REDUCER

## (undated) DEVICE — XI OBTURATOR OPTICAL BLADELESS 8MM

## (undated) DEVICE — SUT VICRYL 1 36" CT-1 UNDYED

## (undated) DEVICE — DRAPE IRRIGATION POUCH 19X23"

## (undated) DEVICE — ELCTR BOVIE PENCIL BLADE 10FT

## (undated) DEVICE — XI DRAPE ARM

## (undated) DEVICE — DRSG STERISTRIPS 0.5 X 4"

## (undated) DEVICE — DRAPE LIGHT HANDLE COVER (GREEN)

## (undated) DEVICE — SUT OVERSTITCH ENDOSCOPIC SYS

## (undated) DEVICE — SUT OVERSTITCH CLINCH

## (undated) DEVICE — LONE STAR ELASTIC STAY HOOK 12MM BLUNT

## (undated) DEVICE — SUT SILK 2-0 24" TIES

## (undated) DEVICE — XI 12MM AND STAPLER CANNULA SEAL

## (undated) DEVICE — DRSG CURITY GAUZE SPONGE 4 X 4" 12-PLY

## (undated) DEVICE — NDL HYPO SAFE 22G X 1.5" (BLACK)

## (undated) DEVICE — ELCTR BOVIE PENCIL HANDPIECE ROCKER SWITCH 15FT

## (undated) DEVICE — SUT PDO 2-0 1/2 CIRCLE 17MM NDL 20CM

## (undated) DEVICE — POSITIONER STRAP ARMBOARD VELCRO TS-30